# Patient Record
Sex: MALE | Race: WHITE | HISPANIC OR LATINO | Employment: OTHER | URBAN - METROPOLITAN AREA
[De-identification: names, ages, dates, MRNs, and addresses within clinical notes are randomized per-mention and may not be internally consistent; named-entity substitution may affect disease eponyms.]

---

## 2017-01-18 ENCOUNTER — ALLSCRIPTS OFFICE VISIT (OUTPATIENT)
Dept: OTHER | Facility: OTHER | Age: 67
End: 2017-01-18

## 2017-02-27 ENCOUNTER — HOSPITAL ENCOUNTER (OUTPATIENT)
Facility: HOSPITAL | Age: 67
Setting detail: OBSERVATION
Discharge: HOME | End: 2017-02-28
Attending: EMERGENCY MEDICINE | Admitting: INTERNAL MEDICINE
Payer: MEDICARE

## 2017-02-27 ENCOUNTER — APPOINTMENT (EMERGENCY)
Dept: RADIOLOGY | Facility: HOSPITAL | Age: 67
End: 2017-02-27
Payer: MEDICARE

## 2017-02-27 DIAGNOSIS — R07.9 CHEST PAIN: Primary | ICD-10-CM

## 2017-02-27 PROBLEM — R79.89 ABNORMAL TSH: Status: ACTIVE | Noted: 2017-02-27

## 2017-02-27 LAB
ANION GAP SERPL CALCULATED.3IONS-SCNC: 10 MMOL/L (ref 4–13)
APTT PPP: 27 SECONDS (ref 24–36)
BASOPHILS # BLD AUTO: 0.01 THOUSANDS/ΜL (ref 0–0.1)
BASOPHILS NFR BLD AUTO: 0 % (ref 0–1)
BUN SERPL-MCNC: 15 MG/DL (ref 5–25)
CALCIUM SERPL-MCNC: 8.6 MG/DL (ref 8.3–10.1)
CHLORIDE SERPL-SCNC: 108 MMOL/L (ref 100–108)
CO2 SERPL-SCNC: 23 MMOL/L (ref 21–32)
CREAT SERPL-MCNC: 1.15 MG/DL (ref 0.6–1.3)
EOSINOPHIL # BLD AUTO: 0.13 THOUSAND/ΜL (ref 0–0.61)
EOSINOPHIL NFR BLD AUTO: 2 % (ref 0–6)
ERYTHROCYTE [DISTWIDTH] IN BLOOD BY AUTOMATED COUNT: 14.9 % (ref 11.6–15.1)
GFR SERPL CREATININE-BSD FRML MDRD: >60 ML/MIN/1.73SQ M
GLUCOSE SERPL-MCNC: 95 MG/DL (ref 65–140)
HCT VFR BLD AUTO: 37.7 % (ref 36.5–49.3)
HGB BLD-MCNC: 12.9 G/DL (ref 12–17)
INR PPP: 1.03 (ref 0.86–1.16)
LYMPHOCYTES # BLD AUTO: 2.66 THOUSANDS/ΜL (ref 0.6–4.47)
LYMPHOCYTES NFR BLD AUTO: 38 % (ref 14–44)
MCH RBC QN AUTO: 30.5 PG (ref 26.8–34.3)
MCHC RBC AUTO-ENTMCNC: 34.2 G/DL (ref 31.4–37.4)
MCV RBC AUTO: 89 FL (ref 82–98)
MONOCYTES # BLD AUTO: 0.46 THOUSAND/ΜL (ref 0.17–1.22)
MONOCYTES NFR BLD AUTO: 7 % (ref 4–12)
NEUTROPHILS # BLD AUTO: 3.82 THOUSANDS/ΜL (ref 1.85–7.62)
NEUTS SEG NFR BLD AUTO: 53 % (ref 43–75)
NRBC BLD AUTO-RTO: 0 /100 WBCS
PLATELET # BLD AUTO: 282 THOUSANDS/UL (ref 149–390)
PMV BLD AUTO: 9.8 FL (ref 8.9–12.7)
POTASSIUM SERPL-SCNC: 3.6 MMOL/L (ref 3.5–5.3)
PROTHROMBIN TIME: 13.6 SECONDS (ref 12–14.3)
RBC # BLD AUTO: 4.23 MILLION/UL (ref 3.88–5.62)
SODIUM SERPL-SCNC: 141 MMOL/L (ref 136–145)
SPECIMEN SOURCE: NORMAL
TROPONIN I BLD-MCNC: 0.01 NG/ML (ref 0–0.08)
WBC # BLD AUTO: 7.09 THOUSAND/UL (ref 4.31–10.16)

## 2017-02-27 PROCEDURE — 99285 EMERGENCY DEPT VISIT HI MDM: CPT

## 2017-02-27 PROCEDURE — 93005 ELECTROCARDIOGRAM TRACING: CPT | Performed by: EMERGENCY MEDICINE

## 2017-02-27 PROCEDURE — 71020 HB CHEST X-RAY 2VW FRONTAL&LATL: CPT

## 2017-02-27 PROCEDURE — 80048 BASIC METABOLIC PNL TOTAL CA: CPT | Performed by: EMERGENCY MEDICINE

## 2017-02-27 PROCEDURE — 84484 ASSAY OF TROPONIN QUANT: CPT

## 2017-02-27 PROCEDURE — 85610 PROTHROMBIN TIME: CPT | Performed by: EMERGENCY MEDICINE

## 2017-02-27 PROCEDURE — 96361 HYDRATE IV INFUSION ADD-ON: CPT

## 2017-02-27 PROCEDURE — 85730 THROMBOPLASTIN TIME PARTIAL: CPT | Performed by: EMERGENCY MEDICINE

## 2017-02-27 PROCEDURE — 85025 COMPLETE CBC W/AUTO DIFF WBC: CPT | Performed by: EMERGENCY MEDICINE

## 2017-02-27 PROCEDURE — 36415 COLL VENOUS BLD VENIPUNCTURE: CPT | Performed by: EMERGENCY MEDICINE

## 2017-02-27 PROCEDURE — 94760 N-INVAS EAR/PLS OXIMETRY 1: CPT

## 2017-02-27 PROCEDURE — 96374 THER/PROPH/DIAG INJ IV PUSH: CPT

## 2017-02-27 RX ORDER — ASPIRIN 81 MG/1
324 TABLET, CHEWABLE ORAL ONCE
Status: COMPLETED | OUTPATIENT
Start: 2017-02-27 | End: 2017-02-27

## 2017-02-27 RX ORDER — TAMSULOSIN HYDROCHLORIDE 0.4 MG/1
0.4 CAPSULE ORAL
Status: DISCONTINUED | OUTPATIENT
Start: 2017-02-28 | End: 2017-02-28 | Stop reason: HOSPADM

## 2017-02-27 RX ORDER — ACETAMINOPHEN 325 MG/1
650 TABLET ORAL EVERY 4 HOURS PRN
Status: DISCONTINUED | OUTPATIENT
Start: 2017-02-27 | End: 2017-02-28 | Stop reason: HOSPADM

## 2017-02-27 RX ORDER — LISINOPRIL 20 MG/1
20 TABLET ORAL DAILY
Status: DISCONTINUED | OUTPATIENT
Start: 2017-02-28 | End: 2017-02-28 | Stop reason: HOSPADM

## 2017-02-27 RX ORDER — ASPIRIN 81 MG/1
TABLET, CHEWABLE ORAL
Status: COMPLETED
Start: 2017-02-27 | End: 2017-02-27

## 2017-02-27 RX ORDER — LEVALBUTEROL TARTRATE 45 UG/1
1 AEROSOL, METERED ORAL EVERY 6 HOURS PRN
Status: DISCONTINUED | OUTPATIENT
Start: 2017-02-27 | End: 2017-02-27 | Stop reason: SDUPTHER

## 2017-02-27 RX ORDER — ALBUTEROL SULFATE 90 UG/1
2 AEROSOL, METERED RESPIRATORY (INHALATION) EVERY 4 HOURS PRN
Status: DISCONTINUED | OUTPATIENT
Start: 2017-02-27 | End: 2017-02-28 | Stop reason: HOSPADM

## 2017-02-27 RX ORDER — FINASTERIDE 5 MG/1
5 TABLET, FILM COATED ORAL DAILY
COMMUNITY
End: 2018-08-06

## 2017-02-27 RX ORDER — FINASTERIDE 5 MG/1
5 TABLET, FILM COATED ORAL DAILY
Status: DISCONTINUED | OUTPATIENT
Start: 2017-02-28 | End: 2017-02-28 | Stop reason: HOSPADM

## 2017-02-27 RX ORDER — IPRATROPIUM BROMIDE AND ALBUTEROL SULFATE 2.5; .5 MG/3ML; MG/3ML
3 SOLUTION RESPIRATORY (INHALATION) EVERY 6 HOURS PRN
Status: DISCONTINUED | OUTPATIENT
Start: 2017-02-27 | End: 2017-02-27

## 2017-02-27 RX ORDER — ASPIRIN 81 MG/1
81 TABLET, CHEWABLE ORAL DAILY
Status: DISCONTINUED | OUTPATIENT
Start: 2017-02-28 | End: 2017-02-28 | Stop reason: HOSPADM

## 2017-02-27 RX ORDER — ATORVASTATIN CALCIUM 40 MG/1
40 TABLET, FILM COATED ORAL EVERY EVENING
Status: DISCONTINUED | OUTPATIENT
Start: 2017-02-27 | End: 2017-02-28 | Stop reason: HOSPADM

## 2017-02-27 RX ORDER — NITROGLYCERIN 0.4 MG/1
0.4 TABLET SUBLINGUAL
Status: DISCONTINUED | OUTPATIENT
Start: 2017-02-27 | End: 2017-02-28 | Stop reason: HOSPADM

## 2017-02-27 RX ORDER — ONDANSETRON 2 MG/ML
4 INJECTION INTRAMUSCULAR; INTRAVENOUS EVERY 6 HOURS PRN
Status: DISCONTINUED | OUTPATIENT
Start: 2017-02-27 | End: 2017-02-28 | Stop reason: HOSPADM

## 2017-02-27 RX ORDER — NICOTINE 21 MG/24HR
1 PATCH, TRANSDERMAL 24 HOURS TRANSDERMAL DAILY
Status: DISCONTINUED | OUTPATIENT
Start: 2017-02-28 | End: 2017-02-28 | Stop reason: HOSPADM

## 2017-02-27 RX ORDER — CYCLOBENZAPRINE HCL 10 MG
10 TABLET ORAL 3 TIMES DAILY PRN
Status: DISCONTINUED | OUTPATIENT
Start: 2017-02-27 | End: 2017-02-28 | Stop reason: HOSPADM

## 2017-02-27 RX ORDER — NICOTINE 21 MG/24HR
14 PATCH, TRANSDERMAL 24 HOURS TRANSDERMAL ONCE
Status: DISCONTINUED | OUTPATIENT
Start: 2017-02-27 | End: 2017-02-27

## 2017-02-27 RX ORDER — MORPHINE SULFATE 10 MG/ML
6 INJECTION, SOLUTION INTRAMUSCULAR; INTRAVENOUS ONCE
Status: COMPLETED | OUTPATIENT
Start: 2017-02-27 | End: 2017-02-27

## 2017-02-27 RX ORDER — NITROGLYCERIN 0.4 MG/1
0.4 TABLET SUBLINGUAL ONCE
Status: COMPLETED | OUTPATIENT
Start: 2017-02-27 | End: 2017-02-27

## 2017-02-27 RX ADMIN — NITROGLYCERIN 0.4 MG: 0.4 TABLET SUBLINGUAL at 19:54

## 2017-02-27 RX ADMIN — NICOTINE 14 MG: 14 PATCH, EXTENDED RELEASE TRANSDERMAL at 21:23

## 2017-02-27 RX ADMIN — ASPIRIN 324 MG: 81 TABLET, CHEWABLE ORAL at 19:18

## 2017-02-27 RX ADMIN — SODIUM CHLORIDE 1000 ML: 0.9 INJECTION, SOLUTION INTRAVENOUS at 19:20

## 2017-02-27 RX ADMIN — MORPHINE SULFATE 6 MG: 10 INJECTION, SOLUTION INTRAMUSCULAR; INTRAVENOUS at 19:54

## 2017-02-27 RX ADMIN — SODIUM CHLORIDE 1000 ML: 0.9 INJECTION, SOLUTION INTRAVENOUS at 21:05

## 2017-02-28 ENCOUNTER — GENERIC CONVERSION - ENCOUNTER (OUTPATIENT)
Dept: OTHER | Facility: OTHER | Age: 67
End: 2017-02-28

## 2017-02-28 VITALS
RESPIRATION RATE: 18 BRPM | DIASTOLIC BLOOD PRESSURE: 55 MMHG | HEART RATE: 68 BPM | WEIGHT: 166.01 LBS | BODY MASS INDEX: 24.59 KG/M2 | TEMPERATURE: 98.1 F | OXYGEN SATURATION: 97 % | HEIGHT: 69 IN | SYSTOLIC BLOOD PRESSURE: 113 MMHG

## 2017-02-28 PROBLEM — E03.8 SUBCLINICAL HYPOTHYROIDISM: Status: ACTIVE | Noted: 2017-02-27

## 2017-02-28 LAB
ANION GAP SERPL CALCULATED.3IONS-SCNC: 6 MMOL/L (ref 4–13)
ATRIAL RATE: 125 BPM
ATRIAL RATE: 67 BPM
BUN SERPL-MCNC: 12 MG/DL (ref 5–25)
CALCIUM SERPL-MCNC: 8.4 MG/DL (ref 8.3–10.1)
CHLORIDE SERPL-SCNC: 110 MMOL/L (ref 100–108)
CO2 SERPL-SCNC: 26 MMOL/L (ref 21–32)
CREAT SERPL-MCNC: 1.11 MG/DL (ref 0.6–1.3)
ERYTHROCYTE [DISTWIDTH] IN BLOOD BY AUTOMATED COUNT: 15.1 % (ref 11.6–15.1)
GFR SERPL CREATININE-BSD FRML MDRD: >60 ML/MIN/1.73SQ M
GLUCOSE SERPL-MCNC: 97 MG/DL (ref 65–140)
HCT VFR BLD AUTO: 35.7 % (ref 36.5–49.3)
HGB BLD-MCNC: 11.9 G/DL (ref 12–17)
MCH RBC QN AUTO: 30.2 PG (ref 26.8–34.3)
MCHC RBC AUTO-ENTMCNC: 33.3 G/DL (ref 31.4–37.4)
MCV RBC AUTO: 91 FL (ref 82–98)
NT-PROBNP SERPL-MCNC: 68 PG/ML
P AXIS: 29 DEGREES
P AXIS: 65 DEGREES
PLATELET # BLD AUTO: 249 THOUSANDS/UL (ref 149–390)
PMV BLD AUTO: 9.8 FL (ref 8.9–12.7)
POTASSIUM SERPL-SCNC: 4.4 MMOL/L (ref 3.5–5.3)
PR INTERVAL: 116 MS
PR INTERVAL: 132 MS
QRS AXIS: 37 DEGREES
QRS AXIS: 58 DEGREES
QRSD INTERVAL: 84 MS
QRSD INTERVAL: 94 MS
QT INTERVAL: 298 MS
QT INTERVAL: 394 MS
QTC INTERVAL: 416 MS
QTC INTERVAL: 430 MS
RBC # BLD AUTO: 3.94 MILLION/UL (ref 3.88–5.62)
SODIUM SERPL-SCNC: 142 MMOL/L (ref 136–145)
T WAVE AXIS: 141 DEGREES
T WAVE AXIS: 60 DEGREES
T4 FREE SERPL-MCNC: 1.09 NG/DL (ref 0.76–1.46)
TROPONIN I SERPL-MCNC: 0.02 NG/ML
TROPONIN I SERPL-MCNC: 0.03 NG/ML
TROPONIN I SERPL-MCNC: 0.04 NG/ML
TSH SERPL DL<=0.05 MIU/L-ACNC: 4.1 UIU/ML (ref 0.36–3.74)
VENTRICULAR RATE: 125 BPM
VENTRICULAR RATE: 67 BPM
WBC # BLD AUTO: 7.62 THOUSAND/UL (ref 4.31–10.16)

## 2017-02-28 PROCEDURE — 85027 COMPLETE CBC AUTOMATED: CPT | Performed by: INTERNAL MEDICINE

## 2017-02-28 PROCEDURE — G0009 ADMIN PNEUMOCOCCAL VACCINE: HCPCS | Performed by: INTERNAL MEDICINE

## 2017-02-28 PROCEDURE — 83880 ASSAY OF NATRIURETIC PEPTIDE: CPT | Performed by: INTERNAL MEDICINE

## 2017-02-28 PROCEDURE — 84484 ASSAY OF TROPONIN QUANT: CPT | Performed by: INTERNAL MEDICINE

## 2017-02-28 PROCEDURE — 93005 ELECTROCARDIOGRAM TRACING: CPT | Performed by: INTERNAL MEDICINE

## 2017-02-28 PROCEDURE — 80048 BASIC METABOLIC PNL TOTAL CA: CPT | Performed by: INTERNAL MEDICINE

## 2017-02-28 PROCEDURE — 90670 PCV13 VACCINE IM: CPT | Performed by: INTERNAL MEDICINE

## 2017-02-28 PROCEDURE — 84439 ASSAY OF FREE THYROXINE: CPT | Performed by: INTERNAL MEDICINE

## 2017-02-28 PROCEDURE — 84443 ASSAY THYROID STIM HORMONE: CPT | Performed by: INTERNAL MEDICINE

## 2017-02-28 RX ORDER — ATORVASTATIN CALCIUM 40 MG/1
40 TABLET, FILM COATED ORAL DAILY
COMMUNITY

## 2017-02-28 RX ADMIN — ENOXAPARIN SODIUM 40 MG: 40 INJECTION SUBCUTANEOUS at 08:40

## 2017-02-28 RX ADMIN — ATORVASTATIN CALCIUM 40 MG: 40 TABLET, FILM COATED ORAL at 00:28

## 2017-02-28 RX ADMIN — LISINOPRIL 20 MG: 20 TABLET ORAL at 08:39

## 2017-02-28 RX ADMIN — NICOTINE 1 PATCH: 21 PATCH, EXTENDED RELEASE TRANSDERMAL at 08:40

## 2017-02-28 RX ADMIN — METOPROLOL TARTRATE 25 MG: 25 TABLET ORAL at 00:28

## 2017-02-28 RX ADMIN — ASPIRIN 81 MG: 81 TABLET, CHEWABLE ORAL at 08:39

## 2017-02-28 RX ADMIN — PNEUMOCOCCAL 13-VALENT CONJUGATE VACCINE 0.5 ML: 2.2; 2.2; 2.2; 2.2; 2.2; 4.4; 2.2; 2.2; 2.2; 2.2; 2.2; 2.2; 2.2 INJECTION, SUSPENSION INTRAMUSCULAR at 13:06

## 2017-02-28 RX ADMIN — FINASTERIDE 5 MG: 5 TABLET, FILM COATED ORAL at 08:39

## 2017-02-28 RX ADMIN — METOPROLOL TARTRATE 25 MG: 25 TABLET ORAL at 08:39

## 2017-03-17 ENCOUNTER — ALLSCRIPTS OFFICE VISIT (OUTPATIENT)
Dept: OTHER | Facility: OTHER | Age: 67
End: 2017-03-17

## 2017-03-17 DIAGNOSIS — F17.200 NICOTINE DEPENDENCE, UNCOMPLICATED: ICD-10-CM

## 2017-03-17 DIAGNOSIS — R73.03 PREDIABETES: ICD-10-CM

## 2017-03-17 DIAGNOSIS — M54.50 LOW BACK PAIN: ICD-10-CM

## 2017-03-17 DIAGNOSIS — K92.1 MELENA: ICD-10-CM

## 2017-03-20 ENCOUNTER — APPOINTMENT (OUTPATIENT)
Dept: LAB | Facility: HOSPITAL | Age: 67
End: 2017-03-20
Payer: MEDICARE

## 2017-03-20 ENCOUNTER — TRANSCRIBE ORDERS (OUTPATIENT)
Dept: RADIOLOGY | Facility: HOSPITAL | Age: 67
End: 2017-03-20

## 2017-03-20 ENCOUNTER — HOSPITAL ENCOUNTER (OUTPATIENT)
Dept: RADIOLOGY | Facility: HOSPITAL | Age: 67
Discharge: HOME | End: 2017-03-20
Payer: MEDICARE

## 2017-03-20 DIAGNOSIS — M54.50 LOW BACK PAIN: ICD-10-CM

## 2017-03-20 DIAGNOSIS — K92.1 MELENA: ICD-10-CM

## 2017-03-20 LAB
ALBUMIN SERPL BCP-MCNC: 3.3 G/DL (ref 3.5–5)
ALP SERPL-CCNC: 83 U/L (ref 46–116)
ALT SERPL W P-5'-P-CCNC: 21 U/L (ref 12–78)
ANION GAP SERPL CALCULATED.3IONS-SCNC: 8 MMOL/L (ref 4–13)
AST SERPL W P-5'-P-CCNC: 12 U/L (ref 5–45)
BILIRUB SERPL-MCNC: 0.32 MG/DL (ref 0.2–1)
BUN SERPL-MCNC: 13 MG/DL (ref 5–25)
CALCIUM SERPL-MCNC: 8.9 MG/DL (ref 8.3–10.1)
CHLORIDE SERPL-SCNC: 106 MMOL/L (ref 100–108)
CO2 SERPL-SCNC: 28 MMOL/L (ref 21–32)
CREAT SERPL-MCNC: 1.08 MG/DL (ref 0.6–1.3)
GFR SERPL CREATININE-BSD FRML MDRD: >60 ML/MIN/1.73SQ M
GLUCOSE P FAST SERPL-MCNC: 103 MG/DL (ref 65–99)
POTASSIUM SERPL-SCNC: 3.8 MMOL/L (ref 3.5–5.3)
PROT SERPL-MCNC: 6.6 G/DL (ref 6.4–8.2)
SODIUM SERPL-SCNC: 142 MMOL/L (ref 136–145)

## 2017-03-20 PROCEDURE — 36415 COLL VENOUS BLD VENIPUNCTURE: CPT

## 2017-03-20 PROCEDURE — 80053 COMPREHEN METABOLIC PANEL: CPT

## 2017-03-20 PROCEDURE — 72100 X-RAY EXAM L-S SPINE 2/3 VWS: CPT

## 2017-04-06 ENCOUNTER — GENERIC CONVERSION - ENCOUNTER (OUTPATIENT)
Dept: OTHER | Facility: OTHER | Age: 67
End: 2017-04-06

## 2017-04-06 ENCOUNTER — ANESTHESIA (OUTPATIENT)
Dept: GASTROENTEROLOGY | Facility: HOSPITAL | Age: 67
End: 2017-04-06
Payer: MEDICARE

## 2017-04-06 ENCOUNTER — ANESTHESIA EVENT (OUTPATIENT)
Dept: GASTROENTEROLOGY | Facility: HOSPITAL | Age: 67
End: 2017-04-06
Payer: MEDICARE

## 2017-04-06 ENCOUNTER — HOSPITAL ENCOUNTER (OUTPATIENT)
Facility: HOSPITAL | Age: 67
Setting detail: OUTPATIENT SURGERY
Discharge: HOME | End: 2017-04-06
Attending: INTERNAL MEDICINE | Admitting: INTERNAL MEDICINE
Payer: MEDICARE

## 2017-04-06 VITALS
DIASTOLIC BLOOD PRESSURE: 55 MMHG | HEART RATE: 90 BPM | SYSTOLIC BLOOD PRESSURE: 106 MMHG | BODY MASS INDEX: 22.22 KG/M2 | RESPIRATION RATE: 16 BRPM | TEMPERATURE: 98.1 F | HEIGHT: 69 IN | WEIGHT: 150 LBS | OXYGEN SATURATION: 99 %

## 2017-04-06 DIAGNOSIS — K92.1 MELENA: ICD-10-CM

## 2017-04-06 DIAGNOSIS — R63.4 ABNORMAL WEIGHT LOSS: ICD-10-CM

## 2017-04-06 PROCEDURE — 88305 TISSUE EXAM BY PATHOLOGIST: CPT | Performed by: INTERNAL MEDICINE

## 2017-04-06 RX ORDER — SODIUM CHLORIDE 9 MG/ML
125 INJECTION, SOLUTION INTRAVENOUS CONTINUOUS
Status: DISCONTINUED | OUTPATIENT
Start: 2017-04-06 | End: 2017-04-06 | Stop reason: HOSPADM

## 2017-04-06 RX ORDER — PROPOFOL 10 MG/ML
INJECTION, EMULSION INTRAVENOUS AS NEEDED
Status: DISCONTINUED | OUTPATIENT
Start: 2017-04-06 | End: 2017-04-06 | Stop reason: SURG

## 2017-04-06 RX ORDER — PROPOFOL 10 MG/ML
INJECTION, EMULSION INTRAVENOUS CONTINUOUS PRN
Status: DISCONTINUED | OUTPATIENT
Start: 2017-04-06 | End: 2017-04-06 | Stop reason: SURG

## 2017-04-06 RX ORDER — EPHEDRINE SULFATE 50 MG/ML
INJECTION, SOLUTION INTRAVENOUS AS NEEDED
Status: DISCONTINUED | OUTPATIENT
Start: 2017-04-06 | End: 2017-04-06 | Stop reason: SURG

## 2017-04-06 RX ADMIN — EPHEDRINE SULFATE 5 MG: 50 INJECTION, SOLUTION INTRAMUSCULAR; INTRAVENOUS; SUBCUTANEOUS at 15:37

## 2017-04-06 RX ADMIN — PROPOFOL 120 MG: 10 INJECTION, EMULSION INTRAVENOUS at 15:28

## 2017-04-06 RX ADMIN — PROPOFOL 160 MCG/KG/MIN: 10 INJECTION, EMULSION INTRAVENOUS at 15:28

## 2017-04-06 RX ADMIN — SODIUM CHLORIDE: 0.9 INJECTION, SOLUTION INTRAVENOUS at 15:23

## 2017-04-19 ENCOUNTER — GENERIC CONVERSION - ENCOUNTER (OUTPATIENT)
Dept: OTHER | Facility: OTHER | Age: 67
End: 2017-04-19

## 2017-04-25 ENCOUNTER — GENERIC CONVERSION - ENCOUNTER (OUTPATIENT)
Dept: OTHER | Facility: OTHER | Age: 67
End: 2017-04-25

## 2017-12-12 ENCOUNTER — ALLSCRIPTS OFFICE VISIT (OUTPATIENT)
Dept: OTHER | Facility: OTHER | Age: 67
End: 2017-12-12

## 2017-12-12 DIAGNOSIS — I25.5 ISCHEMIC CARDIOMYOPATHY: ICD-10-CM

## 2017-12-13 NOTE — CONSULTS
Assessment    1  Arteriosclerotic coronary artery disease (414 00) (I25 10)   · Got CABG in [facility], follows up closely with Cardiology  Unclear if current symptoms truly represent cardiac etiology (unlikely as they do not occur    with activity, presentation would be atypical given description)  Continue metoprolol 37 5 mg BID, statin and aspirin  Will continue to hold lisinopril given borderline blood pressure, will se Cardiology in July    and they would reevaluate   2  Hyperlipidemia (272 4) (E78 5)   · 10 year ASCVD 19 8%, given clinical ASCVD patient should ideally be on high intensity    lipid lowering therapy  Currenlty on pravastatin 40mg, will change to lipitor 40mg   3  Hypertension (401 9) (I10)   · Currently on metoprolol for CAD, BP borderline and lisinopril remains on hold  REpeated sitting BP was 138/82 HR 72  Standing 122/80 HR: 75     continue current regimen, will see Cardiology in July and would decide if OK to    reintroduce ACEI   4  Cardiomyopathy, ischemic (414 8) (I25 5)   · EF 47% on las echocardiogram  Patient reports ? PND that he describes more as his    mouth being too dry, he is able to do activity (walk many blocks) without getting dyspnea  Continue metoprolol, statin and asa  He does not appear to be volume overloaded    Plan  Arteriosclerotic coronary artery disease    · Metoprolol Tartrate 25 MG Oral Tablet; TAKE 1 TABLET DAILY   Rx By: Russel Yu; Dispense: 30 Days ; #:30 Tablet; Refill: 11; For: Arteriosclerotic coronary artery disease; DANY = N; Sent To: [facility] [place] [address] ; Last Updated By: Yamilka Peters; 12/12/2017 2:27:43 PM   · EKG/ECG- POC; Status:Complete;   Done: 05ETP4080   Perform: In Office; Due:33Tzr4728; Last Updated [place]; 12/12/2017 2:27:33 PM;Ordered; For:Arteriosclerotic coronary artery disease; Ordered By:Jarvis Fournier;  Cardiomyopathy, ischemic    · (1) HEPATIC FUNCTION PANEL; Status:Active;  Requested for:80Yze1678; Perform:[facility]; Due:12Dec2018; Ordered; For:Cardiomyopathy, ischemic; Ordered By:Jarvis Fournier;   · (1) LIPID PANEL, FASTING; Status:Active; Requested for:12Dec2017;    Perform:[facility]; Due:12Dec2018; Ordered;ischemic; Ordered By:Jravis Fournier;   · * NM MYOCARDIAL PERFUSION SPECT (STRESS AND/OR REST); Status:Hold For -Scheduling; Requested for:12Dec2017;    Perform:[facility]; Due:12Dec2018; Ordered;ischemic; Ordered By:Jarvis Fournier;   · ECHO COMPLETE WITH CONTRAST IF INDICATED; Status:Hold For - Scheduling;Requested for:12Dec2017;    Perform:[facility]; Due:12Dec2018; Ordered;ischemic; Ordered By:Jarvis Fournier;   · VAS ABDOMINAL AORTA/ILIACS; COMPLETE STUDY; Status:Hold For - Scheduling;Requested for:12Dec2017;    Perform:[facility]; Due:12Dec2018; Ordered;ischemic; Ordered By:Jarvis Fournier;   · VAS LOWER LIMB ARTERIAL DUPLEX, COMPLETE BILATERAL/GRAFTS; SIDE:Bilateral;Status:Hold For - Scheduling; Requested for:12Dec2017;    Perform:[facility]; Due:12Dec2018; Ordered;ischemic; Ordered By:Jarvis Fournier;   · XR HIP/PELV 1 VW RIGHT W PELVIS IF PERFORMED; Status:Active; Requestedfor:12Dec2017;    Perform:[facility]; Due:12Dec2018; Ordered;ischemic; Ordered By:Jarvis Fournier;   · Follow-up visit in 1 year Evaluation and Treatment  Follow-up  Status: Hold For -Scheduling  Requested for: 12Dec2017   Ordered;Cardiomyopathy, ischemic; Ordered By: Daniel Olmos Performed:  Due: 56FTB4555   · We recommend you quit smoking  Time spent counseling today was greater than 3minutes ; Status:Complete;   Done: 59RFS7927   Ordered;ischemic; Ordered By:Jarvis Fournier; Discussion/Summary    Coronary artery disease, atypical chest discomfort  Favor noninvasive evaluation  Stress test and echocardiogram has been ordered  We will obtain lipid profile as well  Strong recommendation for smoking cessation was given to the patient   The long-term benefits were also explained to the patient  discomfort  Appears to be not claudication, will do noninvasive evaluation given his smoking history and physical evaluation  Chief Complaint                                                here for new patient consult due to coronary disease, pt  had CABG in [place]  Pt  has no complaints  History of Present Illness  Cardiology HPI Free Text Note Form [facility]: Cardiology consultation  55-year-old white male who has extensive cardiac history  Status post bypass x4 in 2008 no details available  He was readmitted about a month later with a sternal infection  No recent cardiac testing, he was admitted to the hospital earlier this year with a chest pain syndrome  He admits to have intermittent chest discomfort which she mostly attributes to emotional distress  He is very active, he states he walks 6-8 miles daily  He is limited by what he describes as significant right hip like discomfort  A suffer some numbness of both feet  He did have a lower extremity duplex couple years ago that suggested no significant obstructive discrete disease  An echocardiogram at that time revealed normal left ventricular systolic function ejection fraction of 50% with mild anterior is septal hypokinesis  His lipids last year revealed total cholesterol 123 with an LDL 70 on high-intensity statin therapy  The patient is a smoker most of his adult life in fact he started at age 6, I did express my displease  Review of Systems     Cardiac: chest pain,-- rhythm problems,-- palpitations present -- and-- AM fatigue, but-- as noted in HPI,-- no fainting/blackouts,-- no heart murmur present,-- no signs of swelling,-- no syncope/fainting-- and-- no witnessed apnea episodes  Skin: No complaints of nonhealing sores or skin rash  ,-- no non healing sores present-- and-- no rashes seen  Genitourinary: prostate problems, but-- no recurrent urinary tract infections,-- no frequent urination at night,-- no difficult urination,-- no blood in urine,-- no kidney stones,-- no loss of bladder control,-- no kidney problems-- and-- no erectile dysfunction  Psychological: No complaints of feeling depressed, anxiety, panic attacks, or difficulty concentrating ,-- no depression,-- no anxiety,-- no panic attacks,-- no difficulty concentrating-- and-- no palpitations present  General: trouble sleeping,-- appetite changes,-- changes in weight lbs-- and-- lack of energy/fatigue, but-- no fever,-- no night sweats-- and-- no frequent infections  Respiratory: cough/sputum, but-- no shortness of breath,-- no wheezing,-- no phlegm-- and-- no hemoptysis  HEENT: No complaints of serious problems, hearing problems, nose problems, throat problems, or snoring ,-- no serious eye problems,-- no hearing problems,-- no nose problems,-- no throat problems-- and-- no snoring  Gastrointestinal: nausea,-- vomiting-- and-- abdonimal pain, but-- no liver problems,-- no heartburn,-- no bloody stools,-- no diarrhea,-- no constipation-- and-- no rectal bleeding  Hematologic: No complaints of bleeding disorders, anemia, blood clots, or excessive brusing ,-- no bleeding disorders,-- no anemia,-- no blood clots-- and-- no excessive bruising  Neurological: numbnes,-- tingling-- and-- headaches, but-- as noted in HPI,-- no weakness,-- no seizures,-- no dizziness,-- no diplopia-- and-- no daytime sleepiness  Musculoskeletal: arthritis-- and-- back pain, but-- no swelling/pain-- No myalgias  Active Problems  1  Arteriosclerotic coronary artery disease (414 00) (I25 10)   2  Benign prostatic hypertrophy (600 00) (N40 0)   3  Biliary colic (401 89) (H58 79)   4  Black tarry stools (578 1) (K92 1)   5  Cardiomyopathy, ischemic (414 8) (I25 5)   6  Chronic low back pain (724 2,338 29) (M54 5,G89 29)   7  Claudication (443 9) (I73 9)   8  Decreased diffusion capacity (794 2) (R94 2)   9  Erectile dysfunction of non-organic origin (302 72) (F52 21)   10   Frequency of urination and polyuria (788 41,788 42) (R35 0,R35 8)   11  GERD without esophagitis (530 81) (K21 9)   12  Hyperlipidemia (272 4) (E78 5)   13  Hypertension (401 9) (I10)   14  Need for prophylactic vaccination and inoculation against influenza (V04 81) (Z23)   15  Nicotine dependence (305 1) (F17 200)   16  Normocytic anemia (285 9) (D64 9)   17  Orthostatic hypotension (458 0) (I95 1)   18  Osteoarthritis (715 90) (M19 90)   19  Other abnormal glucose (790 29) (R73 09)   20  Prediabetes (790 29) (R73 03)   21  Pulmonary embolism (415 19) (I26 99)   22  Right hip pain (719 45) (M25 551)   23  Schizophrenia (295 90) (F20 9)   24  Screening for colorectal cancer (V76 51) (Z12 11,Z12 12)   25  Sessile colonic polyp (211 3) (K63 5)   26  Unintended weight loss (783 21) (R63 4)    Past Medical History   · History of Cardiovascular Stress Test   · History of Echo (2-D)   · Need for prophylactic vaccination and inoculation against influenza (V04 81) (Z23)   · Old myocardial infarction (412) (I25 2)   · History of Pulmonary Function Tests    The active problems and past medical history were reviewed and updated today  Surgical History   · History of CABG    The surgical history was reviewed and updated today  Family History  Mother    · Family history of Type 2 Diabetes Mellitus  Father    · Family history of Heart Disease (V17 49)   · Family history of Type 2 Diabetes Mellitus  Family History Reviewed: The family history was reviewed and updated today  Social History     · Denied: History of Alcohol Use (History)   · Current every day smoker   · Current every day smoker (305 1) (F17 200)   · Denied: History of Drug Use   · Marital History -  (V61 03)   · Nondependent tobacco use disorder (305 1) (F17 200)   · Tobacco use (305 1) (Z72 0)  The social history was reviewed and updated today  Current Meds   1  Aspirin 81 MG Oral Tablet Delayed Release; TAKE 1 TABLET BY MOUTH ONCE DAILY;  Therapy: 34YFP3411 to (Last CE:98AHW2354)  Requested for: 01Jun2017 Ordered   2  Atorvastatin Calcium 40 MG Oral Tablet; TOME 1 TABLET DAILY  1 TABLET DAILY; Therapy: 10EMD3174 to (Evaluate:12Mar2018)  Requested for: 56ZDC2971; Last Rx:17Mar2017 Ordered   3  ClonazePAM 0 5 MG Oral Tablet; Therapy: 97MVS9049 to Recorded   4  Finasteride 5 MG Oral Tablet; NUBIA 1 TABLETA POR VIA ORAL DIARIAMENTE KLAUDIA LAS INSTRUCCIONESTABLET BY MOUTH ONCE A DAY AS DIRECTED; Therapy: 26ANM7190 to [phone])  Requested for: 84DFA7709; Last Rx:17Mar2017 Ordered   5  Lisinopril 5 MG Oral Tablet; TAKE 1 TABLET DAILY; Therapy: 23TEI8125 to (05 12 73 93 30)  Requested for: 96DDO6299; Last Rx:27Oct2016 Ordered   6  Metoprolol Tartrate 25 MG Oral Tablet; TAKE 1 TABLET TWICE DAILY; Therapy: 16Apr2012 to (05 12 73 93 30)  Requested for: 41CXK2657; Last Rx:27Oct2016 Ordered   7  Miniprin Low Dose 81 MG Oral Tablet Delayed Release; NUBIA 1 TABLETA POR VIA ORAL DIARIAMENTE AFTER A MEALONE TABLETBY MOUTH ONCE A DAY AFTER A MEAL; Therapy: 50Ryw6550 to (Evaluate:08Dih9111)  Requested for: 95Luj7396; Last Rx:55Poa4706 Ordered   8  Nitroglycerin 0 4 MG/HR Transdermal Patch 24 Hour; APPLY PATCH FOR 12 TO 14 HOURS DAILY, THEN REMOVE; Therapy: 16NKG8955 to (Evaluate:22Oct2017)  Requested for: 90YPL0405; Last Rx:27Oct2016 Ordered   9  Pantoprazole Sodium 40 MG Oral Tablet Delayed Release; TAKE 1 TABLET DAILY; Therapy: 63COE2787 to (Evaluate:99Hrn7955)  Requested for: 03Rpf4396; Last Rx:33Hlp7745 Ordered   10  PEG-3350/Electrolytes 236 GM Oral Solution Reconstituted; TAKE AS DIRECTED; Therapy: 29LXK4347 to (Last Rx:24Mar2017)  Requested for: 24Mar2017 Ordered   11  Proventil  (90 Base) MCG/ACT Inhalation Aerosol Solution; INHALE 2 PUFFS  EVERY 4-6 HOURS AS NEEDED; Therapy: 19Wyn6517 to (Johnathan Champagne)  Requested for: 38JHR3957 Recorded   12  Sertraline HCl - 100 MG Oral Tablet; Therapy: 99AKR0647 to Recorded   13   Tamsulosin HCl - 0 4 MG Oral Capsule; TOME 1 CAPSULA POR VIA ORAL TODOS LOS  D? AS1 CAPSULE BY MOUTH DAILY; Therapy: 09IVL9256 to (Evaluate:01Ztd6918)  Requested for: 40Iqs4196; Last  Rx:80Lik6579 Ordered   14  TraZODone HCl - 100 MG Oral Tablet; Therapy: 02FMD8652 to Recorded   15  Tylenol 325 MG Oral Capsule; take 1 capsule every 4 hours as needed; Therapy: 57VKO7752 to (Last Rx:18Jan2017) Ordered   16  Ventolin  (90 Base) MCG/ACT Inhalation Aerosol Solution; INHALE 2 PUFFS  EVERY 4 HOURS AS NEEDED; Therapy: 67MLV3465 to (Last Rx:87Oyz7595)  Requested for: 15BOR2516 Ordered    The medication list was reviewed and updated today  Allergies  1  No Known Drug Allergies    Vitals  Signs     Heart Rate: 90, Apical  Pulse Quality: Regular, Apical  Systolic: 971, RUE, Sitting  Diastolic: 70, RUE, Sitting  Height: 5 ft 6 5 in  Weight: 156 lb 5 oz  BMI Calculated: 24 85  BSA Calculated: 1 81    Physical Exam   Constitutional  General appearance: No acute distress, well appearing and well nourished  appears healthy,-- within normal limits of ideal weight,-- well hydrated-- and-- appearance reflects stated age  Eyes  Conjunctiva and Sclera examination: Conjunctiva pink, sclera anicteric  both conjunctiva were norml and pink  Ears, Nose, Mouth, and Throat - Oropharynx: Clear, nares are clear, mucous membranes are moist  Inspection of the oropharynx showed visible hard and soft palate, upper portion of tonsils and uvula (Mallampati class 2)  Oral mucosa was not pale  Neck  Neck and thyroid: Normal, supple, trachea midline, no thyromegaly  The neck was normal in appearance-- and-- was supple  the trachea was midline  Jugular Veins: the JVP was not elevated,-- no sustained hepatojugular reflux-- and-- no prominent V waves  The thyroid was normal-- and-- was not enlarged  There were no thyroid nodules  Pulmonary  Respiratory effort: No increased work of breathing or signs of respiratory distress    Respiratory rate: normal  Assessment of respiratory effort revealed normal rhythm and effort  Auscultation of lungs: Clear to auscultation, no rales, no rhonchi, no wheezing, good air movement  Auscultation of the lungs revealed no expiratory wheezing,-- normal expiratory time-- and-- no inspiratory wheezing  no rales or crackles were heard bilaterally  no rhonchi  no friction rub  no wheezing  no diminished breath sounds  no bronchial breath sounds  Cardiovascular  Palpation of heart: Normal PMI, no thrills  The PMI was palpated at the 5th LICS in the midclavicular line  The apical impulse was normal  no precordial heave was noted  Auscultation of heart: Normal rate and rhythm, normal S1 and S2, no murmurs  The heart rate was normal  The rhythm was regular with premature beats  Heart sounds: normal S1,-- normal S2,-- no gallop heard,-- no S3,-- no S4-- and-- the heart sounds were not distant  No pericardial rub  no murmurs were heard  Carotid pulses: Normal, 2+ bilaterally  right 2+,-- no bruit heard over the right carotid,-- left 2+-- and-- no bruit heard over the left carotid  Peripheral vascular exam: Normal pulses throughout, no tenderness, erythema or swelling  Radial: right 2+,-- left 2+ Femoral: right 2+,-- no bruit heard over the right femoral artery,-- left 2+,-- no bruit heard over the left femoral artery Posterior tibialis: right 1+,-- left 1+ Dorsalis pedis: right 2+,-- left 0 Capillary refill: capillary refill of the fingers was normal  no pitting edema present  no varicosital changes  Abdominal aorta: Normal    Chest - Chest: Normal   Abdomen  Abdomen: Non-tender and no distention  Liver and spleen: No hepatomegaly or splenomegaly  Musculoskeletal Digits and nails: Normal without clubbing or cyanosis  Examination of the extremities revealed no fingernail clubbing-- and-- well perfused fingers  Skin - Skin and subcutaneous tissue: Normal without rashes or lesions  Skin is warm and well perfused, normal turgor  Skin Inspection: normal color and pigmentation,-- no cyanosis-- and-- no diaphoresis  Neurologic - Speech: Normal    Psychiatric - Orientation to person, place, and time: Normal -- Mood and affect: Normal       Results/Data   Rhythm and rate:  normal sinus rhythm  Ectopic Beats: Occasional atrial premature contractions are present  T waves:   there are nonspecific ST-T wave changes  End of Encounter Meds    1  Aspirin 81 MG Oral Tablet Delayed Release; TAKE 1 TABLET BY MOUTH ONCE DAILY; Therapy: 67BCI6827 to (Last Rx:01Jun2017)  Requested for: 01Jun2017 Ordered   2  Metoprolol Tartrate 25 MG Oral Tablet; TAKE 1 TABLET DAILY; Therapy: 29Siq3056 to (Evaluate:79Mln5296)  Requested for: 89Vrm7416; Last Rx:15Elx5047; Status: ACTIVE - Retrospective Authorization, Transmit to Pharmacy - Awaiting Verification Ordered   3  Miniprin Low Dose 81 MG Oral Tablet Delayed Release; NUBIA 1 TABLETA POR VIA ORAL DIARIAMENTE AFTER A MEALONE TABLETBY MOUTH ONCE A DAY AFTER A MEAL; Therapy: 89Edx5813 to (Evaluate:73Cwq9669)  Requested for: 03Kex0752; Last Rx:25Aug2015 Ordered   4  Nitroglycerin 0 4 MG/HR Transdermal Patch 24 Hour; APPLY PATCH FOR 12 TO 14 HOURS DAILY, THEN REMOVE; Therapy: 61CHB2803 to (Evaluate:22Oct2017)  Requested for: 35KJA9261; Last Rx:27Oct2016 Ordered    5  Finasteride 5 MG Oral Tablet (Proscar); NUBIA 1 TABLETA POR VIA ORAL DIARIAMENTE KLAUDIA LAS INSTRUCCIONESTABLET BY MOUTH ONCE A DAY AS DIRECTED; Therapy: 17ARU9126 to [phone])  Requested for: 35SKY7507; Last Rx:17Mar2017 Ordered   6  Tamsulosin HCl - 0 4 MG Oral Capsule; TOME 1 CAPSULA POR VIA ORAL TODOS LOS D? AS1 CAPSULE BY MOUTH DAILY; Therapy: 15JCM8379 to (Evaluate:45Zyp1699)  Requested for: 65Ffr5509; Last Rx:68Jak4900 Ordered    7  Lisinopril 5 MG Oral Tablet; TAKE 1 TABLET DAILY; Therapy: 82KSH1262 to [phone])  Requested for: 39YEY5194; Last Rx:27Oct2016 Ordered    8   Proventil  (90 Base) MCG/ACT Inhalation Aerosol Solution; INHALE 2 PUFFS EVERY 4-6 HOURS AS NEEDED; Therapy: 05Lon2823 to (Maricarmen Horn)  Requested for: 43IRA8399 Recorded    9  Ventolin  (90 Base) MCG/ACT Inhalation Aerosol Solution; INHALE 2 PUFFS EVERY 4 HOURS AS NEEDED; Therapy: 22MAY9328 to (Last Rx:98Pbd8524)  Requested for: 69WJY6213 Ordered    10  Pantoprazole Sodium 40 MG Oral Tablet Delayed Release; TAKE 1 TABLET DAILY; Therapy: 00YFI1030 to (Evaluate:69Dwo8611)  Requested for: 46Wln7155; Last  Rx:92Zvo8613 Ordered    11  Atorvastatin Calcium 40 MG Oral Tablet; TOME 1 TABLET DAILY  1 TABLET DAILY; Therapy: 81PDQ6924 to (Evaluate:12Mar2018)  Requested for: 97MLL7097; Last  Rx:17Mar2017 Ordered    12  Tylenol 325 MG Oral Capsule; take 1 capsule every 4 hours as needed; Therapy: 47RTH9181 to (Last Rx:18Jan2017) Ordered    13  PEG-3350/Electrolytes 236 GM Oral Solution Reconstituted; TAKE AS DIRECTED; Therapy: 70LQE4706 to (Last Rx:24Mar2017)  Requested for: 24Mar2017 Ordered    14  ClonazePAM 0 5 MG Oral Tablet; Therapy: 81GZE9519 to Recorded   15  Sertraline HCl - 100 MG Oral Tablet; Therapy: 72KJM9911 to Recorded   16  TraZODone HCl - 100 MG Oral Tablet;   Therapy: 81FEH2382 to Recorded    Signatures   Electronically signed by : CRYSTAL Garcia ; Dec 12 2017  2:53PM EST                       (Author)

## 2018-01-10 NOTE — MISCELLANEOUS
Dear Daryl Meza,  [address] [facility] office has attempted to contact you regarding results  Please give our office a call back at [phone]    Thank you,  [facility]      Electronically signed by:Julia Thomson  1:58PM EST Author

## 2018-01-12 NOTE — PROGRESS NOTES
Assessment    1  Nicotine dependence (305 1) (F17 200)   · Currently on smoking cessation clinic, he has not decided a kait to completely quit but      has cut down the amount of cigarretes to 10-15 per day   2  Acute lumbar back pain (724 2) (M54 5)   3  Other abnormal glucose (790 29) (R73 09)    Plan  Acute lumbar back pain    · Cyclobenzaprine HCl - 10 MG Oral Tablet; TAKE 1 TABLET AT BEDTIME AS NEEDED   · Naproxen 500 MG Oral Tablet; TAKE 1 TABLET 3 TIMES DAILY AS NEEDED  Acute lumbar back pain, Other abnormal glucose    · (1) HEMOGLOBIN A1C; Status:Active; Requested [facility]:[id number];   Need for prophylactic vaccination and inoculation against influenza    · Flulaval Quadrivalent Intramuscular Suspension    Discussion/Summary  Discussion Summary:   1  Acute lumbar back pain: The patient reports having new onset bilateral lumbar back pain starting one week ago  He first noticed the pain after waking one morning, and denies remembering any specific inciting event  He states the pain has been worsening and he has significant pain when rising from a seated position and when exercising  He states the pain is relieved by sitting or laying  He denies any acute neurological symptoms and has no incontinence of bowel or bladder  He is tender to palpation of bilateral paraspinal lumbar muscles and has exacerbation of his pain with straight leg raise  His presentation is likely secondary to lumbar muscle strain   -Will start Naproxen for 5 days  -Flexeril 5mg QHS PRN  -Moist heat  -Was counseled on lower back exercises    Nicotine dependance: On nicotine patch and currently down from 2ppd to 1ppd  Continue nicotine patch    CAD s/p CABG: Patient denies chest pain or dyspnea and states that he is happy with his current treatment regimen  He follows up with cardiology and has an appointment with them in one month  Pre-diabetes: Has diagnosis of pre-diabetes, and has not had a recent A1C  Will get A1C        Chief Complaint  Chief Complaint Free Text Note Form: Back pain      History of Present Illness  HPI: The patient is a 60year old man presents for evaluation of back pain  He states that the pain began about one week ago  He states that there was no trigger to the pain and it began when he awoke from sleep and that it has progressively worsened to the point where he is currently having pain walking  He has not tried any medication for the pain  The pain is relieved when he is sitting or laying, but standing from a sitting position or exercise exacerbates the pain  He states the pain is solely localized to his lower back area and he denies radiation down the leg, paresthesias, or incontinence  He denies fever, chills, nausea, vomiting, chest pain or dyspnea above his baseline  Also, the patient reports that he is currently using the nicotine patch to help with smoking cessation and that he is currently down from 2ppd to barely 1ppd  [facility] Based Practices Required Assessment:   Pain Assessment   the patient states they have pain  The pain is located in the lower back paijn  (on a scale of 0 to 10, the patient rates the pain at 10 )    Prefered Language is  Zimbabwean  Primary Language is  Zimbabwean  Active Problems    1  Arteriosclerotic coronary artery disease (414 00) (I25 10)   2  Benign prostatic hypertrophy (600 00) (N40 0)   3  Biliary colic (335 77) (L48 53)   4  Claudication (443 9) (I73 9)   5  Decreased diffusion capacity (794 2) (R94 2)   6  Dyspnea on exertion (786 09) (R06 09)   7  Erectile dysfunction of non-organic origin (302 72) (F52 21)   8  Frequency of urination and polyuria (788 41,788 42) (R35 0,R35 8)   9  GERD without esophagitis (530 81) (K21 9)   10  Hyperlipidemia (272 4) (E78 5)   11  Hypertension (401 9) (I10)   12  Ischemic cardiomyopathy (414 8) (I25 5)   13  Need for prophylactic vaccination and inoculation against influenza (V04 81) (Z23)   14   Nicotine dependence (305 1) (F17 200)   15  Orthostatic hypotension (458 0) (I95 1)   16  Osteoarthritis (715 90) (M19 90)   17  Other abnormal glucose (790 29) (R73 09)   18  Pulmonary embolism (415 19) (I26 99)   19  Schizophrenia (295 90) (F20 9)    Past Medical History    1  History of Cardiovascular Stress Test   2  History of Echo (2-D)   3  Need for prophylactic vaccination and inoculation against influenza (V04 81) (Z23)   4  Old myocardial infarction (412) (I25 2)   5  History of Pulmonary Function Tests    Surgical History    1  History of CABG    Family History  Mother    1  Family history of Type 2 Diabetes Mellitus  Father    2  Family history of Heart Disease (V17 49)   3  Family history of Type 2 Diabetes Mellitus    Social History    · Denied: History of Alcohol Use (History)   · Current every day smoker   · Current every day smoker (305 1) (F17 200)   · Denied: History of Drug Use   · Marital History -  (V61 03)   · Nondependent tobacco use disorder (305 1) (F17 200)   · Tobacco use (305 1) (Z72 0)  Social History Reviewed: The social history was reviewed and updated today  The social history was reviewed and is unchanged  Current Meds   1  Aspirin 81 MG TABS; TAKE 1 TABLET DAILY AFTER A MEAL; Therapy: 73TLZ3771 to (Evaluate:51Sdd1030)  Requested for: 07Bmp4374; Last Rx:44Uzz5601   Ordered   2  Atorvastatin Calcium 40 MG Oral Tablet; TOME 1 TABLET DAILY  TAKE 1 TABLET DAILY; Therapy: 03DED9061 to (Evaluate:69Rjs2429)  Requested for: 81Zhc0089; Last Rx:31Tlj3725   Ordered   3  ClonazePAM 0 5 MG Oral Tablet; Therapy: 87JTL9884 to Recorded   4  Finasteride 5 MG Oral Tablet; NUBIA 1 TABLETA POR VIA ORAL DIARIAMENTE KLAUDIA LAS   INSTRUCCIONESTAKEONE TABLET BY MOUTH ONCE A DAY AS DIRECTED; Therapy: 15LYY9472 to (Evaluate:74Lus4373)  Requested for: 16NQO9233; Last UM:42FYK4800   Ordered   5  Lisinopril 5 MG Oral Tablet; TAKE 1 TABLET DAILY;    Therapy: 41YAD2474 to (Evaluate:72Wnr7348)  Requested for: 13XCI2403; Last Rx:04Ywk8237   Ordered   6  Metoprolol Tartrate 25 MG Oral Tablet; TAKE 1 TABLET TWICE DAILY; Therapy: 10Swm1782 to (Evaluate:72Nzj1337)  Requested for: 57Kfb4287; Last Rx:44Yjn6225   Ordered   7  Miniprin Low Dose 81 MG Oral Tablet Delayed Release; NUBIA 1 TABLETA POR VIA ORAL   DIARIAMENTE AFTER A MEALTAKE ONE TABLETBY MOUTH ONCE A DAY AFTER A MEAL; Therapy: 11Uqm9123 to (Evaluate:07Tsn9082)  Requested for: 42Lgn7380; Last Rx:23Hmk9835   Ordered   8  Nitroglycerin 0 4 MG/HR Transdermal Patch 24 Hour; APPLY PATCH FOR 12 TO 14 HOURS DAILY,   THEN REMOVE;   Therapy: 26OPN7125 to (Evaluate:38Fmb0135)  Requested for: 50Cgw8071; Last Rx:05Zkm6691   Ordered   9  Pantoprazole Sodium 40 MG Oral Tablet Delayed Release; TAKE 1 TABLET DAILY; Therapy: 58TIR3093 to (Evaluate:14Lkt7439)  Requested for: 26Vvu1644; Last Rx:76Yih8176   Ordered   10  Proventil  (90 Base) MCG/ACT Inhalation Aerosol Solution; INHALE 2 PUFFS EVERY 4-6    HOURS AS NEEDED; Therapy: 14Ech4483 to (Clara Shown)  Requested for: 79MZZ4257 Recorded   11  Sertraline HCl - 100 MG Oral Tablet; Therapy: 28ZTF6733 to Recorded   12  Tamsulosin HCl - 0 4 MG Oral Capsule; TOME 1 CAPSULA POR VIA ORAL TODOS LOS D? ASTAKE 1    CAPSULE BY MOUTH DAILY; Therapy: 26EZB4643 to (Evaluate:72Sbv7357)  Requested for: 80Zra5161; Last Rx:17Hyd1713    Ordered   13  TraZODone HCl - 100 MG Oral Tablet; Therapy: 53QNU9107 to Recorded   14  Ventolin  (90 Base) MCG/ACT Inhalation Aerosol Solution; INHALE 2 PUFFS EVERY 4 HOURS    AS NEEDED; Therapy: 81VJP5787 to (Last Rx:36Yjk8593)  Requested for: 76DJC3751 Ordered   15  Xarelto 20 MG Oral Tablet; NUBIA 1 TABLETA POR VIA ORAL TODOS LOS D? ASTAKE ONE    TABLET BY MOUTH DAILY; Therapy: 05Brg9565 to (Evaluate:14Jun2017)  Requested for: 31Vst6728; Last Rx:18Mza6799    Ordered    Allergies    1   No Known Drug Allergies    Vitals  Vital Signs    Recorded: 28PZY7283 53:49UB   Systolic 562 Diastolic 60   Heart Rate 98   Temperature 97 7 F   Height 5 ft 8 in   Weight 169 lb 12 05 oz   BMI Calculated 25 81   BSA Calculated 1 91     Physical Exam    Constitutional   General appearance: No acute distress, well appearing and well nourished  Pulmonary   Respiratory effort: No increased work of breathing or signs of respiratory distress  Auscultation of lungs: Clear to auscultation, equal breath sounds bilaterally, no wheezes, no rales, no rhonci  Cardiovascular   Auscultation of heart: Normal rate and rhythm, normal S1 and S2, without murmurs  Abdomen   Abdomen: Non-tender, no masses  Musculoskeletal   Gait and station: Abnormal   Walks gingerly  Inspection/palpation of joints, bones, and muscles: Abnormal   Pain to palpation of paraspinal muscles in the lumbar region  No vertebral tenderness  Positive straight leg raise  Neurologic   Reflexes: 2+ and symmetric  Psychiatric   Orientation to person, place and time: Normal     Mood and affect: Normal          Attending Note  Attending Note: Attending Note: I discussed the case with the Resident and reviewed the Resident's note, I supervised the Resident and I agree with the Resident management plan as it was presented to me  Level of Participation: I was present in clinic and examined the patient  Patient's History: 59 yo M with h/o CAD and nicotine dependence who presents for eval and mgmt of acute LBP  Pain onset asso with lifting  Key Parts of the Exam: PE- STR positive and reproducible paraspinal muscle tenderness  Diagnosis and Plan: Acute LBP- will use muscle relaxant at HS, short course of NSAID (limited d/t concurrent use of A/C) and moist heat 20 minutes 5-6 x/day  Also reviewed exercises for pt to due in ~2 wks when sx has abated  I agree with the Resident's note        Future Appointments    Date/Time Provider Specialty Site   10/27/2016 09:00 AM Adriana Ford MD Internal Medicine ST [address] [place] [address] Signatures   Electronically signed by : CRYSTAL Grey ; Oct 19 2016  9:19AM EST                       (Author)    Electronically signed by : CRYSTAL Stuart ; Oct 19 2016 10:03AM EST                       (Author)

## 2018-01-14 VITALS
BODY MASS INDEX: 24.78 KG/M2 | SYSTOLIC BLOOD PRESSURE: 90 MMHG | TEMPERATURE: 97.4 F | HEART RATE: 80 BPM | HEIGHT: 67 IN | DIASTOLIC BLOOD PRESSURE: 60 MMHG | WEIGHT: 157.85 LBS

## 2018-01-16 NOTE — PROGRESS NOTES
Plan  Arteriosclerotic coronary artery disease    · Metoprolol Tartrate 25 MG Oral Tablet; TAKE 1 TABLET TWICE DAILY   Rx By: Jenny Villar; Dispense: 30 Days ; #:60 Tablet; Refill: 11; For: Arteriosclerotic coronary artery disease; DANY = N; Verified Transmission to [place] [address]; Last Updated By: System, SureScript[employer]; 8/18/2016 10:07:17 AM   · Nitroglycerin 0 4 MG/HR Transdermal Patch 24 Hour; APPLY PATCH FOR 12 TO  14 HOURS DAILY, THEN REMOVE   Rx By: eJnny Villar; Dispense: 30 Days ; #:30 Patch 24 Hour; Refill: 11; For: Arteriosclerotic coronary artery disease; DANY = N; Verified Transmission to [place] [address]; Last Updated By: System, SureScripts; 8/18/2016 10:07:18 AM   · Xarelto 20 MG Oral Tablet; NUBIA 1 TABLETA POR VIA ORAL TODOS LOS  D? ASTAKE ONE TABLET BY MOUTH DAILY   Rx By: Jenny Villar; Dispense: 30 Days ; #:1 X 30 Tablet Bottle; Refill: 9; For: Arteriosclerotic coronary artery disease; DANY = N; Verified Transmission to [place] [address]; Last Updated By: System, SureScripts; 8/18/2016 10:07:24 AM   · (1) LIPID PANEL FASTING W DIRECT LDL REFLEX; Status:Resulted - Requires  Verification;   Done: 03XYK2556 10:17AM   Due:08Fkp4396;Ordered; For:Arteriosclerotic coronary artery disease; Ordered By:Daniel Quinn;  Hyperlipidemia    · Atorvastatin Calcium 40 MG Oral Tablet; TOME 1 TABLET DAILY  TAKE 1 TABLET  DAILY   Rx By: Jenny Villar; Dispense: 30 Days ; #:1 X 90 Tablet Bottle; Refill: 2; For: Hyperlipidemia; DANY = N; Verified Transmission to [place] [address]; Last Updated By: System, SureScripts; 8/18/2016 10:07:25 AM  Ischemic cardiomyopathy    · Lisinopril 5 MG Oral Tablet; TAKE 1 TABLET DAILY   Rx By: Jenny Villar; Dispense: 30 Days ; #:30 Tablet; Refill: 11;  For: Ischemic cardiomyopathy; DANY = N; Verified Transmission to [place] [address]; Last Updated By: System, SureScripts; 8/18/2016 10:07:18 AM   · EKG/ECG- POC; Status:Complete;   Done: 69WHI9656   Perform: In Office; Due:38Xsq7021; Last Updated By:Kendrick Wheat [facility]; 8/18/2016 11:28:34 AM;Ordered; For:Ischemic cardiomyopathy; Ordered By:Daniel Quinn;    Discussion/Summary  Cardiology Discussion Summary Free Text Note Form [facility]:   Assessment;   1  CAD s/p CABG x 4 ([place] 2007)   2  HX of Left subsegmental PE- 9/6/2015  3  Questionable hx of Paroxysmal Atrial fibrillation- 9/6/2015  4  Ischemic cardiomyopathy  5  LE claudication  6  Hypertension  7  Hyperlipidemia  8  Extensive tobacco abuse    Plan: Mr Priscilla Zafar is doing relatively well  He denies any atypical chest pain recently  He is on a nitro patch and states that he has relief from it when he wears it and pain and claudication when doesn't  He was advised to continue this at this time  On the next visit we can attempt to titrate the nitro patch down to assess his symptoms further  Additionally we will check a lipid panel  He was strongly advised to quit smoking  We will see him back in 2 months  He was advised to bring all his medications to the next visit  History of Present Illness  Cardiology HPI Free Text Note Form [facility]: 59 yr old Persian speaking male with pmhx as below presents for a routine follow up  He is doing well with no significant chest pain or shortness of breath  He describes very occasional but stable atypical chest pain with no reported triggers and quick resolution  His exercise tolerance is stable and he denies any recent weight gain, PND, LE edema or any other active concerns  He was placed on the yennifer patch to treat the typical components of his chest pain and states that he feels much better when he is on the patch  He states that he has chest pain and claudication when he doesn't use the patch  He has been compliant with all of his medications however did run out of metoprolol and lisinopril recently  (There has been some reported dizziness with lisinopril)   He is still smoking 1-2ppd- he rolls his own cigarettes  He states he is interested in cutting back and is using the nicotine patch and smoking at the same time  PMHx:   >> CAD s/p CABG x 4 (2007 [facility])- Revision after 1 month for possible sternal infection    >>Spect (2/2015): Small, mild fixed apical defect consistent with apical thinning artifact  No evidence of lexiscan induced ischemia or scar  LVEF 58%  There was image artifact, without diagnostic evidence for perfusion abnormality  >>Arterial duplex with ABIs 2/2015: LL limb: Minimal diffuse disease in the femoral and popliteal arteries  Â Ankle/Brachial index: 1 04, Prior 1 19 ; RL Limb: Minimal diffuse disease in the femoral and popliteal arteries  Â Ankle/Brachial index: 0 94 , Prior 1 12   Hospital Based Practices Required Assessment:   Pain Assessment   the patient states they do not have pain  (on a scale of 0 to 10, the patient rates the pain at 0 )   Abuse And Domestic Violence Screen    Yes, the patient is safe at home  The patient states no one is hurting them  (Patient states is having issues with wife at home that has him feeling very stressed )    Depression And Suicide Screen  No, the patient has not had thoughts of hurting themself  Yes, the patient has felt depressed in the past 7 days  Prefered Language is  [facility] [place]  Primary Language is  Japanese  Review of Systems  Cardiology Male ROS:     Cardiac: No complaints of chest pain, no palpitations, no fainiting  Active Problems  Problems    1  Arteriosclerotic coronary artery disease (414 00) (I25 10)   2  Benign prostatic hypertrophy (600 00) (N40 0)   3  Biliary colic (003 77) (P04 12)   4  Claudication (443 9) (I73 9)   5  Decreased diffusion capacity (794 2) (R94 2)   6  Dyspnea on exertion (786 09) (R06 09)   7  Erectile dysfunction of non-organic origin (302 72) (F52 21)   8  Frequency of urination and polyuria (788 41,788 42) (R35 0,R35 8)   9   GERD without esophagitis (530 81) (K21 9)   10  Hyperlipidemia (272 4) (E78 5)   11  Hypertension (401 9) (I10)   12  Ischemic cardiomyopathy (414 8) (I25 5)   13  Need for prophylactic vaccination and inoculation against influenza (V04 81) (Z23)   14  Nicotine dependence (305 1) (F17 200)   15  Orthostatic hypotension (458 0) (I95 1)   16  Osteoarthritis (715 90) (M19 90)   17  Prediabetes (790 29) (R73 09)   18  Pulmonary embolism (415 19) (I26 99)   19  Schizophrenia (295 90) (F20 9)    Past Medical History  Problems    1  History of Cardiovascular Stress Test   2  History of Echo (2-D)   3  Need for prophylactic vaccination and inoculation against influenza (V04 81) (Z23)   4  Old myocardial infarction (412) (I25 2)   5  History of Pulmonary Function Tests    Surgical History  Problems    1  History of CABG    Family History  Mother    1  Family history of Type 2 Diabetes Mellitus  Father    2  Family history of Heart Disease (V17 49)   3  Family history of Type 2 Diabetes Mellitus    Social History  Problems    · Denied: History of Alcohol Use (History)   · Current every day smoker   · Current every day smoker (305 1) (F17 200)   · Denied: History of Drug Use   · Marital History -  (V61 03)   · Nondependent tobacco use disorder (305 1) (Z72 0)   · Tobacco use (305 1) (Z72 0)    Current Meds   1  Aspirin 81 MG TABS; TAKE 1 TABLET DAILY AFTER A MEAL; Therapy: 14ZUW0438 to (Evaluate:45Lll0112)  Requested for: 48Cej3922; Last   Rx:30Avs7651 Ordered   2  Atorvastatin Calcium 40 MG Oral Tablet; TOME 1 TABLET DAILY  TAKE 1 TABLET DAILY; Therapy: 59DXU3421 to (Evaluate:44Oqm0909)  Requested for: 76Ags1472; Last   Rx:83Xqp2323 Ordered   3  ClonazePAM 0 5 MG Oral Tablet; Therapy: 04JZA6479 to Recorded   4  Finasteride 5 MG Oral Tablet; NUBIA 1 TABLETA POR VIA ORAL DIARIAMENTE KLAUDIA   LAS INSTRUCCIONESTAKEONE TABLET BY MOUTH ONCE A DAY AS DIRECTED;    Therapy: 99UXY9532 to (Evaluate:83Qdu0937)  Requested for: 58DVY3735; Last   ZH:43TIY6261 Ordered   5  Lisinopril 5 MG Oral Tablet; TAKE 1 TABLET DAILY; Therapy: 99OSR0831 to (Evaluate:67Wpu1572)  Requested for: 17YGN1225; Last   Rx:86Bxu5113 Ordered   6  Metoprolol Tartrate 25 MG Oral Tablet; TAKE 1 TABLET TWICE DAILY; Therapy: 09Gpj6948 to (Evaluate:99Ttq4086)  Requested for: 54YCB7996; Last   Rx:57Iit8591 Ordered   7  Miniprin Low Dose 81 MG Oral Tablet Delayed Release; NUBIA 1 TABLETA POR VIA   ORAL DIARIAMENTE AFTER A MEALTAKE ONE TABLETBY MOUTH ONCE A DAY AFTER   A MEAL; Therapy: 41Bmy8237 to (Evaluate:99Ayk9623)  Requested for: 22Mkd1879; Last   Rx:72Arh2226 Ordered   8  Nitroglycerin 0 4 MG/HR Transdermal Patch 24 Hour; APPLY PATCH FOR 12 TO 14   HOURS DAILY, THEN REMOVE;   Therapy: 33OKP2571 to (Mellissa Cortez)  Requested for: 47NRU3460; Last   Rx:05Jun2015 Ordered   9  Pantoprazole Sodium 40 MG Oral Tablet Delayed Release; TAKE 1 TABLET DAILY; Therapy: 17VRG0758 to (Evaluate:10Nov2016)  Requested for: 11LXU2430; Last   Rx:16Nov2015 Ordered   10  Proventil  (90 Base) MCG/ACT Inhalation Aerosol Solution; INHALE 2 PUFFS    EVERY 4-6 HOURS AS NEEDED; Therapy: 79Tcf3979 to (Beckie Mcarthur)  Requested for: 44MYS9443 Recorded   11  Sertraline HCl - 100 MG Oral Tablet; Therapy: 40OGP0903 to Recorded   12  Tamsulosin HCl - 0 4 MG Oral Capsule; TOME 1 CAPSULA POR VIA ORAL TODOS LOS    D? ASTAKE 1 CAPSULE BY MOUTH DAILY; Therapy: 19KFC3274 to (Evaluate:17Qzh5282)  Requested for: 75Uwi7071; Last    Rx:14Flj5614 Ordered   13  TraZODone HCl - 100 MG Oral Tablet; Therapy: 00KMA9411 to Recorded   14  Ventolin  (90 Base) MCG/ACT Inhalation Aerosol Solution; INHALE 2 PUFFS    EVERY 4 HOURS AS NEEDED; Therapy: 67NGS3990 to (Last Rx:15Sei9303)  Requested for: 62JIX5936 Ordered   15  Xarelto 20 MG Oral Tablet; NUBIA 1 TABLETA POR VIA ORAL TODOS LOS D? ASTAKE    ONE TABLET BY MOUTH DAILY;     Therapy: 00Uid3313 to (Oscar Bose)  Requested for: 29TKJ1072; Last Rx:28Bkw4089 Ordered    Allergies  Medication    1  No Known Drug Allergies    Vitals  Vital Signs    Recorded: 21XGD4585 32:10LP   Systolic 556   Diastolic 78   Heart Rate 96   Temperature 98 5 F   Height 5 ft 7 72 in   Weight 174 lb 9 65 oz   BMI Calculated 26 77   BSA Calculated 1 92     Physical Exam    Constitutional   General appearance: No acute distress, well appearing and well nourished  Pulmonary   Respiratory effort: No increased work of breathing or signs of respiratory distress  Auscultation of lungs: Clear to auscultation, no rales, no rhonchi, no wheezing, good air movement  Cardiovascular   Auscultation of heart: Normal rate and rhythm, normal S1 and S2, no murmurs  Examination of extremities for edema and/or varicosities: Normal        Results/Data  (1) LIPID PANEL FASTING W DIRECT LDL REFLEX 77Mio2128 10:17AM Mildred Mcfadden Order Number: [id number]     Test Name Result Flag Reference   CHOLESTEROL 123 mg/dL     LDL CHOLESTEROL CALCULATED 67 mg/dL  0-100   - Patient Instructions: This is a fasting blood test  Water, black tea or black coffee only after 9:00pm the night before test   Drink 2 glasses of water the morning of test     - Patient Instructions:  This is a fasting blood test  Water, black tea or black coffee only after 9:00pm the night before test Drink 2 glasses of water the morning of test   Triglyceride:         Normal              <150 mg/dl       Borderline High    150-199 mg/dl       High               200-499 mg/dl       Very High          >499 mg/dl  Cholesterol:         Desirable        <200 mg/dl      Borderline High  200-239 mg/dl      High             >239 mg/dl  HDL Cholesterol:        High    >59 mg/dL      Low     <41 mg/dL  LDL Cholesterol:        Optimal          <100 mg/dl        Near Optimal     100-129 mg/dl        Above Optimal          Borderline High   130-159 mg/dl          High              160-189 mg/dl          Very High >189 mg/dl  LDL CALCULATED:    This screening LDL is a calculated result  It does not have the accuracy of the Direct Measured LDL in the monitoring of patients with hyperlipidemia and/or statin therapy  Direct Measure LDL (SZA382) must be ordered separately in these patients  TRIGLYCERIDES 109 mg/dL  <=150   Specimen collection should occur prior to N-Acetylcysteine or Metamizole administration due to the potential for falsely depressed results  HDL,DIRECT 34 mg/dL L 40-60   Specimen collection should occur prior to Metamizole administration due to the potential for falsely depressed results  Future Appointments    Date/Time Provider Specialty Site   10/27/2016 09:00 AM Eliseo Kate MD Internal Medicine [facility] [address] [place] [address]   10/19/2016 08:05 AM Bianca Jin Dr PCP     Signatures   Electronically signed by : Letta Moritz, MD; Aug 25 2016  9:22AM EST                       (Author)    Electronically signed by : CRYSTAL Coronel ; Aug 25 2016  9:31AM EST                       (Author)

## 2018-01-16 NOTE — PROGRESS NOTES
Assessment    1  Epistaxis (784 7) (R04 0)   2  Right hip pain (719 45) (M25 551)   3  Foreign body of hand, right (914 6) (S60 551A)   4  Other abnormal glucose (790 29) (R73 09)    Plan  Foreign body of hand, right, Other abnormal glucose    · Tylenol 325 MG Oral Capsule; take 1 capsule every 4 hours as needed   · 1 - Amada BRINK, Harjeet Newsome  (Orthopedic Surgery) Physician Referral  Consult Only: the expectation is  that the referring provider will communicate back to the patient on treatment options  Evaluation and  Treatment: the expectation is that the referred to provider will communicate back to the patient on  treatment options  Status: Active  Requested for: 02JQD2288  Care Summary provided  : Yes    Discussion/Summary  Discussion Summary: 1  Right hip pain: The patient reports experiencing right hip pain since he had a mechanical fall down a flight of steps two weeks ago  He states that the pain has improved over the past two weeks, but is still experiencing pain on palpation of the trochanteric region of the right hip, and has pain while laying down for an extended period of time  He is currently taking no medications for this issue  His presentation is concerning for trochanteric bursitis vs  muscle strain  , as the patient has pain to palpation over the right trochanter and has no radiographic evidence of fracture in the area  -Will give tylenol   -Can apply moist heat to the area    2  Right hand mass and pain: The patient noticed development of a painful mass proximal to his 5th MCP since his fall 2 weeks ago  On Xr of the hand, it was described as a radio-opaque foreign body  There was no skin lesion present which may have introduced this lesion at the time of the fall   -Will refer to orthopedic surgery and use tylenol for pain    3  Lumbar back pain: The patient has been experiencing low back pain since his fall two weeks ago   This pain is located in the lumbar paravertebral musculature with associated muscle tightness  He denies any new neurologic symptoms  His pain is likely due to muscle strain secondary to his fall  He was advised to use tylenol and moist heat for this issue  If the pain does not improve in the next 2 weeks he was instructed to return to the clinic for further evaluation  4  Pre-diabetes: The patient had hemoglobin A1C of 6 4 in October and attests to trying to follow a healthy diet since that time  The patient has been counseled on the importance of following a healthy diet and exercise  Will follow-up A1C in 3 months    5  Subclinical Hypothyroidism: The patient had increased TSH of 6 46 in October  He denies hair loss, weight change, nausea, vomiting, diarrhea or constipation    -Will monitor    6  Epistaxis: The patient reports that he has had several episodes of nosebleed per day for the past year  He has not noticed any bleeding from other sources and denies hematuria, hematochezia or melena  He was recently receiving Xarelto for his history of PE, but that was discontinued several months ago per cardiology  He states that the bleeding stops with pressure and is aggravated by dry air  He had only mild rhinorrhea with no evidence of bleeding on nasal examination   -Will continue to monitor  Counseling Documentation With Imm: The patient was counseled regarding importance of compliance with treatment  Chief Complaint  Chief Complaint Free Text Note Form: Hip and hand pain      History of Present Illness  HPI: The patient is a 73year old male who presents s/p fall fown 9 stairs on 12/31  He ws seen in the emergency department after this incident, and at that time described having a mechanical fall where he hit his hip, hand, and head while falling  At that time, CT of the head, and xray of the hip and right hand showed no acute pathology, although there was a 5mm metallic foreign object present in the hand   He was gien oxycodone while in the ED for his pain, and was discharged home  Over the past two weeks, the patient states that his pain is improving, but is still bothering him  He has not taken anything for the pain currently, and states that the right hip pain worsens upon palpation and when he lays down for too long  Also, The patient reports experiencing daily nosebleeds for the past year  He states that these nosebleeds occur at random times throughout the day and usually stop with the application of pressure to the area  He denies hematuria, hematochezia or melena, but does reports bleeding for 10-15 minutes if he happens to cut himself  He denies chest pain, shortness of breath, fever, chills,nausea, vomiting, diarrhea, or constipation  Hospital Based Practices Required Assessment:   Pain Assessment   the patient states they have pain  The pain is located in the patient states pain in back  (on a scale of 0 to 10, the patient rates the pain at 8 )    Prefered Language is  Anguillan  Primary Language is  Anguillan  Review of Systems  Complete-Male:   Constitutional: No fever or chills, feels well, no tiredness, no recent weight gain or weight loss  Eyes: No complaints of eye pain, no red eyes, no discharge from eyes, no itchy eyes  ENT: nosebleeds and nasal discharge  Cardiovascular: No complaints of slow heart rate, no fast heart rate, no chest pain, no palpitations, no leg claudication, no lower extremity  Respiratory: cough  Gastrointestinal: No complaints of abdominal pain, no constipation, no nausea or vomiting, no diarrhea or bloody stools  Genitourinary: No complaints of dysuria, no incontinence, no hesitancy, no nocturia, no genital lesion, no testicular pain  Neurological: No compliants of headache, no confusion, no convulsions, no numbness or tingling, no dizziness or fainting, no limb weakness, no difficulty walking     Psychiatric: Is not suicidal, no sleep disturbances, no anxiety or depression, no change in personality, no emotional problems  Active Problems    1  Acute lumbar back pain (724 2) (M54 5)   2  Arteriosclerotic coronary artery disease (414 00) (I25 10)   3  Benign prostatic hypertrophy (600 00) (N40 0)   4  Biliary colic (079 36) (K45 41)   5  Claudication (443 9) (I73 9)   6  Decreased diffusion capacity (794 2) (R94 2)   7  Dyspnea on exertion (786 09) (R06 09)   8  Erectile dysfunction of non-organic origin (302 72) (F52 21)   9  Frequency of urination and polyuria (788 41,788 42) (R35 0,R35 8)   10  GERD without esophagitis (530 81) (K21 9)   11  Hyperlipidemia (272 4) (E78 5)   12  Hypertension (401 9) (I10)   13  Ischemic cardiomyopathy (414 8) (I25 5)   14  Need for prophylactic vaccination and inoculation against influenza (V04 81) (Z23)   15  Nicotine dependence (305 1) (F17 200)   16  Orthostatic hypotension (458 0) (I95 1)   17  Osteoarthritis (715 90) (M19 90)   18  Other abnormal glucose (790 29) (R73 09)   19  Pulmonary embolism (415 19) (I26 99)   20  Schizophrenia (295 90) (F20 9)    Past Medical History    1  History of Cardiovascular Stress Test   2  History of Echo (2-D)   3  Need for prophylactic vaccination and inoculation against influenza (V04 81) (Z23)   4  Old myocardial infarction (412) (I25 2)   5  History of Pulmonary Function Tests    Surgical History    1  History of CABG    Family History  Mother    1  Family history of Type 2 Diabetes Mellitus  Father    2  Family history of Heart Disease (V17 49)   3  Family history of Type 2 Diabetes Mellitus    Social History    · Denied: History of Alcohol Use (History)   · Current every day smoker   · Current every day smoker (305 1) (F17 200)   · Denied: History of Drug Use   · Marital History -  (V61 03)   · Nondependent tobacco use disorder (305 1) (F17 200)   · Tobacco use (305 1) (Z72 0)  Social History Reviewed: The social history was reviewed and updated today  The social history was reviewed and is unchanged  Current Meds   1  Aspirin 81 MG Oral Tablet Delayed Release; TAKE 1 TABLET BY MOUTH ONCE DAILY; Therapy: 18XEN9379 to (Last Rx:27Oct2016) Ordered   2  Atorvastatin Calcium 40 MG Oral Tablet; TOME 1 TABLET DAILY  TAKE 1 TABLET DAILY; Therapy: 39YGZ5903 to (Evaluate:25Jan2017)  Requested for: 27Oct2016; Last Rx:27Oct2016   Ordered   3  ClonazePAM 0 5 MG Oral Tablet; Therapy: 24ZTO9716 to Recorded   4  Finasteride 5 MG Oral Tablet; NUBIA 1 TABLETA POR VIA ORAL DIARIAMENTE KLAUDIA LAS   INSTRUCCIONESTAKEONE TABLET BY MOUTH ONCE A DAY AS DIRECTED; Therapy: 19OXD0210 to (Evaluate:81Wdi5182)  Requested for: 22UWU2954; Last MX:43UZX3779   Ordered   5  Lisinopril 5 MG Oral Tablet; TAKE 1 TABLET DAILY; Therapy: 98TWS7935 to (21 273 )  Requested for: 55XMP4006; Last Rx:27Oct2016   Ordered   6  Metoprolol Tartrate 25 MG Oral Tablet; TAKE 1 TABLET TWICE DAILY; Therapy: 69Dkj6898 to (21 273 )  Requested for: 53MDX4404; Last Rx:27Oct2016   Ordered   7  Miniprin Low Dose 81 MG Oral Tablet Delayed Release; NUBIA 1 TABLETA POR VIA ORAL   DIARIAMENTE AFTER A MEALTAKE ONE TABLETBY MOUTH ONCE A DAY AFTER A MEAL; Therapy: 38Kno1107 to (Evaluate:61Cne8031)  Requested for: 95Hzq3191; Last Rx:60Nbk7001   Ordered   8  Nitroglycerin 0 4 MG/HR Transdermal Patch 24 Hour; APPLY PATCH FOR 12 TO 14 HOURS DAILY,   THEN REMOVE;   Therapy: 95NOC5501 to (Evaluate:22Oct2017)  Requested for: 52RQN4233; Last Rx:27Oct2016   Ordered   9  Pantoprazole Sodium 40 MG Oral Tablet Delayed Release; TAKE 1 TABLET DAILY; Therapy: 70PUH8536 to (Evaluate:41Zdl8599)  Requested for: 28Ayd4359; Last Rx:10Bbs0315   Ordered   10  Proventil  (90 Base) MCG/ACT Inhalation Aerosol Solution; INHALE 2 PUFFS EVERY 4-6    HOURS AS NEEDED; Therapy: 90Gzr5154 to (Lara Jacobo)  Requested for: 68OUX8307 Recorded   11  Sertraline HCl - 100 MG Oral Tablet; Therapy: 84QGW6962 to Recorded   12   Tamsulosin HCl - 0 4 MG Oral Capsule; TOME 1 CAPSULA POR VIA ORAL TODOS LOS D? ASTAKE 1    CAPSULE BY MOUTH DAILY; Therapy: 88WFQ2711 to (Evaluate:43Mje1851)  Requested for: 23Fxa5282; Last Rx:30Wbl7614    Ordered   13  TraZODone HCl - 100 MG Oral Tablet; Therapy: 09HWW2627 to Recorded   14  Ventolin  (90 Base) MCG/ACT Inhalation Aerosol Solution; INHALE 2 PUFFS EVERY 4 HOURS    AS NEEDED; Therapy: 72SRC6544 to (Last Rx:30Eqe3426)  Requested for: 27BFZ5863 Ordered    Allergies    1  No Known Drug Allergies    Vitals  Vital Signs    Recorded: 91IDU7188 02:17PM   Temperature 97 6 F   Heart Rate 60   Systolic 582   Diastolic 74   Height 5 ft 7 5 in   Weight 166 lb 3 62 oz   BMI Calculated 25 65   BSA Calculated 1 88     Physical Exam    Constitutional   General appearance: No acute distress, well appearing and well nourished  Ears, Nose, Mouth, and Throat   External inspection of ears and nose: Abnormal   Rhinorrhea present  Pulmonary   Respiratory effort: No increased work of breathing or signs of respiratory distress  Auscultation of lungs: Clear to auscultation, equal breath sounds bilaterally, no wheezes, no rales, no rhonci  Cardiovascular   Palpation of heart: Normal PMI, no thrills  Auscultation of heart: Normal rate and rhythm, normal S1 and S2, without murmurs  Examination of extremities for edema and/or varicosities: Normal     Musculoskeletal   Gait and station: Normal     Inspection/palpation of joints, bones, and muscles: Abnormal   Approximately 1cm mass located on the dorsal aspect of his right hand that is tender to palpation  Skin   Skin and subcutaneous tissue: Normal without rashes or lesions      Psychiatric   Orientation to person, place and time: Normal     Mood and affect: Normal          Attending Note  Attending Note: Attending Note: I interviewed and examined the patient, I discussed the case with the Resident and reviewed the Resident's note, I supervised the Resident and I agree with the Resident management plan as it was presented to me  Level of Participation: I was present in clinic and examined the patient  Patient's History: I confirmed the history  Used translation phone  Patient had mild back pain prior to his fall but it increased in the lumbar paraverterbral area after the fall  Key Parts of the Exam: ENT: rhinorrhea- clear nasal discharge; Right hand: palpable tender area on the lateral aspect; Heart reg, normal S1,S2; Lungs: clear; Back: mild, paravertebral tenderness to palpation in upper lumbar area  Diagnosis and Plan: Agree with plan  Comments/Additional Findings: Encouraged patient to continue to monitor his diet for his pre-diabetes        Future Appointments    Date/Time Provider Specialty Site   04/27/2017 09:00 AM Nora Altamirano MD Internal Medicine [facility]   04/19/2017 08:05 AM CRYSTAL Becker   [address] [facility] PCP     Signatures   Electronically signed by : CRYSTAL Grey ; Jan 18 2017  3:27PM EST                       (Author)    Electronically signed by : CRYSTAL Grey ; Jan 18 2017  3:56PM EST                       (Author)    Electronically signed by : CRYSTAL Jones ; Jan 18 2017  4:10PM EST                       (Co-participant)

## 2018-01-16 NOTE — RESULT NOTES
Verified Results  (1) TISSUE EXAM 06Apr2017 03:42PM Italo Augustin     Test Name Result Flag Reference   LAB AP CASE REPORT (Report)     Surgical Pathology Report             Case: [id number]                   Authorizing Provider: Genia Kay MD      Collected:      04/06/2017 1542        Ordering Location:   [address] [place] [address]   Received:      04/07/2017 [place] [address] End[facility]                               Pathologist:      Deirdre Higuera MD                                 Specimen:  Polyp, Colorectal, Sigmoid colon polyp-cold bx   LAB AP FINAL DIAGNOSIS (Report)     A  Sigmoid colon polyp (cold biopsy):    - Polypoid colonic mucosa with surface hyperplasia  - Few mucosal muciphages noted  - No high-grade dysplasia or malignancy identified  Electronically signed by Deirdre Higuera MD on 4/11/2017 at 2:59 PM  Preliminary result electronically signed by Deirdre Higuera MD on 4/11/2017 at 1:30 PM   LAB AP NOTE      Interpretation performed at [facility], [address] [place] [address]  LAB AP SURGICAL ADDITIONAL INFORMATION (Report)     These tests were developed and their performance characteristics   determined by [facility] or [facility]  They may not be cleared or approved by the [place]  Food and   Drug Administration  The FDA has determined that such clearance or   approval is not necessary  These tests are used for clinical purposes  They should not be regarded as investigational or for research  This   laboratory has been approved by CLIA 88, designated as a high-complexity   laboratory and is qualified to perform these tests  LAB AP GROSS DESCRIPTION (Report)     A  The specimen is received in formalin, labeled with the patient's name   and hospital number, and is designated sigmoid colon polyp biopsy  The   specimen consists of 2 tan-pink soft tissue fragments each measuring 0 2   cm in greatest dimension  Entirely submitted   One cassette  Note: The estimated total formalin fixation time based upon information   provided by the submitting clinician and the standard processing schedule   is 22 25 hours  MAS   LAB AP CLINICAL INFORMATION      Melena  ???  Abnormal weight loss     polyp

## 2018-01-17 NOTE — PROGRESS NOTES
Plan  Arteriosclerotic coronary artery disease    · Aspirin 81 MG TABS   Rx By: Daphnie Mota; Dispense: 30 Days ; #:30 Tablet; Refill: 11; For: Arteriosclerotic coronary artery disease; DANY = N; Sent To: [facility] [id number]; Last Updated By: Hector Monson; 10/27/2016 9:44:24 AM   · Xarelto 20 MG Oral Tablet   Rx By: Hector Monson; Dispense: 30 Days ; #:1 X 30 Tablet Bottle; Refill: 9; For: Arteriosclerotic coronary artery disease; DANY = N; Sent To: [facility]   · Aspirin 81 MG Oral Tablet Delayed Release; TAKE 1 TABLET BY MOUTH ONCE  DAILY   Rx By: Hector Monson; Dispense: 0 Days ; #:1 X 60 Tablet Delayed Release Bottle; Refill: 3; For: Arteriosclerotic coronary artery disease; DANY = N; Faxed To: [facility]   · Metoprolol Tartrate 25 MG Oral Tablet; TAKE 1 TABLET TWICE DAILY   Rx By: Hector Monson; Dispense: 30 Days ; #:60 Tablet; Refill: 11; For: Arteriosclerotic coronary artery disease; DANY = N; Faxed To: [facility]   · Nitroglycerin 0 4 MG/HR Transdermal Patch 24 Hour; APPLY PATCH FOR 12 TO  14 HOURS DAILY, THEN REMOVE   Rx By: Hector Monson; Dispense: 30 Days ; #:30 Patch 24 Hour; Refill: 11; For: Arteriosclerotic coronary artery disease; DANY = N; Faxed To: [facility]  Hyperlipidemia    · Atorvastatin Calcium 40 MG Oral Tablet; TOME 1 TABLET DAILY  TAKE 1 TABLET  DAILY   Rx By: Hector Monson; Dispense: 30 Days ; #:1 X 90 Tablet Bottle; Refill: 2; For: Hyperlipidemia; DANY = N; [facility]  Ischemic cardiomyopathy    · Lisinopril 5 MG Oral Tablet; TAKE 1 TABLET DAILY   Rx By: Hector Monson; Dispense: 30 Days ; #:30 Tablet; Refill: 11; For: Ischemic cardiomyopathy; DANY = N; Faxed To: [facility] [address]    Discussion/Summary  Cardiology Discussion Summary Free Text Note Form [facility]:   Assessment;   1  CAD s/p CABG x 4 (SD 2007)   2   HX of Left subsegmental PE- 9/6/2015  3  Questionable hx of Paroxysmal Atrial fibrillation- 9/6/2015  4  Ischemic cardiomyopathy  5  LE claudication  6  Hypertension  7  Hyperlipidemia  8  Extensive tobacco abuse    Plan: Mr Valdez Ferrer was recently admitted with atypical chest pain in the setting of emotional distress  ACS rule out was negative and he was discharged  CTA was unrevealing for PE  He has completed over 6 months of anticoagulation for the PE  I reviewed his ekg's from 9/2015 and all since and there is no evidence of atrial fibrillation  His ekgs are consistent with nsr with PAC's  We will discontinue xarelto at this time  Would continue aspirin, atorvastatin, lisinopril, and metoprolol  Will have him follow in 3 months  smoking cessation was again emphasized  History of Present Illness  Cardiology HPI Free Text Note Form [facility]: 59 yr old Tamazight speaking male with pmhx CAD s/p CABGx4 and hx of PE on xarelto who presents for a routine follow up  He was recently admitted at [facility] for chest pain  Workup was negative for ACS with troponinx3 negativee  He had reportedly had a dispute with his girlfriend prior to the onset of the pain  CTA in the ed was done and ruled out PE  He states that his pain has resolved at this time  He is currently on Xarelto for a left subsegmental PE diagnosed in 9/2015 as well as a questionable history of atrial fibrillation  He deneis any other concerns at this time  PMHx:   >> CAD s/p CABG x 4 (2007 in [place])- Revision after 1 month for possible sternal infection    >>Spect (2/2015): Small, mild fixed apical defect consistent with apical thinning artifact  No evidence of lexiscan induced ischemia or scar  LVEF 58%  There was image artifact, without diagnostic evidence for perfusion abnormality  >>Arterial duplex with ABIs 2/2015: LL limb: Minimal diffuse disease in the femoral and popliteal arteries   Â Ankle/Brachial index: 1 04, Prior 1 19 ; RL Limb: Minimal diffuse disease in the femoral and popliteal arteries  Â Ankle/Brachial index: 0 94 , Prior 1 12 Â     CTA Chest: 9/2015: tiny subsegmental left upper lobe PE    Echo: EF: 50% with mild hypokinesis of the distal anterosteptum   Hospital Based Practices Required Assessment:   Pain Assessment   the patient states they do not have pain  The pain is located in the pt states low back pain x 3 weeks  (on a scale of 0 to 10, the patient rates the pain at 0 )   Abuse And Domestic Violence Screen    Yes, the patient is safe at home  The patient states no one is hurting them  (Patient states is having issues with wife at home that has him feeling very stressed )    Depression And Suicide Screen  No, the patient has not had thoughts of hurting themself  Yes, the patient has felt depressed in the past 7 days  Prefered Language is  [facility] [place]  Primary Language is  Yakut  Review of Systems  Cardiology Male ROS:     Cardiac: No complaints of chest pain, no palpitations, no fainiting  Active Problems  Problems    1  Acute lumbar back pain (724 2) (M54 5)   2  Arteriosclerotic coronary artery disease (414 00) (I25 10)   3  Benign prostatic hypertrophy (600 00) (N40 0)   4  Biliary colic (741 82) (P93 29)   5  Claudication (443 9) (I73 9)   6  Decreased diffusion capacity (794 2) (R94 2)   7  Dyspnea on exertion (786 09) (R06 09)   8  Erectile dysfunction of non-organic origin (302 72) (F52 21)   9  Frequency of urination and polyuria (788 41,788 42) (R35 0,R35 8)   10  GERD without esophagitis (530 81) (K21 9)   11  Hyperlipidemia (272 4) (E78 5)   12  Hypertension (401 9) (I10)   13  Ischemic cardiomyopathy (414 8) (I25 5)   14  Need for prophylactic vaccination and inoculation against influenza (V04 81) (Z23)   15  Nicotine dependence (305 1) (F17 200)   16  Orthostatic hypotension (458 0) (I95 1)   17  Osteoarthritis (715 90) (M19 90)   18  Other abnormal glucose (790 29) (R73 09)   19  Pulmonary embolism (415 19) (I26 99)   20  Schizophrenia (295 90) (F20 9)    Past Medical History  Problems    1  History of Cardiovascular Stress Test   2  History of Echo (2-D)   3  Need for prophylactic vaccination and inoculation against influenza (V04 81) (Z23)   4  Old myocardial infarction (412) (I25 2)   5  History of Pulmonary Function Tests    Surgical History  Problems    1  History of CABG    Family History  Mother    1  Family history of Type 2 Diabetes Mellitus  Father    2  Family history of Heart Disease (V17 49)   3  Family history of Type 2 Diabetes Mellitus    Social History  Problems    · Denied: History of Alcohol Use (History)   · Current every day smoker   · Current every day smoker (305 1) (F17 200)   · Denied: History of Drug Use   · Marital History -  (V61 03)   · Nondependent tobacco use disorder (305 1) (F17 200)   · Tobacco use (305 1) (Z72 0)  Social History Reviewed: The social history was reviewed and updated today  Current Meds   1  Aspirin 81 MG TABS; TAKE 1 TABLET DAILY AFTER A MEAL; Therapy: 84FAJ7005 to (Evaluate:20Xlz7709)  Requested for: 68Zof5662; Last   Rx:[id number] Ordered   2  Atorvastatin Calcium 40 MG Oral Tablet; TOME 1 TABLET DAILY  TAKE 1 TABLET DAILY; Therapy: 51WJA6731 to (Evaluate:64Mtx0172)  Requested for: 39Wrw4072; Last   Rx:[id number] Ordered   3  ClonazePAM 0 5 MG Oral Tablet; Therapy: 44JMX6738 to Recorded   4  Cyclobenzaprine HCl - 10 MG Oral Tablet; TAKE 1 TABLET AT BEDTIME AS NEEDED; Therapy: 27VZV9353 to (Wai Singh)  Requested for: 83LWR1963; Last   Rx:[id number] Ordered   5  Finasteride 5 MG Oral Tablet; NUBIA 1 TABLETA POR VIA ORAL DIARIAMENTE KLAUDIA   LAS INSTRUCCIONESTAKEONE TABLET BY MOUTH ONCE A DAY AS DIRECTED; Therapy: 38OFQ7837 to (Evaluate:13Caa0646)  Requested for: 36EVW6131; Last   YT:36PPQ1247 Ordered   6  Lisinopril 5 MG Oral Tablet; TAKE 1 TABLET DAILY;    Therapy: 93WXT5609 to (Evaluate:10Lce1970)  Requested for: 41Xhn3263; Last   Rx:[id number] Ordered 7  Metoprolol Tartrate 25 MG Oral Tablet; TAKE 1 TABLET TWICE DAILY; Therapy: 32Tad6123 to (Evaluate:54Ebp3074)  Requested for: 52Jdw7818; Last   Rx:[id number] Ordered   8  Miniprin Low Dose 81 MG Oral Tablet Delayed Release; NUBIA 1 TABLETA POR VIA   ORAL DIARIAMENTE AFTER A MEALTAKE ONE TABLETBY MOUTH ONCE A DAY AFTER   A MEAL; Therapy: 75Yay9478 to (Evaluate:85Qfd9735)  Requested for: 37Xrm0458; Last   Rx:[id number] Ordered   9  Naproxen 500 MG Oral Tablet; TAKE 1 TABLET 3 TIMES DAILY AS NEEDED; Therapy: 21TNI1076 to (03 17 74 30 53)  Requested for: 78Pql8862; Last   Rx:[id number] Ordered   10  Nitroglycerin 0 4 MG/HR Transdermal Patch 24 Hour; APPLY PATCH FOR 12 TO 14    HOURS DAILY, THEN REMOVE;    Therapy: 60LLN2275 to (Evaluate:87Wty2652)  Requested for: 13Cnf5811; Last    Rx:[id number] Ordered   11  Pantoprazole Sodium 40 MG Oral Tablet Delayed Release; TAKE 1 TABLET DAILY; Therapy: 81HTJ7742 to (Evaluate:15Qbu5766)  Requested for: 01Iur2738; Last    Rx:[id number] Ordered   12  Proventil  (90 Base) MCG/ACT Inhalation Aerosol Solution; INHALE 2 PUFFS    EVERY 4-6 HOURS AS NEEDED; Therapy: 12Hny2922 to (Donnamarie Rubinstein)  Requested for: 56NCP4827 Recorded   13  Sertraline HCl - 100 MG Oral Tablet; Therapy: 49CNX1407 to Recorded   14  Tamsulosin HCl - 0 4 MG Oral Capsule; TOME 1 CAPSULA POR VIA ORAL TODOS LOS    D? ASTAKE 1 CAPSULE BY MOUTH DAILY; Therapy: 64RSR0242 to (Evaluate:61Cje4262)  Requested for: 14Obt5849; Last    Rx:[id number] Ordered   15  TraZODone HCl - 100 MG Oral Tablet; Therapy: 47IWQ3828 to Recorded   16  Ventolin  (90 Base) MCG/ACT Inhalation Aerosol Solution; INHALE 2 PUFFS    EVERY 4 HOURS AS NEEDED; Therapy: 26RXZ4180 to (Last Rx:24Wmt6778)  Requested for: 18UZH0598 Ordered   17  Xarelto 20 MG Oral Tablet; NUBIA 1 TABLETA POR VIA ORAL TODOS LOS D? ASTAKE    ONE TABLET BY MOUTH DAILY;     Therapy: 01Zei1621 to (Evaluate:14Jun2017)  Requested for: 25NLW4496; Last    Rx:[id number] Ordered    Allergies  Medication    1  No Known Drug Allergies    Vitals  Vital Signs    Recorded: 82XKX7243 17:46WG   Systolic 025   Diastolic 70   Heart Rate 74   Temperature 97 6 F   Height 5 ft 7 5 in   Weight 166 lb 3 62 oz   BMI Calculated 25 65   BSA Calculated 1 88     Physical Exam    Constitutional   General appearance: No acute distress, well appearing and well nourished  Pulmonary   Respiratory effort: No increased work of breathing or signs of respiratory distress  Auscultation of lungs: Clear to auscultation, no rales, no rhonchi, no wheezing, good air movement  Cardiovascular   Auscultation of heart: Normal rate and rhythm, normal S1 and S2, no murmurs  Examination of extremities for edema and/or varicosities: Normal        Future Appointments    Date/Time Provider Specialty Site   04/27/2017 09:00 AM Birdie Koch MD Internal Medicine [address]     Signatures   Electronically signed by : Charity Epstein MD; Nov 6 2016  9:39PM EST                       (Author)    Electronically signed by : CRYSTAL Lizama ; Nov 9 2016  7:19AM EST

## 2018-01-22 VITALS
SYSTOLIC BLOOD PRESSURE: 110 MMHG | TEMPERATURE: 97.6 F | BODY MASS INDEX: 25.19 KG/M2 | HEIGHT: 68 IN | WEIGHT: 166.23 LBS | DIASTOLIC BLOOD PRESSURE: 74 MMHG | HEART RATE: 60 BPM

## 2018-01-22 VITALS
BODY MASS INDEX: 24.53 KG/M2 | WEIGHT: 156.31 LBS | HEIGHT: 67 IN | DIASTOLIC BLOOD PRESSURE: 70 MMHG | HEART RATE: 90 BPM | SYSTOLIC BLOOD PRESSURE: 120 MMHG

## 2018-02-15 ENCOUNTER — HOSPITAL ENCOUNTER (EMERGENCY)
Facility: HOSPITAL | Age: 68
Discharge: HOME | End: 2018-02-15
Attending: EMERGENCY MEDICINE | Admitting: EMERGENCY MEDICINE
Payer: COMMERCIAL

## 2018-02-15 VITALS
OXYGEN SATURATION: 98 % | DIASTOLIC BLOOD PRESSURE: 67 MMHG | BODY MASS INDEX: 24.48 KG/M2 | WEIGHT: 156 LBS | HEART RATE: 108 BPM | RESPIRATION RATE: 18 BRPM | TEMPERATURE: 98.8 F | SYSTOLIC BLOOD PRESSURE: 113 MMHG | HEIGHT: 67 IN

## 2018-02-15 DIAGNOSIS — H16.009 CORNEAL ULCER: Primary | ICD-10-CM

## 2018-02-15 PROCEDURE — 99283 EMERGENCY DEPT VISIT LOW MDM: CPT

## 2018-02-15 RX ORDER — CIPROFLOXACIN HYDROCHLORIDE 3.5 MG/ML
1 SOLUTION/ DROPS TOPICAL
Status: DISCONTINUED | OUTPATIENT
Start: 2018-02-15 | End: 2018-02-16 | Stop reason: HOSPADM

## 2018-02-15 RX ORDER — PROPARACAINE HYDROCHLORIDE 5 MG/ML
1 SOLUTION/ DROPS OPHTHALMIC ONCE
Status: COMPLETED | OUTPATIENT
Start: 2018-02-15 | End: 2018-02-15

## 2018-02-15 RX ORDER — CIPROFLOXACIN HYDROCHLORIDE 3.5 MG/ML
1 SOLUTION/ DROPS TOPICAL
Qty: 5 ML | Refills: 0 | Status: SHIPPED | OUTPATIENT
Start: 2018-02-15 | End: 2018-08-05

## 2018-02-15 RX ORDER — CYCLOPENTOLATE HYDROCHLORIDE 10 MG/ML
1 SOLUTION/ DROPS OPHTHALMIC ONCE
Status: COMPLETED | OUTPATIENT
Start: 2018-02-15 | End: 2018-02-15

## 2018-02-15 RX ADMIN — PROPARACAINE HYDROCHLORIDE 1 DROP: 5 SOLUTION/ DROPS OPHTHALMIC at 20:00

## 2018-02-15 RX ADMIN — CYCLOPENTOLATE HYDROCHLORIDE 1 DROP: 10 SOLUTION OPHTHALMIC at 22:30

## 2018-02-15 RX ADMIN — FLUORESCEIN SODIUM 1 STRIP: 1 STRIP OPHTHALMIC at 20:00

## 2018-02-15 RX ADMIN — CIPROFLOXACIN HYDROCHLORIDE 1 DROP: 3 SOLUTION/ DROPS OPHTHALMIC at 22:30

## 2018-02-16 NOTE — ED NOTES
Used [employer] phone to speak with patient for visual acuity test    #[id number]     Debbie Boecking  02/15/18 8318

## 2018-02-16 NOTE — DISCHARGE INSTRUCTIONS
You have an ulcer on your cornea  Apply the drops I gave you every 4 hours while awake  Follow up with our eye doctors, as soon as possible, for further management  Return to the ER if the bleeding around your eye gets worse as you are on blood thinner  Return to the ER for new or worrisome symptoms  Ulcera corneal   LO QUE NECESITA SABER:   Bucky Linen corneal es vida lesión abierta en la córnea  La córnea es la capa exterior suave y chad del joss  Bucky Linen corneal se provoca debido a vida bacteria que [employer], aubrie al momento de rascarse  INSTRUCCIONES SOBRE EL [facility]PITALARIA:   Medicamentos:   · AINEs (analgésicos antiinflamatorios no esteroides):  Estos medicamentos disminuyen la inflamación, dolor y [place]  Los AINEs se pueden obtener sin receta médica  Consulte con mcarthur médico cuál medicamento es el adecuado para usted  Pregunte cuánto mayra y cuándo  Tómelos aubrie se le indique  Cuando no se charles de la [employer], los medicamentos antiinflamatorios no esteroides pueden causar sangrado estomacal y problemas renales  · Antibiótico para los ojos:  Estos se administran para tratar vida infección provocada por ivda bacteria  Podrían venir en forma de gotas o en pomada  · Medicamentos cicoplégicos para el joss:  Estos medicamentos dilatarán la pupila y [employer] músculos del joss, lo cuál disminuirá el dolor  · Analgésicos:  Podrían recetarle medicación para quitar o disminuir el dolor  No espere a que el dolor sea muy intenso para mayra el medicamento  · Wenden aminata medicamentos aubrie se le haya indicado  Consulte con mcarthur médico si usted steffen que mcarthur medicamento no le está ayudando o si presenta efectos secundarios  Infórmele si es alérgico a algún medicamento  Mantenga vida lista actualizada de los [place], las vitaminas y los productos herbales que lauren  Incluya los siguientes datos de los medicamentos: cantidad, frecuencia y motivo de administración   Lasha Agarwal con pablo rebolledo [facility] envases de la píldoras a mary citas de seguimiento  Lleve la lista de los medicamentos con usted en andres de vida emergencia  Acuda a vida consulta de control con tariq médico o tariq especialista del joss dentro de 24 horas:  Es posible que necesite sol a tariq oftalmólogo cada 1 a 3 días si la condición es grave  Anote mary preguntas para que se acuerde de hacerlas oseas mary visitas  El [place] de tariq síntomas:   · Aplíquese vida compresa tibia:  Moje un paño con agua tibia y póngalo sobre tariq joss  [place] va a disminuir inflamación y el dolor  Utilice el paño tan a menudo aubrie se le indique  · Limpie alrededor del joss:  Remueva con cuidado cualquier acumulación de costra alrededor del joss  · Use gotas para los ojos:  [place] mantendrá los ojos húmedos y ayudará a disminuir el dolor  · Utilice equipo de seguridad:  Use lentes de sol o gafas de seguridad para evitar otra lesión  · Pregunte acerca de mary contactos:  No use los lentes de contacto hasta que tariq médico lo autorice  Siempre limpie los lentes de contacto con vida solución de [place]  Comuníquese con tariq médico o tariq especialista del joss si:   · Tariq visión empeora  · Mary síntomas no mejoran con el tratamiento  · Usted tiene preguntas o inquietudes acerca de tariq condición o cuidado  Regrese a la thien de emergencias si:   · Tiene dolor intenso en el joss  · Pierde la visión  · Usted piensa que tariq úlcera corneal se está agrandando  · Se lesiona el joss de nuevo  © 2017 [address] Information is for End User's use only and may not be sold, redistributed or otherwise used for commercial purposes  All illustrations and images included in CareNotes® are the copyrighted property of A D A M , Inc  or Matt Mix  Esta información es sólo para uso en educación  Tariq intención no es darle un consejo médico sobre enfermedades o tratamientos   Colsulte con tariq Freeda Arriaga farmacéutico antes de seguir cualquier régimen médico para saber si es seguro y efectivo para usted  Ciprofloxacina (En el joss)   Trata infecciones de el joss  Livier medicamento es un antibiótico de [place]  Sudha(s) : [place]   Existen muchas otras marcas de livier medicamento  Livier medicamento no debe ser usado cuando:   Livier medicamento no es adecuado para todas las personas  No lo use si usted ha tenido vida reacción alérgica al ciprofloxacino o a otros antibióticos quinolónicos  Forma de usar livier medicamento:   Nadia Dumont  · Mcarthur médico le dirá la cantidad de medicamento que usted debe mayra y la frecuencia con que debe hacerlo  No use más cantidad de medicamento ni lo use con más frecuencia de la indicada por el médico   · [place] todo mcarthur medicamento recetado para eliminar mcarthur infección por completo aunque usted se sienta mejor después de las primeras dosis  · Si usted [place] lentes de contacto blandos, quíteselos antes de [employer] en aminata ojos  Es posible que usted necesite esperar por lo menos 15 minutos antes de ponerse los lentes de contacto nuevamente  Las gotas para los ojos  pueden contener cloruro de benzalconio, el cual puede revestir los lentes contacto blandos  Usted no podrá usar los lentes de contacto hasta que la infección haya desaparecido  · [employer] las lilly con agua y jabón antes y después de usar livier medicamento  · Acuéstese o incline mcarthur Susanna Innocent atrás  Tire del párpado inferior con mcarthur dedo índice de modo que se forme vida pequeña bolsa entre el joss y [place]  · Para usar las gotas de los ojos: Sostenga el gotero cerca del joss usando mcarthur [place]  Vierta el número correcto de gotas en la bolsa que se forma entre el párpado inferior y el globo del joss  Cierre suavemente aminata ojos  Usando mcarthur dedo índice, brian presión en el lagrimal (extremo interior del joss) oseas 1 minuto  No toque, limpie o enjuague el gotero ni permita que éste toque cualquier superficie incluyendo el joss   Tape inmediatamente el frasco  Mantenga el frasco en posición vertical cuando no lo esté usando  · Para usar el ungüento: Sostenga la punta del tubo cerca del joss usando mcarthur Traversara  Evite tocar la punta del tubo con mcarthur dedo u joss  Exprima vida jessica de ungüento en la bolsa formada entre mcarthur párpado inferior y el globo del joss  Cierre aminata ojos por 1 a 2 minutos  Limpie la punta el tubo con un pañuelo de papel limpio y tape el tubo  Asegúrese que el tubo esté completamente cerrado mientras no lo está usando  · Si olvida vida dosis: Si olvida vida dosis de mcarthur medicamento, tómelo lo más pronto posible  Si es tucker la hora para mcarthur próxima dosis, espere hasta entonces para mayra mcarthur dosis regular  No use medicamento adicional para reponer la dosis olvidada  · Guarde el medicamento en un recipiente cerrado a temperatura ambiente y alejado del calor, la humedad y la florentin directa  Medicamentos y [employer] que debe evitar:   Consulte con mcarthur médico o farmacéutico antes de usar cualquier medicamento, incluyendo los que compra sin receta médica, las vitaminas y los productos herbales  · Consulte con mcarthur médico antes de usar algún otro medicamento en aminata ojos  Precauciones oseas el uso de óscar medicamento:   · Informe a mcarthur médico si está embarazada o amamantando  · Guarde todos los medicamentos fuera del alcance de los niños  Nunca comparta aminata medicamentos con [employer]    Efectos secundarios que pueden presentarse oseas el uso de óscar medicamento:   Consulte inmediatamente con el médico si nota cualquiera de estos efectos secundarios:  · Reacción alérgica: Comezón o ronchas, hinchazón del doretha o las lilly, hinchazón u hormigueo en la boca o garganta, opresión en el pecho, dificultad para respirar  · Visión borrosa  · Enrojecimiento severo, picazón o hinchazón en los ojos, no presentes antes de que usted comenzara a usar el medicamento  Consulte con el médico si nota los siguientes efectos secundarios menos graves:   · Sabor amargo o desagradable en tariq boca  · Costras o gemma blancos en los rincones de aminata ojos  · Sensación de tener un objeto extraño en el joss  Consulte con el médico si nota otros efectos secundarios que steffen son causados por óscar medicamento  Llame a tariq médico para consultarle [place] Alan puede notificar aminata efectos secundarios al FDA al [phone]  © 2017 [address] [employer] [employer] Information is for End User's use only and may not be sold, redistributed or otherwise used for commercial purposes  Esta información es sólo para uso en educación  Tariq intención no es darle un consejo médico sobre enfermedades o tratamientos  Colsulte con tariq Shanel Antis farmacéutico antes de seguir cualquier régimen médico para saber si es seguro y efectivo para usted

## 2018-02-16 NOTE — ED PROVIDER NOTES
History  Chief Complaint   Patient presents with    Eye Pain     Pt c/o right eye has been bloodshot and painful all day  Pt denies any trauma to the eye       71-year-old man with a history of CAD status post CABG and PE on aspirin and Xarelto presents for evaluation of eye pain  Patient woke up this morning with sudden onset right-sided eye pain and redness  He notes associated blurry vision without discharge or pain with extraocular movement  He denies trauma and foreign body  No previous history of eye disease and no previous surgeries  Patient does not use glasses or contacts  He did not try treating his pain  On arrival, patient is afebrile and tachycardic to 108 with otherwise normal vital signs  Physical exam shows chemosis and erythema involving the inferomedial scleral on the right  He has a normal pupillary exam without pain  Visual acuity is 20/70 OD, 20/40 OS, and 20/30 binocular  Will check intra-ocular pressure to evaluate for possible acute angle closure glaucoma  Will evaluate for corneal ulcer/abrasion /foreign body under fluorescein staining  Further treatment and workup pending above results  Prior to Admission Medications   Prescriptions Last Dose Informant Patient Reported? Taking? Rivaroxaban (XARELTO PO)   Yes Yes   Sig: Take 20 mg by mouth daily     TAMSULOSIN HCL PO   Yes Yes   Sig: Take 0 4 mg by mouth daily     aspirin 81 MG tablet   Yes Yes   Sig: Take 81 mg by mouth daily  atorvastatin (LIPITOR) 40 mg tablet   Yes Yes   Sig: Take 40 mg by mouth daily   cyclobenzaprine (FLEXERIL) 10 mg tablet   No Yes   Sig: Take 1 tablet by mouth 3 (three) times a day as needed for muscle spasms   finasteride (PROSCAR) 5 mg tablet   Yes Yes   Sig: Take 5 mg by mouth daily   lisinopril (ZESTRIL) 20 mg tablet   Yes Yes   Sig: Take 20 mg by mouth daily  metoprolol tartrate (LOPRESSOR) 25 mg tablet   Yes Yes   Sig: Take 25 mg by mouth 2 (two) times a day  Facility-Administered Medications: None       Past Medical History:   Diagnosis Date    BPH (benign prostatic hyperplasia)     Coronary artery disease     HTN (hypertension)     Hx pulmonary embolism     Hyperlipidemia     Hypertension     Prediabetes     Tobacco use        Past Surgical History:   Procedure Laterality Date    CORONARY ARTERY BYPASS GRAFT      NM COLONOSCOPY FLX DX W/COLLJ SPEC WHEN PFRMD N/A 4/6/2017    Procedure: COLONOSCOPY;  Surgeon: Shellie Barjaas MD;  Location: [facility]; Service: Gastroenterology       Family History   Problem Relation Age of Onset    Diabetes Mother     Diabetes Father      I have reviewed and agree with the history as documented  Social History   Substance Use Topics    Smoking status: Current Every Day Smoker     Packs/day: 0 50     Years: 56 00     Types: Cigarettes    Smokeless tobacco: Never Used      Comment: started when Pt was 6years old  Pt smokes 1/2 to 1 pack a day when stressed    Alcohol use No        Review of Systems   Constitutional: Negative for chills and fever  HENT: Negative for rhinorrhea and sore throat  Eyes: Positive for photophobia, pain, redness and visual disturbance  Negative for discharge  Gastrointestinal: Negative for abdominal pain, diarrhea, nausea and vomiting  Musculoskeletal: Negative for back pain and neck pain  Skin: Negative for rash and wound  Neurological: Negative for light-headedness and headaches         Physical Exam  ED Triage Vitals [02/15/18 1816]   Temperature Pulse Respirations Blood Pressure SpO2   98 8 °F (37 1 °C) (!) 108 18 113/67 98 %      Temp Source Heart Rate Source Patient Position - Orthostatic VS BP Location FiO2 (%)   Oral Monitor Lying Left arm --      Pain Score       4           Orthostatic Vital Signs  Vitals:    02/15/18 1816   BP: 113/67   Pulse: (!) 108   Patient Position - Orthostatic VS: Lying       Physical Exam   Constitutional: He is oriented to person, place, and time  He appears well-developed and well-nourished  No distress  HENT:   Head: Normocephalic and atraumatic  Eyes: Conjunctivae and EOM are normal  Pupils are equal, round, and reactive to light  No scleral icterus  No increased pain with pupillary constriction on the left or right, pupils 3 mm and reactive bilaterally, chemosis and erythema involving the inferomedial sclera on the right, no pain with extraocular movements, visual acuity 20/70 OD, 20/40 OS, 20/30 binocular, 0 5 cm corneal ulcer/abrasion on inferomedial aspect of cornea, no Herman sign, no foreign body appreciated including underneath the upper lid, intra-ocular pressure maximum of 13 OD and 12 OS   Neck: Neck supple  No JVD present  Cardiovascular: Normal rate, regular rhythm and normal heart sounds  Exam reveals no gallop and no friction rub  No murmur heard  Pulmonary/Chest: Effort normal and breath sounds normal  No respiratory distress  He has no wheezes  He has no rales  Abdominal: Soft  He exhibits no distension  There is no tenderness  There is no rebound and no guarding  Musculoskeletal: He exhibits no edema or tenderness  Neurological: He is alert and oriented to person, place, and time  Skin: Skin is warm and dry  He is not diaphoretic  Psychiatric: He has a normal mood and affect  His behavior is normal  Thought content normal    Vitals reviewed        ED Medications  Medications   proparacaine (ALCAINE) 0 5 % ophthalmic solution 1 drop (1 drop Right Eye Given by Other 2/15/18 2000)   fluorescein sodium sterile ophthalmic strip 1 strip (1 strip Right Eye Given by Other 2/15/18 2000)   cyclopentolate (CYCLOGYL) 1 % ophthalmic solution 1 drop (1 drop Right Eye Given 2/15/18 2230)       Diagnostic Studies  Results Reviewed     None                 No orders to display         Procedures  Procedures      Phone Consults  ED Phone Contact    ED Course  ED Course            Identification of Seniors at [address] [place] [address] Most Recent Value   (ISAR) Identification of Seniors at Risk   Before the illness or injury that brought you to the Emergency, did you need someone to help you on a regular basis? 0 Filed at: 02/15/2018 1818   In the last 24 hours, have you needed more help than usual?  0 Filed at: 02/15/2018 1818   Have you been hospitalized for one or more nights during the past 6 months? 0 Filed at: 02/15/2018 1818   In general, do you see well?  0 Filed at: 02/15/2018 1818   In general, do you have serious problems with your memory? 0 Filed at: 02/15/2018 1818   Do you take more than three different medications every day? 1 Filed at: 02/15/2018 1818   ISAR Score  1 Filed at: 02/15/2018 1818                          [facility]  Number of Diagnoses or Management Options  Corneal ulcer:   Diagnosis management comments: Intra-ocular pressure normal  Fluorescein exam shows a likely corneal ulcer  Patient was given ciprofloxacin ophthalmic drops  He was given a drop of Cyclogyl for symptomatic relief  Patient was discharged with outpatient ophthalmology follow-up  He was instructed to follow up sooner if the chemosis worsened as he is currently on aspirin and Xarelto  CritCare Time    Disposition  Final diagnoses:   Corneal ulcer     Time reflects when diagnosis was documented in both MDM as applicable and the Disposition within this note     Time User Action Codes Description Comment    2/15/2018 11:04 PM Cecille Morris Just Add [W42 [id number]] Corneal ulcer       ED Disposition     ED Disposition Condition Comment    [place]  [facility] [place] to [place]/self care      Condition at discharge: Good        Follow-up Information     Follow up With Specialties Details Why Contact Bentley Cates MD Ophthalmology Schedule an appointment as soon as possible for a visit For further management Vijay Mcdaniel [address] [place] [address] [phone]  [phone]          Discharge Medication List as of 2/15/2018 11:07 PM      START taking these medications    Details   ciprofloxacin (CILOXAN) 0 3 % ophthalmic solution Administer 1 drop to the right eye every 4 (four) hours while awake, Starting u 2/15/2018, Print         CONTINUE these medications which have NOT CHANGED    Details   aspirin 81 MG tablet Take 81 mg by mouth daily  , Until Discontinued, Historical Med      atorvastatin (LIPITOR) 40 mg tablet Take 40 mg by mouth daily, Until Discontinued, Historical Med      cyclobenzaprine (FLEXERIL) 10 mg tablet Take 1 tablet by mouth 3 (three) times a day as needed for muscle spasms, Starting 10/30/2016, Until Discontinued, Print      finasteride (PROSCAR) 5 mg tablet Take 5 mg by mouth daily, Until Discontinued, Historical Med      lisinopril (ZESTRIL) 20 mg tablet Take 20 mg by mouth daily  , Until Discontinued, Historical Med      metoprolol tartrate (LOPRESSOR) 25 mg tablet Take 25 mg by mouth 2 (two) times a day , Until Discontinued, Historical Med      Rivaroxaban (XARELTO PO) Take 20 mg by mouth daily  , Until Discontinued, Historical Med      TAMSULOSIN HCL PO Take 0 4 mg by mouth daily  , Until Discontinued, Historical Med           No discharge procedures on file  ED Provider  Attending physically available and evaluated [facility] managed the patient along with the ED Attending      Electronically Signed by         Ofe Nunez MD  02/16/18 1156

## 2018-02-16 NOTE — ED NOTES
Pt  Discharged from department by Dr Morris  RN not present during discharge        Merlinda Keys, RN  02/15/18 5679

## 2018-02-26 NOTE — ED ATTENDING ATTESTATION
Cecilia Epsarza MD, saw and evaluated the patient  I have discussed the patient with the resident/non-physician practitioner and agree with the resident's/non-physician practitioner's findings, Plan of Care, and MDM as documented in the resident's/non-physician practitioner's note, except where noted  All available labs and Radiology studies were reviewed  At this point I agree with the current assessment done in the Emergency Department  I have conducted an independent evaluation of this patient a history and physical is as follows:    Patient presents with 1 day of painful blurry vision  Denies trauma  Woke with symptoms  No additional complaints  Exam: AAOx3, mild distress, R eye with conjunctival injection  A/P: R eye pain  Will check pressures in eye to r/o glaucoma, Will stain eye to evaluate for abrasion      Critical Care Time  CritCare Time    Procedures

## 2018-08-05 ENCOUNTER — HOSPITAL ENCOUNTER (OUTPATIENT)
Facility: HOSPITAL | Age: 68
Setting detail: OBSERVATION
Discharge: HOME | End: 2018-08-06
Attending: EMERGENCY MEDICINE | Admitting: INTERNAL MEDICINE
Payer: COMMERCIAL

## 2018-08-05 ENCOUNTER — APPOINTMENT (EMERGENCY)
Dept: RADIOLOGY | Facility: HOSPITAL | Age: 68
End: 2018-08-05
Payer: COMMERCIAL

## 2018-08-05 DIAGNOSIS — Z95.1 S/P CABG X 4: ICD-10-CM

## 2018-08-05 DIAGNOSIS — R07.9 CHEST PAIN: Primary | ICD-10-CM

## 2018-08-05 DIAGNOSIS — I25.10 CAD (CORONARY ARTERY DISEASE): ICD-10-CM

## 2018-08-05 DIAGNOSIS — N17.9 AKI (ACUTE KIDNEY INJURY) (HCC): ICD-10-CM

## 2018-08-05 LAB
ALBUMIN SERPL BCP-MCNC: 3.1 G/DL (ref 3.5–5)
ALP SERPL-CCNC: 102 U/L (ref 46–116)
ALT SERPL W P-5'-P-CCNC: 16 U/L (ref 12–78)
ANION GAP SERPL CALCULATED.3IONS-SCNC: 6 MMOL/L (ref 4–13)
AST SERPL W P-5'-P-CCNC: 16 U/L (ref 5–45)
BASOPHILS # BLD AUTO: 0.02 THOUSANDS/ΜL (ref 0–0.1)
BASOPHILS NFR BLD AUTO: 0 % (ref 0–1)
BILIRUB SERPL-MCNC: 0.18 MG/DL (ref 0.2–1)
BUN SERPL-MCNC: 21 MG/DL (ref 5–25)
CALCIUM SERPL-MCNC: 8.5 MG/DL (ref 8.3–10.1)
CHLORIDE SERPL-SCNC: 109 MMOL/L (ref 100–108)
CO2 SERPL-SCNC: 25 MMOL/L (ref 21–32)
CREAT SERPL-MCNC: 1.49 MG/DL (ref 0.6–1.3)
EOSINOPHIL # BLD AUTO: 0.21 THOUSAND/ΜL (ref 0–0.61)
EOSINOPHIL NFR BLD AUTO: 2 % (ref 0–6)
ERYTHROCYTE [DISTWIDTH] IN BLOOD BY AUTOMATED COUNT: 14.4 % (ref 11.6–15.1)
GFR SERPL CREATININE-BSD FRML MDRD: 48 ML/MIN/1.73SQ M
GLUCOSE SERPL-MCNC: 123 MG/DL (ref 65–140)
HCT VFR BLD AUTO: 40.7 % (ref 36.5–49.3)
HGB BLD-MCNC: 13.7 G/DL (ref 12–17)
IMM GRANULOCYTES # BLD AUTO: 0.02 THOUSAND/UL (ref 0–0.2)
IMM GRANULOCYTES NFR BLD AUTO: 0 % (ref 0–2)
LYMPHOCYTES # BLD AUTO: 3.35 THOUSANDS/ΜL (ref 0.6–4.47)
LYMPHOCYTES NFR BLD AUTO: 38 % (ref 14–44)
MCH RBC QN AUTO: 31.8 PG (ref 26.8–34.3)
MCHC RBC AUTO-ENTMCNC: 33.7 G/DL (ref 31.4–37.4)
MCV RBC AUTO: 94 FL (ref 82–98)
MONOCYTES # BLD AUTO: 0.56 THOUSAND/ΜL (ref 0.17–1.22)
MONOCYTES NFR BLD AUTO: 6 % (ref 4–12)
NEUTROPHILS # BLD AUTO: 4.57 THOUSANDS/ΜL (ref 1.85–7.62)
NEUTS SEG NFR BLD AUTO: 54 % (ref 43–75)
NRBC BLD AUTO-RTO: 0 /100 WBCS
PLATELET # BLD AUTO: 280 THOUSANDS/UL (ref 149–390)
PMV BLD AUTO: 9.8 FL (ref 8.9–12.7)
POTASSIUM SERPL-SCNC: 3.8 MMOL/L (ref 3.5–5.3)
PROT SERPL-MCNC: 6.8 G/DL (ref 6.4–8.2)
RBC # BLD AUTO: 4.31 MILLION/UL (ref 3.88–5.62)
SODIUM SERPL-SCNC: 140 MMOL/L (ref 136–145)
TROPONIN I SERPL-MCNC: <0.02 NG/ML
WBC # BLD AUTO: 8.73 THOUSAND/UL (ref 4.31–10.16)

## 2018-08-05 PROCEDURE — 94640 AIRWAY INHALATION TREATMENT: CPT

## 2018-08-05 PROCEDURE — 80053 COMPREHEN METABOLIC PANEL: CPT | Performed by: EMERGENCY MEDICINE

## 2018-08-05 PROCEDURE — 93005 ELECTROCARDIOGRAM TRACING: CPT

## 2018-08-05 PROCEDURE — 85025 COMPLETE CBC W/AUTO DIFF WBC: CPT | Performed by: EMERGENCY MEDICINE

## 2018-08-05 PROCEDURE — 85379 FIBRIN DEGRADATION QUANT: CPT | Performed by: EMERGENCY MEDICINE

## 2018-08-05 PROCEDURE — 84484 ASSAY OF TROPONIN QUANT: CPT | Performed by: EMERGENCY MEDICINE

## 2018-08-05 PROCEDURE — 96360 HYDRATION IV INFUSION INIT: CPT

## 2018-08-05 PROCEDURE — 71046 X-RAY EXAM CHEST 2 VIEWS: CPT

## 2018-08-05 PROCEDURE — 36415 COLL VENOUS BLD VENIPUNCTURE: CPT

## 2018-08-05 RX ORDER — ALBUTEROL SULFATE 2.5 MG/3ML
5 SOLUTION RESPIRATORY (INHALATION) ONCE
Status: COMPLETED | OUTPATIENT
Start: 2018-08-05 | End: 2018-08-05

## 2018-08-05 RX ORDER — ACETAMINOPHEN 325 MG/1
975 TABLET ORAL ONCE
Status: COMPLETED | OUTPATIENT
Start: 2018-08-05 | End: 2018-08-05

## 2018-08-05 RX ORDER — LIDOCAINE 50 MG/G
1 PATCH TOPICAL ONCE
Status: COMPLETED | OUTPATIENT
Start: 2018-08-05 | End: 2018-08-06

## 2018-08-05 RX ORDER — ALBUTEROL SULFATE 90 UG/1
2 AEROSOL, METERED RESPIRATORY (INHALATION) ONCE
Status: COMPLETED | OUTPATIENT
Start: 2018-08-05 | End: 2018-08-05

## 2018-08-05 RX ADMIN — ACETAMINOPHEN 975 MG: 325 TABLET, FILM COATED ORAL at 22:46

## 2018-08-05 RX ADMIN — ALBUTEROL SULFATE 5 MG: 2.5 SOLUTION RESPIRATORY (INHALATION) at 23:13

## 2018-08-05 RX ADMIN — ALBUTEROL SULFATE 2 PUFF: 90 AEROSOL, METERED RESPIRATORY (INHALATION) at 22:47

## 2018-08-05 RX ADMIN — SODIUM CHLORIDE 1000 ML: 0.9 INJECTION, SOLUTION INTRAVENOUS at 23:02

## 2018-08-05 RX ADMIN — IPRATROPIUM BROMIDE 0.5 MG: 0.5 SOLUTION RESPIRATORY (INHALATION) at 23:13

## 2018-08-05 RX ADMIN — LIDOCAINE 1 PATCH: 50 PATCH TOPICAL at 22:46

## 2018-08-06 VITALS
WEIGHT: 150 LBS | TEMPERATURE: 97.9 F | SYSTOLIC BLOOD PRESSURE: 104 MMHG | HEART RATE: 78 BPM | DIASTOLIC BLOOD PRESSURE: 58 MMHG | HEIGHT: 69 IN | RESPIRATION RATE: 18 BRPM | OXYGEN SATURATION: 96 % | BODY MASS INDEX: 22.22 KG/M2

## 2018-08-06 PROBLEM — K63.5 SESSILE COLONIC POLYP: Status: ACTIVE | Noted: 2017-04-10

## 2018-08-06 PROBLEM — N17.9 AKI (ACUTE KIDNEY INJURY) (HCC): Status: ACTIVE | Noted: 2018-08-06

## 2018-08-06 LAB
ANION GAP SERPL CALCULATED.3IONS-SCNC: 6 MMOL/L (ref 4–13)
ATRIAL RATE: 78 BPM
ATRIAL RATE: 89 BPM
BUN SERPL-MCNC: 19 MG/DL (ref 5–25)
CALCIUM SERPL-MCNC: 8.1 MG/DL (ref 8.3–10.1)
CHLORIDE SERPL-SCNC: 112 MMOL/L (ref 100–108)
CHOLEST SERPL-MCNC: 124 MG/DL (ref 50–200)
CO2 SERPL-SCNC: 23 MMOL/L (ref 21–32)
CREAT SERPL-MCNC: 1.05 MG/DL (ref 0.6–1.3)
DEPRECATED D DIMER PPP: 554 NG/ML (FEU) (ref 0–424)
EST. AVERAGE GLUCOSE BLD GHB EST-MCNC: 120 MG/DL
GFR SERPL CREATININE-BSD FRML MDRD: 74 ML/MIN/1.73SQ M
GLUCOSE SERPL-MCNC: 101 MG/DL (ref 65–140)
HBA1C MFR BLD: 5.8 % (ref 4.2–6.3)
HDLC SERPL-MCNC: 27 MG/DL (ref 40–60)
LDLC SERPL CALC-MCNC: 80 MG/DL (ref 0–100)
NONHDLC SERPL-MCNC: 97 MG/DL
P AXIS: 53 DEGREES
P AXIS: 66 DEGREES
PLATELET # BLD AUTO: 246 THOUSANDS/UL (ref 149–390)
PMV BLD AUTO: 9.7 FL (ref 8.9–12.7)
POTASSIUM SERPL-SCNC: 3.6 MMOL/L (ref 3.5–5.3)
PR INTERVAL: 120 MS
PR INTERVAL: 124 MS
QRS AXIS: 22 DEGREES
QRS AXIS: 43 DEGREES
QRSD INTERVAL: 84 MS
QRSD INTERVAL: 88 MS
QT INTERVAL: 338 MS
QT INTERVAL: 384 MS
QTC INTERVAL: 411 MS
QTC INTERVAL: 437 MS
SODIUM SERPL-SCNC: 141 MMOL/L (ref 136–145)
T WAVE AXIS: 60 DEGREES
T WAVE AXIS: 76 DEGREES
TRIGL SERPL-MCNC: 87 MG/DL
TROPONIN I SERPL-MCNC: <0.02 NG/ML
TROPONIN I SERPL-MCNC: <0.02 NG/ML
VENTRICULAR RATE: 78 BPM
VENTRICULAR RATE: 89 BPM

## 2018-08-06 PROCEDURE — 80048 BASIC METABOLIC PNL TOTAL CA: CPT | Performed by: INTERNAL MEDICINE

## 2018-08-06 PROCEDURE — 93010 ELECTROCARDIOGRAM REPORT: CPT | Performed by: INTERNAL MEDICINE

## 2018-08-06 PROCEDURE — 99285 EMERGENCY DEPT VISIT HI MDM: CPT

## 2018-08-06 PROCEDURE — 93005 ELECTROCARDIOGRAM TRACING: CPT

## 2018-08-06 PROCEDURE — 80061 LIPID PANEL: CPT | Performed by: INTERNAL MEDICINE

## 2018-08-06 PROCEDURE — 84484 ASSAY OF TROPONIN QUANT: CPT | Performed by: INTERNAL MEDICINE

## 2018-08-06 PROCEDURE — 85049 AUTOMATED PLATELET COUNT: CPT | Performed by: INTERNAL MEDICINE

## 2018-08-06 PROCEDURE — 83036 HEMOGLOBIN GLYCOSYLATED A1C: CPT | Performed by: INTERNAL MEDICINE

## 2018-08-06 RX ORDER — LISINOPRIL 5 MG/1
5 TABLET ORAL DAILY
COMMUNITY
End: 2018-08-06 | Stop reason: HOSPADM

## 2018-08-06 RX ORDER — NITROGLYCERIN 0.4 MG/1
0.4 TABLET SUBLINGUAL
Status: DISCONTINUED | OUTPATIENT
Start: 2018-08-06 | End: 2018-08-06 | Stop reason: HOSPADM

## 2018-08-06 RX ORDER — NITROGLYCERIN 80 MG/1
1 PATCH TRANSDERMAL 2 TIMES DAILY PRN
COMMUNITY
Start: 2015-06-05

## 2018-08-06 RX ORDER — ACETAMINOPHEN 325 MG/1
650 TABLET ORAL EVERY 6 HOURS PRN
Status: DISCONTINUED | OUTPATIENT
Start: 2018-08-06 | End: 2018-08-06 | Stop reason: HOSPADM

## 2018-08-06 RX ORDER — NICOTINE 21 MG/24HR
1 PATCH, TRANSDERMAL 24 HOURS TRANSDERMAL DAILY
Status: DISCONTINUED | OUTPATIENT
Start: 2018-08-06 | End: 2018-08-06 | Stop reason: HOSPADM

## 2018-08-06 RX ORDER — ALBUTEROL SULFATE 90 UG/1
2 AEROSOL, METERED RESPIRATORY (INHALATION) EVERY 4 HOURS PRN
Status: DISCONTINUED | OUTPATIENT
Start: 2018-08-06 | End: 2018-08-06 | Stop reason: HOSPADM

## 2018-08-06 RX ORDER — ASPIRIN 81 MG/1
324 TABLET, CHEWABLE ORAL ONCE
Status: COMPLETED | OUTPATIENT
Start: 2018-08-06 | End: 2018-08-06

## 2018-08-06 RX ORDER — ARIPIPRAZOLE 5 MG/1
5 TABLET ORAL DAILY
Status: DISCONTINUED | OUTPATIENT
Start: 2018-08-06 | End: 2018-08-06 | Stop reason: HOSPADM

## 2018-08-06 RX ORDER — ATORVASTATIN CALCIUM 40 MG/1
40 TABLET, FILM COATED ORAL
Status: DISCONTINUED | OUTPATIENT
Start: 2018-08-06 | End: 2018-08-06 | Stop reason: HOSPADM

## 2018-08-06 RX ORDER — LIDOCAINE 50 MG/G
1 PATCH TOPICAL ONCE
Qty: 30 PATCH | Refills: 0 | Status: CANCELLED | OUTPATIENT
Start: 2018-08-06 | End: 2018-08-06

## 2018-08-06 RX ORDER — ARIPIPRAZOLE 5 MG/1
5 TABLET ORAL DAILY
COMMUNITY

## 2018-08-06 RX ORDER — ASPIRIN 81 MG/1
81 TABLET, CHEWABLE ORAL DAILY
Status: DISCONTINUED | OUTPATIENT
Start: 2018-08-06 | End: 2018-08-06 | Stop reason: HOSPADM

## 2018-08-06 RX ORDER — HEPARIN SODIUM 5000 [USP'U]/ML
5000 INJECTION, SOLUTION INTRAVENOUS; SUBCUTANEOUS EVERY 8 HOURS SCHEDULED
Status: DISCONTINUED | OUTPATIENT
Start: 2018-08-06 | End: 2018-08-06 | Stop reason: HOSPADM

## 2018-08-06 RX ORDER — MUSCLE RUB CREAM 100; 150 MG/G; MG/G
CREAM TOPICAL 4 TIMES DAILY PRN
Status: DISCONTINUED | OUTPATIENT
Start: 2018-08-06 | End: 2018-08-06 | Stop reason: HOSPADM

## 2018-08-06 RX ADMIN — HEPARIN SODIUM 5000 UNITS: 5000 INJECTION, SOLUTION INTRAVENOUS; SUBCUTANEOUS at 02:27

## 2018-08-06 RX ADMIN — ARIPIPRAZOLE 5 MG: 5 TABLET ORAL at 09:28

## 2018-08-06 RX ADMIN — ASPIRIN 81 MG 81 MG: 81 TABLET ORAL at 09:28

## 2018-08-06 RX ADMIN — ASPIRIN 81 MG 324 MG: 81 TABLET ORAL at 00:29

## 2018-08-06 NOTE — ED ATTENDING ATTESTATION
Jessica Harmon MD, saw and evaluated the patient  All available labs and X-rays were ordered by me or the resident and have been reviewed by myself  I discussed the patient with the resident / non-physician and agree with the resident's / non-physician practitioner's findings and plan as documented in the resident's / non-physician practicitioner's note, except where noted  At this point, I agree with the current assessment done in the ED  Chief Complaint   Patient presents with    Chest Pain     Patient reports CP when breathing and touching chest  Patient reports two previous chest operations in [place]  Patient reports symptoms started approx 2 days ago, stated they became much more severe during the last hour or so  This is a 71-year-old male presenting for evaluation of chest pain with a pleuritic nature to it  Beginning about 2 days ago he started to get the symptoms  It has been gradually getting worse  He tried no medications for it  No fevers chills nausea or vomiting with it  No dizziness or lightheadedness  No falls injuries or heavy lifting  He is coughing a little bit but the significant other in the room says that it the cough is stable  PMH:  - HTN  - HLD  - BPH  - CAD  - pDM  - Tobacco use  - PE  - HTN  PSH:  - CABG  - colonoscopy  Smokes daily  No alcohol/drugs  PE:  Vitals:    08/06/18 0141 08/06/18 0335 08/06/18 0742 08/06/18 1155   BP: 101/58 94/50 93/51 104/58   BP Location: Left arm Right arm Right arm Right arm   Pulse: 87 85 80 78   Resp: 17 17 18 18   Temp: 97 6 °F (36 4 °C) 98 1 °F (36 7 °C) 97 9 °F (36 6 °C) 97 9 °F (36 6 °C)   TempSrc: Oral Oral Oral Oral   SpO2:  95% 96% 96%   Weight:       Height:       General: VSS, NAD, awake, alert  Well-nourished, well-developed  Appears stated age  Speaking normally in full sentences  Head: Normocephalic, atraumatic, nontender  Eyes: PERRL, EOM-I  No diplopia  No hyphema  No subconjunctival hemorrhages    Symmetrical lids    ENT: Atraumatic external nose and ears  MMM  No malocclusion  No stridor  Normal phonation  No drooling  Normal swallowing  Neck: Symmetric, trachea midline  No JVD  CV: RRR  +S1/S2  No murmurs or gallops  Peripheral pulses +2 throughout  +chest wall tenderness that is reproducible on exam  There is no hyper-esthesia with light touch  No shingles   Lungs:   Unlabored No retractions  CTAB, lungs sounds equal bilateral    No tachypnea  Abd: +BS, soft, NT/ND    MSK:   FROM   Back:   No rashes  Skin: Dry, intact  Neuro: AAOx3, GCS 15, CN II-XII grossly intact  Motor grossly intact  Psychiatric/Behavioral: Appropriate mood and affect   Exam: deferred  A:  - CP  P:  - pleuritic --> d-dimer; ADJUST PE criteria  - will do ACS w/u  - likely DC home if negative labs  - else, admit  - 13 point ROS was performed and all are normal unless stated in the history above  - Nursing note reviewed  Vitals reviewed  - Orders placed by myself and/or advanced practitioner / resident     - Previous chart was reviewed  - No language barrier    - History obtained from patient  - There are no limitations to the history obtained  - Critical care time: Not applicable for this patient  Final Diagnosis:  1  Chest pain    2  NARDA (acute kidney injury) ([facility] [address] )    3  CAD (coronary artery disease)    4   S/P CABG x 4      ED Course as of Aug 10 0119   Citlaly Arauz Aug 05, 2018   2307 NARDA Creatinine: (!) 1 49     Medications   lidocaine (LIDODERM) 5 % patch 1 patch (1 patch Transdermal Medication Applied 8/5/18 2246)   acetaminophen (TYLENOL) tablet 975 mg (975 mg Oral Given 8/5/18 2246)   albuterol (PROVENTIL HFA,VENTOLIN HFA) inhaler 2 puff (2 puffs Inhalation Given 8/5/18 2247)   sodium chloride 0 9 % bolus 1,000 mL (0 mL Intravenous Stopped 8/6/18 0029)   albuterol inhalation solution 5 mg (5 mg Nebulization Given 8/5/18 2313)   ipratropium (ATROVENT) 0 02 % inhalation solution 0 5 mg (0 5 mg Nebulization Given 8/5/18 2313) aspirin chewable tablet 324 mg (324 mg Oral Given 8/6/18 0029)     X-ray chest 2 views   Final Result      No acute cardiopulmonary disease  Workstation performed: [id number]           Orders Placed This Encounter   Procedures    X-ray chest 2 views    Comprehensive metabolic panel    CBC and differential    Troponin I    D-dimer, quantitative    Basic metabolic panel    Troponin I    Platelet count    Hemoglobin A1C    Lipid panel    Discharge Diet    Continuous cardiac monitoring    Continuous pulse oximetry    Nursing communcation Continue IV as ordered   Insert peripheral IV    Call provider for:  persistent dizziness or light-headedness    Call provider for:  persistent nausea or vomiting    NM Myocardial Perfusion Spect (Pharmacological Induced Stress and/or Rest)    EKG RESULTS    ECG 12 lead    ECG 12 lead    ECG 12 lead    ECG 12 lead    Place in Observation (expected length of stay for this patient is less than two midnights)     Labs Reviewed   COMPREHENSIVE METABOLIC PANEL - Abnormal        Result Value Ref Range Status    Sodium 140  136 - 145 mmol/L Final    Potassium 3 8  3 5 - 5 3 mmol/L Final    Comment: Slightly Hemolyzed; Results May be Affected    Chloride 109 (*) 100 - 108 mmol/L Final    CO2 25  21 - 32 mmol/L Final    Anion Gap 6  4 - 13 mmol/L Final    BUN 21  5 - 25 mg/dL Final    Creatinine 1 49 (*) 0 60 - 1 30 mg/dL Final    Comment: Standardized to IDMS reference method    Glucose 123  65 - 140 mg/dL Final    Comment:   If the patient is fasting, the ADA then defines impaired fasting glucose as > 100 mg/dL and diabetes as > or equal to 123 mg/dL  Specimen collection should occur prior to Sulfasalazine administration due to the potential for falsely depressed results  Specimen collection should occur prior to Sulfapyridine administration due to the potential for falsely elevated results      Calcium 8 5  8 3 - 10 1 mg/dL Final    AST 16  5 - 45 U/L Final    Comment: Slightly Hemolyzed; Results May be Affected  Specimen collection should occur prior to Sulfasalazine administration due to the potential for falsely depressed results  ALT 16  12 - 78 U/L Final    Comment:   Specimen collection should occur prior to Sulfasalazine and/or Sulfapyridine administration due to the potential for falsely depressed results  Alkaline Phosphatase 102  46 - 116 U/L Final    Total Protein 6 8  6 4 - 8 2 g/dL Final    Albumin 3 1 (*) 3 5 - 5 0 g/dL Final    Total Bilirubin 0 18 (*) 0 20 - 1 00 mg/dL Final    eGFR 48  ml/min/1 73sq m Final    Narrative:     National Kidney Disease Education Program recommendations are as follows:  GFR calculation is accurate only with a steady state creatinine  Chronic Kidney disease less than 60 ml/min/1 73 sq  meters  Kidney failure less than 15 ml/min/1 73 sq  meters  D-DIMER, QUANTITATIVE - Abnormal     D-Dimer, Quant 554 (*) 0 - 424 ng/ml (FEU) Final    Comment:   Reference and upper limits to exclude DVT and PE are the same  Do not use to exclude if clinical symptoms are present  TROPONIN I - Normal    Troponin I <0 02  <=0 04 ng/mL Final    Comment:   [employer] Chemistry analyzer 99% cutoff is > 0 04 ng/mL in network labs     o cTnI 99% cutoff is useful only when applied to patients in the clinical setting of myocardial ischemia   o cTnI 99% cutoff should be interpreted in the context of clinical history, ECG findings and possibly cardiac imaging to establish correct diagnosis  o cTnI 99% cutoff may be suggestive but clearly not indicative of a coronary event without the clinical setting of myocardial ischemia       CBC AND DIFFERENTIAL    WBC 8 73  4 31 - 10 16 Thousand/uL Final    RBC 4 31  3 88 - 5 62 Million/uL Final    Hemoglobin 13 7  12 0 - 17 0 g/dL Final    Hematocrit 40 7  36 5 - 49 3 % Final    MCV 94  82 - 98 fL Final    MCH 31 8  26 8 - 34 3 pg Final    MCHC 33 7  31 4 - 37 4 g/dL Final    RDW 14 4  11 6 - 15 1 % Final    MPV 9 8  8 9 - 12 7 fL Final    Platelets 986  278 - 390 Thousands/uL Final    nRBC 0  /100 WBCs Final    Neutrophils Relative 54  43 - 75 % Final    Immat GRANS % 0  0 - 2 % Final    Lymphocytes Relative 38  14 - 44 % Final    Monocytes Relative 6  4 - 12 % Final    Eosinophils Relative 2  0 - 6 % Final    Basophils Relative 0  0 - 1 % Final    Neutrophils Absolute 4 57  1 85 - 7 62 Thousands/µL Final    Immature Grans Absolute 0 02  0 00 - 0 20 Thousand/uL Final    Lymphocytes Absolute 3 35  0 60 - 4 47 Thousands/µL Final    Monocytes Absolute 0 56  0 17 - 1 22 Thousand/µL Final    Eosinophils Absolute 0 21  0 00 - 0 61 Thousand/µL Final    Basophils Absolute 0 02  0 00 - 0 10 Thousands/µL Final   LIGHT BLUE TOP   LAVENDER TOP 7 ML ON HOLD     Time reflects when diagnosis was documented in both MDM as applicable and the Disposition within this note     Time User Action Codes Description Comment    8/6/2018 12:17 AM Stanley Saint Add [R07 9] Chest pain     8/6/2018 12:17 AM Stanley Saint Add [N17 9] NARDA (acute kidney injury) ([facility] [address] )     8/6/2018 12:00 PM Nannette Bashir Add [I25 10] CAD (coronary artery disease)     8/6/2018 12:00 PM Nannette Bashir Add [Z95 1] S/P CABG x 4       ED Disposition     ED Disposition Condition Comment    Admit  Case was discussed with [facility] and the patient's admission status was agreed to be Admission Status: observation status to the service of Dr Jese Soto   Follow-up Information    None       Discharge Medication List as of 8/6/2018 12:05 PM      CONTINUE these medications which have NOT CHANGED    Details   ARIPiprazole (ABILIFY) 5 mg tablet Take 5 mg by mouth daily, Historical Med      aspirin 81 MG tablet Take 81 mg by mouth daily  , Until Discontinued, Historical Med      atorvastatin (LIPITOR) 40 mg tablet Take 40 mg by mouth daily, Historical Med      metoprolol tartrate (LOPRESSOR) 25 mg tablet Take 25 mg by mouth daily  , Historical Med      nitroglycerin (NITRODUR) 0 4 mg/hr Place 1 patch on the skin 2 (two) times a day as needed, Starting Fri 6/5/2015, Historical Med         STOP taking these medications       lisinopril (ZESTRIL) 5 mg tablet Comments:   Reason for Stopping:               Outpatient Discharge Orders  Discharge Diet     Call provider for:  persistent dizziness or light-headedness     Call provider for:  persistent nausea or vomiting     NM Myocardial Perfusion Spect (Pharmacological Induced Stress and/or Rest)   Standing Status: Future  Standing Exp  Date: 08/06/22       Prior to Admission Medications   Prescriptions Last Dose Informant Patient Reported? Taking? ARIPiprazole (ABILIFY) 5 mg tablet Past Week at Unknown time  Yes Yes   Sig: Take 5 mg by mouth daily   aspirin 81 MG tablet Past Week at Unknown time  Yes Yes   Sig: Take 81 mg by mouth daily  atorvastatin (LIPITOR) 40 mg tablet Past Week at Unknown time  Yes Yes   Sig: Take 40 mg by mouth daily   lisinopril (ZESTRIL) 5 mg tablet Past Week at Unknown time  Yes Yes   Sig: Take 5 mg by mouth daily   metoprolol tartrate (LOPRESSOR) 25 mg tablet Past Week at Unknown time  Yes Yes   Sig: Take 25 mg by mouth daily     nitroglycerin (NITRODUR) 0 4 mg/hr Past Week at Unknown time  Yes Yes   Sig: Place 1 patch on the skin 2 (two) times a day as needed      Facility-Administered Medications: None       Portions of the record may have been created with voice recognition software  Occasional wrong word or "sound a like" substitutions may have occurred due to the inherent limitations of voice recognition software  Read the chart carefully and recognize, using context, where substitutions have occurred      Electronically signed by:  Eryn Alaniz

## 2018-08-06 NOTE — PROGRESS NOTES
[facility] Senior Admission Note   Unit/Bed [place] [place]@ Encounter: [id number]  SOD Team B           Saul Asif 78 y o  male [id number]       Patient seen and examined  Reviewed H&P per Dr Fadumo Long  Agree with the assessment and plan as outlined in his H&P      Assessment/Plan:   Principal Problem:    Chest pain  Active Problems:    CAD (coronary artery disease)    S/P CABG x 4    Pre-diabetes    HLD (hyperlipidemia)    HTN (hypertension)    Tobacco use    NARDA (acute kidney injury) [facility])     Mr Luis Mota is a primarily Maltese-speaking only male with a past medical history significant for coronary artery disease status post CABG approximately 14 years ago in [place], hypertension, hyperlipidemia, history of PE presents emergency department with complaints of right-sided atypical chest pain for the past few days  Chest pain has a positional component as it is worsened when patient tries to lift heavy objects or movement with this arm  Of note, 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  There is a pleuritic component and patient states the pain is worsened with deep breath  Denies any associated nausea, vomiting, diaphoresis  No association with exertion  Denies shortness of breath  Of note, patient follows with Dr Simi Rojas with Cardiology who recommended noninvasive evaluation patient's chest discomfort back in December of 2017  Patient was ordered stress test and echocardiogram which it appears he never completed and has not followed up with cardiology since  patient was admitted for chest pain in February of 2017 and had ACS workup which was negative at the time  In the ED patient was noted to have troponin x1 which was negative  Chest x-ray was within normal limits  D-dimer was 554  Patient appeared in no acute distress  EKG showed no acute ischemic changes  Given 975 mg of Tylenol as well as  mg    He was also given 1 L normal saline fluid resuscitation  He was given breathing treatment with duo and inhaler  Principal diagnosis necessitating admission:  Atypical chest pain    1  Atypical chest pain  · Low suspicion for ACS  Likely musculoskeletal given history  Heart score = 4 due to risk factors  · Troponin x1 negative  Trend trops x2 more  No ischemic changes noted on EKG  · Will continue home ASA 81 mg, SL nitro, beta-blocker and statin  Recheck lipid panel and A1c  · Patient counseled on of outpatient follow-up with Cardiology as well as going for ordered testing (myocardial perfusion scan and echo) will defer to outpatient setting due to low suspicion for ACS at this time  · Telemetry monitoring  · Cardiac diet  · Tylenol and lidocaine patch p r n  for pain  Darin-Fisher cream p r n  Avoid NSAIDs in setting of NARDA    2  Acute kidney injury  · Current elevated 1 49 on admission  Baseline appears to be closer 1 06-1  15  Likely prerenal in etiology  Patient is status post 1 L normal saline fluid resuscitation  Encourage p o  Intake  · Continue to monitor    3  History of coronary artery disease status post remote history of CABG  · Patient follows with Dr Rick Morillo with Cardiology in the outpatient setting  Patient was last seen in the office in December 2017 was recommended at that time the patient undergo noninvasive testing  Patient was ordered a myocardial perfusion stress test as well as 2D echocardiogram which patient did not get done  · Patient will need follow-up in the outpatient setting to complete testing  These tests may need to be ordered again on discharge so that patient can have them done in the outpatient setting and follow up with Cardiology  · Plan as outlined in #1    4  Hypertension  · Holding home lisinopril in setting of #2  Continue beta-blocker    5  Hyperlipidemia  · Recheck lipid panel  Continue statin    6  Tobacco abuse  · Patient counseled on tobacco cessation    7    History of pre diabetes  · Per records, patient has A1c in 10/2016 of 6 4  Patient is currently not on any medications    Will recheck A1c  · Continue to monitor      Disposition:  OBSERVATION    Expected LOS: <2 Dayana Barrientos MD

## 2018-08-06 NOTE — DISCHARGE INSTRUCTIONS
PLEASE OBTAIN YOUR CARDIAC STRESS TEST AS SOON AS POSSIBLE    Follow up with your primary care doctor

## 2018-08-06 NOTE — CASE MANAGEMENT
Initial Clinical Review    Admission: Date/Time/Statement: Placed In observation status on 8/6 @ 00:19    Orders Placed This Encounter   Procedures    Place in Observation (expected length of stay for this patient is less than two midnights)     Standing Status:   Standing     Number of Occurrences:   1     Order Specific Question:   Admitting Physician     Answer:   Joaquin De Leon     Order Specific Question:   Level of Care     Answer:   Med Surg [16]         ED: Date/Time/Mode of Arrival:   ED Arrival Information     Expected Arrival Acuity Means of Arrival Escorted By Service Admission Type    - 8/5/2018 21:49 Emergent Walk-In Family Member General Medicine Emergency    Arrival Complaint    CHEST PAIN          Chief Complaint:   Chief Complaint   Patient presents with    Chest Pain     Patient reports CP when breathing and touching chest  Patient reports two previous chest operations in [place]  Patient reports symptoms started approx 2 days ago, stated they became much more severe during the last hour or so  History of Illness: Lamar Alfaro is a 78 y o  male with past medical history of hypertension, hyperlipidemia, coronary artery disease, CABG (14 years ago) and history of PE  Patient states that he has been having right-sided chest pain for the past 4 days that has been progressively getting worse  This pain in his chest is reproducible to palpation and he states the pain is worse when moving his right arm  Patient denies any associated symptoms such as shortness of breath, palpitations, nausea, and diaphoresis  Patient states that he is very active and does not like to sit around, and that these symptoms are not made worse with exertion  He also complains of being dizzy and lightheaded especially when getting up from a lying or sitting position  He states he must sit down for a couple minutes until the symptoms resolve  Patient denies drug and alcohol use    Patient has been smoking 1-2 packs of cigarettes since age of 6        ED Vital Signs:   ED Triage Vitals   Temperature Pulse Respirations Blood Pressure SpO2   08/05/18 2154 08/05/18 2154 08/05/18 2154 08/05/18 2154 08/05/18 2154   97 5 °F (36 4 °C) 98 20 122/69 98 %      Temp Source Heart Rate Source Patient Position - Orthostatic VS BP Location FiO2 (%)   08/06/18 0141 08/05/18 2154 08/05/18 2154 08/05/18 2300 --   Oral Monitor Lying Right arm       Pain Score       08/05/18 2157       7           Abnormal Labs/Diagnostic Test Results:  Trop 0 02-0 02-0 02    Bun/cr 21/1 49 - 19/1 05  - baseline Cr 1 0  Albumin 3 1   D dimer 554    CXR - no acute     ED Treatment:   Medication Administration from 08/05/2018 2149 to 08/06/2018 0109       Date/Time Order Dose Route Action     08/05/2018 2246 lidocaine (LIDODERM) 5 % patch 1 patch 1 patch Transdermal Medication Applied     08/05/2018 2246 acetaminophen (TYLENOL) tablet 975 mg 975 mg Oral Given     08/05/2018 2247 albuterol (PROVENTIL HFA,VENTOLIN HFA) inhaler 2 puff 2 puff Inhalation Given     08/05/2018 2302 sodium chloride 0 9 % bolus 1,000 mL 1,000 mL Intravenous New Bag     08/05/2018 2313 albuterol inhalation solution 5 mg 5 mg Nebulization Given     08/05/2018 2313 ipratropium (ATROVENT) 0 02 % inhalation solution 0 5 mg 0 5 mg Nebulization Given     08/06/2018 0029 aspirin chewable tablet 324 mg 324 mg Oral Given       Past Medical/Surgical History:   Diagnosis    BPH (benign prostatic hyperplasia)    Coronary artery disease    HTN (hypertension)    Hx pulmonary embolism    Hyperlipidemia    Hypertension    Prediabetes    Tobacco use       Admitting Diagnosis: Chest pain [R07 9]  NARDA (acute kidney injury) ([facility], [place] [address] ) [N17 9]    Age/Sex: 78 y o  male    Assessment/Plan:   Chest pain  · Patient states that this pain is reproducible and is on his right side; this pain is likely secondary to musculoskeletal  · Troponin negative x1  · Chest x-ray unremarkable  · EKG pending in a m   · Telemetry  · Lipid panel pending     Acute kidney injury  · Patient received 1 L fluid bolus an ED  · Increased p o  fluid intake  · Creatinine 1 49 (baseline~1)  · Continue to monitor     Hypertension  · Holding lisinopril due to acute kidney injury  · Metoprolol tartrate 25 mg p o  daily     Pre diabetes  · Hemoglobin A1c pending; previous hemoglobin A1c 6 4% in 2016     Hyperlipidemia  · Atorvastatin 40 mg     Status post CABG  · Patient states he had CABG 14 years ago and is uncertain in regards to how many vessels     Tobacco abuse  · Nicotine patch  · Will discuss tobacco cessation    Admission Orders:  Scheduled Meds:   Current Facility-Administered Medications:  acetaminophen 650 mg Oral Q6H PRN   albuterol 2 puff Inhalation Q4H PRN   ARIPiprazole 5 mg Oral Daily   aspirin 81 mg Oral Daily   atorvastatin 40 mg Oral Daily With Dinner   heparin (porcine) 5,000 Units Subcutaneous Q8H [facility]   lidocaine 1 patch Transdermal Once   menthol-methyl salicylate  Apply externally 4x Daily PRN   metoprolol tartrate 25 mg Oral Daily   nicotine 1 patch Transdermal Daily   nitroglycerin 0 4 mg Sublingual Q5 Min PRN   pneumococcal 23-valent polysaccharide vaccine 0 5 mL Subcutaneous Prior to discharge     Nursing orders - VS q 4 - venodynes to le's - Up & OOB as tolerated - up with assistance - Diet cardiac step 1

## 2018-08-06 NOTE — ED NOTES
Dr Hua Valenzuela ED resident at bedside for patient evaluation        Debra Ramesh RN  08/05/18 7481

## 2018-08-06 NOTE — H&P
INTERNAL MEDICINE HISTORY AND PHYSICAL  JOE SOD Team B     NAME: Samm Armijo  AGE: 78 y o  SEX: male  : 1950   MRN: [id number]  ENCOUNTER: [id number]    DATE: 2018  TIME: 12:58 AM    Primary Care Physician: Amy Larsen DO  Admitting Provider: Surya Louis MD    Chief complaint:  Chest pain    History of Present Illness     Dasia Nicolas is a 78 y o  male with past medical history of hypertension, hyperlipidemia, coronary artery disease, CABG (14 years ago) and history of PE  Patient states that he has been having right-sided chest pain for the past 4 days that has been progressively getting worse  This pain in his chest is reproducible to palpation and he states the pain is worse when moving his right arm  Patient denies any associated symptoms such as shortness of breath, palpitations, nausea, and diaphoresis  Patient states that he is very active and does not like to sit around, and that these symptoms are not made worse with exertion  He also complains of being dizzy and lightheaded especially when getting up from a lying or sitting position  He states he must sit down for a couple minutes until the symptoms resolve  Patient denies drug and alcohol use  Patient has been smoking 1-2 packs of cigarettes since age of 6  While in the ED the patient had a workup significant for creatinine of 1 49 (baseline~1) and troponin negative x1  The patient received 1 L fluid bolus, and 324 mg aspirin  Review of Systems   Review of Systems   Constitutional: Negative for chills, diaphoresis, fatigue and fever  HENT: Negative for sore throat and tinnitus  Eyes: Negative for visual disturbance  Respiratory: Negative for chest tightness, shortness of breath and wheezing  Cardiovascular: Positive for chest pain  Negative for palpitations and leg swelling  Gastrointestinal: Negative for abdominal pain, diarrhea, nausea and vomiting     Genitourinary: Negative for dysuria and frequency  Neurological: Positive for dizziness and light-headedness  Negative for syncope, weakness and numbness  Past Medical History     Past Medical History:   Diagnosis Date    BPH (benign prostatic hyperplasia)     Coronary artery disease     HTN (hypertension)     Hx pulmonary embolism     Hyperlipidemia     Hypertension     Prediabetes     Tobacco use        Past Surgical History     Past Surgical History:   Procedure Laterality Date    CORONARY ARTERY BYPASS GRAFT      WA COLONOSCOPY FLX DX W/COLLJ SPEC WHEN PFRMD N/A 4/6/2017    Procedure: COLONOSCOPY;  Surgeon: Genia Kay MD;  Location: [facility]; Service: Gastroenterology       Social History     History   Alcohol Use No     History   Drug Use No     History   Smoking Status    Current Every Day Smoker    Packs/day: 0 50    Years: 56 00    Types: Cigarettes   Smokeless Tobacco    Never Used     Comment: started when Pt was 6years old  Pt smokes 1/2 to 1 pack a day when stressed       Family History     Family History   Problem Relation Age of Onset    Diabetes Mother     Diabetes Father        Medications Prior to Admission     Prior to Admission medications    Medication Sig Start Date End Date Taking? Authorizing Provider   ARIPiprazole (ABILIFY) 5 mg tablet Take 5 mg by mouth daily   Yes Historical Provider, MD   aspirin 81 MG tablet Take 81 mg by mouth daily     Yes Historical Provider, MD   atorvastatin (LIPITOR) 40 mg tablet Take 40 mg by mouth daily   Yes Historical Provider, MD   lisinopril (ZESTRIL) 5 mg tablet Take 5 mg by mouth daily   Yes Historical Provider, MD   metoprolol tartrate (LOPRESSOR) 25 mg tablet Take 25 mg by mouth daily     Yes Historical Provider, MD   nitroglycerin (NITRODUR) 0 4 mg/hr Place 1 patch on the skin 2 (two) times a day as needed 6/5/15  Yes Historical Provider, MD   lisinopril (ZESTRIL) 20 mg tablet Take 5 mg by mouth daily    8/6/18 Yes Historical Provider, MD   cyclobenzaprine (FLEXERIL) 10 mg tablet Take 1 tablet by mouth 3 (three) times a day as needed for muscle spasms 10/30/16 8/6/18  Lolita James DO   finasteride (PROSCAR) 5 mg tablet Take 5 mg by mouth daily  8/6/18  Historical Provider, MD   TAMSULOSIN HCL PO Take 0 4 mg by mouth daily    8/6/18  Historical Provider, MD       Allergies   No Known Allergies    Objective     Vitals:    08/05/18 2154 08/05/18 2157 08/05/18 2300 08/06/18 0008   BP: 122/69  120/57 98/55   BP Location:   Right arm Right arm   Pulse: 98  96 98   Resp: 20  22 22   Temp: 97 5 °F (36 4 °C)      SpO2: 98%  96% 93%   Weight:  68 kg (150 lb)     Height:  5' 9" (1 753 m)       Body mass index is 22 15 kg/m²  Intake/Output Summary (Last 24 hours) at 08/06/18 0058  Last data filed at 08/06/18 0029   Gross per 24 hour   Intake             1000 ml   Output                0 ml   Net             1000 ml     Invasive Devices     Peripheral Intravenous Line            Peripheral IV 08/05/18 Left Antecubital less than 1 day                Physical Exam  GENERAL: Appears well-developed and well-nourished  Appears in no acute distress   HEENT: Normocephalic and atraumatic  No scleral icterus  PERRLA  EOMI B/L  No oropharyngeal edema  MM moist    NECK: Neck supple with no lymphadenopathy  Trachea midline  No JVD  CARDIOVASCULAR: S1 and S2 are present  Regular rate and rhythm  No murmurs, rubs, or gallops  RESPIRATORY: CTA B/L, no rales, rhonci or wheezes  Normal respiratory expansion  ABDOMINAL: Bowel sounds present in all 4 quadrants, non-tender, soft, non-distended  No organomegaly, rebound, or guarding  EXTREMITIES: 2+ DP and PT pulses bilaterally; no cyanosis, clubbing, edema  ROM intact  FRANK x4   MUSCULOSKELETAL: Tenderness to palpation over R chest wall  No joint tenderness, deformity or swelling, full range of motion without pain  NEUROLOGIC: Patient is alert and oriented to person, place, and time  No sensory or motor deficits  CN 2-12 intact  Plantars downgoing bilaterally  Speech fluent  SKIN: Skin is warm and dry  No skin lesions are present  No rashes  PSYCHIATRIC: Normal mood and affect     Lab Results: I have personally reviewed pertinent reports  CBC:   Results from last 7 days  Lab Units 08/05/18  2213   WBC Thousand/uL 8 73   RBC Million/uL 4 31   HEMOGLOBIN g/dL 13 7   HEMATOCRIT % 40 7   MCV fL 94   MCH pg 31 8   MCHC g/dL 33 7   RDW % 14 4   MPV fL 9 8   PLATELETS Thousands/uL 280   NRBC AUTO /100 WBCs 0   NEUTROS PCT % 54   LYMPHS PCT % 38   MONOS PCT % 6   EOS PCT % 2   BASOS PCT % 0   NEUTROS ABS Thousands/µL 4 57   LYMPHS ABS Thousands/µL 3 35   MONOS ABS Thousand/µL 0 56   EOS ABS Thousand/µL 0 21   , Chemistry Profile:   Results from last 7 days  Lab Units 08/05/18  2213   SODIUM mmol/L 140   POTASSIUM mmol/L 3 8   CHLORIDE mmol/L 109*   CO2 mmol/L 25   ANION GAP mmol/L 6   BUN mg/dL 21   CREATININE mg/dL 1 49*   GLUCOSE RANDOM mg/dL 123   CALCIUM mg/dL 8 5   AST U/L 16   ALT U/L 16   ALK PHOS U/L 102   TOTAL PROTEIN g/dL 6 8   BILIRUBIN TOTAL mg/dL 0 18*   EGFR ml/min/1 73sq m 48       Imaging: I have personally reviewed pertinent films in PACS  No results found  Microbiology: cultures obtained in emergency department include none    Urinalysis:       Invalid input(s): URIBILINOGEN     Urine Micro:        EKG, Pathology, and Other Studies: I have personally reviewed pertinent reports        Medications given in Emergency Department     Medication Administration - last 24 hours from 08/05/2018 0058 to 08/06/2018 0058       Date/Time Order Dose Route Action Action by     08/05/2018 2246 lidocaine (LIDODERM) 5 % patch 1 patch 1 patch Transdermal Medication Applied Елена Edwards, RN     08/05/2018 2246 acetaminophen (TYLENOL) tablet 975 mg 975 mg Oral Given Елена Edwards RN     08/05/2018 2247 albuterol (PROVENTIL HFA,VENTOLIN HFA) inhaler 2 puff 2 puff Inhalation Given Елена Edwards RN     08/06/2018 0029 sodium chloride 0 9 % bolus 1,000 mL 0 mL Intravenous Stopped Ilene Espitia RN     08/05/2018 2302 sodium chloride 0 9 % bolus 1,000 mL 1,000 mL Intravenous New Bag Ilene Espitia RN     08/05/2018 2313 albuterol inhalation solution 5 mg 5 mg Nebulization Given Ilene Espitia RN     08/05/2018 2313 ipratropium (ATROVENT) 0 02 % inhalation solution 0 5 mg 0 5 mg Nebulization Given Ilene Espitia RN     08/06/2018 7134 aspirin chewable tablet 324 mg 324 mg Oral Given Ilene Espitia RN          Assessment and Plan     Chest pain  · Patient states that this pain is reproducible and is on his right side; this pain is likely secondary to musculoskeletal  · Troponin negative x1  · Chest x-ray unremarkable  · EKG pending in a m  · Telemetry  · Lipid panel pending    Acute kidney injury  · Patient received 1 L fluid bolus an ED  · Increased p o  fluid intake  · Creatinine 1 49 (baseline~1)  · Continue to monitor    Hypertension  · Holding lisinopril due to acute kidney injury  · Metoprolol tartrate 25 mg p o  daily    Pre diabetes  · Hemoglobin A1c pending; previous hemoglobin A1c 6 4% in 2016    Hyperlipidemia  · Atorvastatin 40 mg    Status post CABG  · Patient states he had CABG 14 years ago and is uncertain in regards to how many vessels    Tobacco abuse  · Nicotine patch  · Will discuss tobacco cessation    Code Status: Level 1 - Full Code  VTE Pharmacologic Prophylaxis: Heparin   VTE Mechanical Prophylaxis: sequential compression device  Admission Status: OBSERVATION    Admission Time  I spent 30 minutes admitting the patient  This involved direct patient contact where I performed a full history and physical, reviewing previous records, and reviewing laboratory and other diagnostic studies      Richard Mccollum DO  Internal Medicine  PGY-1

## 2018-08-06 NOTE — DISCHARGE SUMMARY
IMR Discharge Summary - Medical Andrea Amador 78 y o  male MRN: [id number]    [address] [facility]  Room / Bed: [facility] 218/[facility] 218-02 Encounter: [id number]    BRIEF OVERVIEW    Admitting Provider: Darshan Guillory MD  Discharge Provider: Darshan Guillory MD  Primary Care Physician at Discharge: Constanza Yeh DO    Discharge To: Home/self care  Phone Number: [phone]     Admission Date: 8/5/2018     Discharge Date: 8/6/2018 12:20 PM    Primary Discharge Diagnosis  Principal Problem:    Chest pain  Active Problems:    CAD (coronary artery disease)    S/P CABG x 4    Pre-diabetes    HLD (hyperlipidemia)    HTN (hypertension)    Tobacco use    NARDA (acute kidney injury) ([facility] [address] )  Resolved Problems:    * No resolved hospital problems  *      Diagnostic Procedures Performed  Troponin I [[id number]] (Normal) Collected: 08/06/18 0532   Lab Status: Final result Specimen: Blood from Arm, Right Updated: 08/06/18 0626    Troponin I <0 02 ng/mL    Basic metabolic panel [[id number]] (Abnormal) Collected: 08/06/18 0532   Lab Status: Final result Specimen: Blood from Arm, Right Updated: 08/06/18 4742    Sodium 141 mmol/L     Potassium 3 6 mmol/L     Chloride 112 (H) mmol/L     CO2 23 mmol/L     Anion Gap 6 mmol/L     BUN 19 mg/dL     Creatinine 1 05 mg/dL     Glucose 101 mg/dL     Calcium 8 1 (L) mg/dL     eGFR 74 ml/min/1 73sq m    Narrative:       National Kidney Disease Education Program recommendations are as follows:  GFR calculation is accurate only with a steady state creatinine  Chronic Kidney disease less than 60 ml/min/1 73 sq  meters  Kidney failure less than 15 ml/min/1 73 sq  meters     Hemoglobin A1C [[id number]] Collected: 08/06/18 0532   Lab Status: Final result Specimen: Blood from Arm, Right Updated: 08/06/18 0623    Hemoglobin A1C 5 8 %      mg/dl    Lipid panel [[id number]] (Abnormal) Collected: 08/06/18 0532   Lab Status: Final result Specimen: Blood from Arm, Right Updated: 08/06/18 6056    Cholesterol 124 mg/dL     Triglycerides 87 mg/dL     HDL, Direct 27 (L) mg/dL     LDL Calculated 80 mg/dL     Non-HDL-Chol (CHOL-HDL) 97 mg/dl    Troponin I [[id number]] (Normal) Collected: 08/06/18 0222   Lab Status: Final result Specimen: Blood from Arm, Right Updated: 08/06/18 0304    Troponin I <0 02 ng/mL        Discharge Disposition: Home/Self Care  Discharged With Lines: no    Test Results Pending at Discharge: none    Outpatient Follow-Up  yes      Follow up: Darleen Fraire MD  Follow up within next: 2 weeks  Follow up with consulting providers  none required   Active Issues Requiring Follow-up   none    Code Status: Level 1 - Full Code  Advance Directive and Living Will: <no information>  Power of :    POLST:      Medications   Discharge Medication List as of 8/6/2018 12:05 PM      STOP taking these medications       lisinopril (ZESTRIL) 5 mg tablet Comments:   Reason for Stopping:             Discharge Medication List as of 8/6/2018 12:05 PM      CONTINUE these medications which have NOT CHANGED    Details   aspirin 81 MG tablet Take 81 mg by mouth daily  , Until Discontinued, Historical Med      atorvastatin (LIPITOR) 40 mg tablet Take 40 mg by mouth daily, Historical Med      metoprolol tartrate (LOPRESSOR) 25 mg tablet Take 25 mg by mouth daily  , Historical Med      ARIPiprazole (ABILIFY) 5 mg tablet Take 5 mg by mouth daily, Historical Med      nitroglycerin (NITRODUR) 0 4 mg/hr Place 1 patch on the skin 2 (two) times a day as needed, Starting Fri 6/5/2015, Historical Med               Allergies  No Known Allergies  Discharge Diet: cardiac diet  Activity restrictions: none    [facility] Course  This is a gentleman with a past medical history of hyperlipidemia, hypertension, and coronary artery disease status post CABG 14 years ago who presented with right-sided chest pain that he described as progressive worsening for the past 4 days    He said that pain was worse when moving his right arm and reproducible on palpation  It was not associated with exertion  However, he denied associated symptoms including shortness of breath, palpitations, nausea, and diaphoresis  He was also noted to have a creatinine elevation of 1 49, up from a baseline around 1  Chest pain was suspected to be musculoskeletal, but due to his multiple risk factors, and due to his NARDA, he was admitted to observation  Overnight, his troponins were negative x3  He had no events on telemetry monitoring and his EKG did not show ischemic changes  The next morning, he reported great improvement in his chest pain  His creatinine went back down to baseline next morning  It was decided to discharge him back home that day with instructions to follow up with his primary care physician and with Cardiology and to obtain a nuclear medicine stress study that was ordered as an outpatient  In addition, due to his systolic blood pressure running in the low 100-90's, he was advised to stop taking his lisinopril  The physical exam findings on the day of his discharge are:  General:  Awake, non diaphoretic, no acute distress  Cardio:  Regular rate and rhythm, no heart murmurs  No peripheral edema  Chest:  Right-sided tenderness to palpation  Lungs:  Clear to auscultation bilaterally    Presenting Problem/History of Present Illness  Principal Problem:    Chest pain  Active Problems:    CAD (coronary artery disease)    S/P CABG x 4    Pre-diabetes    HLD (hyperlipidemia)    HTN (hypertension)    Tobacco use    NARDA (acute kidney injury) ([facility] [address] )  Resolved Problems:    * No resolved hospital problems   *        Other Pertinent Test Results  Chest xray: no acute pathology     Discharge Condition: good    Everette Chambers DO  PGY-3

## 2018-08-06 NOTE — ED NOTES
Pt transported to [address] [place] [address], [address] [place] [address]  08/05/18 2383

## 2018-08-07 ENCOUNTER — TRANSITIONAL CARE MANAGEMENT (OUTPATIENT)
Dept: INTERNAL MEDICINE CLINIC | Facility: CLINIC | Age: 68
End: 2018-08-07

## 2019-01-10 ENCOUNTER — TRANSCRIBE ORDERS (OUTPATIENT)
Dept: ADMINISTRATIVE | Facility: HOSPITAL | Age: 69
End: 2019-01-10

## 2019-01-10 DIAGNOSIS — I73.9 PERIPHERAL VASCULAR DISEASE, UNSPECIFIED (HCC): Primary | ICD-10-CM

## 2019-01-14 ENCOUNTER — HOSPITAL ENCOUNTER (OUTPATIENT)
Dept: NON INVASIVE DIAGNOSTICS | Facility: CLINIC | Age: 69
Discharge: HOME | End: 2019-01-14
Payer: COMMERCIAL

## 2019-01-14 DIAGNOSIS — I73.9 PERIPHERAL VASCULAR DISEASE, UNSPECIFIED (HCC): ICD-10-CM

## 2019-01-14 PROCEDURE — 93925 LOWER EXTREMITY STUDY: CPT

## 2019-01-14 PROCEDURE — 93922 UPR/L XTREMITY ART 2 LEVELS: CPT | Performed by: SURGERY

## 2019-01-14 PROCEDURE — 93925 LOWER EXTREMITY STUDY: CPT | Performed by: SURGERY

## 2019-01-14 PROCEDURE — 93923 UPR/LXTR ART STDY 3+ LVLS: CPT

## 2020-01-23 ENCOUNTER — TRANSCRIBE ORDERS (OUTPATIENT)
Dept: ADMINISTRATIVE | Facility: HOSPITAL | Age: 70
End: 2020-01-23

## 2020-01-23 DIAGNOSIS — I73.9 PERIPHERAL VASCULAR DISEASE, UNSPECIFIED (HCC): ICD-10-CM

## 2020-01-23 DIAGNOSIS — I25.10 ATHEROSCLEROSIS OF NATIVE CORONARY ARTERY WITHOUT ANGINA PECTORIS, UNSPECIFIED WHETHER NATIVE OR TRANSPLANTED HEART: ICD-10-CM

## 2020-01-23 DIAGNOSIS — I25.10 CORONARY ARTERY DISEASE INVOLVING NATIVE HEART WITHOUT ANGINA PECTORIS, UNSPECIFIED VESSEL OR LESION TYPE: Primary | ICD-10-CM

## 2020-02-03 ENCOUNTER — HOSPITAL ENCOUNTER (OUTPATIENT)
Dept: NON INVASIVE DIAGNOSTICS | Facility: HOSPITAL | Age: 70
Discharge: HOME | End: 2020-02-03
Payer: COMMERCIAL

## 2020-02-03 DIAGNOSIS — I25.10 ATHEROSCLEROSIS OF NATIVE CORONARY ARTERY WITHOUT ANGINA PECTORIS, UNSPECIFIED WHETHER NATIVE OR TRANSPLANTED HEART: ICD-10-CM

## 2020-02-03 DIAGNOSIS — I73.9 PERIPHERAL VASCULAR DISEASE, UNSPECIFIED (HCC): ICD-10-CM

## 2020-02-03 PROCEDURE — 93922 UPR/L XTREMITY ART 2 LEVELS: CPT

## 2020-02-03 PROCEDURE — 93970 EXTREMITY STUDY: CPT | Performed by: SURGERY

## 2020-02-03 PROCEDURE — 93922 UPR/L XTREMITY ART 2 LEVELS: CPT | Performed by: SURGERY

## 2020-02-03 PROCEDURE — 93970 EXTREMITY STUDY: CPT

## 2020-02-10 ENCOUNTER — HOSPITAL ENCOUNTER (OUTPATIENT)
Dept: RADIOLOGY | Facility: HOSPITAL | Age: 70
Discharge: HOME | End: 2020-02-10
Payer: COMMERCIAL

## 2020-02-10 ENCOUNTER — APPOINTMENT (OUTPATIENT)
Dept: LAB | Facility: HOSPITAL | Age: 70
End: 2020-02-10
Payer: COMMERCIAL

## 2020-02-10 ENCOUNTER — TRANSCRIBE ORDERS (OUTPATIENT)
Dept: LAB | Facility: HOSPITAL | Age: 70
End: 2020-02-10

## 2020-02-10 DIAGNOSIS — I25.10 ATHEROSCLEROSIS OF NATIVE CORONARY ARTERY OF NATIVE HEART WITHOUT ANGINA PECTORIS: ICD-10-CM

## 2020-02-10 DIAGNOSIS — E78.00 PURE HYPERCHOLESTEROLEMIA: Primary | ICD-10-CM

## 2020-02-10 DIAGNOSIS — E78.00 PURE HYPERCHOLESTEROLEMIA: ICD-10-CM

## 2020-02-10 DIAGNOSIS — I25.10 CORONARY ARTERY DISEASE INVOLVING NATIVE HEART WITHOUT ANGINA PECTORIS, UNSPECIFIED VESSEL OR LESION TYPE: ICD-10-CM

## 2020-02-10 LAB
ALBUMIN SERPL BCP-MCNC: 3.3 G/DL (ref 3.5–5)
ALP SERPL-CCNC: 87 U/L (ref 46–116)
ALT SERPL W P-5'-P-CCNC: 22 U/L (ref 12–78)
ANION GAP SERPL CALCULATED.3IONS-SCNC: 4 MMOL/L (ref 4–13)
AST SERPL W P-5'-P-CCNC: 14 U/L (ref 5–45)
BASOPHILS # BLD AUTO: 0.02 THOUSANDS/ΜL (ref 0–0.1)
BASOPHILS NFR BLD AUTO: 0 % (ref 0–1)
BILIRUB SERPL-MCNC: 0.27 MG/DL (ref 0.2–1)
BUN SERPL-MCNC: 14 MG/DL (ref 5–25)
CALCIUM SERPL-MCNC: 8.9 MG/DL (ref 8.3–10.1)
CHLORIDE SERPL-SCNC: 107 MMOL/L (ref 100–108)
CHOLEST SERPL-MCNC: 140 MG/DL (ref 50–200)
CO2 SERPL-SCNC: 27 MMOL/L (ref 21–32)
CREAT SERPL-MCNC: 1.06 MG/DL (ref 0.6–1.3)
EOSINOPHIL # BLD AUTO: 0.13 THOUSAND/ΜL (ref 0–0.61)
EOSINOPHIL NFR BLD AUTO: 2 % (ref 0–6)
ERYTHROCYTE [DISTWIDTH] IN BLOOD BY AUTOMATED COUNT: 13.7 % (ref 11.6–15.1)
GFR SERPL CREATININE-BSD FRML MDRD: 72 ML/MIN/1.73SQ M
GLUCOSE P FAST SERPL-MCNC: 89 MG/DL (ref 65–99)
HCT VFR BLD AUTO: 42.7 % (ref 36.5–49.3)
HDLC SERPL-MCNC: 40 MG/DL
HGB BLD-MCNC: 14.3 G/DL (ref 12–17)
IMM GRANULOCYTES # BLD AUTO: 0.02 THOUSAND/UL (ref 0–0.2)
IMM GRANULOCYTES NFR BLD AUTO: 0 % (ref 0–2)
LDLC SERPL CALC-MCNC: 79 MG/DL (ref 0–100)
LYMPHOCYTES # BLD AUTO: 2.44 THOUSANDS/ΜL (ref 0.6–4.47)
LYMPHOCYTES NFR BLD AUTO: 30 % (ref 14–44)
MCH RBC QN AUTO: 31.9 PG (ref 26.8–34.3)
MCHC RBC AUTO-ENTMCNC: 33.5 G/DL (ref 31.4–37.4)
MCV RBC AUTO: 95 FL (ref 82–98)
MONOCYTES # BLD AUTO: 0.67 THOUSAND/ΜL (ref 0.17–1.22)
MONOCYTES NFR BLD AUTO: 8 % (ref 4–12)
NEUTROPHILS # BLD AUTO: 4.93 THOUSANDS/ΜL (ref 1.85–7.62)
NEUTS SEG NFR BLD AUTO: 60 % (ref 43–75)
NONHDLC SERPL-MCNC: 100 MG/DL
NRBC BLD AUTO-RTO: 0 /100 WBCS
PLATELET # BLD AUTO: 265 THOUSANDS/UL (ref 149–390)
PMV BLD AUTO: 10.2 FL (ref 8.9–12.7)
POTASSIUM SERPL-SCNC: 3.8 MMOL/L (ref 3.5–5.3)
PROT SERPL-MCNC: 6.8 G/DL (ref 6.4–8.2)
RBC # BLD AUTO: 4.48 MILLION/UL (ref 3.88–5.62)
SODIUM SERPL-SCNC: 138 MMOL/L (ref 136–145)
TRIGL SERPL-MCNC: 106 MG/DL
TSH SERPL DL<=0.05 MIU/L-ACNC: 5.66 UIU/ML (ref 0.36–3.74)
WBC # BLD AUTO: 8.21 THOUSAND/UL (ref 4.31–10.16)

## 2020-02-10 PROCEDURE — 85025 COMPLETE CBC W/AUTO DIFF WBC: CPT

## 2020-02-10 PROCEDURE — 80053 COMPREHEN METABOLIC PANEL: CPT

## 2020-02-10 PROCEDURE — 80061 LIPID PANEL: CPT

## 2020-02-10 PROCEDURE — 84443 ASSAY THYROID STIM HORMONE: CPT

## 2020-02-10 PROCEDURE — 93005 ELECTROCARDIOGRAM TRACING: CPT

## 2020-02-10 PROCEDURE — 36415 COLL VENOUS BLD VENIPUNCTURE: CPT

## 2020-02-10 PROCEDURE — 71046 X-RAY EXAM CHEST 2 VIEWS: CPT

## 2020-02-11 LAB
ATRIAL RATE: 97 BPM
P AXIS: 72 DEGREES
PR INTERVAL: 122 MS
QRS AXIS: 61 DEGREES
QRSD INTERVAL: 84 MS
QT INTERVAL: 350 MS
QTC INTERVAL: 444 MS
T WAVE AXIS: 71 DEGREES
VENTRICULAR RATE: 97 BPM

## 2020-02-11 PROCEDURE — 93010 ELECTROCARDIOGRAM REPORT: CPT | Performed by: INTERNAL MEDICINE

## 2020-02-14 ENCOUNTER — APPOINTMENT (OUTPATIENT)
Dept: LAB | Facility: HOSPITAL | Age: 70
End: 2020-02-14
Payer: COMMERCIAL

## 2020-02-14 ENCOUNTER — TRANSCRIBE ORDERS (OUTPATIENT)
Dept: LAB | Facility: HOSPITAL | Age: 70
End: 2020-02-14

## 2020-02-14 DIAGNOSIS — Z12.11 SPECIAL SCREENING FOR MALIGNANT NEOPLASMS, COLON: Primary | ICD-10-CM

## 2020-02-14 DIAGNOSIS — Z12.11 SPECIAL SCREENING FOR MALIGNANT NEOPLASMS, COLON: ICD-10-CM

## 2020-02-14 LAB
HEMOCCULT SP1 STL QL: NEGATIVE

## 2020-02-14 PROCEDURE — 82270 OCCULT BLOOD FECES: CPT

## 2020-02-18 ENCOUNTER — TRANSCRIBE ORDERS (OUTPATIENT)
Dept: ADMINISTRATIVE | Facility: HOSPITAL | Age: 70
End: 2020-02-18

## 2020-02-18 DIAGNOSIS — F17.200 TOBACCO USE DISORDER: Primary | ICD-10-CM

## 2020-02-21 ENCOUNTER — HOSPITAL ENCOUNTER (OUTPATIENT)
Dept: RADIOLOGY | Facility: HOSPITAL | Age: 70
Discharge: HOME | End: 2020-02-21
Payer: COMMERCIAL

## 2020-02-21 DIAGNOSIS — F17.200 TOBACCO USE DISORDER: ICD-10-CM

## 2020-02-21 PROCEDURE — 76706 US ABDL AORTA SCREEN AAA: CPT

## 2020-08-27 ENCOUNTER — TRANSCRIBE ORDERS (OUTPATIENT)
Dept: RADIOLOGY | Facility: HOSPITAL | Age: 70
End: 2020-08-27

## 2020-08-27 ENCOUNTER — APPOINTMENT (OUTPATIENT)
Dept: LAB | Facility: HOSPITAL | Age: 70
End: 2020-08-27
Payer: COMMERCIAL

## 2020-08-27 ENCOUNTER — TRANSCRIBE ORDERS (OUTPATIENT)
Dept: LAB | Facility: HOSPITAL | Age: 70
End: 2020-08-27

## 2020-08-27 ENCOUNTER — HOSPITAL ENCOUNTER (OUTPATIENT)
Dept: RADIOLOGY | Facility: HOSPITAL | Age: 70
Discharge: HOME | End: 2020-08-27
Payer: COMMERCIAL

## 2020-08-27 DIAGNOSIS — F03.90 PRESENILE DEMENTIA WITH DEPRESSION WITHOUT BEHAVIORAL DISTURBANCE (HCC): Primary | ICD-10-CM

## 2020-08-27 DIAGNOSIS — F03.90 PRESENILE DEMENTIA WITH DEPRESSION WITHOUT BEHAVIORAL DISTURBANCE (HCC): ICD-10-CM

## 2020-08-27 DIAGNOSIS — M54.50 LOW BACK PAIN, UNSPECIFIED BACK PAIN LATERALITY, UNSPECIFIED CHRONICITY, UNSPECIFIED WHETHER SCIATICA PRESENT: ICD-10-CM

## 2020-08-27 DIAGNOSIS — F32.A PRESENILE DEMENTIA WITH DEPRESSION WITHOUT BEHAVIORAL DISTURBANCE (HCC): Primary | ICD-10-CM

## 2020-08-27 DIAGNOSIS — M54.50 LOW BACK PAIN, UNSPECIFIED BACK PAIN LATERALITY, UNSPECIFIED CHRONICITY, UNSPECIFIED WHETHER SCIATICA PRESENT: Primary | ICD-10-CM

## 2020-08-27 DIAGNOSIS — F32.A PRESENILE DEMENTIA WITH DEPRESSION WITHOUT BEHAVIORAL DISTURBANCE (HCC): ICD-10-CM

## 2020-08-27 LAB
ALBUMIN SERPL BCP-MCNC: 3.4 G/DL (ref 3.5–5)
ALP SERPL-CCNC: 87 U/L (ref 46–116)
ALT SERPL W P-5'-P-CCNC: 27 U/L (ref 12–78)
ANION GAP SERPL CALCULATED.3IONS-SCNC: 3 MMOL/L (ref 4–13)
AST SERPL W P-5'-P-CCNC: 25 U/L (ref 5–45)
BILIRUB SERPL-MCNC: 0.38 MG/DL (ref 0.2–1)
BUN SERPL-MCNC: 17 MG/DL (ref 5–25)
CALCIUM SERPL-MCNC: 8.6 MG/DL (ref 8.3–10.1)
CHLORIDE SERPL-SCNC: 112 MMOL/L (ref 100–108)
CO2 SERPL-SCNC: 26 MMOL/L (ref 21–32)
CREAT SERPL-MCNC: 1.04 MG/DL (ref 0.6–1.3)
GFR SERPL CREATININE-BSD FRML MDRD: 73 ML/MIN/1.73SQ M
GLUCOSE P FAST SERPL-MCNC: 101 MG/DL (ref 65–99)
POTASSIUM SERPL-SCNC: 4.2 MMOL/L (ref 3.5–5.3)
PROT SERPL-MCNC: 7.1 G/DL (ref 6.4–8.2)
SODIUM SERPL-SCNC: 141 MMOL/L (ref 136–145)
T4 FREE SERPL-MCNC: 1.08 NG/DL (ref 0.76–1.46)
TSH SERPL DL<=0.05 MIU/L-ACNC: 2.62 UIU/ML (ref 0.36–3.74)

## 2020-08-27 PROCEDURE — 80053 COMPREHEN METABOLIC PANEL: CPT

## 2020-08-27 PROCEDURE — 36415 COLL VENOUS BLD VENIPUNCTURE: CPT

## 2020-08-27 PROCEDURE — 84439 ASSAY OF FREE THYROXINE: CPT

## 2020-08-27 PROCEDURE — 72100 X-RAY EXAM L-S SPINE 2/3 VWS: CPT

## 2020-08-27 PROCEDURE — 84443 ASSAY THYROID STIM HORMONE: CPT

## 2020-12-04 ENCOUNTER — HOSPITAL ENCOUNTER (EMERGENCY)
Facility: HOSPITAL | Age: 70
Discharge: HOME | End: 2020-12-04
Attending: EMERGENCY MEDICINE
Payer: COMMERCIAL

## 2020-12-04 VITALS
DIASTOLIC BLOOD PRESSURE: 111 MMHG | TEMPERATURE: 97.8 F | SYSTOLIC BLOOD PRESSURE: 143 MMHG | RESPIRATION RATE: 22 BRPM | HEART RATE: 105 BPM | OXYGEN SATURATION: 98 %

## 2020-12-04 DIAGNOSIS — H10.9 CONJUNCTIVITIS: Primary | ICD-10-CM

## 2020-12-04 PROCEDURE — 99282 EMERGENCY DEPT VISIT SF MDM: CPT

## 2020-12-04 PROCEDURE — 99284 EMERGENCY DEPT VISIT MOD MDM: CPT | Performed by: EMERGENCY MEDICINE

## 2020-12-04 RX ORDER — ERYTHROMYCIN 5 MG/G
OINTMENT OPHTHALMIC
Qty: 3.5 G | Refills: 0 | Status: SHIPPED | OUTPATIENT
Start: 2020-12-04

## 2020-12-04 RX ORDER — TETRACAINE HYDROCHLORIDE 5 MG/ML
2 SOLUTION OPHTHALMIC ONCE
Status: COMPLETED | OUTPATIENT
Start: 2020-12-04 | End: 2020-12-04

## 2020-12-04 RX ADMIN — FLUORESCEIN SODIUM 1 STRIP: 1 STRIP OPHTHALMIC at 22:04

## 2020-12-04 RX ADMIN — TETRACAINE HYDROCHLORIDE 2 DROP: 5 SOLUTION OPHTHALMIC at 22:04

## 2021-04-01 ENCOUNTER — TRANSCRIBE ORDERS (OUTPATIENT)
Dept: LAB | Facility: HOSPITAL | Age: 71
End: 2021-04-01

## 2021-04-01 ENCOUNTER — APPOINTMENT (OUTPATIENT)
Dept: LAB | Facility: HOSPITAL | Age: 71
End: 2021-04-01
Payer: COMMERCIAL

## 2021-04-01 DIAGNOSIS — I25.119 ATHEROSCLEROSIS OF NATIVE CORONARY ARTERY WITH ANGINA PECTORIS, UNSPECIFIED WHETHER NATIVE OR TRANSPLANTED HEART (HCC): ICD-10-CM

## 2021-04-01 DIAGNOSIS — R73.09 IMPAIRED GLUCOSE TOLERANCE TEST: ICD-10-CM

## 2021-04-01 DIAGNOSIS — G25.81 RESTLESS LEGS: ICD-10-CM

## 2021-04-01 DIAGNOSIS — N52.9 ERECTILE DYSFUNCTION, UNSPECIFIED ERECTILE DYSFUNCTION TYPE: ICD-10-CM

## 2021-04-01 DIAGNOSIS — G25.81 RESTLESS LEGS: Primary | ICD-10-CM

## 2021-04-01 LAB
ALBUMIN SERPL BCP-MCNC: 3.4 G/DL (ref 3.5–5)
ALP SERPL-CCNC: 101 U/L (ref 46–116)
ALT SERPL W P-5'-P-CCNC: 18 U/L (ref 12–78)
ANION GAP SERPL CALCULATED.3IONS-SCNC: 3 MMOL/L (ref 4–13)
AST SERPL W P-5'-P-CCNC: 13 U/L (ref 5–45)
BACTERIA UR QL AUTO: ABNORMAL /HPF
BILIRUB SERPL-MCNC: 0.38 MG/DL (ref 0.2–1)
BILIRUB UR QL STRIP: NEGATIVE
BUN SERPL-MCNC: 13 MG/DL (ref 5–25)
CALCIUM ALBUM COR SERPL-MCNC: 9.5 MG/DL (ref 8.3–10.1)
CALCIUM SERPL-MCNC: 9 MG/DL (ref 8.3–10.1)
CHLORIDE SERPL-SCNC: 111 MMOL/L (ref 100–108)
CHOLEST SERPL-MCNC: 159 MG/DL (ref 50–200)
CLARITY UR: CLEAR
CO2 SERPL-SCNC: 25 MMOL/L (ref 21–32)
COLOR UR: YELLOW
CREAT SERPL-MCNC: 1.25 MG/DL (ref 0.6–1.3)
ERYTHROCYTE [DISTWIDTH] IN BLOOD BY AUTOMATED COUNT: 13.7 % (ref 11.6–15.1)
EST. AVERAGE GLUCOSE BLD GHB EST-MCNC: 126 MG/DL
FERRITIN SERPL-MCNC: 37 NG/ML (ref 8–388)
GFR SERPL CREATININE-BSD FRML MDRD: 58 ML/MIN/1.73SQ M
GLUCOSE P FAST SERPL-MCNC: 99 MG/DL (ref 65–99)
GLUCOSE UR STRIP-MCNC: NEGATIVE MG/DL
HBA1C MFR BLD: 6 %
HCT VFR BLD AUTO: 43.4 % (ref 36.5–49.3)
HDLC SERPL-MCNC: 34 MG/DL
HGB BLD-MCNC: 14.4 G/DL (ref 12–17)
HGB UR QL STRIP.AUTO: ABNORMAL
HYALINE CASTS #/AREA URNS LPF: ABNORMAL /LPF
KETONES UR STRIP-MCNC: NEGATIVE MG/DL
LDLC SERPL CALC-MCNC: 95 MG/DL (ref 0–100)
LEUKOCYTE ESTERASE UR QL STRIP: NEGATIVE
MAGNESIUM SERPL-MCNC: 2 MG/DL (ref 1.6–2.6)
MCH RBC QN AUTO: 31.5 PG (ref 26.8–34.3)
MCHC RBC AUTO-ENTMCNC: 33.2 G/DL (ref 31.4–37.4)
MCV RBC AUTO: 95 FL (ref 82–98)
NITRITE UR QL STRIP: NEGATIVE
NON-SQ EPI CELLS URNS QL MICRO: ABNORMAL /HPF
NONHDLC SERPL-MCNC: 125 MG/DL
PH UR STRIP.AUTO: 6.5 [PH]
PLATELET # BLD AUTO: 275 THOUSANDS/UL (ref 149–390)
PMV BLD AUTO: 9.6 FL (ref 8.9–12.7)
POTASSIUM SERPL-SCNC: 4 MMOL/L (ref 3.5–5.3)
PROT SERPL-MCNC: 7.4 G/DL (ref 6.4–8.2)
PROT UR STRIP-MCNC: NEGATIVE MG/DL
RBC # BLD AUTO: 4.57 MILLION/UL (ref 3.88–5.62)
RBC #/AREA URNS AUTO: ABNORMAL /HPF
SODIUM SERPL-SCNC: 139 MMOL/L (ref 136–145)
SP GR UR STRIP.AUTO: 1.02 (ref 1–1.03)
TIBC SERPL-MCNC: 330 UG/DL (ref 250–450)
TRIGL SERPL-MCNC: 150 MG/DL
UROBILINOGEN UR QL STRIP.AUTO: 1 E.U./DL
VIT B12 SERPL-MCNC: 296 PG/ML (ref 100–900)
WBC # BLD AUTO: 5.92 THOUSAND/UL (ref 4.31–10.16)
WBC #/AREA URNS AUTO: ABNORMAL /HPF

## 2021-04-01 PROCEDURE — 87086 URINE CULTURE/COLONY COUNT: CPT

## 2021-04-01 PROCEDURE — 83036 HEMOGLOBIN GLYCOSYLATED A1C: CPT

## 2021-04-01 PROCEDURE — 82728 ASSAY OF FERRITIN: CPT

## 2021-04-01 PROCEDURE — 84154 ASSAY OF PSA FREE: CPT

## 2021-04-01 PROCEDURE — 84153 ASSAY OF PSA TOTAL: CPT

## 2021-04-01 PROCEDURE — 83550 IRON BINDING TEST: CPT

## 2021-04-01 PROCEDURE — 80061 LIPID PANEL: CPT

## 2021-04-01 PROCEDURE — 82607 VITAMIN B-12: CPT

## 2021-04-01 PROCEDURE — 36415 COLL VENOUS BLD VENIPUNCTURE: CPT

## 2021-04-01 PROCEDURE — 81001 URINALYSIS AUTO W/SCOPE: CPT

## 2021-04-01 PROCEDURE — 85027 COMPLETE CBC AUTOMATED: CPT

## 2021-04-01 PROCEDURE — 80053 COMPREHEN METABOLIC PANEL: CPT

## 2021-04-01 PROCEDURE — 83735 ASSAY OF MAGNESIUM: CPT

## 2021-04-02 LAB
BACTERIA UR CULT: NORMAL
PSA FREE MFR SERPL: 19 %
PSA FREE SERPL-MCNC: 0.19 NG/ML
PSA SERPL-MCNC: 1 NG/ML (ref 0–4)

## 2021-04-20 ENCOUNTER — TELEPHONE (OUTPATIENT)
Dept: NEUROLOGY | Facility: CLINIC | Age: 71
End: 2021-04-20

## 2021-04-20 NOTE — TELEPHONE ENCOUNTER
Lm advising patient we received order needs to be sent to sleep center not a dx we treat for      Mefan Galanek/Restless leg syn/Medicare

## 2021-05-17 ENCOUNTER — TRANSCRIBE ORDERS (OUTPATIENT)
Dept: ADMINISTRATIVE | Facility: HOSPITAL | Age: 71
End: 2021-05-17

## 2021-05-17 DIAGNOSIS — Z12.2 SCREENING FOR MALIGNANT NEOPLASM OF RESPIRATORY ORGAN: Primary | ICD-10-CM

## 2021-05-17 DIAGNOSIS — F17.200 NICOTINE DEPENDENCE, UNCOMPLICATED, UNSPECIFIED NICOTINE PRODUCT TYPE: ICD-10-CM

## 2021-05-27 ENCOUNTER — HOSPITAL ENCOUNTER (OUTPATIENT)
Dept: RADIOLOGY | Facility: HOSPITAL | Age: 71
Discharge: HOME | End: 2021-05-27
Payer: COMMERCIAL

## 2021-05-27 DIAGNOSIS — Z12.2 SCREENING FOR MALIGNANT NEOPLASM OF RESPIRATORY ORGAN: ICD-10-CM

## 2021-05-27 DIAGNOSIS — F17.200 NICOTINE DEPENDENCE, UNCOMPLICATED, UNSPECIFIED NICOTINE PRODUCT TYPE: ICD-10-CM

## 2021-05-27 PROCEDURE — 71271 CT THORAX LUNG CANCER SCR C-: CPT

## 2021-06-10 ENCOUNTER — TRANSCRIBE ORDERS (OUTPATIENT)
Dept: ADMINISTRATIVE | Facility: HOSPITAL | Age: 71
End: 2021-06-10

## 2021-06-10 DIAGNOSIS — G25.81 RESTLESS LEG SYNDROME: Primary | ICD-10-CM

## 2021-06-10 DIAGNOSIS — F17.200 NICOTINE DEPENDENCE, UNSPECIFIED, UNCOMPLICATED: ICD-10-CM

## 2021-06-10 DIAGNOSIS — Z12.2 ENCOUNTER FOR SCREENING FOR MALIGNANT NEOPLASM OF RESPIRATORY ORGANS: ICD-10-CM

## 2021-06-21 ENCOUNTER — TELEPHONE (OUTPATIENT)
Dept: UROLOGY | Facility: MEDICAL CENTER | Age: 71
End: 2021-06-21

## 2021-06-21 NOTE — TELEPHONE ENCOUNTER
Please Triage [facility]   Patient speaks Indonesian     What is the reason for the patients appointment? Patient family doctor called stating patient needs appointment for micro hematuria  Patient can be contacted directly        Imaging/Lab Results:      Do we accept the patient's insurance or is the patient Self-Pay? Provider & Plan: [facility]  Member ID#: Has the patient had any previous urologist(s)? Yes more than 5 years ago       Have patient records been requested? Records will be faxed to [phone]       Has the patient had any outside testing done?       Does the patient have a personal history of cancer?no       Patient can be reached at :[phone] () Detail Level: Detailed Quality 130: Documentation Of Current Medications In The Medical Record: Current Medications Documented

## 2021-06-22 NOTE — TELEPHONE ENCOUNTER
Called patient and only speaks Hungarian   He is scheduled with Karen Monday on 8/3 @ 9:45 for microhematuria

## 2021-06-23 NOTE — TELEPHONE ENCOUNTER
Called patient and left a message in [place] on machine with appointment date and time  Left message to return call to confirm appointment

## 2021-06-30 ENCOUNTER — APPOINTMENT (EMERGENCY)
Dept: RADIOLOGY | Facility: HOSPITAL | Age: 71
End: 2021-06-30
Payer: COMMERCIAL

## 2021-06-30 ENCOUNTER — HOSPITAL ENCOUNTER (EMERGENCY)
Facility: HOSPITAL | Age: 71
Discharge: HOME | End: 2021-06-30
Attending: EMERGENCY MEDICINE | Admitting: EMERGENCY MEDICINE
Payer: COMMERCIAL

## 2021-06-30 VITALS
SYSTOLIC BLOOD PRESSURE: 145 MMHG | HEART RATE: 116 BPM | RESPIRATION RATE: 20 BRPM | OXYGEN SATURATION: 95 % | DIASTOLIC BLOOD PRESSURE: 83 MMHG | TEMPERATURE: 97.9 F

## 2021-06-30 DIAGNOSIS — T14.8XXA ABRASION: ICD-10-CM
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
## 2021-06-30 DIAGNOSIS — R42 LIGHTHEADEDNESS: ICD-10-CM

## 2021-06-30 DIAGNOSIS — S00.83XA CONTUSION OF FACE, INITIAL ENCOUNTER: ICD-10-CM

## 2021-06-30 DIAGNOSIS — S09.90XA INJURY OF HEAD, INITIAL ENCOUNTER: Primary | ICD-10-CM

## 2021-06-30 DIAGNOSIS — R07.9 CHEST PAIN: ICD-10-CM

## 2021-06-30 LAB
ANION GAP SERPL CALCULATED.3IONS-SCNC: 6 MMOL/L (ref 4–13)
BASOPHILS # BLD AUTO: 0.02 THOUSANDS/ΜL (ref 0–0.1)
BASOPHILS NFR BLD AUTO: 0 % (ref 0–1)
BUN SERPL-MCNC: 22 MG/DL (ref 5–25)
CALCIUM SERPL-MCNC: 9.6 MG/DL (ref 8.3–10.1)
CHLORIDE SERPL-SCNC: 108 MMOL/L (ref 100–108)
CO2 SERPL-SCNC: 23 MMOL/L (ref 21–32)
CREAT SERPL-MCNC: 1.28 MG/DL (ref 0.6–1.3)
EOSINOPHIL # BLD AUTO: 0.09 THOUSAND/ΜL (ref 0–0.61)
EOSINOPHIL NFR BLD AUTO: 1 % (ref 0–6)
ERYTHROCYTE [DISTWIDTH] IN BLOOD BY AUTOMATED COUNT: 13.7 % (ref 11.6–15.1)
GFR SERPL CREATININE-BSD FRML MDRD: 57 ML/MIN/1.73SQ M
GLUCOSE SERPL-MCNC: 109 MG/DL (ref 65–140)
HCT VFR BLD AUTO: 43.8 % (ref 36.5–49.3)
HGB BLD-MCNC: 15 G/DL (ref 12–17)
IMM GRANULOCYTES # BLD AUTO: 0.04 THOUSAND/UL (ref 0–0.2)
IMM GRANULOCYTES NFR BLD AUTO: 0 % (ref 0–2)
LYMPHOCYTES # BLD AUTO: 1.78 THOUSANDS/ΜL (ref 0.6–4.47)
LYMPHOCYTES NFR BLD AUTO: 17 % (ref 14–44)
MCH RBC QN AUTO: 32 PG (ref 26.8–34.3)
MCHC RBC AUTO-ENTMCNC: 34.2 G/DL (ref 31.4–37.4)
MCV RBC AUTO: 93 FL (ref 82–98)
MONOCYTES # BLD AUTO: 0.77 THOUSAND/ΜL (ref 0.17–1.22)
MONOCYTES NFR BLD AUTO: 7 % (ref 4–12)
NEUTROPHILS # BLD AUTO: 8.09 THOUSANDS/ΜL (ref 1.85–7.62)
NEUTS SEG NFR BLD AUTO: 75 % (ref 43–75)
NRBC BLD AUTO-RTO: 0 /100 WBCS
PLATELET # BLD AUTO: 313 THOUSANDS/UL (ref 149–390)
PMV BLD AUTO: 9.7 FL (ref 8.9–12.7)
POTASSIUM SERPL-SCNC: 4.1 MMOL/L (ref 3.5–5.3)
RBC # BLD AUTO: 4.69 MILLION/UL (ref 3.88–5.62)
SODIUM SERPL-SCNC: 137 MMOL/L (ref 136–145)
TROPONIN I SERPL-MCNC: <0.02 NG/ML
WBC # BLD AUTO: 10.79 THOUSAND/UL (ref 4.31–10.16)

## 2021-06-30 PROCEDURE — 71045 X-RAY EXAM CHEST 1 VIEW: CPT

## 2021-06-30 PROCEDURE — 72125 CT NECK SPINE W/O DYE: CPT

## 2021-06-30 PROCEDURE — 80048 BASIC METABOLIC PNL TOTAL CA: CPT | Performed by: EMERGENCY MEDICINE

## 2021-06-30 PROCEDURE — 36415 COLL VENOUS BLD VENIPUNCTURE: CPT | Performed by: EMERGENCY MEDICINE

## 2021-06-30 PROCEDURE — 99284 EMERGENCY DEPT VISIT MOD MDM: CPT

## 2021-06-30 PROCEDURE — 99285 EMERGENCY DEPT VISIT HI MDM: CPT | Performed by: EMERGENCY MEDICINE

## 2021-06-30 PROCEDURE — 70450 CT HEAD/BRAIN W/O DYE: CPT

## 2021-06-30 PROCEDURE — 85025 COMPLETE CBC W/AUTO DIFF WBC: CPT | Performed by: EMERGENCY MEDICINE

## 2021-06-30 PROCEDURE — 84484 ASSAY OF TROPONIN QUANT: CPT | Performed by: EMERGENCY MEDICINE

## 2021-06-30 PROCEDURE — 93005 ELECTROCARDIOGRAM TRACING: CPT

## 2021-06-30 PROCEDURE — 70486 CT MAXILLOFACIAL W/O DYE: CPT

## 2021-06-30 RX ORDER — ACETAMINOPHEN 325 MG/1
975 TABLET ORAL ONCE
Status: COMPLETED | OUTPATIENT
Start: 2021-06-30 | End: 2021-06-30

## 2021-06-30 RX ADMIN — ACETAMINOPHEN 975 MG: 325 TABLET, FILM COATED ORAL at 20:45

## 2021-07-01 LAB
ATRIAL RATE: 118 BPM
P AXIS: 77 DEGREES
PR INTERVAL: 152 MS
QRS AXIS: 35 DEGREES
QRSD INTERVAL: 80 MS
QT INTERVAL: 294 MS
QTC INTERVAL: 412 MS
T WAVE AXIS: 100 DEGREES
VENTRICULAR RATE: 118 BPM

## 2021-07-01 PROCEDURE — 93010 ELECTROCARDIOGRAM REPORT: CPT | Performed by: INTERNAL MEDICINE
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
## 2021-07-01 NOTE — DISCHARGE INSTRUCTIONS
Please follow-up primary care provider  Recommend Tylenol 650 mg every 6 hours as needed for pain as well as ice  Please return for severely worsening pain, fainting, significant chest pain or shortness of breath, or any other concerning signs or symptoms  Please refer to the following documents for additional instructions return precautions

## 2021-10-03 ENCOUNTER — APPOINTMENT (EMERGENCY)
Dept: RADIOLOGY | Facility: HOSPITAL | Age: 71
End: 2021-10-03
Payer: COMMERCIAL

## 2021-10-03 ENCOUNTER — HOSPITAL ENCOUNTER (EMERGENCY)
Facility: HOSPITAL | Age: 71
Discharge: HOME | End: 2021-10-03
Attending: EMERGENCY MEDICINE
Payer: COMMERCIAL

## 2021-10-03 VITALS
TEMPERATURE: 98.1 F | SYSTOLIC BLOOD PRESSURE: 123 MMHG | HEART RATE: 79 BPM | OXYGEN SATURATION: 97 % | RESPIRATION RATE: 16 BRPM | DIASTOLIC BLOOD PRESSURE: 84 MMHG
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
## 2021-10-03 DIAGNOSIS — M54.2 NECK PAIN: ICD-10-CM

## 2021-10-03 DIAGNOSIS — R51.9 LEFT FACIAL PAIN: Primary | ICD-10-CM

## 2021-10-03 DIAGNOSIS — M79.602 LEFT ARM PAIN: ICD-10-CM

## 2021-10-03 PROCEDURE — 73110 X-RAY EXAM OF WRIST: CPT

## 2021-10-03 PROCEDURE — 70450 CT HEAD/BRAIN W/O DYE: CPT

## 2021-10-03 PROCEDURE — 99284 EMERGENCY DEPT VISIT MOD MDM: CPT | Performed by: EMERGENCY MEDICINE

## 2021-10-03 PROCEDURE — 70486 CT MAXILLOFACIAL W/O DYE: CPT

## 2021-10-03 PROCEDURE — 72125 CT NECK SPINE W/O DYE: CPT

## 2021-10-03 PROCEDURE — 73030 X-RAY EXAM OF SHOULDER: CPT

## 2021-10-03 PROCEDURE — 73090 X-RAY EXAM OF FOREARM: CPT

## 2021-10-03 PROCEDURE — 72128 CT CHEST SPINE W/O DYE: CPT

## 2021-10-03 PROCEDURE — 73060 X-RAY EXAM OF HUMERUS: CPT

## 2021-10-03 PROCEDURE — 99284 EMERGENCY DEPT VISIT MOD MDM: CPT

## 2022-01-11 ENCOUNTER — HOSPITAL ENCOUNTER (EMERGENCY)
Facility: HOSPITAL | Age: 72
Discharge: HOME | End: 2022-01-11
Attending: EMERGENCY MEDICINE
Payer: COMMERCIAL

## 2022-01-11 ENCOUNTER — APPOINTMENT (EMERGENCY)
Dept: RADIOLOGY | Facility: HOSPITAL | Age: 72
End: 2022-01-11
Payer: COMMERCIAL

## 2022-01-11 VITALS
TEMPERATURE: 97.5 F | SYSTOLIC BLOOD PRESSURE: 143 MMHG | HEART RATE: 90 BPM | DIASTOLIC BLOOD PRESSURE: 96 MMHG | RESPIRATION RATE: 18 BRPM | OXYGEN SATURATION: 98 % | WEIGHT: 150 LBS | BODY MASS INDEX: 22.15 KG/M2

## 2022-01-11 DIAGNOSIS — M70.20 OLECRANON BURSITIS: Primary | ICD-10-CM

## 2022-01-11 DIAGNOSIS — M25.521 ELBOW PAIN, RIGHT: ICD-10-CM

## 2022-01-11 PROCEDURE — 73080 X-RAY EXAM OF ELBOW: CPT

## 2022-01-11 PROCEDURE — 99284 EMERGENCY DEPT VISIT MOD MDM: CPT | Performed by: EMERGENCY MEDICINE

## 2022-01-11 PROCEDURE — 99283 EMERGENCY DEPT VISIT LOW MDM: CPT

## 2022-01-11 RX ORDER — ACETAMINOPHEN 325 MG/1
650 TABLET ORAL ONCE
Status: COMPLETED | OUTPATIENT
Start: 2022-01-11 | End: 2022-01-11

## 2022-01-11 RX ADMIN — ACETAMINOPHEN 650 MG: 325 TABLET, FILM COATED ORAL at 03:21

## 2022-01-11 NOTE — DISCHARGE INSTRUCTIONS
You have been evaluated in the Emergency Department today for right elbow  pain  Your evaluation did not find evidence of medical conditions requiring emergent intervention at this time  The most likely cause of your swelling is "olecranon bursitis"    You may take ibuprofen every 6 hours or tylenol every 6 hours as needed for pain  Please schedule an appointment for follow up with your primary care physician this week  Return to the Emergency Department if you experience worsening pain, numbness, tingling, change of color in your fingers, or any other concerning symptoms  Ha sido evaluado en el Departamento de Emergencias hoy por dolor en el codo derecho  [place] evaluación no encontró evidencia de condiciones médicas que requieran vida intervención de emergencia en óscar momento  La causa más probable de mcarthur inflamación es la "bursitis del olécranon"  Puede mayra ibuprofeno cada 6 horas o tylenol cada 6 horas según sea necesario para el dolor  Programe vida ondina de seguimiento con mcarthur médico de atención primaria esta semana  Regrese al [employer] de Emergencias si experimenta un empeoramiento del dolor, entumecimiento, hormigueo, cambio de color en los dedos o cualquier otro síntoma preocupante

## 2022-01-11 NOTE — ED ATTENDING ATTESTATION
1/11/2022  IJaylan MD, saw and evaluated the patient  I have discussed the patient with the resident/non-physician practitioner and agree with the resident's/non-physician practitioner's findings, Plan of Care, and MDM as documented in the resident's/non-physician practitioner's note, except where noted  All available labs and Radiology studies were reviewed  I was present for key portions of any procedure(s) performed by the resident/non-physician practitioner and I was immediately available to provide assistance  At this point I agree with the current assessment done in the Emergency Department  I have conducted an independent evaluation of this patient a history and physical is as follows:    ED Course     Emergency Department Note- Linda Tellez 80 y o  male MRN: [id number]    Unit/Bed#: [facility] Encounter: [id number]    Linda Tellez is a 80 y o  male who presents with   Chief Complaint   Patient presents with    Elbow Swelling     Pt reports hitting his rightelbow today and woke up to left elbow pain and swelling  History of Present Illness   HPI:  Linda Tellez is a 80 y o  male who presents for evaluation of:  Right elbow pain and swelling after feeling a crack in his right elbow  The patient denies any direct trauma to the right elbow  The patient notes that he was leaning on his elbows earlier today when feeling a crack in his right elbow  He has noted persistently more pain throughout the day and swelling  Flexing and extending the right upper extremity exacerbates discomfort  Review of Systems   Constitutional: Negative for chills and fever  HENT: Negative for congestion and rhinorrhea  Respiratory: Negative for cough and shortness of breath  Cardiovascular: Negative for chest pain and palpitations  Gastrointestinal: Negative for nausea and vomiting  All other systems reviewed and are negative        Historical Information   Past Medical History: Diagnosis Date    BPH (benign prostatic hyperplasia)     Coronary artery disease     HTN (hypertension)     Hx pulmonary embolism     Hyperlipidemia     Hypertension     Prediabetes     Tobacco use      Past Surgical History:   Procedure Laterality Date    CORONARY ARTERY BYPASS GRAFT      GA COLONOSCOPY FLX DX W/COLLJ SPEC WHEN PFRMD N/A 4/6/2017    Procedure: COLONOSCOPY;  Surgeon: Lucy Bhardwaj MD;  Location: [facility]; Service: Gastroenterology     Social History   Social History     Substance and Sexual Activity   Alcohol Use No     Social History     Substance and Sexual Activity   Drug Use No     Social History     Tobacco Use   Smoking Status Current Every Day Smoker    Packs/day: 0 50    Years: 56 00    Pack years: 28 00    Types: Cigarettes   Smokeless Tobacco Never Used   Tobacco Comment    started when Pt was 6years old  Pt smokes 1/2 to 1 pack a day when stressed     Family History:   Family History   Problem Relation Age of Onset    Diabetes Mother     Diabetes Father        Meds/Allergies   PTA meds:   Prior to Admission Medications   Prescriptions Last Dose Informant Patient Reported? Taking? ARIPiprazole (ABILIFY) 5 mg tablet   Yes No   Sig: Take 5 mg by mouth daily   aspirin 81 MG tablet   Yes No   Sig: Take 81 mg by mouth daily  atorvastatin (LIPITOR) 40 mg tablet   Yes No   Sig: Take 40 mg by mouth daily   erythromycin (ILOTYCIN) ophthalmic ointment   No No   Sig: Place a 1/2 inch ribbon of ointment into the lower eyelid     metoprolol tartrate (LOPRESSOR) 25 mg tablet   Yes No   Sig: Take 25 mg by mouth daily     nitroglycerin (NITRODUR) 0 4 mg/hr   Yes No   Sig: Place 1 patch on the skin 2 (two) times a day as needed      Facility-Administered Medications: None     No Known Allergies    Objective   First Vitals:   Blood Pressure: 143/96 (01/11/22 0043)  Pulse: 90 (01/11/22 0043)  Temperature: 97 5 °F (36 4 °C) (01/11/22 0050)  Temp Source: Oral (01/11/22 8526)  Respirations: 18 (22)  Weight - Scale: 68 kg (150 lb) (22)  SpO2: 98 % (22)    Current Vitals:   Blood Pressure: 143/96 (22)  Pulse: 90 (22)  Temperature: 97 5 °F (36 4 °C) (22)  Temp Source: Oral (22)  Respirations: 18 (22)  Weight - Scale: 68 kg (150 lb) (22)  SpO2: 98 % (22)    No intake or output data in the 24 hours ending 22    Invasive Devices  Report    None                 Physical Exam  Vitals and nursing note reviewed  Constitutional:       General: He is not in acute distress  Appearance: Normal appearance  He is well-developed  HENT:      Head: Normocephalic and atraumatic  Right Ear: External ear normal       Left Ear: External ear normal       Nose: Nose normal       Mouth/Throat:      Pharynx: No oropharyngeal exudate  Eyes:      Conjunctiva/sclera: Conjunctivae normal       Pupils: Pupils are equal, round, and reactive to light  Cardiovascular:      Rate and Rhythm: Normal rate and regular rhythm  Pulmonary:      Effort: Pulmonary effort is normal  No respiratory distress  Abdominal:      General: Abdomen is flat  There is no distension  Musculoskeletal:         General: No deformity  Normal range of motion  Cervical back: Normal range of motion and neck supple  Skin:     General: Skin is warm and dry  Capillary Refill: Capillary refill takes less than 2 seconds  Neurological:      General: No focal deficit present  Mental Status: He is alert and oriented to person, place, and time  Mental status is at baseline  Coordination: Coordination normal    Psychiatric:         Mood and Affect: Mood normal          Behavior: Behavior normal          Thought Content: Thought content normal          Judgment: Judgment normal            Medical Decision Makin   Acute right elbow pain secondary to olecranon bursitis:  Ice, NSAIDs    No results found for this or any previous visit (from the past 39 hour(s))  XR elbow 3+ views RIGHT    (Results Pending)         Portions of the record may have been created with voice recognition software  Occasional wrong word or "sound a like" substitutions may have occurred due to the inherent limitations of voice recognition software  Read the chart carefully and recognize, using context, where substitutions have occurred          Critical Care Time  Procedures

## 2022-01-11 NOTE — ED PROVIDER NOTES
History  Chief Complaint   Patient presents with    Elbow Swelling     Pt reports hitting his rightelbow today and woke up to left elbow pain and swelling  Patient is a 79 YO M, PMHx of HTN, who presents to the ED for pain and swelling to his R elbow  Patient states around 10 PM last night he was laying down in his bed on his right forearm when he heard a pop  He subsequently noticed swelling to the R elbow  Since then, the swelling has increased in size and he is now having pain to his RUE with certain movements  There are no other modifying factors  No associated symptoms  Denies any prior hx of symptoms like this in the past  Denies any direct trauma to the elbow  Denies any fevers, chills, rashes, or any other new or concerning symptoms  History provided by:  Patient   used: Yes        Prior to Admission Medications   Prescriptions Last Dose Informant Patient Reported? Taking? ARIPiprazole (ABILIFY) 5 mg tablet   Yes No   Sig: Take 5 mg by mouth daily   aspirin 81 MG tablet   Yes No   Sig: Take 81 mg by mouth daily  atorvastatin (LIPITOR) 40 mg tablet   Yes No   Sig: Take 40 mg by mouth daily   erythromycin (ILOTYCIN) ophthalmic ointment   No No   Sig: Place a 1/2 inch ribbon of ointment into the lower eyelid     metoprolol tartrate (LOPRESSOR) 25 mg tablet   Yes No   Sig: Take 25 mg by mouth daily     nitroglycerin (NITRODUR) 0 4 mg/hr   Yes No   Sig: Place 1 patch on the skin 2 (two) times a day as needed      Facility-Administered Medications: None       Past Medical History:   Diagnosis Date    BPH (benign prostatic hyperplasia)     Coronary artery disease     HTN (hypertension)     Hx pulmonary embolism     Hyperlipidemia     Hypertension     Prediabetes     Tobacco use        Past Surgical History:   Procedure Laterality Date    CORONARY ARTERY BYPASS GRAFT      NV COLONOSCOPY FLX DX W/COLLJ SPEC WHEN PFRMD N/A 4/6/2017    Procedure: COLONOSCOPY;  Surgeon: Tyrese Araujo Marge Mcguire MD;  Location: [facility]; Service: Gastroenterology       Family History   Problem Relation Age of Onset    Diabetes Mother     Diabetes Father      I have reviewed and agree with the history as documented  E-Cigarette/Vaping    E-Cigarette Use Never User      E-Cigarette/Vaping Substances    Nicotine No     THC No     CBD No     Flavoring No     Other No     Unknown No      Social History     Tobacco Use    Smoking status: Current Every Day Smoker     Packs/day: 0 50     Years: 56 00     Pack years: 28 00     Types: Cigarettes    Smokeless tobacco: Never Used    Tobacco comment: started when Pt was 6years old  Pt smokes 1/2 to 1 pack a day when stressed   Vaping Use    Vaping Use: Never used   Substance Use Topics    Alcohol use: No    Drug use: No        Review of Systems   Constitutional: Negative for chills and fever  Respiratory: Negative for shortness of breath  Cardiovascular: Negative for chest pain  Musculoskeletal:        Right elbow pain and swelling   Skin: Negative for rash  All other systems reviewed and are negative  Physical Exam  ED Triage Vitals   Temperature Pulse Respirations Blood Pressure SpO2   01/11/22 0050 01/11/22 0043 01/11/22 0043 01/11/22 0043 01/11/22 0043   97 5 °F (36 4 °C) 90 18 143/96 98 %      Temp Source Heart Rate Source Patient Position - Orthostatic VS BP Location FiO2 (%)   01/11/22 0043 01/11/22 0043 01/11/22 0043 01/11/22 0043 --   Oral Monitor Sitting Right arm       Pain Score       01/11/22 0043       6             Orthostatic Vital Signs  Vitals:    01/11/22 0043   BP: 143/96   Pulse: 90   Patient Position - Orthostatic VS: Sitting       Physical Exam  Vitals and nursing note reviewed  Constitutional:       General: He is not in acute distress  Appearance: He is well-developed  He is not diaphoretic  HENT:      Head: Normocephalic and atraumatic        Right Ear: External ear normal       Left Ear: External ear normal  Nose: Nose normal    Eyes:      General: Lids are normal  No scleral icterus  Cardiovascular:      Rate and Rhythm: Normal rate and regular rhythm  Heart sounds: Normal heart sounds  No murmur heard  No friction rub  No gallop  Pulmonary:      Effort: Pulmonary effort is normal  No respiratory distress  Breath sounds: Normal breath sounds  No wheezing or rales  Musculoskeletal:         General: Tenderness present  Normal range of motion  Cervical back: Normal range of motion and neck supple  Comments: There is a moderate amount of swelling to the R olecranon, consistent with olecranon bursitis  No overlying skin changes  FROM of the R shoulder and elbow  RUE compartments are soft  No signs of RUE infection  Skin:     General: Skin is warm and dry  Neurological:      General: No focal deficit present  Mental Status: He is alert  Psychiatric:         Mood and Affect: Mood normal          Behavior: Behavior normal          ED Medications  Medications   acetaminophen (TYLENOL) tablet 650 mg (650 mg Oral Given 1/11/22 0321)       Diagnostic Studies  Results Reviewed     None                 XR elbow 3+ views RIGHT    (Results Pending)         Procedures  Procedures      ED Course                                       MDM  Number of Diagnoses or Management Options  Elbow pain, right  Olecranon bursitis  Diagnosis management comments: Patient is a 80 y o  male who presents to the ED for right elbow pain  Presentation consistent with olecranon bursitis  Low suspicion for fx or dislocation, but due to "pop" heard by patient, will obtain plain films of the elbow  Plan: Plain films, NSAIDs, d/c                     Portions of the record may have been created with voice recognition software  Occasional wrong word or "sound a like" substitutions may have occurred due to the inherent limitations of voice recognition software   Read the chart carefully and recognize, using context, where substitutions have occurred  Amount and/or Complexity of Data Reviewed  Tests in the radiology section of CPT®: ordered and reviewed  Independent visualization of images, tracings, or specimens: yes    Risk of Complications, Morbidity, and/or Mortality  Presenting problems: low  Diagnostic procedures: moderate  Management options: low    Patient Progress  Patient progress: stable      Disposition  Final diagnoses:   Olecranon bursitis   Elbow pain, right     Time reflects when diagnosis was documented in both MDM as applicable and the Disposition within this note     Time User Action Codes Description Comment    1/11/2022  3:08 AM Fred Randal Add [M70 20] Olecranon bursitis     1/11/2022  6:15 AM Florzelalem Randal Add [M25 521] Elbow pain, right       ED Disposition     ED Disposition Condition Date/Time Comment    Discharge Stable Tue Jan 11, 2022  3:08 AM Samm Armijo discharge to home/self care  Follow-up Information     Follow up With Specialties Details Why Contact Info Additional Viki 74, SINGH Nurse Practitioner   Maddie [address] Emergency Department Emergency Medicine  As needed [address]  [address] [place] [address] 64 [facility] Emergency Department, [address]          Discharge Medication List as of 1/11/2022  4:03 AM      CONTINUE these medications which have NOT CHANGED    Details   ARIPiprazole (ABILIFY) 5 mg tablet Take 5 mg by mouth daily, Historical Med      aspirin 81 MG tablet Take 81 mg by mouth daily  , Until Discontinued, Historical Med      atorvastatin (LIPITOR) 40 mg tablet Take 40 mg by mouth daily, Historical Med      erythromycin (ILOTYCIN) ophthalmic ointment Place a 1/2 inch ribbon of ointment into the lower eyelid  , Print      metoprolol tartrate (LOPRESSOR) 25 mg tablet Take 25 mg by mouth daily  , Historical Med      nitroglycerin (NITRODUR) 0 4 mg/hr Place 1 patch on the skin 2 (two) times a day as needed, Starting Fri 6/5/2015, Historical Med           No discharge procedures on file  PDMP Review     None           ED Provider  Attending physically available and evaluated [facility] managed the patient along with the ED Attending      Electronically Signed by         Ginger Gama DO  01/11/22 5370

## 2022-02-23 ENCOUNTER — CONSULT (OUTPATIENT)
Dept: PAIN MEDICINE | Facility: CLINIC | Age: 72
End: 2022-02-23
Payer: COMMERCIAL

## 2022-02-23 VITALS
WEIGHT: 150 LBS | BODY MASS INDEX: 22.22 KG/M2 | HEIGHT: 69 IN | DIASTOLIC BLOOD PRESSURE: 72 MMHG | SYSTOLIC BLOOD PRESSURE: 110 MMHG | HEART RATE: 84 BPM

## 2022-02-23 DIAGNOSIS — M70.21 OLECRANON BURSITIS OF RIGHT ELBOW: ICD-10-CM

## 2022-02-23 DIAGNOSIS — M51.36 DDD (DEGENERATIVE DISC DISEASE), LUMBAR: ICD-10-CM

## 2022-02-23 DIAGNOSIS — M47.816 LUMBAR SPONDYLOSIS: ICD-10-CM

## 2022-02-23 DIAGNOSIS — M54.16 LUMBAR RADICULOPATHY: Primary | ICD-10-CM

## 2022-02-23 DIAGNOSIS — S32.010S CLOSED COMPRESSION FRACTURE OF L1 VERTEBRA, SEQUELA: ICD-10-CM

## 2022-02-23 DIAGNOSIS — M54.40 LOW BACK PAIN WITH SCIATICA, SCIATICA LATERALITY UNSPECIFIED, UNSPECIFIED BACK PAIN LATERALITY, UNSPECIFIED CHRONICITY: ICD-10-CM

## 2022-02-23 PROBLEM — M51.369 DDD (DEGENERATIVE DISC DISEASE), LUMBAR: Status: ACTIVE | Noted: 2022-02-23

## 2022-02-23 PROBLEM — S32.010A CLOSED COMPRESSION FRACTURE OF FIRST LUMBAR VERTEBRA (HCC): Status: ACTIVE | Noted: 2022-02-23

## 2022-02-23 PROCEDURE — 99204 OFFICE O/P NEW MOD 45 MIN: CPT | Performed by: ANESTHESIOLOGY

## 2022-02-23 RX ORDER — GABAPENTIN 100 MG/1
300 CAPSULE ORAL
Qty: 90 CAPSULE | Refills: 1 | Status: SHIPPED | OUTPATIENT
Start: 2022-02-23 | End: 2022-05-24 | Stop reason: SDUPTHER

## 2022-02-23 NOTE — PATIENT INSTRUCTIONS
Gabapentin Enacarbil (Por la boca)   Gabapentin Enacarbil (damian-a-PEN-tin [id number]-CSI-yuri)  Se [place] para tratar el síndrome de las piernas inquietas (SPI) y el dolor que causa la Obi Grijalva  Sudha(s) : Horizant   Existen muchas otras marcas de [place] 26  Livier medicamento no debe ser usado cuando:   Livier medicamento no es adecuado para todas las personas  No lo use si usted ha tenido vida reacción alérgica a la gabapentina enacarbil  Forma de usar livier medicamento:   Tableta de liberación prolongada  · [place] aminata medicamentos aubrie se le haya indicado  Es probable que sea necesario cambiar mcarthur dosis varias veces hasta encontrar la que funciona mejor para usted  · Lo más conveniente es mayra livier medicamento con comida o con [place]  · [place] la tableta de liberación prolongada entera  No triture, rompa o mastique  · [place] [address] debe venir con [place] Guía del medicamento  Solicite vida copia con mcarthur farmacéutico en andres de no tener la guía  · Si olvida vida dosis: Omita ivonne dosis y regrese mcarthur horario de dosificación regular  · Guarde el medicamento en un recipiente cerrado a temperatura ambiente y alejado del calor, la humedad y la florentin directa  Medicamentos y [employer] que debe evitar:   Consulte con mcarthur médico o farmacéutico antes de usar cualquier medicamento, incluyendo los que compra sin receta médica, las vitaminas y los productos herbales  · Algunos medicamentos pueden afectar la eficacia de la gabapentina enacarbil  Informe a mcarthur médico si también está usando otros medicamentos que contienen gabapentina  · No consuma alcohol mientras esté   · Informe a mcarthur médico si usted [place] cualquier cosa que le provoca sueño  Maurita Tej son medicamentos para alergia o medicamentos narcóticos para el dolor y el alcohol  Informe a mcarthur médico si usted [place] usando lorazepam, morfina, oxicodona o zolpidem    Precauciones oseas el uso de livier medicamento:   · Informe a mcarthur médico si está embarazada o amamantando, o si tiene problemas renales (incluyendo los pacientes que reciben diálisis) o antecedentes de depresión o problemas de susie mental  Dígale a mcarthur médico si necesita dormir oseas el día y permanecer despierto por la noche  · Livier medicamento puede causar los siguientes problemas:   ? Cambios en el estado de ánimo o el comportamiento, incluyendo pensamientos o comportamiento suicidas  ? Depresión respiratoria (problema respiratorio grave que puede ser peligroso para la josefina), cuando se [place] con medicamentos narcóticos para el dolor  ? Reacción cutánea con eosinofilia y síntomas sistémicos (DRESS) o hipersensibilidad multiorgánica, que puede dañar el hígado, los riñones, la [place], el corazón o los músculos  · No suspenda el uso de livier medicamento súbitamente  Mcarthur médico necesitará disminuir mcarthur dosis poco a poco antes de suspender el medicamento por completo  · Livier medicamento podría causarle a usted mareos o somnolencia  No maneje un vehículo ni brian ninguna tarea que pueda ser peligrosa hasta que usted sepa cómo lo afecta livier medicamento  · Mcarthur médico tendrá que revisar mcarthur progreso y los efectos de livier medicamento oseas aminata citas regulares  [place] sin falta con todas aminata citas médicas  Asista a todas aminata citas  · Guarde todos los medicamentos fuera del alcance de los niños  Nunca comparta aminata medicamentos con [employer]    Efectos secundarios que pueden presentarse oseas el uso de livier medicamento:   Consulte inmediatamente con el médico si nota cualquiera de estos efectos secundarios:  · Reacción alérgica: Comezón o ronchas, hinchazón del doretha o las lilly, hinchazón u hormigueo en la boca o garganta, opresión en el pecho, dificultad para respirar  · Labios, uñas de las lilly o piel [place], dificultad para respirar, dolor de [place]  · Carlos A, salpullido, inflamación o [employer] ganglios linfáticos del [place], las axilas o las ingles  · Aumento rápido de [place], inflamación en aminata lilly, tobillos, o pies  · Cambios repentinos de humor, estados de ánimo o comportamiento inusuales, incluyendo cordell o depresión extrema, pensamientos o intentos de suicidarse  · Sangrado, moretones o debilidad inusuales  · Piel u ojos amarillos  Consulte con el médico si nota los siguientes efectos secundarios menos graves:   · Mareos, somnolencia, cansancio  · Dolor de prateek  Consulte con el médico si nota otros efectos secundarios que steffen son causados por óscar medicamento  Llame a mcarthur médico para consultarle Dixon Alan puede notificar aminata efectos secundarios al FDA al [phone]-WAR-5735  © Copyright [employer] 2021 Information is for End User's use only and may not be sold, redistributed or otherwise used for commercial purposes  Esta información es sólo para uso en educación  Mcarthur intención no es darle un consejo médico sobre enfermedades o tratamientos  Colsulte con mcarthur Timo Varma farmacéutico antes de seguir cualquier régimen médico para saber si es seguro y efectivo para usted

## 2022-02-23 NOTE — PROGRESS NOTES
Assessment  1  Lumbar radiculopathy    2  Lumbar spondylosis    3  DDD (degenerative disc disease), lumbar    4  Low back pain with sciatica, sciatica laterality unspecified, unspecified back pain laterality, unspecified chronicity    5  Closed compression fracture of L1 vertebra, sequela    6  Olecranon bursitis of right elbow        Plan  67-year-old male with a history of CAD status post CABG, hypertension, history of PE, and schizophrenia, referred by ARIELLA Noriega PA-C, presenting for initial consultation regarding a long-standing history of lumbosacral back pain that radiates into bilateral lower extremities with associated numbness and paresthesias on the bottom of the feet as well as difficulty with balance and gait  Symptoms have worsened over the past 4 months without any specific trauma or inciting event  The patient does have a history of frequent falls  He also has a secondary complaint of right elbow pain with swelling noted for the past month and a half  The patient was leaning on his bed to get up and felt a crack  X-ray lumbar spine from August 27, 2020 shows multilevel degenerative disc disease and facet arthrosis with chronic superior endplate compression fracture at L1  This was unchanged from prior imaging  An MRI of the lumbar spine was ordered by referring NP, however this was never scheduled  EMG was also ordered which was not scheduled  X-ray of the right elbow was negative for fracture or dislocation  There was soft tissue swelling about the olecranon  The patient has not done any formal physical therapy or chiropractic treatment  Tylenol and NSAIDs provide minimal relief  The patient was prescribed Lyrica in the past, but did not find this effective  Unsure what dose, however upon review of PDMP he was prescribed 150 mg b i d   This was discontinued for unknown reasons  The patient did not note any side effects      Patient's low back pain seems to be multifactorial including myofascial and facet mediated components  There also seems to be a component of SI mediated pain  Lower extremity symptoms may be radiculopathy verses peripheral neuropathy  Right elbow pain secondary to olecranon bursitis    1  I will order an MRI of the lumbar spine without contrast  2  I will order physical therapy as I feel the patient may benefit from [facility] based exercises   3  I will prescribe low-dose gabapentin 100 mg q h s  and he will titrate up to 300 mg q h s  for his neuropathic complaints  The patient was apprised of the most common side effects of gabapentin and he was given titration schedule at today's office visit  4  Advised patient to contact ordering physician of EMG to either reprint order or help facilitate appointment  5  I will follow up the patient in 6 weeks and we will call with results of MRI once received and our recommendations based upon those results  6  Finally, patient was given a referral to Orthopedics regarding olecranon bursitis            My impressions and treatment recommendations were discussed in detail with the patient who verbalized understanding and had no further questions  Discharge instructions were provided  I personally saw and examined the patient and I agree with the above discussed plan of care  No orders of the defined types were placed in this encounter  No orders of the defined types were placed in this encounter  History of Present Illness    Linda Tellez is a 80 y o  male with a history of CAD status post CABG, hypertension, history of PE, and schizophrenia, referred by ARIELLA Alvarado PA-C, presenting for initial consultation regarding a long-standing history of lumbosacral back pain that radiates into bilateral lower extremities with associated numbness and paresthesias on the bottom of the feet as well as difficulty with balance and gait  He does feel some weakness in the legs    He denies any bladder or bowel incontinence or saddle anesthesia  Symptoms have worsened over the past 4 months without any specific trauma or inciting event  The patient does have a history of frequent falls  He also has a secondary complaint of right elbow pain with swelling noted for the past month and a half  The patient was leaning on his bed to get up and felt a crack  He denies any numbness or tingling in the right arm  X-ray lumbar spine from August 27, 2020 shows multilevel degenerative disc disease and facet arthrosis with chronic superior endplate compression fracture at L1  This was unchanged from prior imaging  An MRI of the lumbar spine was ordered by referring NP, however this was never scheduled  EMG was also ordered which was not scheduled  X-ray of the right elbow was negative for fracture or dislocation  There was soft tissue swelling about the olecranon  The patient has not done any formal physical therapy or chiropractic treatment  Tylenol and NSAIDs provide minimal relief  The patient was prescribed Lyrica in the past, but did not find this effective  Unsure what dose, however upon review of PDMP he was prescribed 150 mg b i d   This was discontinued for unknown reasons  The patient did not note any side effects  The pain is rated moderate to severe and constant  The pain is not follow any particular pattern throughout the day  The pain is described as sharp and numbness  The pain is worse with standing and walking and exercise  The pain is alleviated somewhat with sitting and lying down  Other than as stated above, the patient denies any interval changes in medications, medical condition, mental condition, symptoms, or allergies since the last office visit  I have personally reviewed and/or updated the patient's past medical history, past surgical history, family history, social history, current medications, allergies, and vital signs today       Review of Systems    Patient Active Problem List   Diagnosis    Chest pain    CAD (coronary artery disease)    S/P CABG x 4    Pre-diabetes    HLD (hyperlipidemia)    HTN (hypertension)    Tobacco use    Hx pulmonary embolism    BPH (benign prostatic hyperplasia)    Subclinical hypothyroidism    [facility]    Schizophrenia ([facility] )    Sessile colonic polyp    Osteoarthritis       Past Medical History:   Diagnosis Date    BPH (benign prostatic hyperplasia)     Coronary artery disease     HTN (hypertension)     Hx pulmonary embolism     Hyperlipidemia     Hypertension     Prediabetes     Tobacco use        Past Surgical History:   Procedure Laterality Date    CORONARY ARTERY BYPASS GRAFT      ID COLONOSCOPY FLX DX W/COLLJ SPEC WHEN PFRMD N/A 4/6/2017    Procedure: COLONOSCOPY;  Surgeon: Nils Moritz, MD;  Location: [facility]; Service: Gastroenterology       Family History   Problem Relation Age of Onset    Diabetes Mother     Diabetes Father        Social History     Occupational History    Not on file   Tobacco Use    Smoking status: Current Every Day Smoker     Packs/day: 0 50     Years: 56 00     Pack years: 28 00     Types: Cigarettes    Smokeless tobacco: Never Used    Tobacco comment: started when Pt was 6years old  Pt smokes 1/2 to 1 pack a day when stressed   Vaping Use    Vaping Use: Never used   Substance and Sexual Activity    Alcohol use: No    Drug use: No    Sexual activity: Not Currently       Current Outpatient Medications on File Prior to Visit   Medication Sig    ARIPiprazole (ABILIFY) 5 mg tablet Take 5 mg by mouth daily    aspirin 81 MG tablet Take 81 mg by mouth daily   atorvastatin (LIPITOR) 40 mg tablet Take 40 mg by mouth daily    erythromycin (ILOTYCIN) ophthalmic ointment Place a 1/2 inch ribbon of ointment into the lower eyelid      metoprolol tartrate (LOPRESSOR) 25 mg tablet Take 25 mg by mouth daily      nitroglycerin (NITRODUR) 0 4 mg/hr Place 1 patch on the skin 2 (two) times a day as needed     No current facility-administered medications on file prior to visit  No Known Allergies    Physical Exam    /72   Pulse 84   Ht 5' 9" (1 753 m)   Wt 68 kg (150 lb)   BMI 22 15 kg/m²     Constitutional: normal, well developed, well nourished, alert, in no distress and non-toxic and no overt pain behavior  Eyes: anicteric  HEENT: grossly intact  Neck: supple, symmetric, trachea midline and no masses   Pulmonary:even and unlabored  Cardiovascular:No edema or pitting edema present  Skin:Normal without rashes or lesions and well hydrated  Psychiatric:Mood and affect appropriate  Neurologic:Cranial Nerves II-XII grossly intact  Musculoskeletal:antalgic gait  Bilateral lumbar paraspinals tender to palpation from L3-L5  Bilateral SI joints tender to palpation  Bilateral patellar and Achilles reflexes were 2/4 and symmetrical   No clonus was noted bilaterally  Left lower extremity strength 5/5 in all muscle groups  Right plantar flexion 4-5 strength  Right dorsiflexion and EHL 5/5 strength  Right quadriceps and hamstrings 5/5 strength  Right iliopsoas 4-5 strength  Sensation intact to light touch in L3 through S1 dermatomes bilaterally  Negative straight leg raise bilaterally  Positive Sandro's and Gaenslen's test bilaterally  Imaging  LUMBAR SPINE     INDICATION:   M54 5: Low back pain      COMPARISON:  3/20/2017     VIEWS:  XR SPINE LUMBAR 2 OR 3 VIEWS INJURY        FINDINGS:     There are 5 non rib bearing lumbar vertebral bodies       There is no evidence of acute fracture or destructive osseous lesion  Unchanged mild superior endplate compression fracture at L1      Alignment is unremarkable       Mild multilevel degenerative disc disease and osteoarthritis of the lower lumbar facet joints, not significantly changed      The pedicles appear intact      There are atherosclerotic calcifications   Soft tissues are otherwise unremarkable      IMPRESSION:     No acute osseous abnormality        Degenerative changes as described

## 2022-02-23 NOTE — PROGRESS NOTES
Assessment  No diagnosis found  Plan  ***    {Oral Swab Statement:11828}    {Opioid Statement:50937}    {UDS Statement:58910}    {PDMP Statement:35963}    {Pain Management Procedure Statement:73998}    My impressions and treatment recommendations were discussed in detail with the patient who verbalized understanding and had no further questions  Discharge instructions were provided  I personally saw and examined the patient and I agree with the above discussed plan of care  No orders of the defined types were placed in this encounter  No orders of the defined types were placed in this encounter  History of Present Illness    Sia Donis is a 80 y o  male ***    I have personally reviewed and/or updated the patient's past medical history, past surgical history, family history, social history, current medications, allergies, and vital signs today  Review of Systems    Patient Active Problem List   Diagnosis    Chest pain    CAD (coronary artery disease)    S/P CABG x 4    Pre-diabetes    HLD (hyperlipidemia)    HTN (hypertension)    Tobacco use    Hx pulmonary embolism    BPH (benign prostatic hyperplasia)    Subclinical hypothyroidism    [facility] )    Schizophrenia ([facility] )    Sessile colonic polyp    Osteoarthritis       Past Medical History:   Diagnosis Date    BPH (benign prostatic hyperplasia)     Coronary artery disease     HTN (hypertension)     Hx pulmonary embolism     Hyperlipidemia     Hypertension     Prediabetes     Tobacco use        Past Surgical History:   Procedure Laterality Date    CORONARY ARTERY BYPASS GRAFT      [place] COLONOSCOPY FLX DX W/COLLJ SPEC WHEN PFRMD N/A 4/6/2017    Procedure: COLONOSCOPY;  Surgeon: Karla Negron MD;  Location: [facility];   Service: Gastroenterology       Family History   Problem Relation Age of Onset    Diabetes Mother     Diabetes Father        Social History     Occupational History    Not on file Tobacco Use    Smoking status: Current Every Day Smoker     Packs/day: 0 50     Years: 56 00     Pack years: 28 00     Types: Cigarettes    Smokeless tobacco: Never Used    Tobacco comment: started when Pt was 6years old  Pt smokes 1/2 to 1 pack a day when stressed   Vaping Use    Vaping Use: Never used   Substance and Sexual Activity    Alcohol use: No    Drug use: No    Sexual activity: Not Currently       Current Outpatient Medications on File Prior to Visit   Medication Sig    ARIPiprazole (ABILIFY) 5 mg tablet Take 5 mg by mouth daily    aspirin 81 MG tablet Take 81 mg by mouth daily   atorvastatin (LIPITOR) 40 mg tablet Take 40 mg by mouth daily    erythromycin (ILOTYCIN) ophthalmic ointment Place a 1/2 inch ribbon of ointment into the lower eyelid   metoprolol tartrate (LOPRESSOR) 25 mg tablet Take 25 mg by mouth daily      nitroglycerin (NITRODUR) 0 4 mg/hr Place 1 patch on the skin 2 (two) times a day as needed     No current facility-administered medications on file prior to visit  No Known Allergies    Physical Exam    /72   Pulse 84   Ht 5' 9" (1 753 m)   Wt 68 kg (150 lb)   BMI 22 15 kg/m²     Constitutional: {General Appearance:84121::"normal, well developed, well nourished, alert, in no distress and non-toxic and no overt pain behavior  "}  Eyes: {Sclera:80177::"anicteric"}  HEENT: {Hearin::"grossly intact"}  Neck: {Neck:52088::"supple, symmetric, trachea midline and no masses "}  Pulmonary:{Respiratory effort:23961::"even and unlabored"}  Cardiovascular:{Examination of Extremities:87960::"No edema or pitting edema present"}  Skin:{Skin and Subcutaneous tissues:93978::"Normal without rashes or lesions and well hydrated"}  Psychiatric:{Mood and Affect:95034::"Mood and affect appropriate"}  Neurologic:{Cranial Nerves:16932::"Cranial Nerves II-XII grossly intact"}  Musculoskeletal:{Gait and Station:47857::"normal"}    Imaging    PACS Images     Show images for TRAUMA - CT spine thoracic wo contrast    Study Result    Narrative & Impression   CT THORACIC SPINE     INDICATION:   TRAUMA      COMPARISON:  CT chest 5/27/2021     TECHNIQUE:  Contiguous axial images were obtained  Sagittal and coronal reconstructions were performed        Radiation dose length product (DLP) for this visit:  390 35 mGy-cm   This examination, like all CT scans performed in the [facility], was performed utilizing techniques to minimize radiation dose exposure, including the use of iterative   reconstruction and automated exposure control         IMAGE QUALITY:  Diagnostic      FINDINGS:     ALIGNMENT:  Normal alignment of the thoracic spine  No subluxation      VERTEBRAL BODIES: No fracture      DEGENERATIVE CHANGES:  Mild multilevel degenerative disc disease      PARASPINAL SOFT TISSUES: Normal   There are emphysematous changes present in the lungs      IMPRESSION:     No acute traumatic injury  Mild degenerative changes      The study was marked in EPIC for immediate notification given trauma status      Workstation performed: [id number]     PACS Images     Show images for XR spine lumbar 2 or 3 views injury    Study Result    Narrative & Impression   LUMBAR SPINE     INDICATION:   M54 5: Low back pain      COMPARISON:  3/20/2017     VIEWS:  XR SPINE LUMBAR 2 OR 3 VIEWS INJURY        FINDINGS:     There are 5 non rib bearing lumbar vertebral bodies       There is no evidence of acute fracture or destructive osseous lesion  Unchanged mild superior endplate compression fracture at L1      Alignment is unremarkable       Mild multilevel degenerative disc disease and osteoarthritis of the lower lumbar facet joints, not significantly changed      The pedicles appear intact      There are atherosclerotic calcifications   Soft tissues are otherwise unremarkable      IMPRESSION:     No acute osseous abnormality        Degenerative changes as described      Workstation performed: [id number]

## 2022-03-23 ENCOUNTER — HOSPITAL ENCOUNTER (OUTPATIENT)
Dept: RADIOLOGY | Facility: HOSPITAL | Age: 72
Discharge: HOME | End: 2022-03-23
Attending: ANESTHESIOLOGY
Payer: COMMERCIAL

## 2022-03-23 DIAGNOSIS — M54.16 LUMBAR RADICULOPATHY: ICD-10-CM

## 2022-03-23 PROCEDURE — 72148 MRI LUMBAR SPINE W/O DYE: CPT

## 2022-03-25 ENCOUNTER — TELEPHONE (OUTPATIENT)
Dept: PAIN MEDICINE | Facility: CLINIC | Age: 72
End: 2022-03-25

## 2022-03-25 NOTE — TELEPHONE ENCOUNTER
Please notify the patient the MRI of his lumbar spine shows a chronic old compression fracture of the superior endplate of the L1 vertebral body  He has degenerative disc disease and arthritis of the lower lumbar spine with varying degrees of central and foraminal stenosis from L1-2 to L5-S1, most severe at L3-4 and L4-5    I can offer the patient bilateral L4 TFESI

## 2022-03-28 NOTE — TELEPHONE ENCOUNTER
S/w the patient via [place] interp Myla Priest #[id number] and reviewed the MRI results with him  Inquired about anticoagulants and he stated he is just on ASA on  He is agreeable in having the procedure done  Please call and schedule  Please make sure he brings a  with him   Thanks

## 2022-03-29 ENCOUNTER — OFFICE VISIT (OUTPATIENT)
Dept: PAIN MEDICINE | Facility: CLINIC | Age: 72
End: 2022-03-29
Payer: COMMERCIAL

## 2022-03-29 VITALS
SYSTOLIC BLOOD PRESSURE: 106 MMHG | DIASTOLIC BLOOD PRESSURE: 62 MMHG | WEIGHT: 150 LBS | HEIGHT: 69 IN | BODY MASS INDEX: 22.22 KG/M2 | HEART RATE: 93 BPM

## 2022-03-29 DIAGNOSIS — M51.36 DDD (DEGENERATIVE DISC DISEASE), LUMBAR: ICD-10-CM

## 2022-03-29 DIAGNOSIS — M54.16 LUMBAR RADICULOPATHY: Primary | ICD-10-CM

## 2022-03-29 DIAGNOSIS — M47.816 LUMBAR SPONDYLOSIS: ICD-10-CM

## 2022-03-29 DIAGNOSIS — S32.010S CLOSED COMPRESSION FRACTURE OF L1 VERTEBRA, SEQUELA: ICD-10-CM

## 2022-03-29 PROCEDURE — 99214 OFFICE O/P EST MOD 30 MIN: CPT | Performed by: NURSE PRACTITIONER

## 2022-03-29 RX ORDER — NAPROXEN 250 MG/1
TABLET ORAL
COMMUNITY
Start: 2022-02-03

## 2022-03-29 RX ORDER — RISPERIDONE 0.5 MG/1
TABLET, FILM COATED ORAL
COMMUNITY
Start: 2022-03-23

## 2022-03-29 NOTE — PROGRESS NOTES
Assessment:  1  Lumbar radiculopathy    2  Lumbar spondylosis    3  DDD (degenerative disc disease), lumbar    4  Closed compression fracture of L1 vertebra, sequela        Plan:  1  I will schedule the patient for a bilateral L4 TFESI to address the inflammatory component the patient's pain  Complete risks and benefits including bleeding, infection, tissue reaction, nerve injury and allergic reaction were discussed  The patient was agreeable and verbalized an understanding  2  Patient may continue gabapentin 300 mg q h s  for neuropathic symptoms  He does not wish to increase dosing at this time and does not require refills   3  Patient was encouraged to move forward with EMG scheduled for May 2022   4  Patient was encouraged to call orthopedics to schedule an appointment regarding olecranon bursitis  5  Patient will follow-up after his procedure sooner if needed      M*Modal software was used to dictate this note  It may contain errors with dictating incorrect words or incorrect spelling  Please contact the provider directly with any questions  Please note that the patient is primarily Setswana speaking and required interpretive services for this office visit  Brandon Styles, leander MA, who speaks fluent Cameroonian was available throughout the entire office visit and interpreted with the patient in agreement and verbalizing understanding  History of Present Illness: The patient is a 80 y o  male with a history of CAD status post CABG, hypertension, history of PE and schizophrenia last seen on 2/23/22 who presents for a follow up office visit in regards to chronic low back pain that radiates into the bilateral lower extremities with associated numbness and paresthesias in the lateral ankles  Patient denies bowel or bladder incontinence or saddle anesthesia  He also has a secondary complaint of right elbow pain with swelling noted for the past 3 and half months    He was given a referral to Orthopedics last office visit, however states that when he called to schedule, there is no  available and the office never called him back to schedule  Patient did have MRI lumbar spine which revealed a chronic old compression fracture of L1, degenerative disc disease and arthritis of the lower spine with various degrees of central and foraminal stenosis from L1-2 to L5-S1, most severe L3-4 L4-5  Patient was offered an epidural steroid injection, however never returned our phone call to schedule  This is something he would be interested in doing so today  He is taking gabapentin 300 mg q h s  without relief of his pain without side effects  He currently rates his pain a 10/10 on the numeric pain rating scale  He states his pain is constant nature bothersome all day  Characterizes the pain as throbbing, shooting, numbness and pins and needles      I have personally reviewed and/or updated the patient's past medical history, past surgical history, family history, social history, current medications, allergies, and vital signs today  Review of Systems:    Review of Systems   Respiratory: Negative for shortness of breath  Cardiovascular: Negative for chest pain  Gastrointestinal: Negative for constipation, diarrhea, nausea and vomiting  Musculoskeletal: Positive for gait problem  Negative for arthralgias, joint swelling and myalgias  Skin: Negative for rash  Neurological: Negative for dizziness, seizures and weakness  All other systems reviewed and are negative          Past Medical History:   Diagnosis Date    BPH (benign prostatic hyperplasia)     Coronary artery disease     HTN (hypertension)     Hx pulmonary embolism     Hyperlipidemia     Hypertension     Prediabetes     Tobacco use        Past Surgical History:   Procedure Laterality Date    CORONARY ARTERY BYPASS GRAFT      [place] COLONOSCOPY FLX DX W/COLLJ SPEC WHEN PFRMD N/A 4/6/2017    Procedure: COLONOSCOPY;  Surgeon: Anders Ghosh MD; Location: [facility]; Service: Gastroenterology       Family History   Problem Relation Age of Onset    Diabetes Mother     Diabetes Father        Social History     Occupational History    Not on file   Tobacco Use    Smoking status: Current Every Day Smoker     Packs/day: 0 50     Years: 56 00     Pack years: 28 00     Types: Cigarettes    Smokeless tobacco: Never Used    Tobacco comment: started when Pt was 6years old  Pt smokes 1/2 to 1 pack a day when stressed   Vaping Use    Vaping Use: Never used   Substance and Sexual Activity    Alcohol use: No    Drug use: No    Sexual activity: Not Currently         Current Outpatient Medications:     ARIPiprazole (ABILIFY) 5 mg tablet, Take 5 mg by mouth daily, Disp: , Rfl:     aspirin 81 MG tablet, Take 81 mg by mouth daily  , Disp: , Rfl:     atorvastatin (LIPITOR) 40 mg tablet, Take 40 mg by mouth daily, Disp: , Rfl:     erythromycin (ILOTYCIN) ophthalmic ointment, Place a 1/2 inch ribbon of ointment into the lower eyelid  , Disp: 3 5 g, Rfl: 0    gabapentin (NEURONTIN) 100 mg capsule, Take 3 capsules (300 mg total) by mouth daily at bedtime, Disp: 90 capsule, Rfl: 1    metoprolol tartrate (LOPRESSOR) 25 mg tablet, Take 25 mg by mouth daily  , Disp: , Rfl:     naproxen (NAPROSYN) 250 mg tablet, take 1 tablet by mouth twice a day if needed for 10 days, Disp: , Rfl:     nitroglycerin (NITRODUR) 0 4 mg/hr, Place 1 patch on the skin 2 (two) times a day as needed, Disp: , Rfl:     risperiDONE (RisperDAL) 0 5 mg tablet, , Disp: , Rfl:     No Known Allergies    Physical Exam:    /62   Pulse 93   Ht 5' 9" (1 753 m)   Wt 68 kg (150 lb)   BMI 22 15 kg/m²     Constitutional:normal, well developed, well nourished, alert, in no distress and non-toxic and no overt pain behavior    Eyes:anicteric  HEENT:grossly intact  Neck:supple, symmetric, trachea midline and no masses   Pulmonary:even and unlabored  Cardiovascular:No edema or pitting edema present  Skin:Normal without rashes or lesions and well hydrated  Psychiatric:Mood and affect appropriate  Neurologic:Cranial Nerves II-XII grossly intact  Musculoskeletal:antalgic gait  Imaging  FL spine and pain procedure    (Results Pending)   MRI LUMBAR SPINE WITHOUT CONTRAST   INDICATION: M54 16: Radiculopathy, lumbar region  COMPARISON: None  TECHNIQUE: Sagittal T1, sagittal T2, sagittal inversion recovery, axial T1 and axial T2, coronal T2    IMAGE QUALITY: Diagnostic   FINDINGS:   VERTEBRAL BODIES: There are 5 lumbar type vertebral bodies  Mild levoscoliosis at the lumbosacral junction  No spondylolisthesis or spondylolysis  There is a mild compression fracture of the L1 vertebral body involving the superior endplate  This is chronic with no associated marrow edema  Normal marrow signal within the lumbar spine  There is developmental spinal canal stenosis within the lumbar canal    SACRUM: Normal signal within the sacrum  No evidence of insufficiency or stress fracture  DISTAL CORD AND CONUS: Normal size and signal within the distal cord and conus  PARASPINAL SOFT TISSUES: Paraspinal soft tissues are unremarkable  LOWER THORACIC DISC SPACES: Normal disc height and signal  No disc herniation, canal stenosis or foraminal narrowing  LUMBAR DISC SPACES:   L1-L2: Mild developmental canal stenosis  No disc herniation  No foraminal narrowing  L2-L3: Mild annular bulging  Mild to moderate canal stenosis  Mild bilateral foraminal narrowing without clear nerve compression  L3-L4: Moderate annular bulging  Mild facet degenerative change and ligamentum flavum thickening  Moderately severe canal stenosis, see series 7 image 18 where the AP diameter of the canal measures approximately 6 mm  Mild to moderate bilateral   foraminal narrowing  L4-L5: Diffuse annular bulging  Facet degenerative change and ligamentum flavum thickening with severe central canal stenosis   AP diameter of the canal measures approximately 5 mm, see series 7 image 23  Mild foraminal narrowing  L5-S1: Mild diffuse annular bulging and facet degenerative change  Far lateral endplate osteophyte formation noted on the right  Mild canal stenosis  Mild distal right foraminal narrowing  IMPRESSION:   There is mild diffuse developmental canal stenosis of the lumbar canal exacerbated by acquired degenerative disc disease and posterior element degenerative change, most prominent at the L3-4 and L4-5 levels see series 7 images 18 at L3-4 and 23 at L4-5          Orders Placed This Encounter   Procedures    FL spine and pain procedure

## 2022-03-29 NOTE — PATIENT INSTRUCTIONS
Inyección Epidural de esteroides, cuidados ambulatorios   INFORMACIÓN GENERAL:   ¿Qué necesito saber acerca de vida inyección epidural de esteroides? Vida inyección epidural de esteroides (IEE) es un procedimiento para inyectar medicamentos esteroide en el espacio epidural  El espacio epidural se encuentra entre tariq Marvina Guiles y las [place]  Las esteroides reducen la inflamación y la acumulación de líquido en tariq columna que podrían estar provocando dolor  Es posible que a usted le den medicamento para el dolor junto con esteroides  ¿Cómo me preparo para vida IEE? Tariq proveedor de susie hablará con usted acerca de cómo prepararse para tariq procedimiento  Él le indicará cuáles medicamentos mayra y cuáles no el día de tariq procedimiento  Es posible que usted necesite dejar de mayra anticoagulantes u otros medicamentos varios días antes de tariq procedimiento  Podría ser necesario que modifique cualquier medicamento para la diabetes que esté tomando el día de tariq procedimiento  Los medicamentos de esteroides pueden aumentar los niveles de azúcar en tariq melodie  ¿Qué sucederá [place] IEE? · A usted le administrarán medicamento para adormecer el área del procedimiento  Usted estará despierto oseas el procedimiento, maxine no sentirá dolor  Es posible que [employer] den medicamento para ayudarlo a relajarse oseas el procedimiento  Se usará líquido de contraste para ayudar a que tariq proveedor de susie reyna el área mejor  Informe a tariq proveedor de susie si usted alguna vez tuvo vida reacción alérgica al líquido de [place]  · Tariq proveedor de susie podría colocar la aguja en el área de tariq [place], en la parte media de tariq espalda, o en el área del coxis  Él podría inyectarle el medicamento junto a los nervios que están provocándole dolor  O en vez de esto, él podría inyectarle el medicamento en vida área más ramon del espacio epidural  [place] ayuda a que el medicamento se extienda a más nervios   Tariq proveedor de susie Pamela Wade un fluoroscopio para ayudarse a guiar la aguja hacia el lugar correcto  Un fluoroscopio es un tipo de radiografía  Después del procedimiento, se colocará un vendaje sobre el sitio de la inyección para evitar vida infección  ¿Cuáles son los riesgos de Matias Stain IEE? Es posible que usted sufra daño a los nervios o parálisis temporal o Mitch  Usted podría presentar vida hemorragia o desarrollar vida infección seria, aubrie meningitis (inflamación del revestimiento del cerebro)  También podría desarrollar un absceso  Usted podría necesitar vida cirugía para corregir el absceso  Es posible que usted tenga vida convulsión, ansiedad o dificultad para dormir  Si usted es un hombre podría tener disfunción eréctil temporal (incapaz de Tura Junaid erección)  ACUERDOS SOBRE CASTELAN CUIDADO:   Usted tiene el derecho de participar en la planificación de castelan cuidado  Aprenda todo lo que pueda sobre castelan condición y aubrie darle tratamiento  Discuta con aminata médicos aminata opciones de tratamiento para juntos decidir el cuidado que usted quiere recibir  Usted siempre tiene el derecho a rechazar castelan tratamiento  Esta información es sólo para uso en educación  Castelan intención no es darle un consejo médico sobre enfermedades o tratamientos  Colsulte con castelan Surehs Melo farmacéutico antes de seguir cualquier régimen médico para saber si es seguro y efectivo para usted  © 2014 [address] [employer] is for End User's use only and may not be sold, redistributed or otherwise used for commercial purposes  All illustrations and images included in CareNotes® are the copyrighted property of A D A M [employer]  or Matt Mix

## 2022-04-14 ENCOUNTER — OFFICE VISIT (OUTPATIENT)
Dept: OBGYN CLINIC | Facility: OTHER | Age: 72
End: 2022-04-14
Payer: COMMERCIAL

## 2022-04-14 ENCOUNTER — APPOINTMENT (OUTPATIENT)
Dept: LAB | Facility: HOSPITAL | Age: 72
End: 2022-04-14
Payer: COMMERCIAL

## 2022-04-14 VITALS
HEIGHT: 69 IN | SYSTOLIC BLOOD PRESSURE: 108 MMHG | BODY MASS INDEX: 22.07 KG/M2 | HEART RATE: 85 BPM | DIASTOLIC BLOOD PRESSURE: 65 MMHG | WEIGHT: 149 LBS

## 2022-04-14 DIAGNOSIS — M70.21 OLECRANON BURSITIS OF RIGHT ELBOW: Primary | ICD-10-CM

## 2022-04-14 LAB — CRYSTALS SNV QL MICRO: NORMAL

## 2022-04-14 PROCEDURE — 89051 BODY FLUID CELL COUNT: CPT | Performed by: FAMILY MEDICINE

## 2022-04-14 PROCEDURE — 89050 BODY FLUID CELL COUNT: CPT | Performed by: FAMILY MEDICINE

## 2022-04-14 PROCEDURE — 99204 OFFICE O/P NEW MOD 45 MIN: CPT | Performed by: FAMILY MEDICINE

## 2022-04-14 PROCEDURE — 87476 LYME DIS DNA AMP PROBE: CPT | Performed by: FAMILY MEDICINE

## 2022-04-14 PROCEDURE — 89060 EXAM SYNOVIAL FLUID CRYSTALS: CPT | Performed by: FAMILY MEDICINE

## 2022-04-14 PROCEDURE — 20605 DRAIN/INJ JOINT/BURSA W/O US: CPT | Performed by: FAMILY MEDICINE

## 2022-04-14 PROCEDURE — 87070 CULTURE OTHR SPECIMN AEROBIC: CPT | Performed by: FAMILY MEDICINE

## 2022-04-14 PROCEDURE — 36415 COLL VENOUS BLD VENIPUNCTURE: CPT | Performed by: FAMILY MEDICINE

## 2022-04-14 PROCEDURE — 87205 SMEAR GRAM STAIN: CPT | Performed by: FAMILY MEDICINE

## 2022-04-14 NOTE — PROGRESS NOTES
1  Olecranon bursitis of right elbow  Body fluid culture and Gram stain    Synovial fluid, crystal    RBC count,Synovial Fluid    Synovial fluid white cell count w/ diff    Lyme disease, PCR    Body fluid culture and Gram stain    Synovial fluid, crystal    RBC count,Synovial Fluid    Synovial fluid white cell count w/ diff    Lyme disease, PCR    Synovial Fluid Diff    Medium joint arthrocentesis: R olecranon bursa     Orders Placed This Encounter   Procedures    Medium joint arthrocentesis: R olecranon bursa    Body fluid culture and Gram stain    Synovial fluid, crystal    RBC count,Synovial Fluid    Synovial fluid white cell count w/ diff    Lyme disease, PCR    Synovial Fluid Diff        IMAGING STUDIES: (I personally reviewed images in PACS and report):    x-ray right elbow 01/11/2022: Olecranon spur  No acute abnormality      PAST REPORTS:        ASSESSMENT/PLAN:   right hemorrhagic olecranon bursitis    Repeat X-ray next visit: None    Return in about 4 weeks (around 5/12/2022)  Patient Instructions   Olecranon bursitis is inflammation of a cushion sac at the elbow point usually related to chronic repetitive trauma or can sometimes be acute occurring after recent injury  Usually this inflammation is non-infectious; however, sometimes the bursal sac can become infected  Signs of infection are redness, increased warmth at the site, fever, difficulty moving the elbow, and if any of these occur, then patient should notify physician immediately and be evaluated same day or go to the emergency room to prevent severe medical complications from infection  Treatment for aseptic (non-infected) bursitis includes cushioning and protection with brace or elbow sleeve, avoidance of repetitive leaning the elbow on hard surfaces, and aspiration to reduce pressure and to confirm diagnosis if unclear  Bracing is paramount after aspiration to provide pressure and prevent fluid from reaccumulating   Injection with steroids is controversial for bursitis as newer evidence does not appear to show any routine benefit and also increases the risk for developing an infection (ANJU Roper 2018); however, other studies have shown benefit even in hemorrhagic bursitis (Eneida & Yogi 2014 ACR/ARHP meeting)   In episodes of recurrence, steroid injection "should not be routinely used" and patients should consider surgical referral (AFP 2/15/17 Keshia Gonzales)  __________________________________________________________________________    CHIEF COMPLAINT:   right elbow swelling    HPI:  Maria G Hinson is a 80 y o  male  who presents for       Visit   Right elbow swelling x4 weeks  Denies any specific trauma or does rub his elbows on hard surface  Denies any fevers redness or drainage  Review of Systems      Following history reviewed and update:    Past Medical History:   Diagnosis Date    BPH (benign prostatic hyperplasia)     Coronary artery disease     HTN (hypertension)     Hx pulmonary embolism     Hyperlipidemia     Hypertension     Prediabetes     Tobacco use      Past Surgical History:   Procedure Laterality Date    CORONARY ARTERY BYPASS GRAFT      ME COLONOSCOPY FLX DX W/COLLJ SPEC WHEN PFRMD N/A 4/6/2017    Procedure: COLONOSCOPY;  Surgeon: Kemal Dee MD;  Location: [facility]; Service: Gastroenterology     Social History   Social History     Substance and Sexual Activity   Alcohol Use No     Social History     Substance and Sexual Activity   Drug Use No     Social History     Tobacco Use   Smoking Status Current Every Day Smoker    Packs/day: 0 50    Years: 56 00    Pack years: 28 00    Types: Cigarettes   Smokeless Tobacco Never Used   Tobacco Comment    started when Pt was 6years old    Pt smokes 1/2 to 1 pack a day when stressed     Family History   Problem Relation Age of Onset    Diabetes Mother     Diabetes Father      No Known Allergies       Physical Exam  /65 (BP Location: Left arm, Patient Position: Sitting, Cuff Size: Adult)   Pulse 85   Ht 5' 9" (1 753 m)   Wt 67 6 kg (149 lb)   BMI 22 00 kg/m²     Constitutional:  see vital signs  Gen: well-developed, normocephalic/atraumatic, well-groomed  Eyes: No inflammation or discharge of conjunctiva or lids; sclera clear   Pharynx: no inflammation, lesion, or mass of lips  Neck: supple, no masses, non-distended  MSK: no inflammation, lesion, mass, or clubbing of nails and digits except for other than mentioned below  SKIN: no visible rashes or skin lesions  Pulmonary/Chest: Effort normal  No respiratory distress  NEURO: cranial nerves grossly intact  PSYCH:  Alert and oriented to person, place, and time; recent and remote memory intact; mood normal, no depression, anxiety, or agitation, judgment and insight good and intact     Ortho Exam  Right Elbow:  no erythema, or increased warmth   localized swelling over the olecranon without erythema or increased warmth  No drainage  No punctate  No purulence  rom full  + mild tenderness olecranon  no laxity of joint  Cubital tunnel Tinel's test:  Distal Biceps Hook test:    Right Wrist  no swelling, erythema, or increased warmth  rom full  nontender  no laxity of joint; druj stable  Carpal tunnel compression test:  Phalen's test:  Tinel's carpal tunnel test:    Right Hand  no erythema  Swelling:  None  Tenderness: none  rom fingers mcp, pip, dip intact without pain  No digital scissoring or deviation of fingers  no extensor lag  no rotation of fingers  no joint laxity  strenght flexion and extension mcp, pip, dip 5/5  sensation intact  capillary refill intact   Froment sign:  normal  OK sign:  Normal  Thumb extension:  5/5   __________________________________________________________________________  Medium joint arthrocentesis: R olecranon bursa  Universal Protocol:  Procedure performed by: Marla Leiva)  Consent: Verbal consent obtained    Risks and benefits: risks, benefits and alternatives were discussed  Consent given by: patient  Patient understanding: patient states understanding of the procedure being performed  Site marked: the operative site was marked  Radiology Images displayed and confirmed   If images not available, report reviewed: imaging studies available  Patient identity confirmed: verbally with patient    Supporting Documentation  Indications: pain and diagnostic evaluation   Procedure Details  Location: elbow - R olecranon bursa  Preparation: Patient was prepped and draped in the usual sterile fashion  Needle size: 18 G  Ultrasound guidance: no  Approach: posterior  Medications administered: 2 mL lidocaine 1 %    Aspirate amount: 5 mL  Aspirate: bloody  Analysis: fluid sample sent for laboratory analysis    Patient tolerance: patient tolerated the procedure well with no immediate complications  Dressing:  Sterile dressing applied    Ace bandage pressure dressing applied

## 2022-04-14 NOTE — PATIENT INSTRUCTIONS
Olecranon bursitis is inflammation of a cushion sac at the elbow point usually related to chronic repetitive trauma or can sometimes be acute occurring after recent injury  Usually this inflammation is non-infectious; however, sometimes the bursal sac can become infected  Signs of infection are redness, increased warmth at the site, fever, difficulty moving the elbow, and if any of these occur, then patient should notify physician immediately and be evaluated same day or go to the emergency room to prevent severe medical complications from infection  Treatment for aseptic (non-infected) bursitis includes cushioning and protection with brace or elbow sleeve, avoidance of repetitive leaning the elbow on hard surfaces, and aspiration to reduce pressure and to confirm diagnosis if unclear  Bracing is paramount after aspiration to provide pressure and prevent fluid from reaccumulating  Injection with steroids is controversial for bursitis as newer evidence does not appear to show any routine benefit and also increases the risk for developing an infection (UTANTON Carvajal [employer] 2018); however, other studies have shown benefit even in hemorrhagic bursitis (Eneida & Yogi 2014 ACR/ARHP meeting)   In episodes of recurrence, steroid injection "should not be routinely used" and patients should consider surgical referral (AFP 2/15/17 Ofelia Gramajo)

## 2022-04-15 LAB
LYMPHOCYTES # SNV MANUAL: 9 %
MONOCYTES NFR SNV MANUAL: 90 %
NEUTROPHILS NFR SNV MANUAL: 1 %
RBC # SNV MANUAL: ABNORMAL /UL (ref 0–10)
TOTAL CELLS COUNTED SPEC: 100
WBC # FLD MANUAL: 434 /UL

## 2022-04-15 RX ORDER — LIDOCAINE HYDROCHLORIDE 10 MG/ML
2 INJECTION, SOLUTION INFILTRATION; PERINEURAL
Status: COMPLETED | OUTPATIENT
Start: 2022-04-15 | End: 2022-04-15

## 2022-04-15 RX ADMIN — LIDOCAINE HYDROCHLORIDE 2 ML: 10 INJECTION, SOLUTION INFILTRATION; PERINEURAL at 16:17

## 2022-04-17 LAB
BACTERIA SPEC BFLD CULT: NO GROWTH
GRAM STN SPEC: NORMAL

## 2022-04-19 ENCOUNTER — HOSPITAL ENCOUNTER (OUTPATIENT)
Dept: RADIOLOGY | Facility: CLINIC | Age: 72
Discharge: HOME | End: 2022-04-19
Attending: ANESTHESIOLOGY | Admitting: ANESTHESIOLOGY
Payer: COMMERCIAL

## 2022-04-19 VITALS
RESPIRATION RATE: 20 BRPM | OXYGEN SATURATION: 98 % | TEMPERATURE: 97.5 F | DIASTOLIC BLOOD PRESSURE: 71 MMHG | SYSTOLIC BLOOD PRESSURE: 119 MMHG | HEART RATE: 100 BPM

## 2022-04-19 DIAGNOSIS — M54.16 LUMBAR RADICULOPATHY: ICD-10-CM

## 2022-04-19 PROCEDURE — 64483 NJX AA&/STRD TFRM EPI L/S 1: CPT | Performed by: ANESTHESIOLOGY

## 2022-04-19 RX ORDER — PAPAVERINE HCL 150 MG
20 CAPSULE, EXTENDED RELEASE ORAL ONCE
Status: COMPLETED | OUTPATIENT
Start: 2022-04-19 | End: 2022-04-19

## 2022-04-19 RX ADMIN — LIDOCAINE HYDROCHLORIDE 2 ML: 20 INJECTION, SOLUTION EPIDURAL; INFILTRATION; INTRACAUDAL; PERINEURAL at 13:49

## 2022-04-19 RX ADMIN — IOHEXOL 3 ML: 300 INJECTION, SOLUTION INTRAVENOUS at 13:47

## 2022-04-19 RX ADMIN — DEXAMETHASONE SODIUM PHOSPHATE 15 MG: 10 INJECTION, SOLUTION INTRAMUSCULAR; INTRAVENOUS at 13:49

## 2022-04-19 NOTE — H&P
History of Present Illness: The patient is a 80 y o  male who presents with complaints of low back and leg pain  Patient Active Problem List   Diagnosis    Chest pain    CAD (coronary artery disease)    S/P CABG x 4    Pre-diabetes    HLD (hyperlipidemia)    HTN (hypertension)    Tobacco use    Hx pulmonary embolism    BPH (benign prostatic hyperplasia)    Subclinical hypothyroidism    [facility]) [facility] [address] [facility]    Schizophrenia ([facility] [address] )    Sessile colonic polyp    Osteoarthritis    Lumbar radiculopathy    Lumbar spondylosis    DDD (degenerative disc disease), lumbar    Low back pain with sciatica    Closed compression fracture of first lumbar vertebra (HCC)    Olecranon bursitis of right elbow       Past Medical History:   Diagnosis Date    BPH (benign prostatic hyperplasia)     Coronary artery disease     HTN (hypertension)     Hx pulmonary embolism     Hyperlipidemia     Hypertension     Prediabetes     Tobacco use        Past Surgical History:   Procedure Laterality Date    CORONARY ARTERY BYPASS GRAFT      NE COLONOSCOPY FLX DX W/COLLJ SPEC WHEN PFRMD N/A 4/6/2017    Procedure: COLONOSCOPY;  Surgeon: Michael Bunn MD;  Location: [facility]; Service: Gastroenterology         Current Outpatient Medications:     ARIPiprazole (ABILIFY) 5 mg tablet, Take 5 mg by mouth daily, Disp: , Rfl:     aspirin 81 MG tablet, Take 81 mg by mouth daily  , Disp: , Rfl:     atorvastatin (LIPITOR) 40 mg tablet, Take 40 mg by mouth daily, Disp: , Rfl:     erythromycin (ILOTYCIN) ophthalmic ointment, Place a 1/2 inch ribbon of ointment into the lower eyelid  , Disp: 3 5 g, Rfl: 0    gabapentin (NEURONTIN) 100 mg capsule, Take 3 capsules (300 mg total) by mouth daily at bedtime, Disp: 90 capsule, Rfl: 1    metoprolol tartrate (LOPRESSOR) 25 mg tablet, Take 25 mg by mouth daily  , Disp: , Rfl:     naproxen (NAPROSYN) 250 mg tablet, take 1 tablet by mouth twice a day if needed for 10 days, Disp: , Rfl:     nitroglycerin (NITRODUR) 0 4 mg/hr, Place 1 patch on the skin 2 (two) times a day as needed, Disp: , Rfl:     risperiDONE (RisperDAL) 0 5 mg tablet, , Disp: , Rfl:     No Known Allergies    Physical Exam:   Vitals:    04/19/22 1332   BP: 111/72   Pulse: 75   Resp: 20   Temp: 97 5 °F (36 4 °C)   SpO2: 100%     General: Awake, Alert, Oriented x 3, Mood and affect appropriate  Respiratory: Respirations even and unlabored  Cardiovascular: Peripheral pulses intact; no edema  Musculoskeletal Exam:  Bilateral lumbar paraspinals    ASA Score: 3         Assessment:   1   Lumbar radiculopathy        Plan: B/L L4 TFESI

## 2022-04-19 NOTE — DISCHARGE INSTRUCTIONS
Epidural Steroid Injection   WHAT YOU NEED TO KNOW:   An epidural steroid injection (ERIC) is a procedure to inject steroid medicine into the epidural space  The epidural space is between your spinal cord and vertebrae  Steroids reduce inflammation and fluid buildup in your spine that may be causing pain  You may be given pain medicine along with the steroids  ACTIVITY  · Do not drive or operate machinery today  · No strenuous activity today - bending, lifting, etc   · You may resume normal activites starting tomorrow - start slowly and as tolerated  · You may shower today, but no tub baths or hot tubs  · You may have numbness for several hours from the local anesthetic  Please use caution and common sense, especially with weight-bearing activities  CARE OF THE INJECTION SITE  · If you have soreness or pain, apply ice to the area today (20 minutes on/20 minutes off)  · Starting tomorrow, you may use warm, moist heat or ice if needed  · You may have an increase or change in your discomfort for 36-48 hours after your treatment  · Apply ice and continue with any pain medication you have been prescribed  · [facility] if you have any of the following: redness, drainage, swelling, headache, stiff neck or fever above 100°F     SPECIAL INSTRUCTIONS  · Our office will contact you in approximately 7 days for a progress report  MEDICATIONS  · Continue to take all routine medications  · Our office may have instructed you to hold some medications  As no general anesthesia was used in today's procedure, you should not experience any side effects related to anesthesia  If you have a problem specifically related to your procedure, please call our office at [phone]  Problems not related to your procedure should be directed to your primary care physician

## 2022-04-21 LAB — B BURGDOR DNA SPEC QL NAA+PROBE: NEGATIVE

## 2022-04-26 ENCOUNTER — TELEPHONE (OUTPATIENT)
Dept: PAIN MEDICINE | Facility: CLINIC | Age: 72
End: 2022-04-26

## 2022-05-19 ENCOUNTER — HOSPITAL ENCOUNTER (OUTPATIENT)
Dept: NEUROLOGY | Facility: CLINIC | Age: 72
Discharge: HOME | End: 2022-05-19
Payer: COMMERCIAL

## 2022-05-19 DIAGNOSIS — M62.81 MUSCLE WEAKNESS (GENERALIZED): ICD-10-CM

## 2022-05-19 PROCEDURE — 95886 MUSC TEST DONE W/N TEST COMP: CPT | Performed by: PSYCHIATRY & NEUROLOGY

## 2022-05-19 PROCEDURE — 95911 NRV CNDJ TEST 9-10 STUDIES: CPT | Performed by: PSYCHIATRY & NEUROLOGY

## 2022-05-24 ENCOUNTER — OFFICE VISIT (OUTPATIENT)
Dept: PAIN MEDICINE | Facility: CLINIC | Age: 72
End: 2022-05-24
Payer: COMMERCIAL

## 2022-05-24 VITALS
SYSTOLIC BLOOD PRESSURE: 113 MMHG | BODY MASS INDEX: 22.22 KG/M2 | DIASTOLIC BLOOD PRESSURE: 73 MMHG | WEIGHT: 150 LBS | HEIGHT: 69 IN | HEART RATE: 92 BPM

## 2022-05-24 DIAGNOSIS — M54.16 RADICULOPATHY, LUMBAR REGION: ICD-10-CM

## 2022-05-24 DIAGNOSIS — M47.816 LUMBAR SPONDYLOSIS: ICD-10-CM

## 2022-05-24 DIAGNOSIS — M51.36 DDD (DEGENERATIVE DISC DISEASE), LUMBAR: ICD-10-CM

## 2022-05-24 DIAGNOSIS — M54.40 LOW BACK PAIN WITH SCIATICA, SCIATICA LATERALITY UNSPECIFIED, UNSPECIFIED BACK PAIN LATERALITY, UNSPECIFIED CHRONICITY: Primary | ICD-10-CM

## 2022-05-24 DIAGNOSIS — M54.16 LUMBAR RADICULOPATHY: ICD-10-CM

## 2022-05-24 PROCEDURE — 99214 OFFICE O/P EST MOD 30 MIN: CPT | Performed by: NURSE PRACTITIONER

## 2022-05-24 RX ORDER — GABAPENTIN 100 MG/1
300 CAPSULE ORAL
Qty: 90 CAPSULE | Refills: 1 | Status: SHIPPED | OUTPATIENT
Start: 2022-05-24

## 2022-05-24 NOTE — PROGRESS NOTES
Assessment:  1  Low back pain with sciatica, sciatica laterality unspecified, unspecified back pain laterality, unspecified chronicity    2  Radiculopathy, lumbar region    3  Lumbar spondylosis    4  DDD (degenerative disc disease), lumbar    5  Lumbar radiculopathy        Plan:  1  I will schedule the patient for a bilateral L5 TFESI to address the inflammatory component the patient's pain  Complete risks and benefits including bleeding, infection, tissue reaction, nerve injury and allergic reaction were discussed  The patient was agreeable and verbalized an understanding  2  Patient may continue gabapentin 300 mg q h s  as prescribed  This medication was refilled today  May consider increasing dosing in the future, however patient would like to keep dosing the same at this time   3  Patient will follow-up with orthopedics regarding olecranon bursitis  4  The patient will follow-up after his procedure or sooner if needed    M*Modal software was used to dictate this note  It may contain errors with dictating incorrect words or incorrect spelling  Please contact the provider directly with any questions  Please note that the patient is primarily Romanian speaking and required interpretive services for this office visit  leander Cabrera MA, who speaks fluent [place] was available throughout the entire office visit and interpreted with the patient in agreement and verbalizing understanding  History of Present Illness: The patient is a 80 y o  male with a history of CAD status post CABG, hypertension, history of PE and schizophrenia last seen on 3/29/22 who presents for a follow up office visit in regards to chronic low back pain that radiates into the bilateral lower extremities with associated numbness and paresthesias into the lateral ankles secondary to lumbar degenerative disc disease, lumbar spondylosis, lumbar stenosis, lumbar radiculopathy and chronic pain syndrome    The patient denies bowel or bladder incontinence or saddle anesthesia  Since last office visit, patient did have EMG completed which revealed a bilateral L5-S1 radiculopathy  MRI lumbar spine which revealed a chronic old compression fracture of L1, degenerative disc disease and arthritis of the lower spine with various degrees of central and foraminal stenosis from L1-2 to L5-S1, most severe L3-4 L4-5  Patient is status post bilateral L4 TFESI on April 19, 2022 which provided 90% improvement for about a month  The pain has since returned to baseline  Patient rates his pain a 9/10 on the numeric pain rating scale  He characterizes his pain as shooting and pins and needles    I have personally reviewed and/or updated the patient's past medical history, past surgical history, family history, social history, current medications, allergies, and vital signs today  Review of Systems:    Review of Systems   Constitutional: Negative for fever and unexpected weight change  HENT: Negative for trouble swallowing  Eyes: Negative for visual disturbance  Respiratory: Negative for shortness of breath and wheezing  Cardiovascular: Negative for chest pain and palpitations  Gastrointestinal: Negative for constipation, diarrhea, nausea and vomiting  Endocrine: Negative for cold intolerance, heat intolerance and polydipsia  Genitourinary: Negative for difficulty urinating and frequency  Musculoskeletal: Negative for arthralgias, gait problem, joint swelling and myalgias  Skin: Negative for rash  Neurological: Negative for dizziness, seizures, syncope, weakness and headaches  Hematological: Does not bruise/bleed easily  Psychiatric/Behavioral: Negative for dysphoric mood  All other systems reviewed and are negative          Past Medical History:   Diagnosis Date    BPH (benign prostatic hyperplasia)     Coronary artery disease     HTN (hypertension)     Hx pulmonary embolism     Hyperlipidemia     Hypertension     Prediabetes     Tobacco use        Past Surgical History:   Procedure Laterality Date    CORONARY ARTERY BYPASS GRAFT      ND COLONOSCOPY FLX DX W/COLLJ SPEC WHEN PFRMD N/A 4/6/2017    Procedure: COLONOSCOPY;  Surgeon: Melissa Moctezuma MD;  Location: [facility]; Service: Gastroenterology       Family History   Problem Relation Age of Onset    Diabetes Mother     Diabetes Father        Social History     Occupational History    Not on file   Tobacco Use    Smoking status: Current Every Day Smoker     Packs/day: 0 50     Years: 56 00     Pack years: 28 00     Types: Cigarettes    Smokeless tobacco: Never Used    Tobacco comment: started when Pt was 6years old  Pt smokes 1/2 to 1 pack a day when stressed   Vaping Use    Vaping Use: Never used   Substance and Sexual Activity    Alcohol use: No    Drug use: No    Sexual activity: Not Currently         Current Outpatient Medications:     gabapentin (NEURONTIN) 100 mg capsule, Take 3 capsules (300 mg total) by mouth daily at bedtime, Disp: 90 capsule, Rfl: 1    Acetaminophen Extra Strength 500 MG tablet, , Disp: , Rfl:     ARIPiprazole (ABILIFY) 5 mg tablet, Take 5 mg by mouth daily, Disp: , Rfl:     aspirin 81 MG tablet, Take 81 mg by mouth daily  , Disp: , Rfl:     atorvastatin (LIPITOR) 40 mg tablet, Take 40 mg by mouth daily, Disp: , Rfl:     erythromycin (ILOTYCIN) ophthalmic ointment, Place a 1/2 inch ribbon of ointment into the lower eyelid  , Disp: 3 5 g, Rfl: 0    lisinopril (ZESTRIL) 5 mg tablet, , Disp: , Rfl:     metoprolol tartrate (LOPRESSOR) 25 mg tablet, Take 25 mg by mouth daily  , Disp: , Rfl:     naproxen (NAPROSYN) 250 mg tablet, take 1 tablet by mouth twice a day if needed for 10 days, Disp: , Rfl:     nitroglycerin (NITRODUR) 0 4 mg/hr, Place 1 patch on the skin 2 (two) times a day as needed, Disp: , Rfl:     risperiDONE (RisperDAL) 0 5 mg tablet, , Disp: , Rfl:     No Known Allergies    Physical Exam:    /73   Pulse 92  5' 9" (1 753 m)   Wt 68 kg (150 lb)   BMI 22 15 kg/m²     Constitutional:normal, well developed, well nourished, alert, in no distress and non-toxic and no overt pain behavior  Eyes:anicteric  HEENT:grossly intact  Neck:supple, symmetric, trachea midline and no masses   Pulmonary:even and unlabored  Cardiovascular:No edema or pitting edema present  Skin:Normal without rashes or lesions and well hydrated  Psychiatric:Mood and affect appropriate  Neurologic:Cranial Nerves II-XII grossly intact  Musculoskeletal:antalgic gait but steady without assistive devices      Imaging  FL spine and pain procedure    (Results Pending)     Narrative & Impression   MRI LUMBAR SPINE WITHOUT CONTRAST   INDICATION: [id number]: Radiculopathy, lumbar region  COMPARISON: None  TECHNIQUE: Sagittal T1, sagittal T2, sagittal inversion recovery, axial T1 and axial T2, coronal T2    IMAGE QUALITY: Diagnostic   FINDINGS:   VERTEBRAL BODIES: There are 5 lumbar type vertebral bodies  Mild levoscoliosis at the lumbosacral junction  No spondylolisthesis or spondylolysis  There is a mild compression fracture of the L1 vertebral body involving the superior endplate  This is chronic with no associated marrow edema  Normal marrow signal within the lumbar spine  There is developmental spinal canal stenosis within the lumbar canal    SACRUM: Normal signal within the sacrum  No evidence of insufficiency or stress fracture  DISTAL CORD AND CONUS: Normal size and signal within the distal cord and conus  PARASPINAL SOFT TISSUES: Paraspinal soft tissues are unremarkable  LOWER THORACIC DISC SPACES: Normal disc height and signal  No disc herniation, canal stenosis or foraminal narrowing  LUMBAR DISC SPACES:   L1-L2: Mild developmental canal stenosis  No disc herniation  No foraminal narrowing  L2-L3: Mild annular bulging  Mild to moderate canal stenosis  Mild bilateral foraminal narrowing without clear nerve compression     L3-L4: Moderate annular bulging  Mild facet degenerative change and ligamentum flavum thickening  Moderately severe canal stenosis, see series 7 image 18 where the AP diameter of the canal measures approximately 6 mm  Mild to moderate bilateral   foraminal narrowing  L4-L5: Diffuse annular bulging  Facet degenerative change and ligamentum flavum thickening with severe central canal stenosis  AP diameter of the canal measures approximately 5 mm, see series 7 image 23  Mild foraminal narrowing  L5-S1: Mild diffuse annular bulging and facet degenerative change  Far lateral endplate osteophyte formation noted on the right  Mild canal stenosis  Mild distal right foraminal narrowing  IMPRESSION:   There is mild diffuse developmental canal stenosis of the lumbar canal exacerbated by acquired degenerative disc disease and posterior element degenerative change, most prominent at the L3-4 and L4-5 levels see series 7 images 18 at L3-4 and 23 at L4-5     Workstation performed: [id number]      Orders Placed This Encounter   Procedures    [place] spine and pain procedure

## 2022-06-20 ENCOUNTER — HOSPITAL ENCOUNTER (OUTPATIENT)
Dept: RADIOLOGY | Facility: CLINIC | Age: 72
Discharge: HOME | End: 2022-06-20
Attending: ANESTHESIOLOGY | Admitting: ANESTHESIOLOGY
Payer: COMMERCIAL

## 2022-06-20 VITALS
HEART RATE: 97 BPM | SYSTOLIC BLOOD PRESSURE: 125 MMHG | RESPIRATION RATE: 20 BRPM | DIASTOLIC BLOOD PRESSURE: 75 MMHG | TEMPERATURE: 97.7 F | OXYGEN SATURATION: 98 %

## 2022-06-20 DIAGNOSIS — M54.16 LUMBAR RADICULOPATHY: ICD-10-CM

## 2022-06-20 PROCEDURE — A9585 GADOBUTROL INJECTION: HCPCS | Performed by: ANESTHESIOLOGY

## 2022-06-20 PROCEDURE — 64483 NJX AA&/STRD TFRM EPI L/S 1: CPT | Performed by: ANESTHESIOLOGY

## 2022-06-20 RX ORDER — PAPAVERINE HCL 150 MG
20 CAPSULE, EXTENDED RELEASE ORAL ONCE
Status: COMPLETED | OUTPATIENT
Start: 2022-06-20 | End: 2022-06-20

## 2022-06-20 RX ADMIN — LIDOCAINE HYDROCHLORIDE 2 ML: 20 INJECTION, SOLUTION EPIDURAL; INFILTRATION; INTRACAUDAL; PERINEURAL at 11:08

## 2022-06-20 RX ADMIN — GADOBUTROL 2 ML: 604.72 INJECTION INTRAVENOUS at 11:07

## 2022-06-20 RX ADMIN — DEXAMETHASONE SODIUM PHOSPHATE 15 MG: 10 INJECTION, SOLUTION INTRAMUSCULAR; INTRAVENOUS at 11:07

## 2022-06-20 NOTE — DISCHARGE INSTRUCTIONS
Epidural Steroid Injection   WHAT YOU NEED TO KNOW:   An epidural steroid injection (ERIC) is a procedure to inject steroid medicine into the epidural space  The epidural space is between your spinal cord and vertebrae  Steroids reduce inflammation and fluid buildup in your spine that may be causing pain  You may be given pain medicine along with the steroids  ACTIVITY  Do not drive or operate machinery today  No strenuous activity today - bending, lifting, etc   You may resume normal activites starting tomorrow - start slowly and as tolerated  You may shower today, but no tub baths or hot tubs  You may have numbness for several hours from the local anesthetic  Please use caution and common sense, especially with weight-bearing activities  CARE OF THE INJECTION SITE  If you have soreness or pain, apply ice to the area today (20 minutes on/20 minutes off)  Starting tomorrow, you may use warm, moist heat or ice if needed  You may have an increase or change in your discomfort for 36-48 hours after your treatment  Apply ice and continue with any pain medication you have been prescribed  Notify the [facility] if you have any of the following: redness, drainage, swelling, headache, stiff neck or fever above 100°F     SPECIAL INSTRUCTIONS  Our office will contact you in approximately 7 days for a progress report  MEDICATIONS  Continue to take all routine medications  Our office may have instructed you to hold some medications  As no general anesthesia was used in today's procedure, you should not experience any side effects related to anesthesia  If you have a problem specifically related to your procedure, please call our office at [phone]  Problems not related to your procedure should be directed to your primary care physician

## 2022-06-20 NOTE — H&P
History of Present Illness: The patient is a 80 y o  male who presents with complaints of low back and leg pain  Patient Active Problem List   Diagnosis    Chest pain    CAD (coronary artery disease)    S/P CABG x 4    Pre-diabetes    HLD (hyperlipidemia)    HTN (hypertension)    Tobacco use    Hx pulmonary embolism    BPH (benign prostatic hyperplasia)    Subclinical hypothyroidism    [facility] injury) [facility]    Schizophrenia ([facility] )    Sessile colonic polyp    Osteoarthritis    Radiculopathy, lumbar region    Lumbar spondylosis    DDD (degenerative disc disease), lumbar    Low back pain with sciatica    Closed compression fracture of first lumbar vertebra (HCC)    Olecranon bursitis of right elbow       Past Medical History:   Diagnosis Date    BPH (benign prostatic hyperplasia)     Coronary artery disease     HTN (hypertension)     Hx pulmonary embolism     Hyperlipidemia     Hypertension     Prediabetes     Tobacco use        Past Surgical History:   Procedure Laterality Date    CORONARY ARTERY BYPASS GRAFT      OK COLONOSCOPY FLX DX W/COLLJ SPEC WHEN PFRMD N/A 4/6/2017    Procedure: COLONOSCOPY;  Surgeon: Delon Boateng MD;  Location: [facility]; Service: Gastroenterology         Current Outpatient Medications:     Acetaminophen Extra Strength 500 MG tablet, , Disp: , Rfl:     ARIPiprazole (ABILIFY) 5 mg tablet, Take 5 mg by mouth daily, Disp: , Rfl:     aspirin 81 MG tablet, Take 81 mg by mouth daily  , Disp: , Rfl:     atorvastatin (LIPITOR) 40 mg tablet, Take 40 mg by mouth daily, Disp: , Rfl:     erythromycin (ILOTYCIN) ophthalmic ointment, Place a 1/2 inch ribbon of ointment into the lower eyelid  , Disp: 3 5 g, Rfl: 0    gabapentin (NEURONTIN) 100 mg capsule, Take 3 capsules (300 mg total) by mouth daily at bedtime, Disp: 90 capsule, Rfl: 1    lisinopril (ZESTRIL) 5 mg tablet, , Disp: , Rfl:     metoprolol tartrate (LOPRESSOR) 25 mg tablet, Take 25 mg by mouth daily  , Disp: , Rfl:     naproxen (NAPROSYN) 250 mg tablet, take 1 tablet by mouth twice a day if needed for 10 days, Disp: , Rfl:     nitroglycerin (NITRODUR) 0 4 mg/hr, Place 1 patch on the skin 2 (two) times a day as needed, Disp: , Rfl:     risperiDONE (RisperDAL) 0 5 mg tablet, , Disp: , Rfl:   No current facility-administered medications for this encounter  No Known Allergies    Physical Exam:   Vitals:    06/20/22 1048   BP: 122/75   Pulse: 96   Resp: 20   Temp: 97 7 °F (36 5 °C)   SpO2: 98%     General: Awake, Alert, Oriented x 3, Mood and affect appropriate  Respiratory: Respirations even and unlabored  Cardiovascular: Peripheral pulses intact; no edema  Musculoskeletal Exam:  Bilateral lumbar paraspinal centered palpation    ASA Score: 3    Patient/Chart Verification  Patient ID Verified: Verbal  ID Band Applied: No  Consents Confirmed: Procedural, To be obtained in the Pre-Procedure area  Interval H&P(within 24 hr) Complete (required for Outpatients and Surgery Admit only): To be obtained in the Pre-Procedure area  Allergies Reviewed: Yes  Anticoag/NSAID held?: NA  Currently on antibiotics?: No    Assessment:   1   Lumbar radiculopathy        Plan: B/L L5 TFESI

## 2022-06-27 ENCOUNTER — TELEPHONE (OUTPATIENT)
Dept: PAIN MEDICINE | Facility: CLINIC | Age: 72
End: 2022-06-27

## 2022-08-11 ENCOUNTER — OFFICE VISIT (OUTPATIENT)
Dept: OBGYN CLINIC | Facility: OTHER | Age: 72
End: 2022-08-11
Payer: COMMERCIAL

## 2022-08-11 VITALS
BODY MASS INDEX: 22.85 KG/M2 | SYSTOLIC BLOOD PRESSURE: 112 MMHG | DIASTOLIC BLOOD PRESSURE: 66 MMHG | HEIGHT: 69 IN | WEIGHT: 154.3 LBS | HEART RATE: 71 BPM

## 2022-08-11 DIAGNOSIS — M54.16 LUMBAR RADICULOPATHY: ICD-10-CM

## 2022-08-11 DIAGNOSIS — M25.729 OLECRANON BONE SPUR: Primary | ICD-10-CM
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
## 2022-08-11 DIAGNOSIS — M48.061 SPINAL STENOSIS OF LUMBAR REGION, UNSPECIFIED WHETHER NEUROGENIC CLAUDICATION PRESENT: ICD-10-CM

## 2022-08-11 DIAGNOSIS — M70.21 OLECRANON BURSITIS OF RIGHT ELBOW: ICD-10-CM

## 2022-08-11 PROCEDURE — 99214 OFFICE O/P EST MOD 30 MIN: CPT | Performed by: FAMILY MEDICINE

## 2022-08-11 RX ORDER — NICOTINE 21 MG/24HR
PATCH, TRANSDERMAL 24 HOURS TRANSDERMAL
COMMUNITY

## 2022-08-11 RX ORDER — HYDROXYZINE HYDROCHLORIDE 25 MG/1
TABLET, FILM COATED ORAL
COMMUNITY

## 2022-08-11 NOTE — PATIENT INSTRUCTIONS
Olecranon bursitis resolved  There is a painful olecranon bone spur that will be treated with padding and compression  Patient provided with elbow sleeve brace and told to apply ice as needed  Patient unstable with ambulation on exam today with known history of disc herniation and nerve impingement  No red flags reported on history or noted during exam, but patient was strongly encouraged to follow-up with his primary care doctor (states he has an appointment this afternoon) and with pain management given his history  Patient missed a scheduled appointment for repeat lumbar spine MRI last month and was strongly advised to reschedule this exam   Will also refer patient to Neurosurgery at this time as based on his exam he may require operative intervention for his lumbar spine  Follow-up with primary care sports med as needed  Olecranon bursitis is inflammation of a cushion sac at the elbow point usually related to chronic repetitive trauma or can sometimes be acute occurring after recent injury  Usually this inflammation is non-infectious; however, sometimes the bursal sac can become infected  Signs of infection are redness, increased warmth at the site, fever, difficulty moving the elbow, and if any of these occur, then patient should notify physician immediately and be evaluated same day or go to the emergency room to prevent severe medical complications from infection  Treatment for aseptic (non-infected) bursitis includes cushioning and protection with brace or elbow sleeve, avoidance of repetitive leaning the elbow on hard surfaces, and aspiration to reduce pressure and to confirm diagnosis if unclear  Bracing is paramount after aspiration to provide pressure and prevent fluid from reaccumulating   Injection with steroids is controversial for bursitis as newer evidence does not appear to show any routine benefit and also increases the risk for developing an infection (ANJU Stahl 2018); however, other studies have shown benefit even in hemorrhagic bursitis (Eneida & Yogi 2014 ACR/ARHP meeting)   In episodes of recurrence, steroid injection "should not be routinely used" and patients should consider surgical referral (AFP 2/15/17 Vijay Felix)

## 2022-08-11 NOTE — PROGRESS NOTES
1  Olecranon bone spur  Brace   2  Olecranon bursitis of right elbow     3  Lumbar radiculopathy  Ambulatory Referral to Neurosurgery   4  Spinal stenosis of lumbar region, unspecified whether neurogenic claudication present  Ambulatory Referral to Neurosurgery   5  Fall, initial encounter  Ambulatory Referral to Neurosurgery     Orders Placed This Encounter   Procedures    Brace    Ambulatory Referral to Neurosurgery        Imaging Studies (I personally reviewed images in PACS and report):  X-ray right elbow 01/11/2022 impression:  No acute osseous abnormality  Suspected olecranon bursitis  (report details presence of olecranon spur)    IMPRESSION:  Right hemorrhagic olecranon bursitis status post aspiration 04/14/2022 without recurrence - current history and exam not consistent with active bursitis  Right olecranon bone spur  Spinal stenosis with lumbar radiculopathy  Falls      Repeat X-ray next visit: None    Return for Follow up urgently with pain management and PCP  Establish with Neurosurgery  Patient Instructions   Olecranon bursitis resolved  There is a painful olecranon bone spur that will be treated with padding and compression  Patient provided with elbow sleeve brace and told to apply ice as needed  Patient unstable with ambulation on exam today with known history of disc herniation and nerve impingement  No red flags reported on history or noted during exam, but patient was strongly encouraged to follow-up with his primary care doctor (states he has an appointment this afternoon) and with pain management given his history  Patient missed a scheduled appointment for repeat lumbar spine MRI last month and was strongly advised to reschedule this exam   Will also refer patient to Neurosurgery at this time as based on his exam he may require operative intervention for his lumbar spine  Follow-up with primary care sports med as needed      Olecranon bursitis is inflammation of a cushion sac at the elbow point usually related to chronic repetitive trauma or can sometimes be acute occurring after recent injury  Usually this inflammation is non-infectious; however, sometimes the bursal sac can become infected  Signs of infection are redness, increased warmth at the site, fever, difficulty moving the elbow, and if any of these occur, then patient should notify physician immediately and be evaluated same day or go to the emergency room to prevent severe medical complications from infection  Treatment for aseptic (non-infected) bursitis includes cushioning and protection with brace or elbow sleeve, avoidance of repetitive leaning the elbow on hard surfaces, and aspiration to reduce pressure and to confirm diagnosis if unclear  Bracing is paramount after aspiration to provide pressure and prevent fluid from reaccumulating  Injection with steroids is controversial for bursitis as newer evidence does not appear to show any routine benefit and also increases the risk for developing an infection (ANJU Win 2018); however, other studies have shown benefit even in hemorrhagic bursitis (Eneida & Yogi 2014 ACR/AR[facility] meeting)   In episodes of recurrence, steroid injection "should not be routinely used" and patients should consider surgical referral (AFP 2/15/17 Warren Yanez)  CHIEF COMPLAINT:  Right olecranon bursitis follow-up    HPI:  Woodrow Darling is a 80 y o  male  who presents for       Visit 8/11/2022 :  Patient is here for follow-up of atraumatic hemorrhagic right olecranon bursitis patient was last seen in this regard on 04/14/2022 where he received aspiration of the bursa, which yielded 5 cc of hemorrhagic fluid  Testing of aspirate yielded RBCs 33,000,  (neutrophils 1%, lymphocytes 9%, monocytes 90%), negative crystal analysis, negative bacterial Gram stain and culture, and negative Lyme PCR    Patient is right hand dominant    Today, patient reports some residual, but improved, swelling of the posterior right elbow  He states he has pain with motion, particularly pain with extension and limited range of motion with flexion  He states he continues with pain following his aspiration at last visit and notes no improvement since procedure  He states his elbow centered to the touch  He denies any history of erythema or drainage  Also noted today was that the patient endorsed chronic bilateral leg weakness and multiple falls in recent months  He states he has a longstanding history of back pain for which he has seen Pain Management in the past   He had a TFESI relatively recently that he states that not improve his pain  He was due for repeat MRI of his lumbar spine last month and states he forgot about this appointment and did not report for this scan  He states in recent months he has been very unsteady on his feet  He denies any bowel or bladder incontinence  Denies any saddle anesthesia  Denies any fevers, chills, or night sweats  Denies any unintentional weight loss  Review of Systems      Following history reviewed and update:    Past Medical History:   Diagnosis Date    BPH (benign prostatic hyperplasia)     Coronary artery disease     HTN (hypertension)     Hx pulmonary embolism     Hyperlipidemia     Hypertension     Prediabetes     Tobacco use      Past Surgical History:   Procedure Laterality Date    CORONARY ARTERY BYPASS GRAFT      NM COLONOSCOPY FLX DX W/COLLJ SPEC WHEN PFRMD N/A 4/6/2017    Procedure: COLONOSCOPY;  Surgeon: Shira Ellis MD;  Location: [facility];   Service: Gastroenterology     Social History   Social History     Substance and Sexual Activity   Alcohol Use No     Social History     Substance and Sexual Activity   Drug Use No     Social History     Tobacco Use   Smoking Status Current Every Day Smoker    Packs/day: 0 50    Years: 56 00    Pack years: 28 00    Types: Cigarettes   Smokeless Tobacco Never Used   Tobacco Comment    started when Pt was 10 years old  Pt smokes 1/2 to 1 pack a day when stressed     Family History   Problem Relation Age of Onset    Diabetes Mother     Diabetes Father      No Known Allergies       Physical Exam  /66 (BP Location: Left arm, Patient Position: Sitting, Cuff Size: Adult)   Pulse 71   Ht 5' 9" (1 753 m)   Wt 70 kg (154 lb 4 8 oz)   BMI 22 79 kg/m²     Constitutional:  see vital signs  Gen: well-developed, normocephalic/atraumatic, well-groomed  Eyes: No inflammation or discharge of conjunctiva or lids; sclera clear   Pharynx: no inflammation, lesion, or mass of lips  Neck: supple, no masses, non-distended  MSK: no inflammation, lesion, mass, or clubbing of nails and digits except for other than mentioned below  SKIN: no visible rashes or skin lesions  Pulmonary/Chest: Effort normal  No respiratory distress  NEURO: cranial nerves grossly intact  PSYCH:  Alert and oriented to person, place, and time; recent and remote memory intact; mood normal, no depression, anxiety, or agitation, judgment and insight good and intact     Ortho Exam  Right Elbow:  Trace swelling  Palpable olecranon bone spur that is tender to touch  No erythema or increased warmth  rom:  Reduced in flexion to 100° and extension to 10°  no laxity of joint  Cubital tunnel Tinel's test:  Distal Biceps Hook test:  Flexion strength:  5/5  Extension strength:  5/5      BACK EXAM:  Gait: Abnormal gait  Upon standing, patient falls backwards  Patient unable to ambulate on his heels or on his toes without assistance      BACK TENDERNESS:  Spinous Processes: no  Paraspinal Muscles: no  SI Joint:   Sacrum:     ROM:  Flexion:  Unable to assess  Extension:  Unable to assess  Sidebending:  Unable to assess    DERMATOMAL SENSATION:  L1: normal   L2: normal   L3: normal   L4: normal   L5: normal   S1: normal    STRENGTH (bilateral):  Knee Extension: 5/5  Knee Flexion: 5/5  Foot Dorsiflexion: 5/5  Great Toe Extension: 5/5  Foot Plantarflexion: 5/5  Hip Flexion: 5/5  Hip Abduction: 5/5              Procedures

## 2022-09-11 PROBLEM — G89.4 CHRONIC PAIN SYNDROME: Status: ACTIVE | Noted: 2022-09-11

## 2022-09-11 NOTE — ASSESSMENT & PLAN NOTE
· As addressed in HPI  · He denies bowel or bladder incontinence or saddle anesthesia  · Demonstrated no gait instability , has an antalgic gait   · He does not want to attend physical tharapy has a fear of falling  · Reports repeated falls , 5 in the past week       Imagining-  3/23/2022 MRI lumbar spine which revealed a chronic old compression fracture of L1, degenerative disc disease and arthritis of the lower spine with various degrees of central and foraminal stenosis from L1-2 to L5-S1, most severe L3-4Moderately severe canal stenosis, see series 7 image 18 where the AP diameter of the canal measures approximately 6 mm  L4-5 AP diameter of the canal measures approximately 5 mm      EMG BLE-  Lumbar paraspinal testing showed abnormalities with denervation in the L5 and S1 distributions      Plan  · MRI Lumbar spine reviewed with patient   · Dr Lashon Gomez met with patient  after assessment, imagining review, and collaborative discussion of symptoms decision made to proceed with surgery   · MRI lumbar spine

## 2022-09-11 NOTE — ASSESSMENT & PLAN NOTE
Secondary to ---chronic old compression fracture of L1, degenerative disc disease and arthritis of the lower spine with various degrees of central and foraminal stenosis from L1-2 to L5-S1, most severe L3-4 L4-5

## 2022-09-12 ENCOUNTER — OFFICE VISIT (OUTPATIENT)
Dept: NEUROSURGERY | Facility: CLINIC | Age: 72
End: 2022-09-12
Payer: COMMERCIAL

## 2022-09-12 VITALS
TEMPERATURE: 98.5 F | HEART RATE: 60 BPM | DIASTOLIC BLOOD PRESSURE: 72 MMHG | SYSTOLIC BLOOD PRESSURE: 112 MMHG | WEIGHT: 150 LBS | BODY MASS INDEX: 22.22 KG/M2 | RESPIRATION RATE: 16 BRPM | HEIGHT: 69 IN
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
## 2022-09-12 DIAGNOSIS — M48.062 LUMBAR STENOSIS WITH NEUROGENIC CLAUDICATION: ICD-10-CM

## 2022-09-12 DIAGNOSIS — M48.062 SPINAL STENOSIS OF LUMBAR REGION WITH NEUROGENIC CLAUDICATION: ICD-10-CM

## 2022-09-12 DIAGNOSIS — M54.16 LUMBAR RADICULOPATHY: Primary | ICD-10-CM

## 2022-09-12 PROCEDURE — 99204 OFFICE O/P NEW MOD 45 MIN: CPT | Performed by: STUDENT IN AN ORGANIZED HEALTH CARE EDUCATION/TRAINING PROGRAM

## 2022-09-12 RX ORDER — CEFAZOLIN SODIUM 1 G/50ML
1000 SOLUTION INTRAVENOUS ONCE
OUTPATIENT
Start: 2022-09-12 | End: 2022-09-12

## 2022-09-12 RX ORDER — CHLORHEXIDINE GLUCONATE 0.12 MG/ML
15 RINSE ORAL ONCE
OUTPATIENT
Start: 2022-09-12 | End: 2022-09-12

## 2022-09-12 RX ORDER — SODIUM CHLORIDE 9 MG/ML
125 INJECTION, SOLUTION INTRAVENOUS CONTINUOUS
OUTPATIENT
Start: 2022-09-12

## 2022-09-12 NOTE — PROGRESS NOTES
Assessment/Plan:    Lumbar radiculopathy  · As addressed in HPI  · He denies bowel or bladder incontinence or saddle anesthesia  · Demonstrated no gait instability , has an antalgic gait   · He does not want to attend physical tharapy has a fear of falling  · Reports repeated falls , 5 in the past week       Imagining-  3/23/2022 MRI lumbar spine which revealed a chronic old compression fracture of L1, degenerative disc disease and arthritis of the lower spine with various degrees of central and foraminal stenosis from L1-2 to L5-S1, most severe L3-4Moderately severe canal stenosis, see series 7 image 18 where the AP diameter of the canal measures approximately 6 mm  L4-5 AP diameter of the canal measures approximately 5 mm  EMG BLE-  Lumbar paraspinal testing showed abnormalities with denervation in the L5 and S1 distributions      Plan  · MRI Lumbar spine reviewed with patient   · Dr Crispin Thomson met with patient  after assessment, imagining review, and collaborative discussion of symptoms decision made to proceed with surgery   · MRI lumbar spine         Chronic pain syndrome  Secondary to ---chronic old compression fracture of L1, degenerative disc disease and arthritis of the lower spine with various degrees of central and foraminal stenosis from L1-2 to L5-S1, most severe L3-4 L4-5  Spinal stenosis of lumbar region with neurogenic claudication  · As addressed in HPI  · As identified in MRI lumbar spine 3/23/22 refer to imagining section for report      Lumbar radiculopathy  -     Ambulatory Referral to Neurosurgery      Subjective: Referral form Pain management Dr Stevie Osullivan    Patient ID: Loi Persaud is a 80 y o  male PM/SH  CAD status post CABG, hypertension, history of PE and schizophrenia     required  # [phone]    HPI   He has a longstanding history of chronic pain affecting his  bilateral low back  Into bilateral hips  Into lower legs associated with numbness and paresthesias into lateral ankles  Reports both legs fell weak left greater than right  Pain is worse in legs when standing and walking  Onset 2 years ago , inciting event denies  Symptom severity -8-9/10    Characterization of symptoms-Numbness in legs and feet , cramping in bilateral legs , shooting, pins and needle sensation    Aggravating -sitting, standing and walking long periods     Relieving- laying down     Multimodal RX   · PT ---none is afraid of falling down secondary to leg weakness  · PM - DR Abigail Gallegos --bilateral L4 TFESI on April 19, 2022 , reports 90 % efficacy  · Medicinal Gabapentin -reports about 65 % efficacy         I have spent 60 minutes with the patient today in which greater than 50% of this time was spent in assessment, examination, impressions, reviewing imagining and recommendations for care  All questions were answered to his/her satisfaction, and contact information provided in the event additional questions arise  Patient acknowledged an understanding and agreement with plan              Review of Systems   Constitutional: Positive for activity change  HENT: Negative  Eyes: Negative  Respiratory: Negative  Cardiovascular: Negative  Gastrointestinal: Negative  Endocrine: Negative  Genitourinary: Negative  Negative for frequency and urgency  Musculoskeletal: Positive for back pain (Constant b/l lower back pain radiates into b/l hips and BLE), gait problem (off balance) and myalgias (b/l legs cramping)  Pain started 2 years ago suddenly  No previous back Sx  PT - no  PM/INJ - 6 injections in his back, last was 2 months ago -- slightly helpful    Falls frequently, last fall was a week ago     Skin: Negative  Allergic/Immunologic: Negative  Neurological: Positive for weakness (BLE, L>R) and numbness (and tingling in b/l feet)  Hematological: Negative  Psychiatric/Behavioral: Positive for sleep disturbance       Meds/Allergies     Current Outpatient Medications Medication Sig Dispense Refill    Acetaminophen Extra Strength 500 MG tablet       ARIPiprazole (ABILIFY) 5 mg tablet Take 5 mg by mouth daily      aspirin 81 MG tablet Take 81 mg by mouth daily   atorvastatin (LIPITOR) 40 mg tablet Take 40 mg by mouth daily      erythromycin (ILOTYCIN) ophthalmic ointment Place a 1/2 inch ribbon of ointment into the lower eyelid  3 5 g 0    gabapentin (NEURONTIN) 100 mg capsule Take 3 capsules (300 mg total) by mouth daily at bedtime 90 capsule 1    hydrOXYzine HCL (ATARAX) 25 mg tablet Take 1 tablet twice a day by oral route as needed for 30 days   lisinopril (ZESTRIL) 5 mg tablet       metoprolol tartrate (LOPRESSOR) 25 mg tablet Take 25 mg by mouth daily        naproxen (NAPROSYN) 250 mg tablet take 1 tablet by mouth twice a day if needed for 10 days      nicotine (NICODERM CQ) 21 mg/24 hr TD 24 hr patch apply 1 patch to CLEAN, DRY, AND INTACT SKIN once daily      nitroglycerin (NITRODUR) 0 4 mg/hr Place 1 patch on the skin 2 (two) times a day as needed      risperiDONE (RisperDAL) 0 5 mg tablet       triamcinolone (KENALOG) 0 1 % ointment APPLY A THIN LAYER TO THE AFFECTED AREA(S) BY TOPICAL ROUTE TWO TIMES PER DAY AS NEEDED       No current facility-administered medications for this visit  No Known Allergies    PAST HISTORY    Past Medical History:   Diagnosis Date    BPH (benign prostatic hyperplasia)     Coronary artery disease     HTN (hypertension)     Hx pulmonary embolism     Hyperlipidemia     Hypertension     Prediabetes     Tobacco use        Past Surgical History:   Procedure Laterality Date    CORONARY ARTERY BYPASS GRAFT      [place] COLONOSCOPY FLX DX W/COLLJ SPEC WHEN PFRMD N/A 4/6/2017    Procedure: COLONOSCOPY;  Surgeon: Homer Barahona MD;  Location: [facility];   Service: Gastroenterology       Social History     Tobacco Use    Smoking status: Current Every Day Smoker     Packs/day: 0 50     Years: 56 00     Pack years: 28 00 Types: Cigarettes    Smokeless tobacco: Never Used    Tobacco comment: started when Pt was 6years old  Pt smokes 1/2 to 1 pack a day when stressed   Vaping Use    Vaping Use: Never used   Substance Use Topics    Alcohol use: No    Drug use: No       Family History   Problem Relation Age of Onset    Diabetes Mother     Diabetes Father        The following portions of the patient's history were reviewed and updated as appropriate: allergies, current medications, past family history, past medical history, past social history, past surgical history and problem list       EXAM    Vitals:Blood pressure 112/72, pulse 60, temperature 98 5 °F (36 9 °C), temperature source Probe, resp  rate 16, height 5' 9" (1 753 m), weight 68 kg (150 lb)  ,Body mass index is 22 15 kg/m²  Physical Exam  Vitals and nursing note reviewed  Constitutional:       General: He is not in acute distress  Appearance: Normal appearance  He is normal weight  He is not ill-appearing, toxic-appearing or diaphoretic  Eyes:      General: No scleral icterus  Right eye: No discharge  Left eye: No discharge  Pulmonary:      Effort: Pulmonary effort is normal  No respiratory distress  Musculoskeletal:      Right lower leg: No edema  Left lower leg: No edema  Comments: Limitation in lumbar extension and flexion   Skin:     General: Skin is warm and dry  Neurological:      Mental Status: He is alert and oriented to person, place, and time  Motor: No weakness  Gait: Gait abnormal       Deep Tendon Reflexes: Reflexes abnormal       Reflex Scores:       Patellar reflexes are 1+ on the right side and 1+ on the left side  Achilles reflexes are 0 on the right side and 0 on the left side  Psychiatric:         Mood and Affect: Mood normal          Behavior: Behavior normal          Neurologic Exam     Mental Status   Oriented to person, place, and time     Level of consciousness: alert    Motor Exam Muscle bulk: normal    Strength   Right interossei: 5/5  Left interossei: 5/5  Right abdominals: 5/5  Left abdominals: 5/5  Right iliopsoas: 5/5  Left iliopsoas: 5/5  Right hamstrin/5  Left hamstrin/5  Right glutei: 5/5  Left glutei: 5/5  Right anterior tibial: 5/5  Left anterior tibial: 5/5  Right gastroc: 5/5  Left gastroc: 5/5    Sensory Exam   Right leg light touch: normal  Left leg light touch: normal    Gait, Coordination, and Reflexes     Gait  Gait: (antalgic)    Reflexes   Right patellar: 1+  Left patellar: 1+  Right achilles: 0  Left achilles: 0  Right ankle clonus: absent  Left pendular knee jerk: absent      Imaging Studies  10/14/2021 EMG Nerve conduction studies were performed in the bilateral lower extremities  The bilateral tibial and peroneal compound motor action potentials were abnormal  The bilateral peroneal compound motor action potential when examined at the ED B was absent   when examined at the tibialis anterior the responses were normal  The bilateral tibial compound motor action potentials were abnormal    The bilateral tibial H reflexes were prolonged   The tibial F reflex was normal    The bilateral sural and superficial peroneal sensory nerve action potentials were normal    Needle EMG study was performed on various muscle groups in the bilateral lower extremities utilizing a disposable monopolar needle  All of the muscles tested except the iliopsoas and the quadriceps muscles were abnormal  There is denervation changes in the form of fibrillation potentials positive sharp waves with polyphasics potentials  Lumbar paraspinal testing showed abnormalities with denervation in the L5 and S1 distributions         3/23/22 MRI LUMBAR SPINE WITHOUT CONTRAST     INDICATION: M54 [id number]: Radiculopathy, lumbar region      COMPARISON:  None      TECHNIQUE:  Sagittal T1, sagittal T2, sagittal inversion recovery, axial T1 and axial T2, coronal T2     IMAGE QUALITY: Diagnostic     FINDINGS:     VERTEBRAL BODIES:  There are 5 lumbar type vertebral bodies  Mild levoscoliosis at the lumbosacral junction  No spondylolisthesis or spondylolysis      There is a mild compression fracture of the L1 vertebral body involving the superior endplate  This is chronic with no associated marrow edema        Normal marrow signal within the lumbar spine      There is developmental spinal canal stenosis within the lumbar canal      SACRUM:  Normal signal within the sacrum  No evidence of insufficiency or stress fracture      DISTAL CORD AND CONUS:  Normal size and signal within the distal cord and conus      PARASPINAL SOFT TISSUES:  Paraspinal soft tissues are unremarkable      LOWER THORACIC DISC SPACES:  Normal disc height and signal   No disc herniation, canal stenosis or foraminal narrowing      LUMBAR DISC SPACES:     L1-L2:  Mild developmental canal stenosis  No disc herniation  No foraminal narrowing      L2-L3:  Mild annular bulging  Mild to moderate canal stenosis  Mild bilateral foraminal narrowing without clear nerve compression      L3-L4:  Moderate annular bulging  Mild facet degenerative change and ligamentum flavum thickening  Moderately severe canal stenosis, see series 7 image 18 where the AP diameter of the canal measures approximately 6 mm  Mild to moderate bilateral   foraminal narrowing      L4-L5:  Diffuse annular bulging  Facet degenerative change and ligamentum flavum thickening with severe central canal stenosis  AP diameter of the canal measures approximately 5 mm, see series 7 image 23  Mild foraminal narrowing      L5-S1:  Mild diffuse annular bulging and facet degenerative change  Far lateral endplate osteophyte formation noted on the right  Mild canal stenosis    Mild distal right foraminal narrowing      IMPRESSION:   There is mild diffuse developmental canal stenosis of the lumbar canal exacerbated by acquired degenerative disc disease and posterior element degenerative change, most prominent at the L3-4 and L4-5 levels see series 7 images 18 at L3-4 and 23 at L4-5  I have personally reviewed pertinent reports     and I have personally reviewed pertinent films in PACS

## 2022-09-12 NOTE — PROGRESS NOTES
Neurosurgery Office Note  Odilia Mccormick 80 y o  male MRN: [id number]      Assessment/Plan     Lumbar radiculopathy  The patient is a 80 y o  male with a history of CAD status post CABG, hypertension, history of PE and schizophrenia last seen on 3/29/22 who presents for a follow up office visit in regards to chronic low back pain that radiates into the bilateral lower extremities with associated numbness and paresthesias into the lateral ankles secondary to lumbar degenerative disc disease, lumbar spondylosis, lumbar stenosis, lumbar radiculopathy and chronic pain syndrome  The patient denies bowel or bladder incontinence or saddle anesthesia      Since last office visit, patient did have EMG completed which revealed a bilateral L5-S1 radiculopathy  MRI lumbar spine which revealed a chronic old compression fracture of L1, degenerative disc disease and arthritis of the lower spine with various degrees of central and foraminal stenosis from L1-2 to L5-S1, most severe L3-4 L4-5      Patient is status post bilateral L4 TFESI on April 19, 2022 which provided 90% improvement for about a month  The pain has since returned to baseline  Patient rates his pain a 9/10 on the numeric pain rating scale  He characterizes his pain as shooting and pins and needles    Chronic pain syndrome  Secondary to ---chronic old compression fracture of L1, degenerative disc disease and arthritis of the lower spine with various degrees of central and foraminal stenosis from L1-2 to L5-S1, most severe L3-4 L4-5          {Assess/PlanSmartLinks:96868}      I spent *** minutes with the patient today in which >50% of the time was spent counseling/coordination of care regarding diagnosis, imaging review, symptoms and treatment plan       CHIEF COMPLAINT    Chief Complaint   Patient presents with    Consult      CONSULT LBP MRI LSP 3/23/22 SL       HISTORY    History of Present Illness     80y o  year old male     HPI    See Discussion    REVIEW OF SYSTEMS    Review of Systems   Constitutional: Positive for activity change  HENT: Negative  Eyes: Negative  Respiratory: Negative  Cardiovascular: Negative  Gastrointestinal: Negative  Endocrine: Negative  Genitourinary: Negative  Negative for frequency and urgency  Musculoskeletal: Positive for back pain (Constant b/l lower back pain radiates into b/l hips and BLE), gait problem (off balance) and myalgias (b/l legs cramping)  Pain started 2 years ago suddenly  No previous back Sx  PT - no  PM/INJ - 6 injections in his back, last was 2 months ago -- slightly helpful    Falls frequently, last fall was a week ago     Skin: Negative  Allergic/Immunologic: Negative  Neurological: Positive for weakness (BLE, L>R) and numbness (and tingling in b/l feet)  Hematological: Negative  Psychiatric/Behavioral: Positive for sleep disturbance  Meds/Allergies     Current Outpatient Medications   Medication Sig Dispense Refill    Acetaminophen Extra Strength 500 MG tablet       ARIPiprazole (ABILIFY) 5 mg tablet Take 5 mg by mouth daily      aspirin 81 MG tablet Take 81 mg by mouth daily   atorvastatin (LIPITOR) 40 mg tablet Take 40 mg by mouth daily      erythromycin (ILOTYCIN) ophthalmic ointment Place a 1/2 inch ribbon of ointment into the lower eyelid  3 5 g 0    gabapentin (NEURONTIN) 100 mg capsule Take 3 capsules (300 mg total) by mouth daily at bedtime 90 capsule 1    hydrOXYzine HCL (ATARAX) 25 mg tablet Take 1 tablet twice a day by oral route as needed for 30 days        lisinopril (ZESTRIL) 5 mg tablet       metoprolol tartrate (LOPRESSOR) 25 mg tablet Take 25 mg by mouth daily        naproxen (NAPROSYN) 250 mg tablet take 1 tablet by mouth twice a day if needed for 10 days      nicotine (NICODERM CQ) 21 mg/24 hr TD 24 hr patch apply 1 patch to CLEAN, DRY, AND INTACT SKIN once daily      nitroglycerin (NITRODUR) 0 4 mg/hr Place 1 patch on the skin 2 (two) times a day as needed      risperiDONE (RisperDAL) 0 5 mg tablet       triamcinolone (KENALOG) 0 1 % ointment APPLY A THIN LAYER TO THE AFFECTED AREA(S) BY TOPICAL ROUTE TWO TIMES PER DAY AS NEEDED       No current facility-administered medications for this visit  No Known Allergies    PAST HISTORY    Past Medical History:   Diagnosis Date    BPH (benign prostatic hyperplasia)     Coronary artery disease     HTN (hypertension)     Hx pulmonary embolism     Hyperlipidemia     Hypertension     Prediabetes     Tobacco use        Past Surgical History:   Procedure Laterality Date    CORONARY ARTERY BYPASS GRAFT      [place] COLONOSCOPY FLX DX W/COLLJ SPEC WHEN PFRMD N/A 4/6/2017    Procedure: COLONOSCOPY;  Surgeon: Lo Diaz MD;  Location: [facility]; Service: Gastroenterology       Social History     Tobacco Use    Smoking status: Current Every Day Smoker     Packs/day: 0 50     Years: 56 00     Pack years: 28 00     Types: Cigarettes    Smokeless tobacco: Never Used    Tobacco comment: started when Pt was 6years old  Pt smokes 1/2 to 1 pack a day when stressed   Vaping Use    Vaping Use: Never used   Substance Use Topics    Alcohol use: No    Drug use: No       Family History   Problem Relation Age of Onset    Diabetes Mother     Diabetes Father          Above history personally reviewed  EXAM    Vitals:Blood pressure 112/72, pulse 60, temperature 98 5 °F (36 9 °C), temperature source Probe, resp  rate 16, height 5' 9" (1 753 m), weight 68 kg (150 lb)  ,Body mass index is 22 15 kg/m²  Physical Exam    Neurologic Exam      MEDICAL DECISION MAKING    Imaging Studies:     No results found      {Results Review Statement:75205}

## 2022-09-12 NOTE — PROGRESS NOTES
Neurosurgery Office Note  Tivis Class 80 y o  male MRN: [id number]      Assessment/Plan     Lumbar radiculopathy  The patient is a 80 y o  male with a history of CAD status post CABG, hypertension, history of PE and schizophrenia last seen on 3/29/22 who presents for a follow up office visit in regards to chronic low back pain that radiates into the bilateral lower extremities with associated numbness and paresthesias into the lateral ankles secondary to lumbar degenerative disc disease, lumbar spondylosis, lumbar stenosis, lumbar radiculopathy and chronic pain syndrome  The patient denies bowel or bladder incontinence or saddle anesthesia      Since last office visit, patient did have EMG completed which revealed a bilateral L5-S1 radiculopathy  MRI lumbar spine which revealed a chronic old compression fracture of L1, degenerative disc disease and arthritis of the lower spine with various degrees of central and foraminal stenosis from L1-2 to L5-S1, most severe L3-4 L4-5      Patient is status post bilateral L4 TFESI on April 19, 2022 which provided 90% improvement for about a month  The pain has since returned to baseline  Patient rates his pain a 9/10 on the numeric pain rating scale  He characterizes his pain as shooting and pins and needles    Chronic pain syndrome  Secondary to ---chronic old compression fracture of L1, degenerative disc disease and arthritis of the lower spine with various degrees of central and foraminal stenosis from L1-2 to L5-S1, most severe L3-4 L4-5          {Assess/PlanSmartLinks:76671}      I spent *** minutes with the patient today in which >50% of the time was spent counseling/coordination of care regarding diagnosis, imaging review, symptoms and treatment plan       CHIEF COMPLAINT    Chief Complaint   Patient presents with    Consult      CONSULT LBP MRI LSP 3/23/22        HISTORY    History of Present Illness     80y o  year old male     HPI                  REVIEW OF SYSTEMS    Review of Systems   Constitutional: Positive for activity change  HENT: Negative  Eyes: Negative  Respiratory: Negative  Cardiovascular: Negative  Gastrointestinal: Negative  Endocrine: Negative  Genitourinary: Negative  Negative for frequency and urgency  Musculoskeletal: Positive for back pain (Constant b/l lower back pain radiates into b/l hips and BLE), gait problem (off balance) and myalgias (b/l legs cramping)  Pain started 2 years ago suddenly  No previous back Sx  PT - no  PM/INJ - 6 injections in his back, last was 2 months ago -- slightly helpful    Falls frequently, last fall was a week ago     Skin: Negative  Allergic/Immunologic: Negative  Neurological: Positive for weakness (BLE, L>R) and numbness (and tingling in b/l feet)  Hematological: Negative  Psychiatric/Behavioral: Positive for sleep disturbance  Meds/Allergies     Current Outpatient Medications   Medication Sig Dispense Refill    Acetaminophen Extra Strength 500 MG tablet       ARIPiprazole (ABILIFY) 5 mg tablet Take 5 mg by mouth daily      aspirin 81 MG tablet Take 81 mg by mouth daily   atorvastatin (LIPITOR) 40 mg tablet Take 40 mg by mouth daily      erythromycin (ILOTYCIN) ophthalmic ointment Place a 1/2 inch ribbon of ointment into the lower eyelid  3 5 g 0    gabapentin (NEURONTIN) 100 mg capsule Take 3 capsules (300 mg total) by mouth daily at bedtime 90 capsule 1    hydrOXYzine HCL (ATARAX) 25 mg tablet Take 1 tablet twice a day by oral route as needed for 30 days        lisinopril (ZESTRIL) 5 mg tablet       metoprolol tartrate (LOPRESSOR) 25 mg tablet Take 25 mg by mouth daily        naproxen (NAPROSYN) 250 mg tablet take 1 tablet by mouth twice a day if needed for 10 days      nicotine (NICODERM CQ) 21 mg/24 hr TD 24 hr patch apply 1 patch to CLEAN, DRY, AND INTACT SKIN once daily      nitroglycerin (NITRODUR) 0 4 mg/hr Place 1 patch on the skin 2 (two) times a day as needed      risperiDONE (RisperDAL) 0 5 mg tablet       triamcinolone (KENALOG) 0 1 % ointment APPLY A THIN LAYER TO THE AFFECTED AREA(S) BY TOPICAL ROUTE TWO TIMES PER DAY AS NEEDED       No current facility-administered medications for this visit  No Known Allergies    PAST HISTORY    Past Medical History:   Diagnosis Date    BPH (benign prostatic hyperplasia)     Coronary artery disease     HTN (hypertension)     Hx pulmonary embolism     Hyperlipidemia     Hypertension     Prediabetes     Tobacco use        Past Surgical History:   Procedure Laterality Date    CORONARY ARTERY BYPASS GRAFT      [place] COLONOSCOPY FLX DX W/COLLJ SPEC WHEN PFRMD N/A 4/6/2017    Procedure: COLONOSCOPY;  Surgeon: Elham Dennis MD;  Location: [facility]; Service: Gastroenterology       Social History     Tobacco Use    Smoking status: Current Every Day Smoker     Packs/day: 0 50     Years: 56 00     Pack years: 28 00     Types: Cigarettes    Smokeless tobacco: Never Used    Tobacco comment: started when Pt was 6years old  Pt smokes 1/2 to 1 pack a day when stressed   Vaping Use    Vaping Use: Never used   Substance Use Topics    Alcohol use: No    Drug use: No       Family History   Problem Relation Age of Onset    Diabetes Mother     Diabetes Father          Above history personally reviewed  EXAM    Vitals: There were no vitals taken for this visit  ,There is no height or weight on file to calculate BMI  Physical Exam    Neurologic Exam      MEDICAL DECISION MAKING    Imaging Studies:     No results found      {Results Review Statement:11323}

## 2022-09-12 NOTE — PROGRESS NOTES
I saw the patient along with Caroline Frias  This is a 28-year-old Slovak-speaking male presenting with chronic bilateral lower extremity claudicating symptoms  The patient states his symptoms have been ongoing for over 2 years  He states he gets diffuse claudicating pain in his legs, numbness and tingling in his feet and weakness when he tries to walk  He has been getting worse over time  He denies any low back pain  He has had extensive conservative management without any benefit  He is here to discuss surgical intervention  He is neurologically intact on exam     MRI of his lumbar spine demonstrates congenitally shallow canal with diffuse degeneration from L2 to L5  At L2-3, the patient has small disc bulge with ligamentous and facet arthropathy causing mild central canal stenosis  At L3-4 and L4-5, the patient has combination of disc herniation with ligamentous hypertrophy and facet arthropathy causing severe central canal stenosis  I had a long discussion with the patient about his symptoms as well as imaging findings  The patient has severe neurogenic claudication and I believe it is stemming from the severe stenosis he has at L3-4 and L4-5  We discussed treatment options and since he has failed conservative management, surgical intervention in the form of L3-5 bilateral laminectomies and foraminotomies were discussed with the patient  He is on board with surgery  I sat down with the patient and had an extensive discussion about the procedure including the details of the procedure as well as it's risks and benefits  Risks of the procedure include but are not limited to bleeding, infection, nerve injury which can lead to extremity weakness, numbness, tingling, paralysis, and/or bowel/bladder dysfunction  There is also risk of CSF leak which may require additional procedures to repair   There is risk of injury to the great retroperitoneal vessels which may require vascular surgery intervention  Major procedure related risks include adjacent segment disease, hardware failure, and the need for further future surgeries  There is also anesthesia related risks which include blood clots in the legs/lungs, heart attack, stroke, coma and death  The patient provided verbal consent to the surgical procedure and signed the consent form  Expected postoperative course, including activity restrictions, expected pain and postoperative medication were reviewed  In the meantime, the patient will obtain medical clearance from their primary care physician  The patient is to call the office with any questions

## 2022-09-13 NOTE — ASSESSMENT & PLAN NOTE
· As addressed in HPI  · As identified in MRI lumbar spine 3/23/22 refer to imagining section for report

## 2023-02-10 ENCOUNTER — HOSPITAL ENCOUNTER (OUTPATIENT)
Dept: RADIOLOGY | Facility: HOSPITAL | Age: 73
Discharge: HOME | End: 2023-02-10

## 2023-02-10 DIAGNOSIS — F17.290 NICOTINE DEPENDENCE, OTHER TOBACCO PRODUCT, UNCOMPLICATED: ICD-10-CM

## 2023-03-14 ENCOUNTER — TELEPHONE (OUTPATIENT)
Dept: NEUROSURGERY | Facility: CLINIC | Age: 73
End: 2023-03-14

## 2023-03-14 NOTE — TELEPHONE ENCOUNTER
Spoke with patient in regards to sheduling an appointment with Dr Minh Kelly  Patient was scheduled for surgery and canceled due to needing to find a place to live  While talking to patient he stated he would like surgery but is now living in a second floor apartment [place] and has no one to help with post-operative care  With this said he agreed to be booked with a AP to order further testing

## 2023-05-11 ENCOUNTER — LAB (OUTPATIENT)
Dept: LAB | Facility: CLINIC | Age: 73
End: 2023-05-11

## 2023-05-11 DIAGNOSIS — M54.16 LUMBAR RADICULOPATHY: ICD-10-CM
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
## 2023-05-11 DIAGNOSIS — M48.062 LUMBAR STENOSIS WITH NEUROGENIC CLAUDICATION: ICD-10-CM

## 2023-05-11 DIAGNOSIS — M48.062 SPINAL STENOSIS OF LUMBAR REGION WITH NEUROGENIC CLAUDICATION: ICD-10-CM

## 2023-05-11 LAB
ALBUMIN SERPL BCP-MCNC: 3.7 G/DL (ref 3.5–5)
ALP SERPL-CCNC: 90 U/L (ref 46–116)
ALT SERPL W P-5'-P-CCNC: 15 U/L (ref 12–78)
ANION GAP SERPL CALCULATED.3IONS-SCNC: 2 MMOL/L (ref 4–13)
APTT PPP: 28 SECONDS (ref 23–37)
AST SERPL W P-5'-P-CCNC: 22 U/L (ref 5–45)
BACTERIA UR QL AUTO: ABNORMAL /HPF
BASOPHILS # BLD AUTO: 0.02 THOUSANDS/ÂΜL (ref 0–0.1)
BASOPHILS NFR BLD AUTO: 0 % (ref 0–1)
BILIRUB SERPL-MCNC: 0.29 MG/DL (ref 0.2–1)
BILIRUB UR QL STRIP: NEGATIVE
BUN SERPL-MCNC: 14 MG/DL (ref 5–25)
CALCIUM SERPL-MCNC: 9.2 MG/DL (ref 8.3–10.1)
CHLORIDE SERPL-SCNC: 110 MMOL/L (ref 96–108)
CLARITY UR: CLEAR
CO2 SERPL-SCNC: 25 MMOL/L (ref 21–32)
COLOR UR: YELLOW
CREAT SERPL-MCNC: 1.07 MG/DL (ref 0.6–1.3)
EOSINOPHIL # BLD AUTO: 0.09 THOUSAND/ÂΜL (ref 0–0.61)
EOSINOPHIL NFR BLD AUTO: 1 % (ref 0–6)
ERYTHROCYTE [DISTWIDTH] IN BLOOD BY AUTOMATED COUNT: 13.7 % (ref 11.6–15.1)
EST. AVERAGE GLUCOSE BLD GHB EST-MCNC: 120 MG/DL
GFR SERPL CREATININE-BSD FRML MDRD: 69 ML/MIN/1.73SQ M
GLUCOSE P FAST SERPL-MCNC: 82 MG/DL (ref 65–99)
GLUCOSE UR STRIP-MCNC: NEGATIVE MG/DL
HBA1C MFR BLD: 5.8 %
HCT VFR BLD AUTO: 43.1 % (ref 36.5–49.3)
HGB BLD-MCNC: 14.3 G/DL (ref 12–17)
HGB UR QL STRIP.AUTO: ABNORMAL
HYALINE CASTS #/AREA URNS LPF: ABNORMAL /LPF
IMM GRANULOCYTES # BLD AUTO: 0.01 THOUSAND/UL (ref 0–0.2)
IMM GRANULOCYTES NFR BLD AUTO: 0 % (ref 0–2)
INR PPP: 0.93 (ref 0.84–1.19)
KETONES UR STRIP-MCNC: NEGATIVE MG/DL
LEUKOCYTE ESTERASE UR QL STRIP: NEGATIVE
LYMPHOCYTES # BLD AUTO: 2.21 THOUSANDS/ÂΜL (ref 0.6–4.47)
LYMPHOCYTES NFR BLD AUTO: 31 % (ref 14–44)
MCH RBC QN AUTO: 31.3 PG (ref 26.8–34.3)
MCHC RBC AUTO-ENTMCNC: 33.2 G/DL (ref 31.4–37.4)
MCV RBC AUTO: 94 FL (ref 82–98)
MONOCYTES # BLD AUTO: 0.45 THOUSAND/ÂΜL (ref 0.17–1.22)
MONOCYTES NFR BLD AUTO: 6 % (ref 4–12)
NEUTROPHILS # BLD AUTO: 4.39 THOUSANDS/ÂΜL (ref 1.85–7.62)
NEUTS SEG NFR BLD AUTO: 62 % (ref 43–75)
NITRITE UR QL STRIP: NEGATIVE
NON-SQ EPI CELLS URNS QL MICRO: ABNORMAL /HPF
NRBC BLD AUTO-RTO: 0 /100 WBCS
PH UR STRIP.AUTO: 6 [PH]
PLATELET # BLD AUTO: 323 THOUSANDS/UL (ref 149–390)
PMV BLD AUTO: 10.1 FL (ref 8.9–12.7)
POTASSIUM SERPL-SCNC: 4.3 MMOL/L (ref 3.5–5.3)
PROT SERPL-MCNC: 7.4 G/DL (ref 6.4–8.4)
PROT UR STRIP-MCNC: ABNORMAL MG/DL
PROTHROMBIN TIME: 12.6 SECONDS (ref 11.6–14.5)
RBC # BLD AUTO: 4.57 MILLION/UL (ref 3.88–5.62)
RBC #/AREA URNS AUTO: ABNORMAL /HPF
SODIUM SERPL-SCNC: 137 MMOL/L (ref 135–147)
SP GR UR STRIP.AUTO: 1.02 (ref 1–1.03)
UROBILINOGEN UR STRIP-ACNC: <2 MG/DL
WBC # BLD AUTO: 7.17 THOUSAND/UL (ref 4.31–10.16)
WBC #/AREA URNS AUTO: ABNORMAL /HPF

## 2023-05-15 ENCOUNTER — APPOINTMENT (EMERGENCY)
Dept: RADIOLOGY | Facility: HOSPITAL | Age: 73
End: 2023-05-15

## 2023-05-15 ENCOUNTER — HOSPITAL ENCOUNTER (EMERGENCY)
Facility: HOSPITAL | Age: 73
Discharge: HOME | End: 2023-05-15
Attending: EMERGENCY MEDICINE

## 2023-05-15 VITALS
TEMPERATURE: 97.6 F | RESPIRATION RATE: 17 BRPM | OXYGEN SATURATION: 99 % | DIASTOLIC BLOOD PRESSURE: 68 MMHG | WEIGHT: 154 LBS | BODY MASS INDEX: 22.74 KG/M2 | SYSTOLIC BLOOD PRESSURE: 116 MMHG | HEART RATE: 68 BPM

## 2023-05-15 DIAGNOSIS — R07.9 CHEST PAIN: Primary | ICD-10-CM

## 2023-05-15 DIAGNOSIS — I25.10 CAD (CORONARY ARTERY DISEASE): ICD-10-CM

## 2023-05-15 DIAGNOSIS — E78.5 HLD (HYPERLIPIDEMIA): ICD-10-CM

## 2023-05-15 DIAGNOSIS — I10 HTN (HYPERTENSION): ICD-10-CM

## 2023-05-15 LAB
2HR DELTA HS TROPONIN: 1 NG/L
ALBUMIN SERPL BCP-MCNC: 3.6 G/DL (ref 3.5–5)
ALP SERPL-CCNC: 97 U/L (ref 46–116)
ALT SERPL W P-5'-P-CCNC: 14 U/L (ref 12–78)
ANION GAP SERPL CALCULATED.3IONS-SCNC: -1 MMOL/L (ref 4–13)
AST SERPL W P-5'-P-CCNC: 23 U/L (ref 5–45)
ATRIAL RATE: 87 BPM
ATRIAL RATE: 89 BPM
BASOPHILS # BLD AUTO: 0.03 THOUSANDS/ÂΜL (ref 0–0.1)
BASOPHILS NFR BLD AUTO: 0 % (ref 0–1)
BILIRUB SERPL-MCNC: 0.31 MG/DL (ref 0.2–1)
BUN SERPL-MCNC: 15 MG/DL (ref 5–25)
CALCIUM SERPL-MCNC: 9.3 MG/DL (ref 8.3–10.1)
CARDIAC TROPONIN I PNL SERPL HS: 4 NG/L
CARDIAC TROPONIN I PNL SERPL HS: 5 NG/L
CHLORIDE SERPL-SCNC: 113 MMOL/L (ref 96–108)
CO2 SERPL-SCNC: 25 MMOL/L (ref 21–32)
CREAT SERPL-MCNC: 1.12 MG/DL (ref 0.6–1.3)
EOSINOPHIL # BLD AUTO: 0.15 THOUSAND/ÂΜL (ref 0–0.61)
EOSINOPHIL NFR BLD AUTO: 2 % (ref 0–6)
ERYTHROCYTE [DISTWIDTH] IN BLOOD BY AUTOMATED COUNT: 13.9 % (ref 11.6–15.1)
GFR SERPL CREATININE-BSD FRML MDRD: 65 ML/MIN/1.73SQ M
GLUCOSE SERPL-MCNC: 103 MG/DL (ref 65–140)
HCT VFR BLD AUTO: 42.2 % (ref 36.5–49.3)
HGB BLD-MCNC: 14.5 G/DL (ref 12–17)
IMM GRANULOCYTES # BLD AUTO: 0.02 THOUSAND/UL (ref 0–0.2)
IMM GRANULOCYTES NFR BLD AUTO: 0 % (ref 0–2)
LYMPHOCYTES # BLD AUTO: 2.72 THOUSANDS/ÂΜL (ref 0.6–4.47)
LYMPHOCYTES NFR BLD AUTO: 37 % (ref 14–44)
MCH RBC QN AUTO: 31.9 PG (ref 26.8–34.3)
MCHC RBC AUTO-ENTMCNC: 34.4 G/DL (ref 31.4–37.4)
MCV RBC AUTO: 93 FL (ref 82–98)
MONOCYTES # BLD AUTO: 0.56 THOUSAND/ÂΜL (ref 0.17–1.22)
MONOCYTES NFR BLD AUTO: 8 % (ref 4–12)
NEUTROPHILS # BLD AUTO: 3.93 THOUSANDS/ÂΜL (ref 1.85–7.62)
NEUTS SEG NFR BLD AUTO: 53 % (ref 43–75)
NRBC BLD AUTO-RTO: 0 /100 WBCS
P AXIS: 55 DEGREES
P AXIS: 75 DEGREES
PLATELET # BLD AUTO: 304 THOUSANDS/UL (ref 149–390)
PMV BLD AUTO: 9.6 FL (ref 8.9–12.7)
POTASSIUM SERPL-SCNC: 4.3 MMOL/L (ref 3.5–5.3)
PR INTERVAL: 116 MS
PR INTERVAL: 118 MS
PROT SERPL-MCNC: 7.3 G/DL (ref 6.4–8.4)
QRS AXIS: 40 DEGREES
QRS AXIS: 42 DEGREES
QRSD INTERVAL: 80 MS
QRSD INTERVAL: 86 MS
QT INTERVAL: 334 MS
QT INTERVAL: 336 MS
QTC INTERVAL: 401 MS
QTC INTERVAL: 408 MS
RBC # BLD AUTO: 4.55 MILLION/UL (ref 3.88–5.62)
SODIUM SERPL-SCNC: 137 MMOL/L (ref 135–147)
T WAVE AXIS: 78 DEGREES
T WAVE AXIS: 87 DEGREES
VENTRICULAR RATE: 87 BPM
VENTRICULAR RATE: 89 BPM
WBC # BLD AUTO: 7.41 THOUSAND/UL (ref 4.31–10.16)

## 2023-05-15 RX ORDER — ASPIRIN 81 MG/1
81 TABLET, CHEWABLE ORAL ONCE
Status: COMPLETED | OUTPATIENT
Start: 2023-05-15 | End: 2023-05-15

## 2023-05-15 RX ADMIN — ASPIRIN 81 MG 81 MG: 81 TABLET ORAL at 12:39

## 2023-05-15 NOTE — ED ATTENDING ATTESTATION
5/15/2023  IRanjeet MD, saw and evaluated the patient  I have discussed the patient with the resident/non-physician practitioner and agree with the resident's/non-physician practitioner's findings, Plan of Care, and MDM as documented in the resident's/non-physician practitioner's note, except where noted  All available labs and Radiology studies were reviewed  I was present for key portions of any procedure(s) performed by the resident/non-physician practitioner and I was immediately available to provide assistance  At this point I agree with the current assessment done in the Emergency Department  I have conducted an independent evaluation of this patient a history and physical is as follows:    ED Course         Critical Care Time  Procedures    70 yo male with hx of cad, cabg x2, got in argument with family member here in ed and then developed chest pain on left side with numbness in arm and palpitaitons  Pt with similar symptoms with mi previously  Vss, afebrile, lungs cta, rrr, abdomen soft nontender, no pedal edema  No reproducible chest tenderness  Cardiac workup, delta troponin, asa

## 2023-05-15 NOTE — DISCHARGE INSTRUCTIONS
Please follow-up with your primary care doctor as soon as possible  Please return the emergency department for any new or concerning symptoms including worsening chest pain, heart palpitations, or shortness of breath

## 2023-05-19 ENCOUNTER — OFFICE VISIT (OUTPATIENT)
Dept: NEUROSURGERY | Facility: CLINIC | Age: 73
End: 2023-05-19

## 2023-05-19 VITALS
WEIGHT: 153 LBS | HEIGHT: 69 IN | HEART RATE: 85 BPM | SYSTOLIC BLOOD PRESSURE: 130 MMHG | OXYGEN SATURATION: 98 % | TEMPERATURE: 98.6 F | DIASTOLIC BLOOD PRESSURE: 86 MMHG | BODY MASS INDEX: 22.66 KG/M2

## 2023-05-19 DIAGNOSIS — M48.062 SPINAL STENOSIS, LUMBAR REGION WITH NEUROGENIC CLAUDICATION: Primary | ICD-10-CM

## 2023-05-19 RX ORDER — MULTIVIT WITH MINERALS/LUTEIN
TABLET ORAL
COMMUNITY
Start: 2023-02-15

## 2023-05-19 RX ORDER — SERTRALINE HYDROCHLORIDE 100 MG/1
TABLET, FILM COATED ORAL
COMMUNITY
Start: 2023-03-08

## 2023-05-19 RX ORDER — ACETAMINOPHEN 650 MG/1
TABLET, FILM COATED, EXTENDED RELEASE ORAL
COMMUNITY
Start: 2023-03-15

## 2023-05-19 RX ORDER — FINASTERIDE 5 MG/1
5 TABLET, FILM COATED ORAL DAILY
COMMUNITY
Start: 2023-05-07

## 2023-05-19 RX ORDER — ASPIRIN 81 MG/1
TABLET, COATED ORAL
COMMUNITY
Start: 2023-03-07

## 2023-05-19 NOTE — ASSESSMENT & PLAN NOTE
Returns for follow up of neurogenic claudication  · Last seen 9/2022 by Dr Stewart Garcia and offered L3-5 bilateral laminectomies and foraminotomies  Surgery cancelled d/t living situation  Now ready to proceed  · Ongoing bilateral lower extremity heaviness and numbness, worse with inclines  · Exam: No midline spinal TTP, BLE 5/5 throughout, LT intact, 1+ DTRs, no clonus    Plan:  · Reviewed current symptoms with patient via [employer]   He continues to describe symptoms of neurogenic claudication, and is now ready to rediscuss surgical options  · Continue home PT exercises  · Since he did have some relief with injections, would also follow-up with pain management for possible injections  · Recommend updated MRI lumbar spine  · Reviewed red flag s/s  · Follow up once MRI completed to see Dr Stewart Garcia  Call sooner with any questions or concerns

## 2023-05-19 NOTE — PROGRESS NOTES
Neurosurgery Office Note  Benita Valdez 71 y o  male MRN: [id number]      Assessment/Plan     Lumbar spondylosis  Returns for follow up of neurogenic claudication  · Last seen 9/2022 by Dr Radha Gorman and offered L3-5 bilateral laminectomies and foraminotomies  Surgery cancelled d/t living situation  Now ready to proceed  · Ongoing bilateral lower extremity heaviness and numbness, worse with inclines  · Exam: No midline spinal TTP, BLE 5/5 throughout, LT intact, 1+ DTRs, no clonus    Plan:  · Reviewed current symptoms with patient via [employer] [place]   He continues to describe symptoms of neurogenic claudication, and is now ready to rediscuss surgical options  · Continue home PT exercises  · Since he did have some relief with injections, would also follow-up with pain management for possible injections  · Recommend updated MRI lumbar spine  · Reviewed red flag s/s  · Follow up once MRI completed to see Dr Radha Gorman  Call sooner with any questions or concerns  Diagnoses and all orders for this visit:    Spinal stenosis, lumbar region with neurogenic claudication  -     Ambulatory Referral to Neurosurgery  -     MRI lumbar spine wo contrast; Future    Other orders  -     Arthritis Pain Relief 650 MG CR tablet; take 2 tablets by mouth twice a day if needed  -     Aspirin Low Dose 81 MG EC tablet  -     finasteride (PROSCAR) 5 mg tablet; Take 5 mg by mouth daily  -     RA Melatonin 3 MG; take 1 tablet by mouth EVERY DAY AT BEDTIME FOR 90 DAYS  -     sertraline (ZOLOFT) 100 mg tablet; take 1 tablet by mouth EVERY DAY IN THE EVENING FOR 90 DAYS          I have spent a total time of 40 minutes on 05/19/23 in caring for this patient including Instructions for management, Patient and family education, Impressions, Counseling / Coordination of care, Documenting in the medical record, Reviewing / ordering tests, medicine, procedures   and Obtaining or reviewing history          [address] COMPLAINT    Chief Complaint Patient presents with   • Follow-up       HISTORY    History of Present Illness     71y o  year old male     67-year-old gentleman seen for evaluation of neurogenic claudication  Last seen in September 2022 by Dr Juvenal Leiva for the same and offered L3-5 bilateral laminectomies and foraminotomies  Surgery was canceled due to patient's living situation  He now returns ready to discuss surgical intervention  He continues to describe bilateral lower extremity numbness and heaviness, worse going up hills  He is able to walk farther on level ground  Both legs get numb and heavy  Worse going up hills, can walk farther on level ground  Has trouble getting up the steps to a second floor apartment  Denies radiating pain to his legs  Feels both legs are weak  Continues to have falls  He admits to some low back pain, 4/10  Denies bowel or bladder incontinence  He has not done formal PT recently, but has been doing home exercises  Since last seen he has not followed up with pain management or any additional injections  Past medical history significant for CAD s/p CABG, DM2 last A1c 5 8, tobacco use, HTN      See Discussion    REVIEW OF SYSTEMS    Review of Systems   Constitutional: Negative  HENT: Negative  Eyes: Negative  Respiratory: Negative  Cardiovascular: Negative  Gastrointestinal: Negative  Endocrine: Negative  Genitourinary: Negative  Musculoskeletal: Positive for back pain (low back pain- 4 out of 10 pain scale) and gait problem (losing balance- fell 13 times recently)  Allergic/Immunologic: Negative  Neurological: Positive for weakness (b/l legs ) and numbness (and heaviness b/l legs)  Hematological: Bruises/bleeds easily (medication)  Psychiatric/Behavioral: Negative for sleep disturbance  ROS obtained by MA  Reviewed  See HPI       Meds/Allergies     Current Outpatient Medications   Medication Sig Dispense Refill   • Acetaminophen Extra Strength 500 MG tablet      • ARIPiprazole (ABILIFY) 5 mg tablet Take 5 mg by mouth daily     • Arthritis Pain Relief 650 MG CR tablet take 2 tablets by mouth twice a day if needed     • aspirin 81 MG tablet Take 81 mg by mouth daily  • Aspirin Low Dose 81 MG EC tablet      • atorvastatin (LIPITOR) 40 mg tablet Take 40 mg by mouth daily     • erythromycin (ILOTYCIN) ophthalmic ointment Place a 1/2 inch ribbon of ointment into the lower eyelid  3 5 g 0   • finasteride (PROSCAR) 5 mg tablet Take 5 mg by mouth daily     • gabapentin (NEURONTIN) 100 mg capsule Take 3 capsules (300 mg total) by mouth daily at bedtime 90 capsule 1   • hydrOXYzine HCL (ATARAX) 25 mg tablet Take 1 tablet twice a day by oral route as needed for 30 days  • lisinopril (ZESTRIL) 5 mg tablet      • metoprolol tartrate (LOPRESSOR) 25 mg tablet Take 25 mg by mouth daily       • naproxen (NAPROSYN) 250 mg tablet take 1 tablet by mouth twice a day if needed for 10 days     • nicotine (NICODERM CQ) 21 mg/24 hr TD 24 hr patch apply 1 patch to CLEAN, DRY, AND INTACT SKIN once daily     • nitroglycerin (NITRODUR) 0 4 mg/hr Place 1 patch on the skin 2 (two) times a day as needed     • RA Melatonin 3 MG take 1 tablet by mouth EVERY DAY AT BEDTIME FOR 90 DAYS     • risperiDONE (RisperDAL) 0 5 mg tablet      • sertraline (ZOLOFT) 100 mg tablet take 1 tablet by mouth EVERY DAY IN THE EVENING FOR 90 DAYS     • triamcinolone (KENALOG) 0 1 % ointment APPLY A THIN LAYER TO THE AFFECTED AREA(S) BY TOPICAL ROUTE TWO TIMES PER DAY AS NEEDED       No current facility-administered medications for this visit         No Known Allergies    PAST HISTORY    Past Medical History:   Diagnosis Date   • BPH (benign prostatic hyperplasia)    • Coronary artery disease    • HTN (hypertension)    • Hx pulmonary embolism    • Hyperlipidemia    • Hypertension    • Prediabetes    • Tobacco use        Past Surgical History:   Procedure Laterality Date   • CORONARY ARTERY BYPASS GRAFT     • NC COLONOSCOPY "FLX DX W/COLLJ SPEC WHEN PFRMD N/A 4/6/2017    Procedure: COLONOSCOPY;  Surgeon: Luz Marina Robin MD;  Location: [facility]; Service: Gastroenterology       Social History     Tobacco Use   • Smoking status: Every Day     Packs/day: 0 50     Years: 56 00     Pack years: 28 00     Types: Cigarettes   • Smokeless tobacco: Never   • Tobacco comments:     started when Pt was 6years old  Pt smokes 1/2 to 1 pack a day when stressed   Vaping Use   • Vaping Use: Never used   Substance Use Topics   • Alcohol use: No   • Drug use: No       Family History   Problem Relation Age of Onset   • Diabetes Mother    • Diabetes Father          Above history personally reviewed  EXAM    Vitals:Blood pressure 130/86, pulse 85, temperature 98 6 °F (37 °C), temperature source Temporal, height 5' 9\" (1 753 m), weight 69 4 kg (153 lb), SpO2 98 %  ,Body mass index is 22 59 kg/m²  Physical Exam  Vitals reviewed  Constitutional:       General: He is awake  Appearance: Normal appearance  HENT:      Head: Normocephalic and atraumatic  Eyes:      Conjunctiva/sclera: Conjunctivae normal    Cardiovascular:      Rate and Rhythm: Normal rate  Pulmonary:      Effort: Pulmonary effort is normal    Musculoskeletal:      Comments: No midline spinal TTP   Skin:     General: Skin is warm and dry  Neurological:      Mental Status: He is alert and oriented to person, place, and time  Coordination: Heel to Murrel Shook Test normal       Deep Tendon Reflexes:      Reflex Scores:       Tricep reflexes are 1+ on the right side and 1+ on the left side  Bicep reflexes are 1+ on the right side and 1+ on the left side  Patellar reflexes are 1+ on the right side and 1+ on the left side  Achilles reflexes are 1+ on the right side and 1+ on the left side    Psychiatric:         Attention and Perception: Attention and perception normal          Mood and Affect: Mood and affect normal          Speech: Speech normal          Behavior: " Behavior normal  Behavior is cooperative  Thought Content: Thought content normal          Cognition and Memory: Cognition and memory normal          Judgment: Judgment normal          Neurologic Exam     Mental Status   Oriented to person, place, and time  Follows 2 step commands  Attention: normal  Concentration: normal    Speech: speech is normal   Level of consciousness: alert  Knowledge: good  Normal comprehension  Motor Exam   Muscle bulk: normal  Overall muscle tone: normal  BUE - 5/5  BLE - 5/5     Sensory Exam   Right arm light touch: normal  Left arm light touch: normal  Right leg light touch: normal  Left leg light touch: normal    Gait, Coordination, and Reflexes     Coordination   Heel to shin coordination: normal    Tremor   Resting tremor: absent  Intention tremor: absent  Action tremor: absent    Reflexes   Right biceps: 1+  Left biceps: 1+  Right triceps: 1+  Left triceps: 1+  Right patellar: 1+  Left patellar: 1+  Right achilles: 1+  Left achilles: 1+  Right Madison: absent  Left Madison: absent  Right ankle clonus: absent  Left ankle clonus: absent  Antalgic gait         MEDICAL DECISION MAKING    Imaging Studies:     XR chest 1 view portable    Result Date: 5/15/2023  Narrative: CHEST INDICATION:   CP  COMPARISON: CXR 6/30/2021 and chest CT 2/10/2023  EXAM PERFORMED/VIEWS:  XR CHEST PORTABLE  FINDINGS: Cardiomediastinal silhouette normal  CABG  coronary stents  Lungs clear  No effusion or pneumothorax  Upper abdomen normal  Bones normal for age  Impression: No acute cardiopulmonary disease  Workstation performed: [id number]       I have personally reviewed pertinent reports     and I have personally reviewed pertinent films in PACS

## 2023-06-02 ENCOUNTER — HOSPITAL ENCOUNTER (OUTPATIENT)
Dept: RADIOLOGY | Facility: HOSPITAL | Age: 73
Discharge: HOME | End: 2023-06-02

## 2023-06-02 DIAGNOSIS — M48.062 SPINAL STENOSIS, LUMBAR REGION WITH NEUROGENIC CLAUDICATION: ICD-10-CM

## 2023-06-19 ENCOUNTER — APPOINTMENT (EMERGENCY)
Dept: RADIOLOGY | Facility: HOSPITAL | Age: 73
DRG: 519 | End: 2023-06-19
Payer: COMMERCIAL

## 2023-06-19 ENCOUNTER — APPOINTMENT (OUTPATIENT)
Dept: RADIOLOGY | Facility: HOSPITAL | Age: 73
DRG: 519 | End: 2023-06-19
Payer: COMMERCIAL

## 2023-06-19 ENCOUNTER — HOSPITAL ENCOUNTER (INPATIENT)
Facility: HOSPITAL | Age: 73
LOS: 11 days | Discharge: NURSING, REHAB OR LONG-TERM CARE | DRG: 519 | End: 2023-06-30
Attending: EMERGENCY MEDICINE | Admitting: PSYCHIATRY & NEUROLOGY
Payer: COMMERCIAL

## 2023-06-19 DIAGNOSIS — E78.5 HLD (HYPERLIPIDEMIA): ICD-10-CM

## 2023-06-19 DIAGNOSIS — R73.03 PRE-DIABETES: ICD-10-CM

## 2023-06-19 DIAGNOSIS — I10 HTN (HYPERTENSION): ICD-10-CM

## 2023-06-19 DIAGNOSIS — N40.0 BPH (BENIGN PROSTATIC HYPERPLASIA): ICD-10-CM

## 2023-06-19 DIAGNOSIS — I25.10 CAD (CORONARY ARTERY DISEASE): ICD-10-CM

## 2023-06-19 DIAGNOSIS — Z95.1 S/P CABG X 4: ICD-10-CM

## 2023-06-19 DIAGNOSIS — R29.90 STROKE-LIKE SYMPTOM: Primary | ICD-10-CM

## 2023-06-19 DIAGNOSIS — R29.898 WEAKNESS OF BOTH LOWER EXTREMITIES: ICD-10-CM

## 2023-06-19 DIAGNOSIS — M48.062 SPINAL STENOSIS OF LUMBAR REGION WITH NEUROGENIC CLAUDICATION: ICD-10-CM

## 2023-06-19 DIAGNOSIS — F20.9 SCHIZOPHRENIA (HCC): ICD-10-CM

## 2023-06-19 DIAGNOSIS — R42 DIZZINESS: ICD-10-CM

## 2023-06-19 DIAGNOSIS — Z72.0 TOBACCO USE: ICD-10-CM

## 2023-06-19 DIAGNOSIS — M19.90 OSTEOARTHRITIS: ICD-10-CM

## 2023-06-19 DIAGNOSIS — G89.4 CHRONIC PAIN SYNDROME: ICD-10-CM

## 2023-06-19 LAB
2HR DELTA HS TROPONIN: 0 NG/L
ANION GAP SERPL CALCULATED.3IONS-SCNC: 1 MMOL/L (ref 4–13)
APTT PPP: 24 SECONDS (ref 23–37)
ATRIAL RATE: 96 BPM
BASOPHILS # BLD AUTO: 0.02 THOUSANDS/ÂΜL (ref 0–0.1)
BASOPHILS NFR BLD AUTO: 0 % (ref 0–1)
BUN SERPL-MCNC: 16 MG/DL (ref 5–25)
CALCIUM SERPL-MCNC: 8.7 MG/DL (ref 8.3–10.1)
CARDIAC TROPONIN I PNL SERPL HS: 6 NG/L
CARDIAC TROPONIN I PNL SERPL HS: 6 NG/L
CHLORIDE SERPL-SCNC: 116 MMOL/L (ref 96–108)
CO2 SERPL-SCNC: 23 MMOL/L (ref 21–32)
CREAT SERPL-MCNC: 1.2 MG/DL (ref 0.6–1.3)
EOSINOPHIL # BLD AUTO: 0.06 THOUSAND/ÂΜL (ref 0–0.61)
EOSINOPHIL NFR BLD AUTO: 1 % (ref 0–6)
ERYTHROCYTE [DISTWIDTH] IN BLOOD BY AUTOMATED COUNT: 13.9 % (ref 11.6–15.1)
GFR SERPL CREATININE-BSD FRML MDRD: 60 ML/MIN/1.73SQ M
GLUCOSE SERPL-MCNC: 153 MG/DL (ref 65–140)
GLUCOSE SERPL-MCNC: 158 MG/DL (ref 65–140)
HCT VFR BLD AUTO: 36.7 % (ref 36.5–49.3)
HGB BLD-MCNC: 12.9 G/DL (ref 12–17)
IMM GRANULOCYTES # BLD AUTO: 0.03 THOUSAND/UL (ref 0–0.2)
IMM GRANULOCYTES NFR BLD AUTO: 0 % (ref 0–2)
INR PPP: 1.04 (ref 0.84–1.19)
LYMPHOCYTES # BLD AUTO: 1.54 THOUSANDS/ÂΜL (ref 0.6–4.47)
LYMPHOCYTES NFR BLD AUTO: 20 % (ref 14–44)
MCH RBC QN AUTO: 32.5 PG (ref 26.8–34.3)
MCHC RBC AUTO-ENTMCNC: 35.1 G/DL (ref 31.4–37.4)
MCV RBC AUTO: 92 FL (ref 82–98)
MONOCYTES # BLD AUTO: 0.44 THOUSAND/ÂΜL (ref 0.17–1.22)
MONOCYTES NFR BLD AUTO: 6 % (ref 4–12)
NEUTROPHILS # BLD AUTO: 5.57 THOUSANDS/ÂΜL (ref 1.85–7.62)
NEUTS SEG NFR BLD AUTO: 73 % (ref 43–75)
NRBC BLD AUTO-RTO: 0 /100 WBCS
P AXIS: 154 DEGREES
PLATELET # BLD AUTO: 259 THOUSANDS/UL (ref 149–390)
PLATELET # BLD AUTO: 264 THOUSANDS/UL (ref 149–390)
PMV BLD AUTO: 9.4 FL (ref 8.9–12.7)
PMV BLD AUTO: 9.5 FL (ref 8.9–12.7)
POTASSIUM SERPL-SCNC: 3.7 MMOL/L (ref 3.5–5.3)
PR INTERVAL: 130 MS
PROTHROMBIN TIME: 13.8 SECONDS (ref 11.6–14.5)
QRS AXIS: 39 DEGREES
QRSD INTERVAL: 66 MS
QT INTERVAL: 318 MS
QTC INTERVAL: 401 MS
RBC # BLD AUTO: 3.97 MILLION/UL (ref 3.88–5.62)
SODIUM SERPL-SCNC: 140 MMOL/L (ref 135–147)
T WAVE AXIS: -13 DEGREES
VENTRICULAR RATE: 96 BPM
WBC # BLD AUTO: 7.66 THOUSAND/UL (ref 4.31–10.16)

## 2023-06-19 PROCEDURE — 85610 PROTHROMBIN TIME: CPT | Performed by: EMERGENCY MEDICINE

## 2023-06-19 PROCEDURE — 99285 EMERGENCY DEPT VISIT HI MDM: CPT

## 2023-06-19 PROCEDURE — NC001 PR NO CHARGE: Performed by: PSYCHIATRY & NEUROLOGY

## 2023-06-19 PROCEDURE — 99223 1ST HOSP IP/OBS HIGH 75: CPT | Performed by: PSYCHIATRY & NEUROLOGY

## 2023-06-19 PROCEDURE — 93005 ELECTROCARDIOGRAM TRACING: CPT

## 2023-06-19 PROCEDURE — 85025 COMPLETE CBC W/AUTO DIFF WBC: CPT | Performed by: EMERGENCY MEDICINE

## 2023-06-19 PROCEDURE — 93010 ELECTROCARDIOGRAM REPORT: CPT | Performed by: INTERNAL MEDICINE

## 2023-06-19 PROCEDURE — 96360 HYDRATION IV INFUSION INIT: CPT

## 2023-06-19 PROCEDURE — 80048 BASIC METABOLIC PNL TOTAL CA: CPT | Performed by: EMERGENCY MEDICINE

## 2023-06-19 PROCEDURE — 82948 REAGENT STRIP/BLOOD GLUCOSE: CPT

## 2023-06-19 PROCEDURE — 70551 MRI BRAIN STEM W/O DYE: CPT

## 2023-06-19 PROCEDURE — 85730 THROMBOPLASTIN TIME PARTIAL: CPT | Performed by: EMERGENCY MEDICINE

## 2023-06-19 PROCEDURE — 70496 CT ANGIOGRAPHY HEAD: CPT

## 2023-06-19 PROCEDURE — 70498 CT ANGIOGRAPHY NECK: CPT

## 2023-06-19 PROCEDURE — 84484 ASSAY OF TROPONIN QUANT: CPT | Performed by: EMERGENCY MEDICINE

## 2023-06-19 PROCEDURE — 85049 AUTOMATED PLATELET COUNT: CPT | Performed by: PSYCHIATRY & NEUROLOGY

## 2023-06-19 PROCEDURE — 36415 COLL VENOUS BLD VENIPUNCTURE: CPT | Performed by: EMERGENCY MEDICINE

## 2023-06-19 PROCEDURE — 72148 MRI LUMBAR SPINE W/O DYE: CPT

## 2023-06-19 RX ORDER — NICOTINE 21 MG/24HR
1 PATCH, TRANSDERMAL 24 HOURS TRANSDERMAL DAILY
Status: DISCONTINUED | OUTPATIENT
Start: 2023-06-19 | End: 2023-07-01 | Stop reason: HOSPADM

## 2023-06-19 RX ORDER — LISINOPRIL 5 MG/1
5 TABLET ORAL DAILY
Status: CANCELLED | OUTPATIENT
Start: 2023-06-19

## 2023-06-19 RX ORDER — ATORVASTATIN CALCIUM 40 MG/1
40 TABLET, FILM COATED ORAL EVERY EVENING
Status: DISCONTINUED | OUTPATIENT
Start: 2023-06-19 | End: 2023-06-24

## 2023-06-19 RX ORDER — SERTRALINE HYDROCHLORIDE 100 MG/1
100 TABLET, FILM COATED ORAL
Status: DISCONTINUED | OUTPATIENT
Start: 2023-06-19 | End: 2023-07-01 | Stop reason: HOSPADM

## 2023-06-19 RX ORDER — ACETAMINOPHEN 325 MG/1
975 TABLET ORAL EVERY 6 HOURS PRN
Status: DISCONTINUED | OUTPATIENT
Start: 2023-06-19 | End: 2023-06-21

## 2023-06-19 RX ORDER — ASPIRIN 325 MG
325 TABLET ORAL ONCE
Status: COMPLETED | OUTPATIENT
Start: 2023-06-19 | End: 2023-06-19

## 2023-06-19 RX ORDER — NITROGLYCERIN 80 MG/1
1 PATCH TRANSDERMAL DAILY
Status: DISCONTINUED | OUTPATIENT
Start: 2023-06-20 | End: 2023-06-21

## 2023-06-19 RX ORDER — FINASTERIDE 5 MG/1
5 TABLET, FILM COATED ORAL DAILY
Status: DISCONTINUED | OUTPATIENT
Start: 2023-06-20 | End: 2023-07-01 | Stop reason: HOSPADM

## 2023-06-19 RX ORDER — HEPARIN SODIUM 5000 [USP'U]/ML
5000 INJECTION, SOLUTION INTRAVENOUS; SUBCUTANEOUS EVERY 8 HOURS SCHEDULED
Status: DISCONTINUED | OUTPATIENT
Start: 2023-06-19 | End: 2023-06-22

## 2023-06-19 RX ADMIN — SODIUM CHLORIDE 1000 ML: 0.9 INJECTION, SOLUTION INTRAVENOUS at 14:20

## 2023-06-19 RX ADMIN — HEPARIN SODIUM 5000 UNITS: 5000 INJECTION INTRAVENOUS; SUBCUTANEOUS at 17:12

## 2023-06-19 RX ADMIN — ASPIRIN 325 MG ORAL TABLET 325 MG: 325 PILL ORAL at 17:12

## 2023-06-19 RX ADMIN — IOHEXOL 85 ML: 350 INJECTION, SOLUTION INTRAVENOUS at 14:28

## 2023-06-19 RX ADMIN — NICOTINE 1 PATCH: 21 PATCH, EXTENDED RELEASE TRANSDERMAL at 17:12

## 2023-06-19 RX ADMIN — ATORVASTATIN CALCIUM 40 MG: 40 TABLET, FILM COATED ORAL at 17:12

## 2023-06-19 NOTE — ASSESSMENT & PLAN NOTE
On home lisinopril 5 mg and Lopressor 25 mg     Plan:  · Normotension  · Hold lisinopril as BP stable

## 2023-06-19 NOTE — H&P
NEUROLOGY RESIDENCY - ADMISSION H&P NOTE     Name: Ricky Rowland   Age & Sex: 71 y o  male   MRN: [id number]  Unit/Bed#: [facility]   Encounter: [id number]    ASSESSMENT & PLAN     Spinal stenosis of lumbar region with neurogenic claudication  Assessment & Plan  Ricky Rowland is a 71 y o  male  with pertinent history lumbar spondylosis, neurogenic claudication, CAD s/p CABG x 4, HTN, HLD, hx of PE (Not on thinners) who presented as stroke alert on 6/19/23 given acute onset worsening bilateral lower extremity weakness  Denies any urinary symptoms or saddle anesthesia  Initial NIHSS of 5 and LKW 1200  Initial BP on arrival was Blood Pressure: 110/53  As a result of lower suspicion for stroke and potential need for urgent surgery patient was determined to not be a candidate for tPA  F/u exam:  HF strength 4-/5 R, 4/5 L  No ataxia or dysmetria noted on exam  Proprioception normal  Difficulty standing from a seated position without assistance  Romberg positive  BP over last 24 hours: Blood Pressure: 109/59  current BP: Blood Pressure: 109/59    Lab Results   Component Value Date    HGBA1C 5 8 (H) 05/11/2023    CHOLESTEROL 159 04/01/2021    [address] 95 04/01/2021    TRIG 150 04/01/2021    INR 1 04 06/19/2023        Imaging:  CTH: No acute intracranial abnormality  CTA: No significant stenosis of the cervical carotid or vertebral arteries  No high-grade intracranial stenosis, large vessel occlusion or aneurysm  MRI: pending  Echocardiogram: pending  Telemetry: monitor    Impression: At this time history and physical along with available imaging more suspicious for worsening of spinal stenosis  One month ago patient strength 5/5 throughout vs today significantly week in bilateral HF  Will place on stroke pathway out of abundance of caution given acute dizziness however could might as well be poor PO intake this morning       Plan:  Admit under neuro primary service   Will repeat MRI L spine  Appreciate neurosurgery eval and recommendations   Stroke pathway due to vascular risk factors and dizziness  Goal BP: Permissive for 24 H    X1 then resume 81 mg daily,hold plavix  in case need for urgent surgery   Statin  A1c/lipid panel pending  Maintain glucose below 200  Echo pending  Neuro checks  Monitor on telemetry   PT/OT//PMR consults appreciated when able      Schizophrenia [facility])  Assessment & Plan  As per chart patient with history of schizophrenia  I reviewed listed home meds at bedside  Patient denies taking aripiprazole or risperidone  BPH (benign prostatic hyperplasia)  Assessment & Plan  On home finasteride 5 mg     Tobacco use  Assessment & Plan  Current smoker 1 PPD     Plan:  Nicotine patch     HTN (hypertension)  Assessment & Plan  On home lisinopril 5 mg and Lopressor 25 mg     Plan:  Permissive HTN for at least 24 H   Hold lisinopril   Appreciate SLIM for medical management     CAD (coronary artery disease)  Assessment & Plan  Patient is a 1 PPD smoker with hx of CAD s/p CABG x 4      Plan:   X 1 then continue 81 mg   Cont atorvastatin 40 mg   Echo   Prn nitroglycerin   Appreciate SLIM for medical management       Gail Vega will need follow up in in 4 weeks with general attending (Cab follow with me in clinic)   He will not require outpatient neurological testing  MRI Brain and L spine, rehabd recs: This should be completed prior to removing patient from list or discharge     Pending for discharge: MRI brain and L spine, rehab recs    VTE Prophylaxis: Heparin  / sequential compression device   Code Status: Full   POLST: POLST form is not discussed and not completed at this time  Anticipated Length of Stay:  Patient will be admitted on an Inpatient basis with an anticipated length of stay of  > 2 midnights       Justification for Hospital Stay: Acute focal neurological deficit     CHIEF COMPLAINT     Chief Complaint   Patient presents with   • Medical Problem     Per EMS pt was fine all morning around 12ish pt started getting super shakey having generalized weakness  HISTORY OF PRESENT ILLNESS     Yareli Cunha is a 71 y o  male  with pertinent history lumbar spondylosis, neurogenic claudication, CAD s/p CABG x 4, HTN, HLD, hx of PE (Not on thinners) who presents as a stroke alert for acute onset bilateral lower extremity weakness  Patient states that symptom onset was around noon  Patient was changing his car tire when he stood up he experienced a strange smell  He had a cigarette  When he entered his house he experienced an acute sensation of the room spinning and shakiness  Of note, he had not eaten anything to eat  Throughout the day had only had coffee and cigarettes  Around that time he experienced worsening of bilateral lower extremity weakness  He denies and urinary symptoms or saddle anesthesia  On arrival to the emergency department, /53, HR 95, RR 20 and SPO2 98% on room air  Stroke alert was called at 1418  NIHSS of 5 given for some effort against gravity in BLE and visual field deficits  HF strength 4-/5 R, 4/5 L  No ataxia or dysmetria noted on exam  Proprioception normal  Difficulty standing from a seated position without assistance  Romberg positive  CTH negative for acute intracranial abnormalities  CTA without significant vessel disease  Patient was not deemed a candidate for thrombolytics given clinical picture  In terms of his PMH, patient with a history of lumbar spondylosis with neurogenic claudication  He reports more recent increase in frequency of falls  He is followed by neurosurgery as outpatient    Patient was determined to be a candidate for L3-L5 bilateral laminectomies/foraminotomies  Surgery was cancelled giving his living situation  Patient reported he lives on a second floor and it would be hard for him to ambulate up and down stairs  Patient was last seen by neurosx on 5/19/2023  As per note, at that time patient exam 5/5 strength  Other than this, patient is a 1 PPD smoker with multiple vascular risk factors  Prior imaging:  MRI L spine  2023:  Degenerative change as detailed worse at levels L3-4 and L4-5 with severe canal stenosis  Multilevel foraminal narrowing as described; moderate to severe stenosis at right L3-4 and L4-5  MRI L spine 3/23/2022: There is mild diffuse developmental canal stenosis of the lumbar canal exacerbated by acquired degenerative disc disease and posterior element degenerative change, most prominent at the L3-4 and L4-5 levels see series 7 images 18 at L3-4 and 23 at L4-5  NIHSS:  1a Level of Consciousness: 0 = Alert   1b  LOC Questions: 0 = Answers both correctly   1c  LOC Commands: 0 = Obeys both correctly   2  Best Gaze: 0 = Normal   3  Visual: 1 = Partial hemianopia   4  Facial Palsy: 0=Normal symmetric movement   5a  Motor Right Arm: 0=No drift, limb holds 90 (or 45) degrees for full 10 seconds   5b  Motor Left Arm: 0=No drift, limb holds 90 (or 45) degrees for full 10 seconds   6a  Motor Right Le=Some effort against gravity, limb cannot get to or maintain (if cured) 90 (or 45) degrees, drifts down to bed, but has some effort against gravity   6b  Motor Left Le=Some effort against gravity, limb cannot get to or maintain (if cured) 90 (or 45) degrees, drifts down to bed, but has some effort against gravity   7  Limb Ataxia:  0=Absent   8  Sensory: 0=Normal; no sensory loss   9  Best Language:  0=No aphasia, normal   10  Dysarthria: 0=Normal articulation   11  Extinction and Inattention (formerly Neglect): 0=No abnormality   Total Score: 4     Time NIHSS was completed: 1440      REVIEW OF SYSTEMS     Review of Systems   Constitutional: Negative for fever  Eyes: Negative for visual disturbance  Respiratory: Negative for shortness of breath  Cardiovascular: Negative for chest pain  Gastrointestinal: Negative for constipation and diarrhea     Genitourinary: Negative for difficulty urinating  Musculoskeletal: Positive for back pain and gait problem  Neurological: Positive for dizziness, weakness and light-headedness  Negative for syncope, facial asymmetry, speech difficulty and numbness  Psychiatric/Behavioral: Negative for confusion  The patient is not nervous/anxious  PAST MEDICAL HISTORY     Past Medical History:   Diagnosis Date   • BPH (benign prostatic hyperplasia)    • Coronary artery disease    • HTN (hypertension)    • Hx pulmonary embolism    • Hyperlipidemia    • Hypertension    • Prediabetes    • Tobacco use        Allergies:   No Known Allergies    PAST SURGICAL HISTORY     Past Surgical History:   Procedure Laterality Date   • CORONARY ARTERY BYPASS GRAFT     • LA COLONOSCOPY FLX DX W/COLLJ SPEC WHEN PFRMD N/A 2017    Procedure: COLONOSCOPY;  Surgeon: Roberta Ledezma MD;  Location: [facility]; Service: Gastroenterology       SOCIAL & FAMILY HISTORY     Social History     Substance and Sexual Activity   Alcohol Use No     Substance and Sexual Activity   Alcohol Use No        Substance and Sexual Activity   Drug Use No     Social History     Tobacco Use   Smoking Status Every Day   • Packs/day: 0 50   • Years: 56 00   • Total pack years: 28 00   • Types: Cigarettes   Smokeless Tobacco Never   Tobacco Comments    started when Pt was 6years old  Pt smokes 1/2 to 1 pack a day when stressed       Marital Status: NA  Occupation: ?   Patient Pre-hospital Living Situation: Independent  Patient Pre-hospital Level of Mobility: independent   Patient Pre-hospital Diet Restrictions: N/A     Family History:  non-contributory        OBJECTIVE     Vitals:    23 1435 23 1450 23 1505 23 1609   BP: 129/70 136/73 109/59 104/58   BP Location:    Right arm   Pulse: 102 87 84 76   Resp: 20 20 20 20   Temp:       TempSrc:       SpO2: 98% 98% 98% 98%   Weight:            Temperature:   Temp (24hrs), Av 1 °F (36 7 °C), Min:98 1 °F (36 7 °C), Max:98 1 °F (36 7 °C)    Temperature: 98 1 °F (36 7 °C)    Intake & Output:  I/O     None          Weights:        Body mass index is 23 54 kg/m²  Weight (last 2 days)     Date/Time Weight    06/19/23 1418 72 3 (159 39)          Physical Exam  Vitals and nursing note reviewed  Constitutional:       General: He is not in acute distress  Appearance: He is well-developed  HENT:      Head: Normocephalic and atraumatic  Eyes:      Extraocular Movements: EOM normal       Conjunctiva/sclera: Conjunctivae normal    Cardiovascular:      Rate and Rhythm: Normal rate and regular rhythm  Heart sounds: No murmur heard  Pulmonary:      Effort: Pulmonary effort is normal  No respiratory distress  Abdominal:      Palpations: Abdomen is soft  Tenderness: There is no abdominal tenderness  Musculoskeletal:         General: No swelling  Cervical back: Neck supple  Skin:     General: Skin is warm and dry  Capillary Refill: Capillary refill takes less than 2 seconds  Neurological:      Mental Status: He is alert and oriented to person, place, and time  Coordination: Romberg Test abnormal  Finger-Nose-Finger Test normal       Deep Tendon Reflexes:      Reflex Scores:       Bicep reflexes are 1+ on the right side and 1+ on the left side  Brachioradialis reflexes are 1+ on the right side and 1+ on the left side  Patellar reflexes are 1+ on the right side and 1+ on the left side  Psychiatric:         Mood and Affect: Mood normal           Neurologic Exam     Mental Status   Oriented to person, place, and time  Able to name object  Able to repeat  Normal comprehension  Cranial Nerves     CN III, IV, VI   Extraocular motions are normal      CN V   Facial sensation intact  CN VII   Facial expression full, symmetric     R eye- deficits in lower nasal quadrant   L eye- deficits in upper temporal quadrant   Initially some decrease EOM to the L but able to overcome later      Motor Exam Muscle bulk: decreased  Overall muscle tone: normal  Right arm pronator drift: absent  Left arm pronator drift: absent    Strength   Right deltoid: 5/5  Left deltoid: 5/5  Right biceps: 5/5  Left biceps: 5/5  Right triceps: 5/5  Left triceps: 5/5  Right quadriceps: 4/5  Left quadriceps: 4/5  Right hamstrin/5  Left hamstrin/5  Right anterior tibial: 5/5  Left anterior tibial: 5/5  Right gastroc: 5/5  Left gastroc: 5/5    Sensory Exam   Right arm proprioception: normal  Left arm proprioception: normal  Pinprick normal      Gait, Coordination, and Reflexes     Coordination   Romberg: positive  Finger to nose coordination: normal    Tremor   Resting tremor: absent    Reflexes   Right brachioradialis: 1+  Left brachioradialis: 1+  Right biceps: 1+  Left biceps: 1+  Right patellar: 1+  Left patellar: 1+       LABORATORY DATA     Labs: I have personally reviewed pertinent reports  Results from last 7 days   Lab Units 23  1420   WBC Thousand/uL 7 66   HEMOGLOBIN g/dL 12 9   HEMATOCRIT % 36 7   PLATELETS Thousands/uL 264   NEUTROS PCT % 73   MONOS PCT % 6   EOS PCT % 1      Results from last 7 days   Lab Units 23  1420   SODIUM mmol/L 140   POTASSIUM mmol/L 3 7   CHLORIDE mmol/L 116*   CO2 mmol/L 23   BUN mg/dL 16   CREATININE mg/dL 1 20   CALCIUM mg/dL 8 7              Results from last 7 days   Lab Units 23  1420   INR  1 04   PTT seconds 24               Micro:  Lab Results   Component Value Date    URINECX No Growth <1000 cfu/mL 2021       IMAGING & DIAGNOSTIC TESTING     Radiology Results: I have personally reviewed pertinent reports  CTA stroke alert (head/neck)   Final Result by MANJULA Bosch MD ( 1277)      No significant stenosis of the cervical carotid or vertebral arteries   No high-grade intracranial stenosis, large vessel occlusion or aneurysm  Findings were directly discussed with Gifty Jones at 2:41 p m                             Workstation performed: [id number]         CT stroke alert brain   Final Result by E Cozetta Ahumada, MD (06/19 1446)      No acute intracranial abnormality  Findings were directly discussed with Chirag Cordero at 2:41 p m  Workstation performed: [id number]         MRI Inpatient Order    (Results Pending)       Other Diagnostic Testing: I have personally reviewed pertinent reports  ACTIVE MEDICATIONS     Current Facility-Administered Medications   Medication Dose Route Frequency   • acetaminophen (TYLENOL) tablet 975 mg  975 mg Oral Q6H PRN   • aspirin tablet 325 mg  325 mg Oral Once   • atorvastatin (LIPITOR) tablet 40 mg  40 mg Oral QPM   • heparin (porcine) subcutaneous injection 5,000 Units  5,000 Units Subcutaneous Q8H [facility]   • nicotine (NICODERM CQ) 21 mg/24 hr TD 24 hr patch 1 patch  1 patch Transdermal Daily         HOME MEDICATIONS     Prior to Admission medications    Medication Sig Start Date End Date Taking? Authorizing Provider   Acetaminophen Extra Strength 500 MG tablet  5/12/22   Historical Provider, MD   ARIPiprazole (ABILIFY) 5 mg tablet Take 5 mg by mouth daily    Historical Provider, MD   Arthritis Pain Relief 650 MG CR tablet take 2 tablets by mouth twice a day if needed 3/15/23   Historical Provider, MD   aspirin 81 MG tablet Take 81 mg by mouth daily  Historical Provider, MD   Aspirin Low Dose 81 MG EC tablet  3/7/23   Historical Provider, MD   atorvastatin (LIPITOR) 40 mg tablet Take 40 mg by mouth daily    Historical Provider, MD   erythromycin (ILOTYCIN) ophthalmic ointment Place a 1/2 inch ribbon of ointment into the lower eyelid  12/4/20   Christian Melissa MD   finasteride (PROSCAR) 5 mg tablet Take 5 mg by mouth daily 5/7/23   Historical Provider, MD   gabapentin (NEURONTIN) 100 mg capsule Take 3 capsules (300 mg total) by mouth daily at bedtime 5/24/22   SINGH Chang   hydrOXYzine HCL (ATARAX) 25 mg tablet Take 1 tablet twice a day by oral route as needed for 30 days  Historical Provider, MD   lisinopril (ZESTRIL) 5 mg tablet  5/12/22   Historical Provider, MD   metoprolol tartrate (LOPRESSOR) 25 mg tablet Take 25 mg by mouth daily      Historical Provider, MD   naproxen (NAPROSYN) 250 mg tablet take 1 tablet by mouth twice a day if needed for 10 days 2/3/22   Historical Provider, MD   nicotine (NICODERM CQ) 21 mg/24 hr TD 24 hr patch apply 1 patch to CLEAN, DRY, AND INTACT SKIN once daily    Historical Provider, MD   nitroglycerin (NITRODUR) 0 4 mg/hr Place 1 patch on the skin 2 (two) times a day as needed 6/5/15   Historical Provider, MD   RA Melatonin 3 MG take 1 tablet by mouth EVERY DAY AT BEDTIME FOR 90 DAYS 2/15/23   Historical Provider, MD   risperiDONE (RisperDAL) 0 5 mg tablet  3/23/22   Historical Provider, MD   sertraline (ZOLOFT) 100 mg tablet take 1 tablet by mouth EVERY DAY IN THE EVENING FOR 90 DAYS 3/8/23   Historical Provider, MD   triamcinolone (KENALOG) 0 1 % ointment APPLY A THIN LAYER TO THE AFFECTED AREA(S) BY TOPICAL ROUTE TWO TIMES PER DAY AS NEEDED    Historical Provider, MD     ACTIVE MEDICATIONS    ==  Woody Land MD  [facility], PGY-4

## 2023-06-19 NOTE — ASSESSMENT & PLAN NOTE
Patient is a 1 PPD smoker with hx of CAD s/p CABG x 4      Plan:  · Nuclear stress test done  Cleared for surgery  Will need cardiology follow up     · ASA on hold with anticipation to surgery    · Cont atorvastatin 40 mg   · Prn nitroglycerin   · Appreciate SOD for medical management

## 2023-06-19 NOTE — ASSESSMENT & PLAN NOTE
Maty Zaragoza is a 71 y o  male  with pertinent history lumbar spondylosis, neurogenic claudication, CAD s/p CABG x 4, HTN, HLD, hx of PE (Not on thinners) who presented as stroke alert on 6/19/23 given acute onset worsening bilateral lower extremity weakness  Denies any urinary symptoms or saddle anesthesia  Initial NIHSS of 5 and LKW 1200  Initial BP on arrival was Blood Pressure: 110/53  As a result of lower suspicion for stroke and potential need for urgent surgery patient was determined to not be a candidate for tPA  • F/u exam:  HF strength 4-/5 R, 4/5 L  No ataxia or dysmetria noted on exam  Proprioception normal  Difficulty standing from a seated position without assistance  Romberg positive  • BP over last 24 hours: Blood Pressure: 136/79  current BP: Blood Pressure: 136/79    Imaging:  • CTH: No acute intracranial abnormality  • CTA: No significant stenosis of the cervical carotid or vertebral arteries  No high-grade intracranial stenosis, large vessel occlusion or aneurysm  • MRI brain: No MR evidence of acute ischemia  Small right mastoid effusion  • MRI L spine: Spondylotic degenerative changes again noted similar to the prior study most significantly at L3-4 and L4-5 where severe central stenosis is identified  Moderate to severe lateral recess stenosis also identified as described  Moderate central stenosis and mild bilateral foraminal narrowing noted at L2-3 with mild foraminal narrowing at L5-S1 bilaterally  • Echocardiogram: EF 50% with mild LA dilation   • Telemetry: monitor  • LDL 71   • HBA1C 5 8    Impression: At this time history and physical along with available imaging concerning for worsening weakness in the setting of known spinal stenosis  One month ago patient strength 5/5 throughout vs now significantly week in bilateral HF        Plan:  • Plan for L3-4 and L4-5 decompression today with Dr Jean Claude Engel   • Family able to help with recovery  • PMR on board, plan for acute post surgical rehab   • Appreciate neurosurgery eval and recommendations   • ASA on hold with anticipation to surgery    • BP goal: normotension  • Statin  • Maintain glucose below 200  • Neuro checks  • PT/OT//PMR consults appreciated   • Follow up with me as outpatient in 3 months   • Will transfer to [place] for further medical management  • No further inpatient neuro recs, TT if any questions

## 2023-06-19 NOTE — ED PROVIDER NOTES
History  Chief Complaint   Patient presents with   • Medical Problem     Per EMS pt was fine all morning around 12ish pt started getting super shakey having generalized weakness  73-year-old male past medical story significant for coronary artery disease, hyperlipidemia, hypertension, history of PE no longer on thinners presenting to ED today for bilateral lower extremity weakness as well as dizziness  Patient describes the dizziness as near syncopal   He also states that has been having heaviness in the legs  Last known normal was at 12 noon  He is currently denying any chest pain or shortness of breath  Denies any fevers or chills  He is a  and works outside all day  Per family patient also has not eaten anything all day today  Prior to Admission Medications   Prescriptions Last Dose Informant Patient Reported? Taking? ARIPiprazole (ABILIFY) 5 mg tablet  Self Yes No   Sig: Take 5 mg by mouth daily   Acetaminophen Extra Strength 500 MG tablet  Self Yes No   Arthritis Pain Relief 650 MG CR tablet   Yes No   Sig: take 2 tablets by mouth twice a day if needed   Aspirin Low Dose 81 MG EC tablet   Yes No   RA Melatonin 3 MG   Yes No   Sig: take 1 tablet by mouth EVERY DAY AT BEDTIME FOR 90 DAYS   aspirin 81 MG tablet  Self Yes No   Sig: Take 81 mg by mouth daily  atorvastatin (LIPITOR) 40 mg tablet  Self Yes No   Sig: Take 40 mg by mouth daily   erythromycin (ILOTYCIN) ophthalmic ointment  Self No No   Sig: Place a 1/2 inch ribbon of ointment into the lower eyelid  finasteride (PROSCAR) 5 mg tablet   Yes No   Sig: Take 5 mg by mouth daily   gabapentin (NEURONTIN) 100 mg capsule  Self No No   Sig: Take 3 capsules (300 mg total) by mouth daily at bedtime   hydrOXYzine HCL (ATARAX) 25 mg tablet  Self Yes No   Sig: Take 1 tablet twice a day by oral route as needed for 30 days     lisinopril (ZESTRIL) 5 mg tablet  Self Yes No   metoprolol tartrate (LOPRESSOR) 25 mg tablet  Self Yes No   Sig: Take 25 mg by mouth daily     naproxen (NAPROSYN) 250 mg tablet  Self Yes No   Sig: take 1 tablet by mouth twice a day if needed for 10 days   nicotine (NICODERM CQ) 21 mg/24 hr TD 24 hr patch  Self Yes No   Sig: apply 1 patch to CLEAN, DRY, AND INTACT SKIN once daily   nitroglycerin (NITRODUR) 0 4 mg/hr  Self Yes No   Sig: Place 1 patch on the skin 2 (two) times a day as needed   risperiDONE (RisperDAL) 0 5 mg tablet  Self Yes No   sertraline (ZOLOFT) 100 mg tablet   Yes No   Sig: take 1 tablet by mouth EVERY DAY IN THE EVENING FOR 90 DAYS   triamcinolone (KENALOG) 0 1 % ointment  Self Yes No   Sig: APPLY A THIN LAYER TO THE AFFECTED AREA(S) BY TOPICAL ROUTE TWO TIMES PER DAY AS NEEDED      Facility-Administered Medications: None       Past Medical History:   Diagnosis Date   • BPH (benign prostatic hyperplasia)    • Coronary artery disease    • HTN (hypertension)    • Hx pulmonary embolism    • Hyperlipidemia    • Hypertension    • Prediabetes    • Tobacco use        Past Surgical History:   Procedure Laterality Date   • CORONARY ARTERY BYPASS GRAFT     • AK COLONOSCOPY FLX DX W/COLLJ SPEC WHEN PFRMD N/A 4/6/2017    Procedure: COLONOSCOPY;  Surgeon: Beth Laguna MD;  Location: [facility]; Service: Gastroenterology       Family History   Problem Relation Age of Onset   • Diabetes Mother    • Diabetes Father      I have reviewed and agree with the history as documented  E-Cigarette/Vaping   • E-Cigarette Use Never User      E-Cigarette/Vaping Substances   • Nicotine No    • THC No    • CBD No    • Flavoring No    • Other No    • Unknown No      Social History     Tobacco Use   • Smoking status: Every Day     Packs/day: 0 50     Years: 56 00     Total pack years: 28 00     Types: Cigarettes   • Smokeless tobacco: Never   • Tobacco comments:     started when Pt was 6years old    Pt smokes 1/2 to 1 pack a day when stressed   Vaping Use   • Vaping Use: Never used   Substance Use Topics   • Alcohol use: No   • Drug use: No        Review of Systems   Constitutional: Negative for chills and fever  HENT: Negative for hearing loss  Eyes: Negative for visual disturbance  Respiratory: Negative for shortness of breath  Cardiovascular: Negative for chest pain  Gastrointestinal: Negative for abdominal pain, constipation, diarrhea, nausea and vomiting  Genitourinary: Negative for difficulty urinating  Musculoskeletal: Negative for myalgias  Skin: Negative for rash  Neurological: Positive for weakness  Negative for dizziness  Psychiatric/Behavioral: Negative for agitation  All other systems reviewed and are negative  Physical Exam  ED Triage Vitals [06/19/23 1320]   Temperature Pulse Respirations Blood Pressure SpO2   98 1 °F (36 7 °C) 95 20 110/53 98 %      Temp Source Heart Rate Source Patient Position - Orthostatic VS BP Location FiO2 (%)   Oral Monitor Lying Right arm --      Pain Score       --             Orthostatic Vital Signs  Vitals:    06/19/23 1320 06/19/23 1435 06/19/23 1450   BP: 110/53 129/70 136/73   Pulse: 95 102 87   Patient Position - Orthostatic VS: Lying         Physical Exam  Vitals and nursing note reviewed  Constitutional:       General: He is not in acute distress  Appearance: Normal appearance  He is not ill-appearing  HENT:      Head: Normocephalic and atraumatic  Right Ear: External ear normal       Left Ear: External ear normal       Nose: Nose normal  No congestion  Mouth/Throat:      Mouth: Mucous membranes are moist       Pharynx: Oropharynx is clear  No oropharyngeal exudate  Eyes:      General:         Right eye: No discharge  Left eye: No discharge  Extraocular Movements: Extraocular movements intact  Conjunctiva/sclera: Conjunctivae normal       Pupils: Pupils are equal, round, and reactive to light  Cardiovascular:      Rate and Rhythm: Normal rate and regular rhythm  Pulses: Normal pulses        Heart sounds: Normal heart sounds  No murmur heard  Pulmonary:      Effort: Pulmonary effort is normal  No respiratory distress  Breath sounds: Normal breath sounds  No wheezing  Abdominal:      General: Abdomen is flat  Bowel sounds are normal  There is no distension  Palpations: Abdomen is soft  Tenderness: There is no abdominal tenderness  Musculoskeletal:         General: No swelling  Normal range of motion  Cervical back: Normal range of motion  No rigidity  Skin:     General: Skin is warm and dry  Capillary Refill: Capillary refill takes less than 2 seconds  Neurological:      General: No focal deficit present  Mental Status: He is alert and oriented to person, place, and time  Mental status is at baseline  Comments: Cranial nerves II through XII are intact  5 out of 5 strength in the upper extremities  His lower extremities are exhibiting generalized bilateral weakness  He does have some effort against gravity however he cannot get him up     Psychiatric:         Mood and Affect: Mood normal          Behavior: Behavior normal          ED Medications  Medications   sodium chloride 0 9 % bolus 1,000 mL (1,000 mL Intravenous New Bag 6/19/23 1420)   iohexol (OMNIPAQUE) 350 MG/ML injection (SINGLE-DOSE) 85 mL (85 mL Intravenous Given 6/19/23 1428)       Diagnostic Studies  Results Reviewed     Procedure Component Value Units Date/Time    Basic metabolic panel [[id number]]  (Abnormal) Collected: 06/19/23 1420    Lab Status: Final result Specimen: Blood from Arm, Right Updated: 06/19/23 1457     Sodium 140 mmol/L      Potassium 3 7 mmol/L      Chloride 116 mmol/L      CO2 23 mmol/L      ANION GAP 1 mmol/L      BUN 16 mg/dL      Creatinine 1 20 mg/dL      Glucose 158 mg/dL      Calcium 8 7 mg/dL      eGFR 60 ml/min/1 73sq m     Narrative:      [place] guidelines for Chronic Kidney Disease (CKD):   •  Stage 1 with normal or high GFR (GFR > 90 mL/min/1 73 square meters)  • Stage 2 Mild CKD (GFR = 60-89 mL/min/1 73 square meters)  •  Stage 3A Moderate CKD (GFR = 45-59 mL/min/1 73 square meters)  •  Stage 3B Moderate CKD (GFR = 30-44 mL/min/1 73 square meters)  •  Stage 4 Severe CKD (GFR = 15-29 mL/min/1 73 square meters)  •  Stage 5 End Stage CKD (GFR <15 mL/min/1 73 square meters)  Note: GFR calculation is accurate only with a steady state creatinine    Protime-INR [[id number]]  (Normal) Collected: 06/19/23 1420    Lab Status: Final result Specimen: Blood from Arm, Right Updated: 06/19/23 1450     Protime 13 8 seconds      INR 1 04    APTT [[id number]]  (Normal) Collected: 06/19/23 1420    Lab Status: Final result Specimen: Blood from Arm, Right Updated: 06/19/23 1450     PTT 24 seconds     CBC and differential [[id number]] Collected: 06/19/23 1420    Lab Status: Final result Specimen: Blood from Arm, Right Updated: 06/19/23 1437     WBC 7 66 Thousand/uL      RBC 3 97 Million/uL      Hemoglobin 12 9 g/dL      Hematocrit 36 7 %      MCV 92 fL      MCH 32 5 pg      MCHC 35 1 g/dL      RDW 13 9 %      MPV 9 5 fL      Platelets 883 Thousands/uL      nRBC 0 /100 WBCs      Neutrophils Relative 73 %      Immat GRANS % 0 %      Lymphocytes Relative 20 %      Monocytes Relative 6 %      Eosinophils Relative 1 %      Basophils Relative 0 %      Neutrophils Absolute 5 57 Thousands/µL      Immature Grans Absolute 0 03 Thousand/uL      Lymphocytes Absolute 1 54 Thousands/µL      Monocytes Absolute 0 44 Thousand/µL      Eosinophils Absolute 0 06 Thousand/µL      Basophils Absolute 0 02 Thousands/µL     HS Troponin 0hr (reflex protocol) [[id number]] Collected: 06/19/23 1420    Lab Status: In process Specimen: Blood from Arm, Right Updated: 06/19/23 1428    Fingerstick Glucose (POCT) [[id number]]  (Abnormal) Collected: 06/19/23 1418    Lab Status: Final result Updated: 06/19/23 1419     POC Glucose 153 mg/dl                  CTA stroke alert (head/neck)   Final Result by MANJULA Hernandez MD (06/19 1445)      No significant stenosis of the cervical carotid or vertebral arteries   No high-grade intracranial stenosis, large vessel occlusion or aneurysm  Findings were directly discussed with Haris Duque at 2:41 p m  Workstation performed: [id number]         CT stroke alert brain   Final Result by MANJULA Miller MD (06/19 1446)      No acute intracranial abnormality  Findings were directly discussed with Haris Duque at 2:41 p m  Workstation performed: [id number]               Procedures  Procedures      ED Course  ED Course as of 06/19/23 1502   Mon Jun 19, 2023   1347 Procedure Note: EKG  Date/Time: 06/19/23 1:47 PM   Interpreted by: Ean Harrell   Indications / Diagnosis: Weakness  ECG reviewed by me, the ED Provider: yes   The EKG demonstrates:  Rhythm: normal sinus with occasional PACs  Intervals: normal intervals  Axis: normal axis  QRS/Blocks: normal QRS  ST Changes: No acute ST Changes, no STD/MARIE  Medical Decision Making  80-year-old male presenting to ED today for lightheadedness and bilateral lower extremity weakness  Differential diagnosis includes heat exhaustion versus [place] abnormality versus anemia versus ACS  I also think that may be potentially the patient is having weakness secondary to decreased p o  intake as well as being outside in the hot day while working outside  I did give him a box with sandwiches and snacks and also given normal saline bolus  However after he did not improve the decision was made to make the patient a stroke alert given the fact that he was still within the window for possible tPA should he have any acute blockages  I think less likely given the bilateral nature of the weakness  CTA head and neck was ordered  I discussed case with Neuro who evaluated patient at bedside  He had no large vessel occlusion    He was admitted to their service for further imaging along the stroke pathway  Patient in good condition  Amount and/or Complexity of Data Reviewed  Labs: ordered  Radiology: ordered  Risk  Prescription drug management  Decision regarding hospitalization  Disposition  Final diagnoses:   Stroke-like symptom   Dizziness   Weakness of both lower extremities     Time reflects when diagnosis was documented in both MDM as applicable and the Disposition within this note     Time User Action Codes Description Comment    6/19/2023  2:17 PM Theodore Ballard Add [R29 90] Stroke-like symptom     6/19/2023  3:01 PM Theodore Ballard Add [R42] Dizziness     6/19/2023  3:01 PM Theodore Hensen Add [R29 898] Weakness of both lower extremities       ED Disposition     ED Disposition   Admit    Condition   Stable    Date/Time   Mon Jun 19, 2023  3:01 PM    Comment   Case was discussed with Neuro resident Dr Vicenta Gonzalez and the patient's admission status was agreed to be Admission Status: inpatient status to the service of Dr Selena Fine   Follow-up Information    None         Patient's Medications   Discharge Prescriptions    No medications on file     No discharge procedures on file  PDMP Review       Value Time User    PDMP Reviewed  Yes 2/23/2022 10:59 AM Drake Diaz DO           ED Provider  Attending physically available and evaluated [facility] managed the patient along with the ED Attending      Electronically Signed by         Marylee Pennant, MD  06/19/23 0916

## 2023-06-19 NOTE — ASSESSMENT & PLAN NOTE
As per chart patient with history of schizophrenia  I reviewed listed home meds at bedside  Patient denies taking aripiprazole or risperidone

## 2023-06-20 LAB
ALBUMIN SERPL BCP-MCNC: 3 G/DL (ref 3.5–5)
ALP SERPL-CCNC: 82 U/L (ref 46–116)
ALT SERPL W P-5'-P-CCNC: 16 U/L (ref 12–78)
ANION GAP SERPL CALCULATED.3IONS-SCNC: 3 MMOL/L (ref 4–13)
AST SERPL W P-5'-P-CCNC: 20 U/L (ref 5–45)
BILIRUB SERPL-MCNC: 0.18 MG/DL (ref 0.2–1)
BUN SERPL-MCNC: 17 MG/DL (ref 5–25)
CALCIUM ALBUM COR SERPL-MCNC: 9.8 MG/DL (ref 8.3–10.1)
CALCIUM SERPL-MCNC: 9 MG/DL (ref 8.3–10.1)
CHLORIDE SERPL-SCNC: 117 MMOL/L (ref 96–108)
CHOLEST SERPL-MCNC: 124 MG/DL
CO2 SERPL-SCNC: 24 MMOL/L (ref 21–32)
CREAT SERPL-MCNC: 0.88 MG/DL (ref 0.6–1.3)
EST. AVERAGE GLUCOSE BLD GHB EST-MCNC: 123 MG/DL
GFR SERPL CREATININE-BSD FRML MDRD: 86 ML/MIN/1.73SQ M
GLUCOSE SERPL-MCNC: 88 MG/DL (ref 65–140)
HBA1C MFR BLD: 5.9 %
HDLC SERPL-MCNC: 31 MG/DL
LDLC SERPL CALC-MCNC: 71 MG/DL (ref 0–100)
MAGNESIUM SERPL-MCNC: 2 MG/DL (ref 1.6–2.6)
POTASSIUM SERPL-SCNC: 3.8 MMOL/L (ref 3.5–5.3)
PROT SERPL-MCNC: 6.4 G/DL (ref 6.4–8.4)
SODIUM SERPL-SCNC: 144 MMOL/L (ref 135–147)
TRIGL SERPL-MCNC: 109 MG/DL

## 2023-06-20 PROCEDURE — 99223 1ST HOSP IP/OBS HIGH 75: CPT | Performed by: INTERNAL MEDICINE

## 2023-06-20 PROCEDURE — 99232 SBSQ HOSP IP/OBS MODERATE 35: CPT | Performed by: PSYCHIATRY & NEUROLOGY

## 2023-06-20 PROCEDURE — 83036 HEMOGLOBIN GLYCOSYLATED A1C: CPT | Performed by: PSYCHIATRY & NEUROLOGY

## 2023-06-20 PROCEDURE — 97163 PT EVAL HIGH COMPLEX 45 MIN: CPT

## 2023-06-20 PROCEDURE — 92610 EVALUATE SWALLOWING FUNCTION: CPT

## 2023-06-20 PROCEDURE — 97167 OT EVAL HIGH COMPLEX 60 MIN: CPT

## 2023-06-20 PROCEDURE — 99223 1ST HOSP IP/OBS HIGH 75: CPT | Performed by: NURSE PRACTITIONER

## 2023-06-20 PROCEDURE — 99222 1ST HOSP IP/OBS MODERATE 55: CPT | Performed by: STUDENT IN AN ORGANIZED HEALTH CARE EDUCATION/TRAINING PROGRAM

## 2023-06-20 PROCEDURE — 80061 LIPID PANEL: CPT | Performed by: PSYCHIATRY & NEUROLOGY

## 2023-06-20 PROCEDURE — 83735 ASSAY OF MAGNESIUM: CPT | Performed by: PSYCHIATRY & NEUROLOGY

## 2023-06-20 PROCEDURE — 80053 COMPREHEN METABOLIC PANEL: CPT | Performed by: PSYCHIATRY & NEUROLOGY

## 2023-06-20 RX ADMIN — METOPROLOL TARTRATE 25 MG: 25 TABLET, FILM COATED ORAL at 10:08

## 2023-06-20 RX ADMIN — SERTRALINE HYDROCHLORIDE 100 MG: 100 TABLET ORAL at 00:31

## 2023-06-20 RX ADMIN — FINASTERIDE 5 MG: 5 TABLET, FILM COATED ORAL at 10:08

## 2023-06-20 RX ADMIN — NICOTINE 1 PATCH: 21 PATCH, EXTENDED RELEASE TRANSDERMAL at 10:08

## 2023-06-20 RX ADMIN — SERTRALINE HYDROCHLORIDE 100 MG: 100 TABLET ORAL at 22:32

## 2023-06-20 RX ADMIN — ACETAMINOPHEN 975 MG: 325 TABLET ORAL at 11:57

## 2023-06-20 RX ADMIN — ASPIRIN 81 MG: 81 TABLET, COATED ORAL at 10:08

## 2023-06-20 RX ADMIN — NITROGLYCERIN 1 PATCH: 0.4 PATCH TRANSDERMAL at 10:08

## 2023-06-20 RX ADMIN — HEPARIN SODIUM 5000 UNITS: 5000 INJECTION INTRAVENOUS; SUBCUTANEOUS at 13:25

## 2023-06-20 RX ADMIN — ACETAMINOPHEN 975 MG: 325 TABLET ORAL at 22:46

## 2023-06-20 RX ADMIN — ATORVASTATIN CALCIUM 40 MG: 40 TABLET, FILM COATED ORAL at 16:48

## 2023-06-20 RX ADMIN — HEPARIN SODIUM 5000 UNITS: 5000 INJECTION INTRAVENOUS; SUBCUTANEOUS at 06:52

## 2023-06-20 RX ADMIN — HEPARIN SODIUM 5000 UNITS: 5000 INJECTION INTRAVENOUS; SUBCUTANEOUS at 00:31

## 2023-06-20 RX ADMIN — HEPARIN SODIUM 5000 UNITS: 5000 INJECTION INTRAVENOUS; SUBCUTANEOUS at 22:32

## 2023-06-20 NOTE — PROGRESS NOTES
NEUROLOGY RESIDENCY PROGRESS NOTE     Name: Rachid Hall   Age & Sex: 71 y o  male   MRN: [id number]  [facility]   Encounter: [id number]    Rachid Hall will need follow up in in 3 months with general attending (Scheduled with Dr Julieta Elikns at [address])  He will not require outpatient neurological testing  Schedule outpatient follow up with me at [address]: This should be completed prior to removing patient from list or discharge     Pending for discharge: surgery    ASSESSMENT & PLAN     Spinal stenosis of lumbar region with neurogenic claudication  Assessment & Plan  Rachid Hall is a 71 y o  male  with pertinent history lumbar spondylosis, neurogenic claudication, CAD s/p CABG x 4, HTN, HLD, hx of PE (Not on thinners) who presented as stroke alert on 6/19/23 given acute onset worsening bilateral lower extremity weakness  Denies any urinary symptoms or saddle anesthesia  Initial NIHSS of 5 and LKW 1200  Initial BP on arrival was Blood Pressure: 110/53  As a result of lower suspicion for stroke and potential need for urgent surgery patient was determined to not be a candidate for tPA  • F/u exam:  HF strength 4-/5 R, 4/5 L  No ataxia or dysmetria noted on exam  Proprioception normal  Difficulty standing from a seated position without assistance  Romberg positive  • BP over last 24 hours: Blood Pressure: 118/88  current BP: Blood Pressure: 118/88    Imaging:  • CTH: No acute intracranial abnormality  • CTA: No significant stenosis of the cervical carotid or vertebral arteries  No high-grade intracranial stenosis, large vessel occlusion or aneurysm  • MRI brain: No MR evidence of acute ischemia  Small right mastoid effusion  • MRI L spine: Spondylotic degenerative changes again noted similar to the prior study most significantly at L3-4 and L4-5 where severe central stenosis is identified  Moderate to severe lateral recess stenosis also identified as described   Moderate central stenosis and mild bilateral foraminal narrowing noted at L2-3 with mild foraminal narrowing at L5-S1 bilaterally  • Echocardiogram: pending  • Telemetry: monitor  • LDL 71   • HBA1C 5 8    Impression: At this time history and physical along with available imaging concerning for worsening weakness in the setting of known spinal stenosis  One month ago patient strength 5/5 throughout vs today significantly week in bilateral HF  Plan:  • Appreciate neurosurgery eval and recommendations   • Plan for neurosurgery on Friday, patient and family aware and agreeable  Family able to help with recovery  • PMR on board, plan for acute post surgical rehab   • ASA on hold with anticipation to surgery    • BP goal: okay to normalize  • Statin  • Maintain glucose below 200  • Echo pending  • Neuro checks  • Monitor on telemetry   • PT/OT//PMR consults appreciated   • Call and discussed the above plan with daughter, Maricarmen Montez [facility])  Assessment & Plan  As per chart patient with history of schizophrenia  I reviewed listed home meds at bedside  Patient denies taking aripiprazole or risperidone  NARDA (acute kidney injury) ([facility] [address] )  Assessment & Plan  Possibly due to dehydration given poor PO intake date of admission  Cr 1 20 > 0 88, treated with NSS 1 L bolus, renal function assessment and lab monitoring  NARDA improved after iL bolus of NSS       Plan:  · Trend BMP    BPH (benign prostatic hyperplasia)  Assessment & Plan  On home finasteride 5 mg     Tobacco use  Assessment & Plan  Current smoker 1 PPD     Plan:  · Nicotine patch     HTN (hypertension)  Assessment & Plan  On home lisinopril 5 mg and Lopressor 25 mg     Plan:  · Normotension  · Hold lisinopril as BP stable    CAD (coronary artery disease)  Assessment & Plan  Patient is a 1 PPD smoker with hx of CAD s/p CABG x 4      Plan:  · ASA on hold with anticipation to surgery    · Cont atorvastatin 40 mg   · Echo   · Prn nitroglycerin   · Appreciate SOD for medical management       SUBJECTIVE     Patient was seen and examined  No acute events overnight  No change in weakness of the lower extremities  Denies LE sensory changes or urinary problems  MRI brain neg for stroke  L spine without changes  Discussed case with neurosurgery who plan for OR on Friday    }     Review of Systems   Constitutional: Negative for fever  Eyes: Negative for visual disturbance  Gastrointestinal: Negative for diarrhea, nausea and vomiting  Genitourinary: Negative for difficulty urinating  Musculoskeletal: Positive for gait problem  Neurological: Positive for weakness  Negative for numbness and headaches  Psychiatric/Behavioral: Negative for agitation  The patient is not nervous/anxious  OBJECTIVE     Patient ID: Daisy Kelley is a 71 y o  male  Vitals:    23 0800 23 0802 23 1008 23 1539   BP:  123/65 124/74 118/88   BP Location:    Left arm   Pulse:  79 74 70   Resp:  16  15   Temp:  98 7 °F (37 1 °C)  97 5 °F (36 4 °C)   TempSrc:    Oral   SpO2: 93% 95%  96%   Weight:          Temperature:   Temp (24hrs), Av 1 °F (36 7 °C), Min:97 5 °F (36 4 °C), Max:98 7 °F (37 1 °C)    Temperature: 97 5 °F (36 4 °C)      Physical Exam  Eyes:      Extraocular Movements: EOM normal    Neurological:      Mental Status: He is oriented to person, place, and time  Coordination: Romberg Test abnormal  Finger-Nose-Finger Test normal       Deep Tendon Reflexes:      Reflex Scores:       Bicep reflexes are 1+ on the right side and 1+ on the left side  Brachioradialis reflexes are 1+ on the right side and 1+ on the left side  Patellar reflexes are 2+ on the right side and 1+ on the left side  Neurologic Exam     Mental Status   Oriented to person, place, and time  Able to name object  Normal comprehension  Cranial Nerves     CN III, IV, VI   Extraocular motions are normal      CN V   Facial sensation intact       CN VII   Facial expression full, symmetric  Motor Exam   Muscle bulk: decreased  Overall muscle tone: normal  Right arm pronator drift: absent  Left arm pronator drift: absent    Strength   Right deltoid: 5/5  Left deltoid: 5/5  Right biceps: 5/5  Left biceps: 5/5  Right triceps: 5/5  Left triceps: 5/5  Right iliopsoas: 3/5  Left iliopsoas: 4/5  Right quadriceps: 4/5  Left quadriceps: 4/5  Right hamstrin/5  Left hamstrin/5  Right anterior tibial: 5/5  Left anterior tibial: 5/5  Right gastroc: 5/5  Left gastroc: 53+ R HF, 4- L HF     Sensory Exam   Right arm proprioception: normal  Left arm proprioception: normal  Pinprick normal      Gait, Coordination, and Reflexes     Coordination   Romberg: positive  Finger to nose coordination: normal    Tremor   Resting tremor: absent    Reflexes   Right brachioradialis: 1+  Left brachioradialis: 1+  Right biceps: 1+  Left biceps: 1+  Right patellar: 2+  Left patellar: 1+  Right plantar: equivocal  Left plantar: equivocal         LABORATORY DATA     Labs: I have personally reviewed pertinent reports  Results from last 7 days   Lab Units 23  1706 23  1420   WBC Thousand/uL  --  7 66   HEMOGLOBIN g/dL  --  12 9   HEMATOCRIT %  --  36 7   PLATELETS Thousands/uL 259 264   NEUTROS PCT %  --  73   MONOS PCT %  --  6   EOS PCT %  --  1      Results from last 7 days   Lab Units 23  0500 23  1420   SODIUM mmol/L 144 140   POTASSIUM mmol/L 3 8 3 7   CHLORIDE mmol/L 117* 116*   CO2 mmol/L 24 23   BUN mg/dL 17 16   CREATININE mg/dL 0 88 1 20   CALCIUM mg/dL 9 0 8 7   ALK PHOS U/L 82  --    ALT U/L 16  --    AST U/L 20  --      Results from last 7 days   Lab Units 23  0500   MAGNESIUM mg/dL 2 0          Results from last 7 days   Lab Units 23  1420   INR  1 04   PTT seconds 24               IMAGING & DIAGNOSTIC TESTING     Radiology Results: I have personally reviewed pertinent reports        MRI lumbar spine wo contrast   Final Result by Shaka Barbour Zenon Carpio DO (06/20 9304)      Spondylotic degenerative changes again noted similar to the prior study most significantly at L3-4 and L4-5 where severe central stenosis is identified  Moderate to severe lateral recess stenosis also identified as described  Moderate central stenosis and mild bilateral foraminal narrowing noted at L2-3 with mild foraminal narrowing at L5-S1 bilaterally  Workstation performed: [id number]         MRI brain wo contrast   Final Result by Falguni Pierce MD (06/20 0020)      No MR evidence of acute ischemia  Small right mastoid effusion  Workstation performed: [id number]         CTA stroke alert (head/neck)   Final Result by MANJULA King MD (06/19 1445)      No significant stenosis of the cervical carotid or vertebral arteries   No high-grade intracranial stenosis, large vessel occlusion or aneurysm  Findings were directly discussed with Charu Escalante at 2:41 p m  Workstation performed: [id number]         CT stroke alert brain   Final Result by MANJULA King MD (06/19 1446)      No acute intracranial abnormality  Findings were directly discussed with Charu Escalante at 2:41 p m  Workstation performed: [id number]             Other Diagnostic Testing: I have personally reviewed pertinent reports        ACTIVE MEDICATIONS     Current Facility-Administered Medications   Medication Dose Route Frequency   • acetaminophen (TYLENOL) tablet 975 mg  975 mg Oral Q6H PRN   • atorvastatin (LIPITOR) tablet 40 mg  40 mg Oral QPM   • finasteride (PROSCAR) tablet 5 mg  5 mg Oral Daily   • heparin (porcine) subcutaneous injection 5,000 Units  5,000 Units Subcutaneous Q8H [facility]   • metoprolol tartrate (LOPRESSOR) tablet 25 mg  25 mg Oral Daily   • nicotine (NICODERM CQ) 21 mg/24 hr TD 24 hr patch 1 patch  1 patch Transdermal Daily   • nitroglycerin (NITRODUR) 0 4 mg/hr TD 24 hr patch  1 patch Transdermal Daily   • sertraline (ZOLOFT) tablet 100 mg  100 mg Oral HS       Prior to Admission medications    Medication Sig Start Date End Date Taking? Authorizing Provider   Acetaminophen Extra Strength 500 MG tablet  5/12/22   Historical Provider, MD   ARIPiprazole (ABILIFY) 5 mg tablet Take 5 mg by mouth daily    Historical Provider, MD   Arthritis Pain Relief 650 MG CR tablet take 2 tablets by mouth twice a day if needed 3/15/23   Historical Provider, MD   aspirin 81 MG tablet Take 81 mg by mouth daily  Historical Provider, MD   Aspirin Low Dose 81 MG EC tablet  3/7/23   Historical Provider, MD   atorvastatin (LIPITOR) 40 mg tablet Take 40 mg by mouth daily    Historical Provider, MD   erythromycin (ILOTYCIN) ophthalmic ointment Place a 1/2 inch ribbon of ointment into the lower eyelid  12/4/20   Tavo Stewart MD   finasteride (PROSCAR) 5 mg tablet Take 5 mg by mouth daily 5/7/23   Historical Provider, MD   gabapentin (NEURONTIN) 100 mg capsule Take 3 capsules (300 mg total) by mouth daily at bedtime 5/24/22   SINGH Romeo   hydrOXYzine HCL (ATARAX) 25 mg tablet Take 1 tablet twice a day by oral route as needed for 30 days      Historical Provider, MD   lisinopril (ZESTRIL) 5 mg tablet  5/12/22   Historical Provider, MD   metoprolol tartrate (LOPRESSOR) 25 mg tablet Take 25 mg by mouth daily      Historical Provider, MD   naproxen (NAPROSYN) 250 mg tablet take 1 tablet by mouth twice a day if needed for 10 days 2/3/22   Historical Provider, MD   nicotine (NICODERM CQ) 21 mg/24 hr TD 24 hr patch apply 1 patch to CLEAN, DRY, AND INTACT SKIN once daily    Historical Provider, MD   nitroglycerin (NITRODUR) 0 4 mg/hr Place 1 patch on the skin 2 (two) times a day as needed 6/5/15   Historical Provider, MD   RA Melatonin 3 MG take 1 tablet by mouth EVERY DAY AT BEDTIME FOR 90 DAYS 2/15/23   Historical Provider, MD   risperiDONE (RisperDAL) 0 5 mg tablet  3/23/22   Historical Provider, MD   sertraline (ZOLOFT) 100 mg tablet take 1 tablet by mouth EVERY DAY IN THE EVENING FOR 90 DAYS 3/8/23   Historical Provider, MD   triamcinolone (KENALOG) 0 1 % ointment APPLY A THIN LAYER TO THE AFFECTED AREA(S) BY TOPICAL ROUTE TWO TIMES PER DAY AS NEEDED    Historical Provider, MD         VTE Pharmacologic Prophylaxis: Heparin  VTE Mechanical Prophylaxis: sequential compression device    ==  MD Shayan Love [facility] Neurology Residency, PGY-4

## 2023-06-20 NOTE — CASE MANAGEMENT
Case Management Assessment & Discharge Planning Note    Patient name Shiraz Velásquez  Location [address] [place] [address] [id number]  : 1950 Date 2023       Current Admission Date: 2023  Current Admission Diagnosis:CAD (coronary artery disease)   Patient Active Problem List    Diagnosis Date Noted   • Spinal stenosis of lumbar region with neurogenic claudication 2022   • Chronic pain syndrome 2022   • Lumbar radiculopathy 2022   • Lumbar spondylosis 2022   • DDD (degenerative disc disease), lumbar 2022   • Low back pain with sciatica 2022   • Closed compression fracture of first lumbar vertebra ([facility] [address] ) 2022   • Olecranon bursitis of right elbow 2022   • NARDA (acute kidney injury) ([facility] [address] ) 2018   • Sessile colonic polyp 04/10/2017   • Subclinical hypothyroidism 2017   • Chest pain 2016   • CAD (coronary artery disease) 2016   • S/P CABG x 4 2016   • Pre-diabetes 2016   • HLD (hyperlipidemia) 2016   • HTN (hypertension) 2016   • Tobacco use 2016   • Hx pulmonary embolism 2016   • BPH (benign prostatic hyperplasia) 2016   • Osteoarthritis 2013   • Schizophrenia ([address] ) 2013      LOS (days): 1  Geometric Mean LOS (GMLOS) (days): 2 80  Days to GMLOS:2     OBJECTIVE:    Risk of Unplanned Readmission Score: 11 69         Current admission status: Inpatient       Preferred Pharmacy:   [id number], [facility], [address] [place] [address] [address]  [address] [place] [address]  Phone: [phone] Fax: [address], [address] [address]  Phone: [phone] Fax: [phone]    Primary Care Provider: SINGH Mcgrath    Primary Insurance: [facility]  Secondary Insurance:     ASSESSMENT:  [address] Proxies    There are no active Health Care Proxies on file  Readmission Root Cause  30 Day Readmission: No    Patient Information  Admitted from[de-identified] Home  Mental Status: Alert  During Assessment patient was accompanied by: Not accompanied during assessment  Assessment information provided by[de-identified] Patient  Primary Caregiver: Self  Support Systems: [id number] of Residence: [address] [place] [address] do you live in?: General acute hospital entry access options   Select all that apply : Stairs  Number of steps to enter home : One Flight  Do the steps have railings?: Yes  Type of Current Residence: Apartment  Floor Level: 2  Upon entering residence, is there a bedroom on the main floor (no further steps)?: Yes  A bedroom is located on the following floor levels of residence (select all that apply):: 2nd Floor  Upon entering residence, is there a bathroom on the main floor (no further steps)?: Yes  In the last 12 months, was there a time when you were not able to pay the mortgage or rent on time?: No  In the last 12 months, how many places have you lived?: 2  In the last 12 months, was there a time when you did not have a steady place to sleep or slept in a shelter (including now)?: No  Homeless/housing insecurity resource given?: N/A  Living Arrangements: Lives Alone  Is patient a ?: No    Activities of Daily Living Prior to Admission  Functional Status: Independent  Completes ADLs independently?: Yes  Ambulates independently?: Yes  Does patient use assisted devices?: No  Does patient currently own DME?: Yes  What DME does the patient currently own?: Rollator, [address], Walker, Electric Wheelchair  Does patient have a history of Outpatient Therapy (PT/OT)?: No  Does the patient have a history of Short-Term Rehab?: No  Does patient have a history of HHC?: No  Does patient currently have [address]?: No         Patient Information Continued  Income Source: Unemployed  Does patient have prescription coverage?: Yes  Within the past 12 months, you worried that your food would run out before you got the money to buy more : Never true  Within the past 12 months, the food you bought just didn't last and you didn't have money to get more : Never true  Food insecurity resource given?: N/A  Does patient receive dialysis treatments?: No  Does patient have a history of substance abuse?: No  Does patient have a history of Mental Health Diagnosis?: Yes (pt is diagnosed with schizophrenia)  Is patient receiving treatment for mental health?: Yes  Has patient received inpatient treatment related to mental health in the last 2 years?: No         Means of Transportation  Means of Transport to Appts[de-identified] Drives Self  In the past 12 months, has lack of transportation kept you from medical appointments or from getting medications?: No  In the past 12 months, has lack of transportation kept you from meetings, work, or from getting things needed for daily living?: No  Was application for public transport provided?: N/A        DISCHARGE DETAILS:    Discharge planning discussed with[de-identified] discharge plan TBD  Freedom of Choice: Yes  Comments - Freedom of Choice: discussed [place] of Choice with pt  CM contacted family/caregiver?: No- see comments (pt stated emergency contact did not need to be called at this time)  Were Treatment Team discharge recommendations reviewed with patient/caregiver?: No (discharge TBD once medically stable)  Did patient/caregiver verbalize understanding of patient care needs?: No (discharge TBD)  Were patient/caregiver advised of the risks associated with not following Treatment Team discharge recommendations?: No- see comments (discharge TBD)    Contacts  Relationship to Patient[de-identified]  ([place] is pt's ex wife)         DME Referral Provided  Referral made for DME?: No         Would you like to participate in our [facility] [address] service program?  : No - Declined       Additional Comments: SW resident spoke with pt at bedside, using Language Line interpreters Alesia Cintron #[id number] and Andrew Chakraborty #[id number]), regarding diagnoses at time of admission  SW resident also spoke with pt regarding how frequent his falls are  Pt states he has fallen about 15 times over the course of a few years (pt could not recall exact amount of years)  Pt rents a 2nd floor apartment, lives alone, and has support once discharged from hospital  Pt states he has a neighbor who is a nurse who has volunteered to care for him once discharged, if needed  Pt also has a daughter Lisa Marinelli) and another friend Tilda Phoenix) who are able to care for him if needed   resident explored pt nutrition and if he is able to grocery shop and cook for himself  Pt states he grocery shops and cooks various foods for himself but just on the day of entering the ED, he did not eat and only had coffee with his cigarettes  Pt has DME (walker, cane, rollinator, and electric wheelchair)  Electric wheelchair is not used [place]ase [place]  Pt confirmed that he does see his psychiatrist and therapist monthly for med management of schizophrenia diagnosis  Pt states he takes his medications as prescribed

## 2023-06-20 NOTE — PLAN OF CARE
"  Problem: PHYSICAL THERAPY ADULT  Goal: Performs mobility at highest level of function for planned discharge setting  See evaluation for individualized goals  Description: Treatment/Interventions: Functional transfer training, LE strengthening/ROM, Elevations, Therapeutic exercise, Endurance training, Patient/family training, Equipment eval/education, Bed mobility, Gait training, Spoke to nursing, Spoke to case management, OT  Equipment Recommended: Meeta Samuels       See flowsheet documentation for full assessment, interventions and recommendations  Note: Prognosis: Good  Problem List: Decreased strength, Decreased endurance, Impaired balance, Decreased mobility, Pain, Orthopedic restrictions  Assessment: Pt is a 71 y o  male [facility] with b/l LE weakness and dizziness  Active problems include spinal stenosis of lumbar region with neurogenic claudication  Per chart review \"He was deemed a surgical candidate by Neurosurgery  However, the surgery was cancelled given patient lives on second floor and it would be hard for him to ambulate at home\"  Other active problems include Cad,  HTN, schizophrenia, BPH, tobacco use, HLD, pre-diabetes  Pt has the following comorbidities which affect their treatment: active problems as well as personal factors including living alone with stairs to access home  Pt has a high complexity clinical presentation due to Ongoing medical management for primary dx, Increased reliance on more restrictive AD compared to baseline, Decreased activity tolerance compared to baseline, Fall risk, Increased assistance needed from caregiver at current time, Ongoing telemetry monitoring, Trending lab values, Diagnostic imaging pending, Continuous pulse oximetry monitoring  , and PMH  PT was consulted to evaluate pt's functional mobility and discharge needs  Upon evaluation, patient required S for bed mobility, S for STS transfers, minAx1 for ambulation   Pt's functional impairments " include: impaired strength, balance, endurance, activity tolerance, and mobility  At conclusion of eval, pt remained seated in chair with chair alarm, phone, call bell, and all other personal needs within reach  Pt would benefit from skilled PT to address their functional mobility limitations  The patient's AM-PAC Basic Mobility Inpatient Short Form Raw Score is 17  A Raw score of greater than 16 suggests the patient may benefit from discharge to home  Please also refer to the recommendation of the Physical Therapist for safe discharge planning  However pt currently functioning well below his baseline and would benefit from inpatient rehab until further progress made for safe dc home alone  D/C recommendations are rehab pending progress or increased support  Barriers to Discharge: Inaccessible home environment, Decreased caregiver support     PT Discharge Recommendation: (S) Post acute rehabilitation services (pending progress and increased home support)    See flowsheet documentation for full assessment

## 2023-06-20 NOTE — UTILIZATION REVIEW
Initial Clinical Review    Admission: Date/Time/Statement:   Admission Orders (From admission, onward)     Ordered        06/19/23 1512  INPATIENT ADMISSION  Once                      Orders Placed This Encounter   Procedures   • INPATIENT ADMISSION     Standing Status:   Standing     Number of Occurrences:   1     Order Specific Question:   Level of Care     Answer:   Med Surg [16]     Order Specific Question:   Estimated length of stay     Answer:   More than 2 Midnights     Order Specific Question:   Certification     Answer:   I certify that inpatient services are medically necessary for this patient for a duration of greater than two midnights  See H&P and MD Progress Notes for additional information about the patient's course of treatment  ED Arrival Information     Expected   -    Arrival   6/19/2023 13:10    Acuity   Urgent            Means of arrival   Ambulance    Escorted by   [facility]    Service   Neurology    Admission type   Emergency            Arrival complaint   dizzi           Chief Complaint   Patient presents with   • Medical Problem     Per EMS pt was fine all morning around 12ish pt started getting super shakey having generalized weakness  Initial Presentation: 71 y o  male presents to ed from home via ems for evaluation and treatment of dizziness, shakiness and weakness  LKN 12 NOON  PMHX: CAD, HTN, PE  Clinical assessment significant for generalized bilateral lower extremity weakness  He has effort against gravity but cannot lift his legs  Ekg nsr occasional Pacs  He had been working outside prior to start of symptoms  Initially treated with iv ns bolus for possible dehydration but no improvement in lower extremity weakness  Stroke alert called because he is within tPA window  Imaging without acute finding  Received aspirin and  sq heparin  Admit to inpatient med surg    Date: 6-20-23    Day 2: inpatient med surg  MRI shows severe lumbar stenosis   Neuro surgery consulted  He has been scheduled for decompression in October 2022 but he cancelled it due to a social situation  Lumbar stenosis is worsening  He had been changing a tire prior to current event  BLLE strength 4 5  decreased bilateral patellar reflexes, subjective diminished sensation  Dizziness persists  Eduardo pending  Risk stratification for L3-L4 L4-L5 decompression on 6-23  Date: 6- 21-23  Day 3: Has surpassed a 2nd midnight with active treatments and services for surgical treatment of lumbar canal stenosis when medically optimized  ED Triage Vitals   06/19/23 1320 06/19/23 1320 06/19/23 1320 06/19/23 1320 06/19/23 1320   98 1 °F (36 7 °C) 95 20 110/53 98 %      Oral Monitor         No Pain          06/19/23 72 3 kg (159 lb 6 3 oz)     Additional Vital Signs:     Patient Vitals for the past 24 hrs:   BP Temp Temp src Pulse Resp SpO2   06/20/23 1539 118/88 97 5 °F (36 4 °C) Oral 70 15 96 %   06/20/23 1008 124/74 -- -- 74 -- --   06/20/23 0802 123/65 98 7 °F (37 1 °C) -- 79 16 95 %   06/20/23 0800 -- -- -- -- -- 93 %   06/19/23 1800 131/72 -- -- 69 20 96 %   06/19/23 1700 112/60 -- -- 69 20 96 %   06/19/23 1609 104/58 -- -- 76 20 98 %     Pertinent Labs/Diagnostic Test Results:     MRI lumbar spine wo contrast   Final  (06/20 0819)      Spondylotic degenerative changes again noted similar to the prior study most significantly at L3-4 and L4-5 where severe central stenosis is identified  Moderate to severe lateral recess stenosis also identified as described  Moderate central stenosis and mild bilateral foraminal narrowing noted at L2-3 with mild foraminal narrowing at L5-S1 bilaterally  MRI brain wo contrast   Final (06/20 0020)      No MR evidence of acute ischemia  Small right mastoid effusion              CTA stroke alert (head/neck)   Final (06/19 1445)      No significant stenosis of the cervical carotid or vertebral arteries   No high-grade intracranial stenosis, large vessel occlusion or aneurysm  CT stroke alert brain   Final (06/19 1446)      No acute intracranial abnormality              Results from last 7 days   Lab Units 06/19/23  1706 06/19/23  1420   WBC Thousand/uL  --  7 66   HEMOGLOBIN g/dL  --  12 9   HEMATOCRIT %  --  36 7   PLATELETS Thousands/uL 259 264   NEUTROS ABS Thousands/µL  --  5 57         Results from last 7 days   Lab Units 06/20/23  0500 06/19/23  1420   SODIUM mmol/L 144 140   POTASSIUM mmol/L 3 8 3 7   CHLORIDE mmol/L 117* 116*   CO2 mmol/L 24 23   ANION GAP mmol/L 3* 1*   BUN mg/dL 17 16   CREATININE mg/dL 0 88 1 20   EGFR ml/min/1 73sq m 86 60   CALCIUM mg/dL 9 0 8 7   MAGNESIUM mg/dL 2 0  --      Results from last 7 days   Lab Units 06/20/23  0500   AST U/L 20   ALT U/L 16   ALK PHOS U/L 82   TOTAL PROTEIN g/dL 6 4   ALBUMIN g/dL 3 0*   TOTAL BILIRUBIN mg/dL 0 18*     Results from last 7 days   Lab Units 06/19/23  1418   POC GLUCOSE mg/dl 153*     Results from last 7 days   Lab Units 06/20/23  0500 06/19/23  1420   GLUCOSE RANDOM mg/dL 88 158*       Results from last 7 days   Lab Units 06/19/23  1706 06/19/23  1420   HS TNI 0HR ng/L  --  6   HS TNI 2HR ng/L 6  --    HSTNI D2 ng/L 0  --          Results from last 7 days   Lab Units 06/19/23  1420   PROTIME seconds 13 8   INR  1 04   PTT seconds 24       ED Treatment:   Medication Administration from 06/19/2023 1310 to 06/19/2023 2033       Date/Time Order Dose Route Action     06/19/2023 1420 EDT sodium chloride 0 9 % bolus 1,000 mL 1,000 mL Intravenous New Bag     06/19/2023 1712 EDT nicotine (NICODERM CQ) 21 mg/24 hr TD 24 hr patch 1 patch 1 patch Transdermal Medication Applied     06/19/2023 1712 EDT atorvastatin (LIPITOR) tablet 40 mg 40 mg Oral Given     06/19/2023 1712 EDT aspirin tablet 325 mg 325 mg Oral Given     06/19/2023 1712 EDT heparin (porcine) subcutaneous injection 5,000 Units 5,000 Units Subcutaneous Given        Past Medical History:   Diagnosis Date   • BPH (benign prostatic hyperplasia)    • Coronary artery disease    • HTN (hypertension)    • Hx pulmonary embolism    • Hyperlipidemia    • Hypertension    • Prediabetes    • Tobacco use      Present on Admission:  • CAD (coronary artery disease)  • HLD (hyperlipidemia)  • HTN (hypertension)  • Tobacco use  • Schizophrenia ([facility] )  • Spinal stenosis of lumbar region with neurogenic claudication  • BPH (benign prostatic hyperplasia)  • Pre-diabetes      Admitting Diagnosis:     Dizziness [R42]  Heat exhaustion [T67  5XXA]  Osteoarthritis [M19 90]  Schizophrenia ([facility] ) [F20 9]  HTN (hypertension) [I10]  CAD (coronary artery disease) [I25 10]  HLD (hyperlipidemia) [E78 5]  Pre-diabetes [R73 03]  S/P CABG x 4 [Z95 1]  BPH (benign prostatic hyperplasia) [N40 0]  Spinal stenosis of lumbar region with neurogenic claudication [M48 062]  Tobacco use [Z72 0]  Stroke-like symptom [R29 90]  Weakness of both lower extremities [R29 898]    Age/Sex: 71 y o  male    Scheduled Medications:  atorvastatin, 40 mg, Oral, QPM  finasteride, 5 mg, Oral, Daily  heparin (porcine), 5,000 Units, Subcutaneous, Q8H [facility]  metoprolol tartrate, 25 mg, Oral, Daily  nicotine, 1 patch, Transdermal, Daily  nitroglycerin, 1 patch, Transdermal, Daily  sertraline, 100 mg, Oral, HS      Continuous IV Infusions:     PRN Meds:  acetaminophen, 975 mg, Oral, Q6H PRN        IP CONSULT TO NEUROLOGY  IP CONSULT TO PHYSICAL MEDICINE REHAB  IP CONSULT TO CASE MANAGEMENT  IP CONSULT TO NUTRITION SERVICES  IP CONSULT TO NEUROSURGERY  IP CONSULT TO SOD    Network Utilization Review Department  ATTENTION: Please call with any questions or concerns to [phone] and carefully listen to the prompts so that you are directed to the right person  All voicemails are confidential   Marcelino Calvert all requests for admission clinical reviews, approved or denied determinations and any other requests to dedicated fax number below belonging to the campus where the patient is receiving treatment   List of dedicated fax numbers for the Facilities:  FACILITY NAME UR FAX NUMBER   ADMISSION DENIALS (Administrative/Medical Necessity) [phone]   [address]  (Maternity/NICU/Pediatrics) [phone]   6 Tamara Alvarez [address] [phone]   [address] [place] [id number] [place] [address] [address] [facility] [place] 134 815 [facility] [phone]

## 2023-06-20 NOTE — CONSULTS
Spinal stenosis of lumbar region with neurogenic claudication  Assessment & Plan  Presenting with BLE weakness as well as room-spinning sensation and shakiness  Most recent MRI L spine 6/2023 showed worsening L3-L4, L4-L% stenosis  He was deemed a surgical candidate by Neurosurgery  However, the surgery was cancelled given patient lives on second floor and it would be hard for him to ambulate at home  His lower extremity weakness is likely related to this but does not explain his vertigo symptoms  CTA and CTH negative for acute pathology  Patient loaded with ASA  Patient not given tPA based on clinic picture but also possible need for acute surgical intervention with NSGY  Admitted on stroke pathway with Neurology primary  MRI brain with no acute ischemia  MRI LS pending  Echo pending  A1c, lipids pending  Permissive HTN for 24 h per Primary team  Glucose control BG < 200; glucose on ; add SSI if needed to maintain goal BG  Continue ASA 81 qd and atorvastatin 40 mg qd  Patient noted to have PACs/PVCs on auscultation of heart sounds - continue to monitor on telemetry and correlate with any events  PT/OT  Tylenol PRN for pain  Avoid gabapentin - which is on patient's home medication list; he says this causes him to have suicidal thoughts    CAD (coronary artery disease)  Assessment & Plan  S/p CABG x4  Loaded with ASA on presentation  Continue home ASA 81  Continue home statin 40 mg qd  Continue Lopressor 25 mg qd as long as BP tolerates    HTN (hypertension)  Assessment & Plan  BP well controlled at this time  Per Primary team, allow for permissive HTN for 24 hrs  Holding home lisinopril  Continue home Lopressor 25 mg qd as BP tolerates    Schizophrenia ([facility] )  Assessment & Plan  Patient's home medication list includes Abilify, Atarax, Risperdal, and Zoloft  Reviewed medications with patient and he reports of these, he only takes Zoloft    Continue Zoloft 100 mg qd  Avoid gabapentin - which is on patient's home medication list; he says this causes him to have suicidal thoughts    BPH (benign prostatic hyperplasia)  Assessment & Plan  Continue home finasteride 5 mg qd    Tobacco use  Assessment & Plan  1 PPD smoker  Continue nicotine patch    HLD (hyperlipidemia)  Assessment & Plan  Last lipid panel in 2021  Continue atorvastatin 40 mg qd  Recheck lipid panel as per stroke pathway    Pre-diabetes  Assessment & Plan  Last A1c recently 5 8  Follow up A1c  Goal BG <200, can add SSI if needed to maintain goal BG      VTE Pharmacologic Prophylaxis: Heparin  VTE Mechanical Prophylaxis: sequential compression device    HISTORY OF PRESENT ILLNESS   Reason for Admission / Principal Problem: Spinal stenosis of lumbar region with neurogenic claudication, stroke pathway  Reason for Consult: chronic medical problems    Samm Armijo 71 y o  male With history of lumbar spondylosis/spinal stenosis with neurogenic claudication, CAD s/p CABG x4, HTN, HLD, tobacco use, schizophrenia, prediabetes, BPH, and PE not on blood thinners who presents today as a stroke alert  He was in his usually state of health until earlier day of admission when he experienced a strange smell  He later develop room spinning sensation, shakiness, and worsening bilateral lower extremity weakness with difficulty standing up  No urinary symptoms or saddle anesthesia  NIHSS on arrival 5  No thrombolytics were administered given his clinical picture  /53, HR 95, RR 20, T 98 1 and spO2 98% on RA on presentation  Labs significant for glucose 158  CTH and CTA negative for acute abnormalities  Patient loaded with aspirin and admitted under stroke pathway with Neurology primary  Plan for MRI brain as well as MRI lumbar spine given significant history of spinal stenosis  SUBJECTIVE   Patient seen after coming back from MRI   used  He reports feeling well   He has no acute symptomatic complaints except that he has bilateral leg "\"heaviness\"  REVIEW OF SYSTEMS   Review of Systems   Constitutional: Negative for appetite change, chills and fever  Eyes: Negative for visual disturbance  Respiratory: Negative for cough and shortness of breath  Cardiovascular: Negative for chest pain and leg swelling  Gastrointestinal: Negative for abdominal pain, constipation, diarrhea and nausea  Genitourinary: Negative for dysuria  Musculoskeletal: Positive for back pain and gait problem  Neurological: Positive for dizziness, weakness and light-headedness  Negative for headaches  Psychiatric/Behavioral: Negative for confusion  OBJECTIVE     Vitals:    23 1505 23 1609 23 1700 23 1800   BP: 109/59 104/58 112/60 131/72   BP Location:  Right arm Right arm Left arm   Pulse: 84 76 69 69   Resp: 20 20 20 20   Temp:       TempSrc:       SpO2: 98% 98% 96% 96%   Weight:          Temperature:   Temp (24hrs), Av 1 °F (36 7 °C), Min:98 1 °F (36 7 °C), Max:98 1 °F (36 7 °C)    Temperature: 98 1 °F (36 7 °C)  Intake & Output:  I/O     None        Weights:        Body mass index is 23 54 kg/m²  Weight (last 2 days)     Date/Time Weight    23 1418 72 3 (159 39)        Physical Exam  Constitutional:       General: He is not in acute distress  HENT:      Mouth/Throat:      Mouth: Mucous membranes are moist    Eyes:      Extraocular Movements: Extraocular movements intact  Conjunctiva/sclera: Conjunctivae normal    Cardiovascular:      Rate and Rhythm: Extrasystoles are present  Heart sounds: Normal heart sounds  Pulmonary:      Effort: Pulmonary effort is normal       Breath sounds: Normal breath sounds  Abdominal:      General: There is no distension  Tenderness: There is no abdominal tenderness  Musculoskeletal:      Cervical back: Neck supple  Right lower leg: No edema  Left lower leg: No edema  Skin:     General: Skin is warm and dry     Neurological:      Mental Status: He is alert and " oriented to person, place, and time  Sensory: Sensation is intact  Coordination: Coordination is intact  Comments: 4/5 BLE strength   Psychiatric:         Speech: Speech normal          Behavior: Behavior normal  Behavior is cooperative  PAST MEDICAL HISTORY     Past Medical History:   Diagnosis Date   • BPH (benign prostatic hyperplasia)    • Coronary artery disease    • HTN (hypertension)    • Hx pulmonary embolism    • Hyperlipidemia    • Hypertension    • Prediabetes    • Tobacco use      PAST SURGICAL HISTORY     Past Surgical History:   Procedure Laterality Date   • CORONARY ARTERY BYPASS GRAFT     • OH COLONOSCOPY FLX DX W/COLLJ SPEC WHEN PFRMD N/A 4/6/2017    Procedure: COLONOSCOPY;  Surgeon: Ismael Ibrahim MD;  Location: [facility]; Service: Gastroenterology     SOCIAL & FAMILY HISTORY     Social History     Substance and Sexual Activity   Alcohol Use No     Substance and Sexual Activity   Alcohol Use No        Substance and Sexual Activity   Drug Use No     Social History     Tobacco Use   Smoking Status Every Day   • Packs/day: 0 50   • Years: 56 00   • Total pack years: 28 00   • Types: Cigarettes   Smokeless Tobacco Never   Tobacco Comments    started when Pt was 6years old  Pt smokes 1/2 to 1 pack a day when stressed     Family History   Problem Relation Age of Onset   • Diabetes Mother    • Diabetes Father      LABORATORY DATA     Labs: I have personally reviewed pertinent reports      Results from last 7 days   Lab Units 06/19/23  1706 06/19/23  1420   WBC Thousand/uL  --  7 66   HEMOGLOBIN g/dL  --  12 9   HEMATOCRIT %  --  36 7   PLATELETS Thousands/uL 259 264   NEUTROS PCT %  --  73   MONOS PCT %  --  6   EOS PCT %  --  1      Results from last 7 days   Lab Units 06/19/23  1420   POTASSIUM mmol/L 3 7   CHLORIDE mmol/L 116*   CO2 mmol/L 23   BUN mg/dL 16   CREATININE mg/dL 1 20   CALCIUM mg/dL 8 7              Results from last 7 days   Lab Units 06/19/23  1420   INR  1 04 PTT seconds 24             Micro:  Lab Results   Component Value Date    URINECX No Growth <1000 cfu/mL 04/01/2021     IMAGING & DIAGNOSTIC TESTS     Imaging: I have personally reviewed pertinent reports  CT stroke alert brain    Result Date: 6/19/2023  Impression: No acute intracranial abnormality  Findings were directly discussed with Ras Brooks at 2:41 p m  Workstation performed: [id number]     CTA stroke alert (head/neck)    Result Date: 6/19/2023  Impression: No significant stenosis of the cervical carotid or vertebral arteries No high-grade intracranial stenosis, large vessel occlusion or aneurysm  Findings were directly discussed with Ras Brooks at 2:41 p m  Workstation performed: [id number]     EKG, Pathology, and Other Studies: I have personally reviewed pertinent reports  ALLERGIES   No Known Allergies  MEDICATIONS PRIOR TO ARRIVAL     Prior to Admission medications    Medication Sig Start Date End Date Taking? Authorizing Provider   Acetaminophen Extra Strength 500 MG tablet  5/12/22   Historical Provider, MD   ARIPiprazole (ABILIFY) 5 mg tablet Take 5 mg by mouth daily    Historical Provider, MD   Arthritis Pain Relief 650 MG CR tablet take 2 tablets by mouth twice a day if needed 3/15/23   Historical Provider, MD   aspirin 81 MG tablet Take 81 mg by mouth daily  Historical Provider, MD   Aspirin Low Dose 81 MG EC tablet  3/7/23   Historical Provider, MD   atorvastatin (LIPITOR) 40 mg tablet Take 40 mg by mouth daily    Historical Provider, MD   erythromycin (ILOTYCIN) ophthalmic ointment Place a 1/2 inch ribbon of ointment into the lower eyelid   12/4/20   Umang Doran MD   finasteride (PROSCAR) 5 mg tablet Take 5 mg by mouth daily 5/7/23   Historical Provider, MD   gabapentin (NEURONTIN) 100 mg capsule Take 3 capsules (300 mg total) by mouth daily at bedtime 5/24/22   SINGH Ritter   hydrOXYzine HCL (ATARAX) 25 mg tablet Take 1 tablet twice a day by oral route as needed for 30 days      Historical Provider, MD   lisinopril (ZESTRIL) 5 mg tablet  5/12/22   Historical Provider, MD   metoprolol tartrate (LOPRESSOR) 25 mg tablet Take 25 mg by mouth daily      Historical Provider, MD   naproxen (NAPROSYN) 250 mg tablet take 1 tablet by mouth twice a day if needed for 10 days 2/3/22   Historical Provider, MD   nicotine (NICODERM CQ) 21 mg/24 hr TD 24 hr patch apply 1 patch to CLEAN, DRY, AND INTACT SKIN once daily    Historical Provider, MD   nitroglycerin (NITRODUR) 0 4 mg/hr Place 1 patch on the skin 2 (two) times a day as needed 6/5/15   Historical Provider, MD   RA Melatonin 3 MG take 1 tablet by mouth EVERY DAY AT BEDTIME FOR 90 DAYS 2/15/23   Historical Provider, MD   risperiDONE (RisperDAL) 0 5 mg tablet  3/23/22   Historical Provider, MD   sertraline (ZOLOFT) 100 mg tablet take 1 tablet by mouth EVERY DAY IN THE EVENING FOR 90 DAYS 3/8/23   Historical Provider, MD   triamcinolone (KENALOG) 0 1 % ointment APPLY A THIN LAYER TO THE AFFECTED AREA(S) BY TOPICAL ROUTE TWO TIMES PER DAY AS NEEDED    Historical Provider, MD     MEDICATIONS ADMINISTERED IN LAST 24 HOURS     Medication Administration - last 24 hours from 06/19/2023 0115 to 06/20/2023 0115       Date/Time Order Dose Route Action Action by     06/19/2023 1930 EDT sodium chloride 0 9 % bolus 1,000 mL 0 mL Intravenous Stopped Ngozi Gama RN     06/19/2023 1420 EDT sodium chloride 0 9 % bolus 1,000 mL 1,000 mL Intravenous Lisandra 37 Randy Dozier RN     06/19/2023 1428 EDT iohexol (OMNIPAQUE) 350 MG/ML injection (SINGLE-DOSE) 85 mL 85 mL Intravenous Given Arvis Larisa [facility]     06/19/2023 1712 EDT nicotine (NICODERM CQ) 21 mg/24 hr TD 24 hr patch 1 patch 1 patch Transdermal Medication Applied Holly Montiel RN     06/19/2023 1712 EDT atorvastatin (LIPITOR) tablet 40 mg 40 mg Oral Given Holly Montiel RN     06/19/2023 1712 EDT aspirin tablet 325 mg 325 mg Oral Given Holly Montiel RN     06/20/2023 0031 EDT heparin "(porcine) subcutaneous injection 5,000 Units 5,000 Units Subcutaneous Given Debby Chavez RN     06/19/2023 1712 EDT heparin (porcine) subcutaneous injection 5,000 Units 5,000 Units Subcutaneous Given Radha Sandoval RN     06/20/2023 0031 EDT sertraline (ZOLOFT) tablet 100 mg 100 mg Oral Given Debby Chavez RN        CURRENT MEDICATIONS     Current Facility-Administered Medications   Medication Dose Route Frequency Provider Last Rate   • acetaminophen  975 mg Oral Q6H PRN Krystyna Miguel MD     • aspirin  81 mg Oral Daily Krystyna Miguel MD     • atorvastatin  40 mg Oral QPM Krystyna Miguel MD     • finasteride  5 mg Oral Daily Krystyna Miguel MD     • heparin (porcine)  5,000 Units Subcutaneous Q8H [facility] Krystyna Miguel MD     • metoprolol tartrate  25 mg Oral Daily Krystyna Miguel MD     • nicotine  1 patch Transdermal Daily Krystyna Miguel MD     • nitroglycerin  1 patch Transdermal Daily Krystyna Miguel MD     • sertraline  100 mg Oral HS Krystyna Miguel MD          acetaminophen, 975 mg, Q6H PRN        Portions of the record may have been created with voice recognition software  Occasional wrong word or \"sound a like\" substitutions may have occurred due to the inherent limitations of voice recognition software    Read the chart carefully and recognize, using context, where substitutions have occurred     ==  Marcia Riley, [address]  Internal Medicine Residency  "

## 2023-06-20 NOTE — ASSESSMENT & PLAN NOTE
Patient appears to be on lisinopril 5 mg daily and Lopressor 25 mg daily at home  -Has been mostly normotensive with blood pressure systolics between 241-388 mmHg  -Hold lisinopril for now

## 2023-06-20 NOTE — QUICK NOTE
Patient has no active issues requiring medical management by our team  SOD will sign off  Please reach out to our team for any questions       Lauryn Medina DO, PGY-2

## 2023-06-20 NOTE — ASSESSMENT & PLAN NOTE
Last lipid panel in 2021    · Continue atorvastatin 80 mg qd  · Repeat lipid panel showing HDL at 31, LDL at 71, triglycerides at 109

## 2023-06-20 NOTE — PHYSICAL THERAPY NOTE
Physical Therapy Evaluation    Patient Name: Jarad Garcia    XTJYA'N Date: 6/20/2023     Problem List  Active Problems:    CAD (coronary artery disease)    Pre-diabetes    HLD (hyperlipidemia)    HTN (hypertension)    Tobacco use    BPH (benign prostatic hyperplasia)    Schizophrenia ([address] )    Spinal stenosis of lumbar region with neurogenic claudication       Past Medical History  Past Medical History:   Diagnosis Date    BPH (benign prostatic hyperplasia)     Coronary artery disease     HTN (hypertension)     Hx pulmonary embolism     Hyperlipidemia     Hypertension     Prediabetes     Tobacco use         Past Surgical History  Past Surgical History:   Procedure Laterality Date    CORONARY ARTERY BYPASS GRAFT      OH COLONOSCOPY FLX DX W/COLLJ SPEC WHEN PFRMD N/A 4/6/2017    Procedure: COLONOSCOPY;  Surgeon: Hoda Wayne MD;  Location: [facility]; Service: Gastroenterology           06/20/23 1034   PT Last Visit   PT Visit Date 06/20/23   Note Type   Note type Evaluation   Pain Assessment   Pain Assessment Tool 0-10   Pain Score 3   Pain Location/Orientation Orientation: Right  (ankle)   Hospital Pain Intervention(s) Repositioned; Ambulation/increased activity; Emotional support   Restrictions/Precautions   Weight Bearing Precautions Per Order No   Other Precautions Chair Alarm; Bed Alarm;Multiple lines;Telemetry; Fall Risk  (b/l LE weakness, Argentine speaking)   Home Living   Type of Home Apartment   Home Layout Stairs to enter with rails  (3 MARIE and full flight to 2nd floor apartment)   Bathroom Shower/Tub Tub/shower unit   [place]   Additional Comments occasional cane use   Prior Function   Level of [place] Independent with ADLs; Independent with functional mobility   Lives With (S)  Alone   Receives Help From Friend(s)  (girlfriend lives in same apartment building per pt)   Falls in the last 6 months (S)  >10 General   Family/Caregiver Present No   Cognition   Overall Cognitive Status WFL   Arousal/Participation Cooperative   Orientation Level Oriented X4   Following Commands Follows one step commands without difficulty   Comments very pleasant   RLE Assessment   RLE Assessment   (4+/5 grossly)   LLE Assessment   LLE Assessment   (4+/5 grossly)   Light Touch   RLE Light Touch Grossly intact   LLE Light Touch Grossly intact   Bed Mobility   Supine to Sit 5  Supervision   Additional items HOB elevated; Increased time required;Verbal cues   Sit to Supine Unable to assess   Additional Comments seated in chair with alarm upon conclusion   Transfers   Sit to Stand 5  Supervision   Additional items Increased time required;Verbal cues   Stand to Sit 5  Supervision   Additional items Increased time required;Verbal cues   Additional Comments c RW   Ambulation/Elevation   Gait pattern Improper Weight shift;Decreased foot clearance; Short stride; Excessively slow   Gait Assistance 4  Minimal assist   Additional items Assist x 1;Verbal cues   Assistive Device Rolling walker   Distance 60'x2   Stair Management Assistance Not tested   Ambulation/Elevation Additional Comments trials ambulating with and without RW  increased unsteadiness without RW  Balance   Static Sitting Fair +   Dynamic Sitting Fair   Static Standing Fair -   Dynamic Standing Poor +   Ambulatory Poor +   Endurance Deficit   Endurance Deficit Yes   Endurance Deficit Description weakness   Activity Tolerance   Activity Tolerance Patient limited by fatigue   Medical Staff Made Aware OT MARTHA; SPT Humble   Nurse Made Aware yes-cleared to mobilize   Assessment   Prognosis Good   Problem List Decreased strength;Decreased endurance; Impaired balance;Decreased mobility;Pain;Orthopedic restrictions   Assessment Pt is a 71 y o  male admitted to hospitals on 6/19/23 with b/l LE weakness and dizziness  Active problems include spinal stenosis of lumbar region with neurogenic claudication  "Per chart review \"He was deemed a surgical candidate by Neurosurgery  However, the surgery was cancelled given patient lives on second floor and it would be hard for him to ambulate at home\"  Other active problems include Cad,  HTN, schizophrenia, BPH, tobacco use, HLD, pre-diabetes  Pt has the following comorbidities which affect their treatment: active problems as well as personal factors including living alone with stairs to access home  Pt has a high complexity clinical presentation due to Ongoing medical management for primary dx, Increased reliance on more restrictive AD compared to baseline, Decreased activity tolerance compared to baseline, Fall risk, Increased assistance needed from caregiver at current time, Ongoing telemetry monitoring, Trending lab values, Diagnostic imaging pending, Continuous pulse oximetry monitoring  , and PMH  PT was consulted to evaluate pt's functional mobility and discharge needs  Upon evaluation, patient required S for bed mobility, S for STS transfers, minAx1 for ambulation  Pt's functional impairments include: impaired strength, balance, endurance, activity tolerance, and mobility  At conclusion of eval, pt remained seated in chair with chair alarm, phone, call bell, and all other personal needs within reach  Pt would benefit from skilled PT to address their functional mobility limitations  The patient's AM-PAC Basic Mobility Inpatient Short Form Raw Score is 17  A Raw score of greater than 16 suggests the patient may benefit from discharge to home  Please also refer to the recommendation of the Physical Therapist for safe discharge planning  However pt currently functioning well below his baseline and would benefit from inpatient rehab until further progress made for safe dc home alone  D/C recommendations are rehab pending progress or increased support     Barriers to Discharge Inaccessible home environment;Decreased caregiver support   Goals   Patient Goals to improve   STG " Expiration Date 07/04/23   Short Term Goal #1 In 14 days, pt will: 1) perform bed mobility with mod I to promote functional independence  2) perform transfers to<>from all surfaces with mod I to promote safety  3) ambulate 200' with mod I and least restrictive device to promote safe return to home  4) negotiate 13 stairs with mod I to promote safe return to home  5) improve balance grades by 1/2 to promote safety with functional mobility  6) improve strength grades by 1/2 to promote safety with functional mobility  PT Treatment Day 0   Plan   Treatment/Interventions Functional transfer training;LE strengthening/ROM; Elevations; Therapeutic exercise; Endurance training;Patient/family training;Equipment eval/education; Bed mobility;Gait training;Spoke to nursing;Spoke to case management;OT   PT Frequency 3-5x/wk   Recommendation   PT Discharge Recommendation (S)  Post acute rehabilitation services  (pending progress and increased home support)   Equipment Recommended [facility] Recommended Wheeled walker   AM-PAC Basic Mobility Inpatient   Turning in Flat Bed Without Bedrails 3   Lying on Back to Sitting on Edge of Flat Bed Without Bedrails 3   Moving Bed to Chair 3   Standing Up From Chair Using Arms 3   Walk in Room 3   Climb 3-5 Stairs With Railing 2   Basic Mobility Inpatient Raw Score 17   Basic Mobility Standardized Score 39 67   Highest Level Of Mobility   -HLM Goal 5: Stand one or more mins   JH-HLM Achieved 7: Walk 25 feet or more   Modified Mcnary Scale   Modified Gonzales Scale 4   Barthel Index   Feeding 10   Bathing 5   Grooming Score 5   Dressing Score 10   Bladder Score 10   Bowels Score 10   Toilet Use Score 5   Transfers (Bed/Chair) Score 10   Mobility (Level Surface) Score 0   Stairs Score 0   Barthel Index Score 65   Christiano Guzman, PT, DPT

## 2023-06-20 NOTE — ASSESSMENT & PLAN NOTE
Possibly due to dehydration given poor PO intake date of admission  Cr 1 20 > 0 88, treated with NSS 1 L bolus, renal function assessment and lab monitoring  NARDA improved after iL bolus of NSS       Plan:  · Trend BMP

## 2023-06-20 NOTE — CONSULTS
[facility]  Consult  Name: Taya Tinajero 71 y o  male I MRN: [id number]  Unit/Bed#: [facility] 708-01 I Date of Admission: 6/19/2023   Date of Service: 6/20/2023 I Hospital Day: 1    Inpatient consult to Neurosurgery  Consult performed by: SINGH Vicente  Consult ordered by: Janine Corral MD          Assessment/Plan   Spinal stenosis of lumbar region with neurogenic claudication  Assessment & Plan  Presents with worsening lower extremity weakness and ambulatory dysfunction  · Known to our service  Scheduled for decompression with Dr Melinda Otoole in 10/2022; cancelled due to social situation  · Hx of severe lumbar spinal stenosis with neurogenic claudication  · Worsening since 6/19 when stood up from changing a tire  · Presented as stroke alert so loaded with ASA  · On exam, bilateral LE weakness 4/5  Decreased bilateral patellar reflexes  Some subjective diminished sensation  Also c/o some dizziness  Imaging:  · MRI lumbar spine wo, 6/19/2023: Spondylotic degenerative changes again noted similar to the prior study most significantly at L3-4 and L4-5 where severe central stenosis is identified  Moderate to severe lateral recess stenosis also identified as described  Moderate central stenosis and mild bilateral foraminal narrowing noted at L2-3 with mild foraminal narrowing at L5-S1 bilaterally  Plan:  · Continue to monitor neuro exam closely  · Neurology primary - appreciate recommendations and care  · JAROD pending  · MRI brain without evidence of acute ischemia  CTA, CTH negative  · Pending surgical risk stratification  · Discontinue ASA (last dose on 6/20)  · Mobilize with PT/OT  · DVT ppx: SCD's, Heparin SQ   · Plan for L3-4 and L4-5 decompression on Friday, 6/23 with Gabriel  Patient is agreeable  · Disposition: per PT/OT will need rehab  PMR consult placed  Neurosurgery will continue to follow  Please call with questions           History of Present Illness     HPI: Gail Vega is a 71 y o  male with PMH including schizophrenia, tobacco use, BPH, HTN, prediabetes, CAD (CABG x4), previous PE, who presented to [facility] emergency department on 6/19/2023 as a stroke alert  He reported that he was changing a tire on his car and when he stood up he developed a sudden acute worsening of his bilateral lower extremities and then had worsening difficulty walking  He is well-known to our service as he has a history of severe lumbar spinal stenosis  In October 2022 he was scheduled for surgery with Dr Stewart Campos for decompression of his lumbar spine however he canceled the surgery secondary to social circumstances including stairs in his home and living alone  He was admitted as a stroke alert with NIHSS of 5  There was however low suspicion for stroke in setting of history of spinal stenosis  He was admitted to the neurology service  He was loaded with aspirin  Currently he continues to endorse some subjective numbness to his legs and as well as weakness and difficulty with ambulation  He denies any bowel or bladder disruption  He states he is concerned still about undergoing surgery as he lives alone  Review of Systems   Constitutional: Negative  Negative for activity change, appetite change and fatigue  HENT: Negative for ear pain, hearing loss, nosebleeds, postnasal drip, tinnitus, trouble swallowing and voice change  Eyes: Negative for pain and visual disturbance  Respiratory: Negative for chest tightness and shortness of breath  Cardiovascular: Negative for chest pain, palpitations and leg swelling  Gastrointestinal: Negative for abdominal pain, diarrhea, nausea and vomiting  Musculoskeletal: Negative for back pain, neck pain and neck stiffness  Skin: Negative for color change and pallor  Neurological: Positive for dizziness, weakness and numbness   Negative for tremors, seizures, syncope, facial asymmetry, speech difficulty, light-headedness and headaches  Psychiatric/Behavioral: Negative for agitation, behavioral problems and confusion  Historical Information   Past Medical History:   Diagnosis Date   • BPH (benign prostatic hyperplasia)    • Coronary artery disease    • HTN (hypertension)    • Hx pulmonary embolism    • Hyperlipidemia    • Hypertension    • Prediabetes    • Tobacco use      Past Surgical History:   Procedure Laterality Date   • CORONARY ARTERY BYPASS GRAFT     • SC COLONOSCOPY FLX DX W/COLLJ SPEC WHEN PFRMD N/A 4/6/2017    Procedure: COLONOSCOPY;  Surgeon: Janeth Tracy MD;  Location: [facility]; Service: Gastroenterology     Social History     Substance and Sexual Activity   Alcohol Use No     Social History     Substance and Sexual Activity   Drug Use No     Social History     Tobacco Use   Smoking Status Every Day   • Packs/day: 0 50   • Years: 56 00   • Total pack years: 28 00   • Types: Cigarettes   Smokeless Tobacco Never   Tobacco Comments    started when Pt was 6years old    Pt smokes 1/2 to 1 pack a day when stressed     Family History   Problem Relation Age of Onset   • Diabetes Mother    • Diabetes Father        Meds/Allergies   all current active meds have been reviewed and current meds:   Current Facility-Administered Medications   Medication Dose Route Frequency   • acetaminophen (TYLENOL) tablet 975 mg  975 mg Oral Q6H PRN   • atorvastatin (LIPITOR) tablet 40 mg  40 mg Oral QPM   • finasteride (PROSCAR) tablet 5 mg  5 mg Oral Daily   • heparin (porcine) subcutaneous injection 5,000 Units  5,000 Units Subcutaneous Q8H [facility]   • metoprolol tartrate (LOPRESSOR) tablet 25 mg  25 mg Oral Daily   • nicotine (NICODERM CQ) 21 mg/24 hr TD 24 hr patch 1 patch  1 patch Transdermal Daily   • nitroglycerin (NITRODUR) 0 4 mg/hr TD 24 hr patch  1 patch Transdermal Daily   • sertraline (ZOLOFT) tablet 100 mg  100 mg Oral HS     No Known Allergies    Objective   I/O     None          Physical Exam  Constitutional:       General: He is not in acute distress  Appearance: He is well-developed  He is not diaphoretic  Eyes:      General:         Right eye: No discharge  Left eye: No discharge  Extraocular Movements: EOM normal       Conjunctiva/sclera: Conjunctivae normal       Pupils: Pupils are equal, round, and reactive to light  Pulmonary:      Effort: Pulmonary effort is normal  No respiratory distress  Abdominal:      General: Bowel sounds are normal  There is no distension  Palpations: Abdomen is soft  Tenderness: There is no abdominal tenderness  Musculoskeletal:         General: Normal range of motion  Cervical back: Normal range of motion and neck supple  Skin:     General: Skin is warm and dry  Neurological:      General: No focal deficit present  Mental Status: He is alert and oriented to person, place, and time  Mental status is at baseline  Cranial Nerves: No cranial nerve deficit  Motor: Weakness present  Coordination: Finger-Nose-Finger Test normal       Deep Tendon Reflexes: Reflexes normal       Reflex Scores:       Patellar reflexes are 1+ on the right side and 1+ on the left side  Achilles reflexes are 1+ on the right side and 1+ on the left side  Psychiatric:         Speech: Speech normal          Behavior: Behavior normal          Thought Content: Thought content normal          Judgment: Judgment normal        Neurologic Exam     Mental Status   Oriented to person, place, and time  Oriented to person  Oriented to place  Oriented to time  Oriented to year, month and date  Registration: recalls 3 of 3 objects  Attention: normal  Concentration: normal    Speech: speech is normal   Level of consciousness: alert  Knowledge: good and consistent with education  Able to name object  Cranial Nerves     CN III, IV, VI   Pupils are equal, round, and reactive to light    Extraocular motions are normal    Right pupil: Size: 3 mm  Shape: regular  Reactivity: brisk  Consensual response: intact  Accommodation: intact  Left pupil: Size: 3 mm  Shape: regular  Reactivity: brisk  Consensual response: intact  Accommodation: intact  Nystagmus: none   Diplopia: none  Conjugate gaze: present    CN V   Right facial sensation deficit: none  Left facial sensation deficit: none    CN VII   Facial expression full, symmetric  CN VIII   Hearing: intact    CN IX, X   Palate: symmetric    CN XI   Right sternocleidomastoid strength: normal  Left sternocleidomastoid strength: normal  Right trapezius strength: normal  Left trapezius strength: normal    CN XII   Tongue: not atrophic  Fasciculations: absent  Tongue deviation: none    Motor Exam   Muscle bulk: normal  Overall muscle tone: normal  Right arm pronator drift: absent  Left arm pronator drift: absentRight LE -4/5, left LE 4/5     Sensory Exam   Light touch normal    Proprioception normal      Gait, Coordination, and Reflexes     Coordination   Finger to nose coordination: normal    Tremor   Resting tremor: absent  Intention tremor: absent  Action tremor: absent    Reflexes   Right patellar: 1+  Left patellar: 1+  Right achilles: 1+  Left achilles: 1+  Right Madison: absent  Left Madison: absent  Right ankle clonus: absent  Left ankle clonus: absent+ neurogenic claudication  Some subjective numbness  Vitals:Blood pressure 124/74, pulse 74, temperature 98 7 °F (37 1 °C), resp  rate 16, weight 72 3 kg (159 lb 6 3 oz), SpO2 95 %  ,Body mass index is 23 54 kg/m²       Lab Results:   Results from last 7 days   Lab Units 06/19/23  1706 06/19/23  1420   WBC Thousand/uL  --  7 66   HEMOGLOBIN g/dL  --  12 9   HEMATOCRIT %  --  36 7   PLATELETS Thousands/uL 259 264   NEUTROS PCT %  --  73   MONOS PCT %  --  6   EOS PCT %  --  1     Results from last 7 days   Lab Units 06/20/23  0500 06/19/23  1420   POTASSIUM mmol/L 3 8 3 7   CHLORIDE mmol/L 117* 116*   CO2 mmol/L 24 23   BUN mg/dL 17 "16   CREATININE mg/dL 0 88 1 20   CALCIUM mg/dL 9 0 8 7   ALK PHOS U/L 82  --    ALT U/L 16  --    AST U/L 20  --      Results from last 7 days   Lab Units 06/20/23  0500   MAGNESIUM mg/dL 2 0         Results from last 7 days   Lab Units 06/19/23  1420   INR  1 04   PTT seconds 24     No results found for: \"TROPONINT\"  ABG:No results found for: \"PHART\", \"PDJ6QDG\", \"PO2ART\", \"RXU1PPE\", \"W7BLLHND\", \"BEART\", \"SOURCE\"    Imaging Studies: I have personally reviewed pertinent reports  and I have personally reviewed pertinent films in PACS    MRI lumbar spine wo contrast    Result Date: 6/20/2023  Impression: Spondylotic degenerative changes again noted similar to the prior study most significantly at L3-4 and L4-5 where severe central stenosis is identified  Moderate to severe lateral recess stenosis also identified as described  Moderate central stenosis and mild bilateral foraminal narrowing noted at L2-3 with mild foraminal narrowing at L5-S1 bilaterally  Workstation performed: [id number]     MRI brain wo contrast    Result Date: 6/20/2023  Impression: No MR evidence of acute ischemia  Small right mastoid effusion  Workstation performed: [id number]     CT stroke alert brain    Result Date: 6/19/2023  Impression: No acute intracranial abnormality  Findings were directly discussed with Ebenezer Parks at 2:41 p m  Workstation performed: [id number]     CTA stroke alert (head/neck)    Result Date: 6/19/2023  Impression: No significant stenosis of the cervical carotid or vertebral arteries No high-grade intracranial stenosis, large vessel occlusion or aneurysm  Findings were directly discussed with Ebenezer Parks at 2:41 p m  Workstation performed: [id number]       EKG, Pathology, and Other Studies: I have personally reviewed pertinent reports     and I have personally reviewed pertinent films in PACS    VTE Prophylaxis: Sequential compression device (Venodyne)  and Heparin    Code Status: Level 1 - Full Code  Advance " Directive and Living Will:      Power of :    POLST:      Counseling / Coordination of Care  I spent 20 minutes with the patient

## 2023-06-20 NOTE — ASSESSMENT & PLAN NOTE
Presents with worsening lower extremity weakness and ambulatory dysfunction  · Known to our service  Scheduled for decompression with Dr Maninder Gee in 10/2022; cancelled due to social situation  · Hx of severe lumbar spinal stenosis with neurogenic claudication  · Worsening since 6/19 when stood up from changing a tire  · Presented as stroke alert so loaded with ASA  · On exam, bilateral LE weakness 4/5  Decreased bilateral patellar reflexes  Some subjective diminished sensation  Also c/o some dizziness  Imaging:  · MRI lumbar spine wo, 6/19/2023: Spondylotic degenerative changes again noted similar to the prior study most significantly at L3-4 and L4-5 where severe central stenosis is identified  Moderate to severe lateral recess stenosis also identified as described  Moderate central stenosis and mild bilateral foraminal narrowing noted at L2-3 with mild foraminal narrowing at L5-S1 bilaterally  Plan:  · Continue to monitor neuro exam closely  · Neurology primary - appreciate recommendations and care  · JAROD pending  · MRI brain without evidence of acute ischemia  CTA, CTH negative  · Pending surgical risk stratification  · Discontinue ASA (last dose on 6/20)  · Mobilize with PT/OT  · DVT ppx: SCD's, Heparin SQ   · Plan for L3-4 and L4-5 decompression on Friday, 6/23 with Gabriel  Patient is agreeable  · Disposition: per PT/OT will need rehab  PMR consult placed  Neurosurgery will continue to follow  Please call with questions

## 2023-06-20 NOTE — ASSESSMENT & PLAN NOTE
Patient's home medication list includes Abilify, Atarax, Risperdal, and Zoloft  Reviewed medications with patient and he reports of these, he only takes Zoloft    · Continue Zoloft 100 mg qd  · Avoid gabapentin - which is on patient's home medication list; he says this causes him to have suicidal thoughts

## 2023-06-20 NOTE — PLAN OF CARE
Problem: MOBILITY - ADULT  Goal: Maintain or return to baseline ADL function  Description: INTERVENTIONS:  -  Assess patient's ability to carry out ADLs; assess patient's baseline for ADL function and identify physical deficits which impact ability to perform ADLs (bathing, care of mouth/teeth, toileting, grooming, dressing, etc )  - Assess/evaluate cause of self-care deficits   - Assess range of motion  - Assess patient's mobility; develop plan if impaired  - Assess patient's need for assistive devices and provide as appropriate  - Encourage maximum independence but intervene and supervise when necessary  - Involve family in performance of ADLs  - Assess for home care needs following discharge   - Consider OT consult to assist with ADL evaluation and planning for discharge  - Provide patient education as appropriate  Outcome: Progressing  Goal: Maintains/Returns to pre admission functional level  Description: INTERVENTIONS:  - Perform BMAT or MOVE assessment daily    - Set and communicate daily mobility goal to care team and patient/family/caregiver  - Collaborate with rehabilitation services on mobility goals if consulted  - Perform Range of Motion 2 times a day  - Reposition patient every 2 hours    - Dangle patient 2 times a day  - Stand patient 2 times a day  - Ambulate patient 2 times a day  - Out of bed to chair 2 times a day   - Out of bed for meals 2 times a day  - Out of bed for toileting  - Record patient progress and toleration of activity level   Outcome: Progressing     Problem: NEUROSENSORY - ADULT  Goal: Achieves stable or improved neurological status  Description: INTERVENTIONS  - Monitor and report changes in neurological status  - Monitor vital signs such as temperature, blood pressure, glucose, and any other labs ordered   - Initiate measures to prevent increased intracranial pressure  - Monitor for seizure activity and implement precautions if appropriate      Outcome: Progressing  Goal: Remains free of injury related to seizures activity  Description: INTERVENTIONS  - Maintain airway, patient safety  and administer oxygen as ordered  - Monitor patient for seizure activity, document and report duration and description of seizure to physician/advanced practitioner  - If seizure occurs,  ensure patient safety during seizure  - Reorient patient post seizure  - Seizure pads on all 4 side rails  - Instruct patient/family to notify RN of any seizure activity including if an aura is experienced  - Instruct patient/family to call for assistance with activity based on nursing assessment  - Administer anti-seizure medications if ordered    Outcome: Progressing  Goal: Achieves maximal functionality and self care  Description: INTERVENTIONS  - Monitor swallowing and airway patency with patient fatigue and changes in neurological status  - Encourage and assist patient to increase activity and self care     - Encourage visually impaired, hearing impaired and aphasic patients to use assistive/communication devices  Outcome: Progressing     Problem: MUSCULOSKELETAL - ADULT  Goal: Maintain or return mobility to safest level of function  Description: INTERVENTIONS:  - Assess patient's ability to carry out ADLs; assess patient's baseline for ADL function and identify physical deficits which impact ability to perform ADLs (bathing, care of mouth/teeth, toileting, grooming, dressing, etc )  - Assess/evaluate cause of self-care deficits   - Assess range of motion  - Assess patient's mobility  - Assess patient's need for assistive devices and provide as appropriate  - Encourage maximum independence but intervene and supervise when necessary  - Involve family in performance of ADLs  - Assess for home care needs following discharge   - Consider OT consult to assist with ADL evaluation and planning for discharge  - Provide patient education as appropriate  Outcome: Progressing  Goal: Maintain proper alignment of affected body part  Description: INTERVENTIONS:  - Support, maintain and protect limb and body alignment  - Provide patient/ family with appropriate education  Outcome: Progressing     Problem: PAIN - ADULT  Goal: Verbalizes/displays adequate comfort level or baseline comfort level  Description: Interventions:  - Encourage patient to monitor pain and request assistance  - Assess pain using appropriate pain scale  - Administer analgesics based on type and severity of pain and evaluate response  - Implement non-pharmacological measures as appropriate and evaluate response  - Consider cultural and social influences on pain and pain management  - Notify physician/advanced practitioner if interventions unsuccessful or patient reports new pain  Outcome: Progressing     Problem: INFECTION - ADULT  Goal: Absence or prevention of progression during hospitalization  Description: INTERVENTIONS:  - Assess and monitor for signs and symptoms of infection  - Monitor lab/diagnostic results  - Monitor all insertion sites, i e  indwelling lines, tubes, and drains  - Monitor endotracheal if appropriate and nasal secretions for changes in amount and color  - [place] appropriate cooling/warming therapies per order  - Administer medications as ordered  - Instruct and encourage patient and family to use good hand hygiene technique  - Identify and instruct in appropriate isolation precautions for identified infection/condition  Outcome: Progressing  Goal: Absence of fever/infection during neutropenic period  Description: INTERVENTIONS:  - Monitor WBC    Outcome: Progressing     Problem: SAFETY ADULT  Goal: Maintain or return to baseline ADL function  Description: INTERVENTIONS:  -  Assess patient's ability to carry out ADLs; assess patient's baseline for ADL function and identify physical deficits which impact ability to perform ADLs (bathing, care of mouth/teeth, toileting, grooming, dressing, etc )  - Assess/evaluate cause of self-care deficits   - Assess range of motion  - Assess patient's mobility; develop plan if impaired  - Assess patient's need for assistive devices and provide as appropriate  - Encourage maximum independence but intervene and supervise when necessary  - Involve family in performance of ADLs  - Assess for home care needs following discharge   - Consider OT consult to assist with ADL evaluation and planning for discharge  - Provide patient education as appropriate  Outcome: Progressing  Goal: Maintains/Returns to pre admission functional level  Description: INTERVENTIONS:  - Perform BMAT or MOVE assessment daily    - Set and communicate daily mobility goal to care team and patient/family/caregiver  - Collaborate with rehabilitation services on mobility goals if consulted  - Perform Range of Motion 2 times a day  - Reposition patient every 2 hours    - Dangle patient 2 times a day  - Stand patient 2 times a day  - Ambulate patient 2 times a day  - Out of bed to chair 2 times a day   - Out of bed for meals 2 times a day  - Out of bed for toileting  - Record patient progress and toleration of activity level   Outcome: Progressing  Goal: Patient will remain free of falls  Description: INTERVENTIONS:  -  Assess patient's ability to carry out ADLs; assess patient's baseline for ADL function and identify physical deficits which impact ability to perform ADLs (bathing, care of mouth/teeth, toileting, grooming, dressing, etc )  - Assess/evaluate cause of self-care deficits   - Assess range of motion  - Assess patient's mobility; develop plan if impaired  - Assess patient's need for assistive devices and provide as appropriate  - Encourage maximum independence but intervene and supervise when necessary  - Involve family in performance of ADLs  - Assess for home care needs following discharge   - Consider OT consult to assist with ADL evaluation and planning for discharge  - Provide patient education as appropriate  Outcome: Progressing Problem: DISCHARGE PLANNING  Goal: Discharge to home or other facility with appropriate resources  Description: INTERVENTIONS:  - Identify barriers to discharge w/patient and caregiver  - Arrange for needed discharge resources and transportation as appropriate  - Identify discharge learning needs (meds, wound care, etc )  - Arrange for interpretive services to assist at discharge as needed  - Refer to Case Management Department for coordinating discharge planning if the patient needs post-hospital services based on physician/advanced practitioner order or complex needs related to functional status, cognitive ability, or social support system  Outcome: Progressing     Problem: Knowledge Deficit  Goal: Patient/family/caregiver demonstrates understanding of disease process, treatment plan, medications, and discharge instructions  Description: Complete learning assessment and assess knowledge base    Interventions:  - Provide teaching at level of understanding  - Provide teaching via preferred learning methods  Outcome: Progressing

## 2023-06-20 NOTE — SPEECH THERAPY NOTE
Speech-Language Pathology Bedside Swallow Evaluation      Patient Name: Francesca Landaverde    BUWEK'D Date: 6/20/2023     Problem List  Active Problems:    CAD (coronary artery disease)    Pre-diabetes    HLD (hyperlipidemia)    HTN (hypertension)    Tobacco use    BPH (benign prostatic hyperplasia)    Schizophrenia ([address] )    Spinal stenosis of lumbar region with neurogenic claudication      Past Medical History  Past Medical History:   Diagnosis Date   • BPH (benign prostatic hyperplasia)    • Coronary artery disease    • HTN (hypertension)    • Hx pulmonary embolism    • Hyperlipidemia    • Hypertension    • Prediabetes    • Tobacco use        Past Surgical History  Past Surgical History:   Procedure Laterality Date   • CORONARY ARTERY BYPASS GRAFT     • ID COLONOSCOPY FLX DX W/COLLJ SPEC WHEN PFRMD N/A 4/6/2017    Procedure: COLONOSCOPY;  Surgeon: Estefania Tran MD;  Location: [facility]; Service: Gastroenterology       Summary   Pt presented with functional appearing oral and pharyngeal stage swallowing skills with materials administered today  Pt is assessed with cookies and thin liquids  Pt is able to feed himself  Bite strength is adequate  Mastication is timely and efficient  Bolus formation is adequate  Transfer and swallow initiation are timely  Pt takes consecutive sips of water via straw  Strength to draw from straw is adequate  Laryngeal rise is palpated and judged to be [facility]  No overt s/s aspiration observed  Risk/s for Aspiration: low    Recommended Diet: regular diet and thin liquids   Recommended Form of Meds: whole with liquid   Aspiration precautions and swallowing strategies: upright posture, slow rate of feeding and small bites/sips  Other Recommendations: Continue frequent oral care      Current Medical Status  6/19/23:  Francesca Landaverde is a 71 y o  male  with pertinent history lumbar spondylosis, neurogenic claudication, CAD s/p CABG x 4, HTN, HLD, hx of PE (Not on thinners) who presents as a stroke alert for acute onset bilateral lower extremity weakness  Patient states that symptom onset was around noon  Patient was changing his car tire when he stood up he experienced a strange smell  He had a cigarette  When he entered his house he experienced an acute sensation of the room spinning and shakiness  Of note, he had not eaten anything to eat  Throughout the day had only had coffee and cigarettes  Around that time he experienced worsening of bilateral lower extremity weakness  He denies and urinary symptoms or saddle anesthesia  On arrival to the emergency department, /53, HR 95, RR 20 and SPO2 98% on room air  Stroke alert was called at 1418  NIHSS of 5 given for some effort against gravity in BLE and visual field deficits  HF strength 4-/5 R, 4/5 L  No ataxia or dysmetria noted on exam  Proprioception normal  Difficulty standing from a seated position without assistance  Romberg positive  CTH negative for acute intracranial abnormalities  CTA without significant vessel disease  Patient was not deemed a candidate for thrombolytics given clinical picture  In terms of his PMH, patient with a history of lumbar spondylosis with neurogenic claudication  He reports more recent increase in frequency of falls  He is followed by neurosurgery as outpatient    Patient was determined to be a candidate for L3-L5 bilateral laminectomies/foraminotomies  Surgery was cancelled giving his living situation  Patient reported he lives on a second floor and it would be hard for him to ambulate up and down stairs  Patient was last seen by neurosx on 5/19/2023  As per note, at that time patient exam 5/5 strength  Other than this, patient is a 1 PPD smoker with multiple vascular risk factors  Current Precautions:  Fall  Aspiration      Allergies:  No known food allergies    Past medical history:  Please see H&P for details    Special Studies:  Brain MRI 6/19/23:  No MR evidence of acute ischemia    Small right mastoid effusion  Social/Education/Vocational Hx:  Pt lives alone    Swallow Information   Current Risks for Dysphagia & Aspiration: none  Current Symptoms/Concerns: none  Current Diet: regular diet and thin liquids   Baseline Diet: regular diet and thin liquids     Baseline Assessment   Behavior/Cognition: alert  Speech/Language Status: able to participate in conversation and able to follow commands  Patient Positioning: upright in bed  Pain Status/Interventions/Response to Interventions:  No report of or nonverbal indications of pain  Swallow Mechanism Exam  Facial: symmetrical  Labial: WFL  Lingual: WFL  Velum: symmetrical  Mandible: adequate ROM  Dentition: limited dentition  Vocal quality:clear/adequate   Respiratory Status: on RA       Consistencies Assessed and Performance   Consistencies Administered: thin liquids and hard solids  Materials administered included water and cookies    Oral Stage: WFL  Mastication was adequate with the materials administered today  Bolus formation and transfer were functional with no significant oral residue noted  No overt s/s reduced oral control  Pharyngeal Stage: WFL  Swallow Mechanics:  Swallowing initiation appeared prompt  Laryngeal rise was palpated and judged to be within functional limits  No coughing, throat clearing, change in vocal quality or respiratory status noted today  Esophageal Concerns: none reported      Summary and Recommendations (see above)    Results Reviewed with: patient and RN     Treatment Recommended: evaluation only  No further ST warranted

## 2023-06-20 NOTE — OCCUPATIONAL THERAPY NOTE
Occupational Therapy Evaluation     Patient Name: Mary Mejía  OXJRX'J Date: 6/20/2023  Problem List  Active Problems:    CAD (coronary artery disease)    Pre-diabetes    HLD (hyperlipidemia)    HTN (hypertension)    Tobacco use    BPH (benign prostatic hyperplasia)    Schizophrenia ([address] )    Spinal stenosis of lumbar region with neurogenic claudication    Past Medical History  Past Medical History:   Diagnosis Date   • BPH (benign prostatic hyperplasia)    • Coronary artery disease    • HTN (hypertension)    • Hx pulmonary embolism    • Hyperlipidemia    • Hypertension    • Prediabetes    • Tobacco use      Past Surgical History  Past Surgical History:   Procedure Laterality Date   • CORONARY ARTERY BYPASS GRAFT     • DC COLONOSCOPY FLX DX W/COLLJ SPEC WHEN PFRMD N/A 4/6/2017    Procedure: COLONOSCOPY;  Surgeon: Luis Alberto Goyal MD;  Location: [facility]; Service: Gastroenterology         06/20/23 1000   OT Last Visit   OT Visit Date 06/20/23   Note Type   Note type Evaluation   Pain Assessment   Pain Assessment Tool 0-10   Pain Score 3   Pain Location/Orientation Location: Head   Hospital Pain Intervention(s) Repositioned; Ambulation/increased activity; Elevated; Emotional support; Rest   Restrictions/Precautions   Weight Bearing Precautions Per Order No   Other Precautions Chair Alarm; Bed Alarm;Cognitive;Telemetry; Fall Risk;Pain  (+[place] speaking)   Home Living   Type of Home Apartment   Home Layout Two level  (3STE, pt lives on 2nd floor, SO lives on first)   Bathroom Shower/Tub Tub/shower unit   [address] Walker;Cane   Prior Function   Level of [place] Independent with ADLs   Lives With (S)  Alone   Receives Help From [place])  (SO, neighbor a RN)   IADLs Family/Friend/Other provides transportation; Family/Friend/Other provides meals; Family/Friend/Other provides medication management   Falls in the last 6 months >10   Vocational Part time employment   Lifestyle   Autonomy I with ADL's/assistance with IaDL's, +SPC occassionally, SO can assist as needed  Reciprocal Relationships SO, neighbor, dtr in [employer] to Others  -part time employement   General   Family/Caregiver Present No   ADL   Eating Assistance 7  [address] 5  Supervision/Setup   [address] 4  [address]  [address]  [address]  4  Minimal Assistance   Bed Mobility   Supine to Sit 5  Supervision   Additional items Assist x 1;HOB elevated; Increased time required   Sit to Supine Unable to assess   Additional Comments pt remained seated in chair with all needs met and alarm engaged   Transfers   Sit to Stand 5  Supervision   Stand to Sit 5  Supervision   Additional Comments +RW   Functional Mobility   Functional Mobility 4  Minimal assistance   Additional Comments Min a with RW household distance functional mobiltiy B/L LE weakness, fair activity tolerance  Balance   Static Sitting Fair +   Dynamic Sitting Fair   Static Standing Fair -   Dynamic Standing Poor +   Ambulatory Poor +   Activity Tolerance   Activity Tolerance Patient limited by fatigue   Medical Staff Made Aware PT LAURA bowser due to the patient's co-morbidities, clinically unstable presentation, and present impairments which are a regression from the patient's baseline  Nurse Made Aware RN cleared pt for therapy   RUE Assessment   RUE Assessment WFL   LUE Assessment   LUE Assessment WFL   Hand Function   Gross Motor Coordination Functional   Fine Motor Coordination Functional   Vision-Basic Assessment   Current Vision Wears glasses only for reading   Perception   Inattention/Neglect Appears intact   Cognition   Arousal/Participation Alert; Responsive; Cooperative   Attention Within functional limits   Orientation Level Oriented X4   Memory (continue to assess) Following Commands Follows one step commands with increased time or repetition   Comments pt pleasant and cooperative, psych history, good attention, direction following with evaluation, continue to assess  Assessment   Assessment Pt is a 71 y o  male who was [facility] with <principal problem not specified> acute on set of B/L LE weakness and now here  with spinal stenosis of lumbar region with neurogenic claudication, schizophrenia, tobacco use, HTN , CAD  Pt's problem list also includes PMH of  has a past medical history of BPH (benign prostatic hyperplasia), Coronary artery disease, HTN (hypertension), pulmonary embolism, Hyperlipidemia, Hypertension, Prediabetes, and Tobacco use    At baseline pt was completing I with ADL's/IADL's, no AD with mobility  Pt lives alone in a 2nd floor apartment with 3STE  Currently pt requires  for overall ADLS and min a with RW for functional mobility/transfers  Pt currently presents with impairments in the following categories -steps to enter environment, difficulty performing ADLS and difficulty performing IADLS  activity tolerance, endurance, standing balance/tolerance, sitting balance/tolerance, safety , judgement , attention , sequencing , sensation , task initiation , communication and interpersonal skills  These impairments, as well as pt's fatigue, pain, decreased caregiver support and risk for falls  limit pt's ability to safely engage in all baseline areas of occupation, includingbathing, dressing, toileting, functional mobility/transfers, community mobility, laundry , driving, house maintenance, social participation  and leisure activities   The patient's raw score on the -PAC Daily Activity Inpatient Short Form is 21  A raw score of greater than or equal to 19 suggests the patient may benefit from discharge to home  Please refer to the recommendation of the Occupational Therapist for safe discharge planning    From OT standpoint, recommend STR upon D/C  OT will continue to follow to address the below stated goals  Goals   Patient Goals walk better   LTG Time Frame 10-14   Long Term Goal #1 see goals below   Plan   Treatment Interventions ADL retraining;Visual perceptual retraining;Functional transfer training;UE strengthening/ROM; Endurance training;Patient/family training; Compensatory technique education;Equipment evaluation/education; Energy conservation; Activityengagement;Continued evaluation   Goal Expiration Date 07/04/23   OT Frequency 2-3x/wk   Recommendation   OT Discharge Recommendation Post acute rehabilitation services   Equipment Recommended (TBD)   AM-PAC Daily Activity Inpatient   Lower Body Dressing 3   Bathing 3   Toileting 3   Upper Body Dressing 4   Grooming 4   Eating 4   Daily Activity Raw Score 21   Daily Activity Standardized Score (Calc for Raw Score >=11) 44 27   AM-PAC Applied Cognition Inpatient   Following a Speech/Presentation 3   Understanding Ordinary Conversation 4   Taking Medications 4   Remembering Where Things Are Placed or Put Away 4   Remembering List of 4-5 Errands 4   Taking Care of Complicated Tasks 3   Applied Cognition Raw Score 22   Applied Cognition Standardized Score 47 83   End of Consult   Patient Position at End of Consult All needs within reach;Bed/Chair alarm activated; Bedside chair   Nurse Communication Nurse aware of consult    Occupational Therapy Goals:    *Mod I with bed mobility to engage in functional tasks    *Mod I Adl's after setup with use of AE PRN  *Mod I toileting and clothing management   *Mod I functional mobility and transfers to/from all surfaces with Fair + dynamic balance and safety for participation in dynamic adls and iadl tasks   *Demonstrate good carryover with safe use of RW during functional tasks   *Assess DME needs   *Increase activity tolerance to 25-30 minutes for participation in adls and enjoyable activities  *Pt to participate in further cognitive testing with good attention and participation to assist with safe d/c recommendations  *Mod I with Simulated IADL management task  *Demonstrate good carryover of pt/family education and training with good tolerance for increased safety and independence with ADL's/ADl's  *Pt will improve standing balance to 4-5 minutes with functional tasks to increase I with toileting/transfers    *Patient will demonstrate 100% carryover of energy conservation techniques t/o functional I/ADL/leisure tasks w/o cues s/p skilled education to increase endurance during functional tasks    Cris Flor MOT, OTR/L

## 2023-06-20 NOTE — CONSULTS
PHYSICAL MEDICINE AND REHABILITATION CONSULT NOTE  Geremias Roy 71 y o  male MRN: [id number]  [facility] Encounter: [id number]    Requested by (Physician/Service): Ebenezer Parks MD  Reason for Consultation:  Assessment of rehabilitation needs    Assessment:  Rehabilitation Diagnosis:   • Lumbar spinal stenosis with neurogenic claudication  • Impaired mobility and self care  • Impaired cognition     Recommendations:  Rehabilitation Plan:  • Continue PT/OT (SLP) while on acute care  • I met with the friends and family as well as the patient today using a Pashto  and discussed as well with the neurology team   Consultation for neurosurgery pending however anticipate potential surgery sometime this week  I anticipate that even despite not having therapy scores and at this time that the patient will have functional and medical needs ongoing especially with pain management and ambulatory dysfunction and ADL deficits after his surgery and would recommend an acute inpatient rehabilitation program prior to discharging home  • Patient has a history of schizophrenia and sees a psychiatrist regularly once a month and seems to be stable at this time  • Covid-19 Testing: [facility] inpatient rehabilitation units require testing within 48 hours of all potential admissions at this time  *Re-testing is NOT required for patients recovering from COVID-19 infection if isolation has been discontinued per CDC criteria            Medical Co-morbidities Plan:  Coronary artery disease status post CABG x4 on aspirin and statin  Hypertension currently on Lopressor and holding home lisinopril  Schizophrenia on Abilify, Atarax, Risperdal and Zoloft-per patient only on Zoloft  BPH on finasteride  Tobacco abuse 1 pack/day smoker currently with nicotine patch  Dyslipidemia on atorvastatin 40 mg daily  Prediabetes  Lumbar spondylosis  Bowel plan: No documented bowel movement however short length of stay  Bladder plan: No documented voiding  DVT ppx: Heparin        History of Present Illness:  Rachid Hall is a 71 y o  male with  has a past medical history of BPH (benign prostatic hyperplasia), Coronary artery disease, HTN (hypertension), pulmonary embolism, Hyperlipidemia, Hypertension, Prediabetes, and Tobacco use     Patient presented to the [facility] on 6/19 for acute onset bilateral lower extremity wheezes and called as a stroke alert  States that the symptoms happened around noon the day of admission when he was changing his car tire he stood up and experienced a strange smell followed by room spinning and unsteadiness  He states he did not eat anything that morning and only drank coffee and had cigarettes throughout the day  At this time he did deny any urinary, bowel or saddle anesthesia symptoms  A CTh was negative for acute abnormalities and a CTA was completed without significant vessel disease and thrombolytics were not given  MRI was completed most recently on June 2, 2023 showing degenerative changes throughout the lumbar spine worse at the L3-4 level and L4-5 level with severe canal stenosis and multilevel foraminal narrowing with moderate to severe stenosis on the right L3-4 and 4-5  It was felt that some of his symptoms especially with the weakness in the legs could be related to chronic lumbar spinal stenosis with neurogenic claudication  I met with the patient and his friend and daughter today overall they have significant support network and are willing to help out as soon as he is discharging home  He denies any fever, chills, nausea, vomiting, cough, shortness of breath, diarrhea, constipation  Overall he complains he has numbness in his low back and buttocks as well as in the lateral thighs bilaterally that is causing difficulty with his walking  He has a history of many falls over the last year      Review of Systems: 10 point ROS negative except for what is noted in HPI    Function:  Prior level of function and living situation:  Patient lives in a second floor apartment and lives alone and has a neighbor who is a nurse who offered and volunteered to help them out at the time of discharge  Patient also has a daughter Soheila Bunch who is able to help out as needed additionally  Current level of function:  Physical Therapy: Pending therapies  Occupational Therapy: Pending therapies      Physical Exam:  /74   Pulse 74   Temp 98 7 °F (37 1 °C)   Resp 16   Wt 72 3 kg (159 lb 6 3 oz)   SpO2 95%   BMI 23 54 kg/m²      No intake or output data in the 24 hours ending 06/20/23 1158    Body mass index is 23 54 kg/m²  Physical Exam  Vitals reviewed  Constitutional:       General: He is not in acute distress  HENT:      Head: Normocephalic and atraumatic  Right Ear: External ear normal       Left Ear: External ear normal       Nose: Nose normal  No rhinorrhea  Mouth/Throat:      Mouth: Mucous membranes are moist       Pharynx: Oropharynx is clear  Eyes:      General: No scleral icterus  Cardiovascular:      Rate and Rhythm: Normal rate  Pulses: Normal pulses  Pulmonary:      Effort: Pulmonary effort is normal  No respiratory distress  Abdominal:      General: There is no distension  Palpations: Abdomen is soft  Musculoskeletal:      Cervical back: Normal range of motion and neck supple  Right lower leg: No edema  Left lower leg: No edema  Skin:     General: Skin is warm and dry  Neurological:      Mental Status: He is alert and oriented to person, place, and time  Comments: Generalized weakness in the lower limbs mostly 4/5 throughout mostly affecting the L4/5 myotomes    Subjective numbness throughout the bilateral lower buttocks, thighs as well as legs   Psychiatric:         Mood and Affect: Mood normal          Behavior: Behavior normal               Social History:    Social History     Socioeconomic History   • Marital status: /Civil Union     Spouse name: None   • Number of children: None   • Years of education: None   • Highest education level: None   Occupational History   • None   Tobacco Use   • Smoking status: Every Day     Packs/day: 0 50     Years: 56 00     Total pack years: 28 00     Types: Cigarettes   • Smokeless tobacco: Never   • Tobacco comments:     started when Pt was 6years old  Pt smokes 1/2 to 1 pack a day when stressed   Vaping Use   • Vaping Use: Never used   Substance and Sexual Activity   • Alcohol use: No   • Drug use: No   • Sexual activity: Not Currently   Other Topics Concern   • None   Social History Narrative   • None     Social Determinants of Health     Financial Resource Strain: Not on file   Food Insecurity: No Food Insecurity (6/20/2023)    Hunger Vital Sign    • Worried About Running Out of Food in the Last Year: Never true    • Ran Out of Food in the Last Year: Never true   Transportation Needs: No Transportation Needs (6/20/2023)    PRAPARE - Transportation    • Lack of Transportation (Medical): No    • Lack of Transportation (Non-Medical):  No   Physical Activity: Not on file   Stress: Not on file   Social Connections: Not on file   Intimate Partner Violence: Not on file   Housing Stability: Low Risk  (6/20/2023)    Housing Stability Vital Sign    • Unable to Pay for Housing in the Last Year: No    • Number of Places Lived in the Last Year: 2    • Unstable Housing in the Last Year: No        Family History:    Family History   Problem Relation Age of Onset   • Diabetes Mother    • Diabetes Father          Medications:     Current Facility-Administered Medications:   •  acetaminophen (TYLENOL) tablet 975 mg, 975 mg, Oral, Q6H PRN, Maria L Purcell MD, 975 mg at 06/20/23 1157  •  aspirin (ECOTRIN LOW STRENGTH) EC tablet 81 mg, 81 mg, Oral, Daily, Maria L Purcell MD, 81 mg at 06/20/23 1008  •  atorvastatin (LIPITOR) tablet 40 mg, 40 mg, Oral, QPM, Maria L Purcell MD, 40 mg at 06/19/23 1712  •  finasteride (PROSCAR) tablet 5 mg, 5 mg, Oral, Daily, Keyla Norton MD, 5 mg at 06/20/23 1008  •  heparin (porcine) subcutaneous injection 5,000 Units, 5,000 Units, Subcutaneous, Q8H [facility], 5,000 Units at 06/20/23 7596 **AND** [COMPLETED] Platelet count, , , Once, Keyla Norton MD  •  metoprolol tartrate (LOPRESSOR) tablet 25 mg, 25 mg, Oral, Daily, Keyla Norton MD, 25 mg at 06/20/23 1008  •  nicotine (NICODERM CQ) 21 mg/24 hr TD 24 hr patch 1 patch, 1 patch, Transdermal, Daily, Keyla Norton MD, 1 patch at 06/20/23 1008  •  nitroglycerin (NITRODUR) 0 4 mg/hr TD 24 hr patch, 1 patch, Transdermal, Daily, Keyla Norton MD, 1 patch at 06/20/23 1008  •  sertraline (ZOLOFT) tablet 100 mg, 100 mg, Oral, HS, Keyla Norton MD, 100 mg at 06/20/23 0031    Past Medical History:     Past Medical History:   Diagnosis Date   • BPH (benign prostatic hyperplasia)    • Coronary artery disease    • HTN (hypertension)    • Hx pulmonary embolism    • Hyperlipidemia    • Hypertension    • Prediabetes    • Tobacco use         Past Surgical History:     Past Surgical History:   Procedure Laterality Date   • CORONARY ARTERY BYPASS GRAFT     • MS COLONOSCOPY FLX DX W/COLLJ SPEC WHEN PFRMD N/A 4/6/2017    Procedure: COLONOSCOPY;  Surgeon: Smita Mejia MD;  Location: [facility];   Service: Gastroenterology         Allergies:     No Known Allergies        LABORATORY RESULTS:      Lab Results   Component Value Date    HGB 12 9 06/19/2023    HGB 11 7 (L) 12/31/2015    HCT 36 7 06/19/2023    HCT 34 4 (L) 12/31/2015    WBC 7 66 06/19/2023    WBC 9 28 12/31/2015     Lab Results   Component Value Date    BUN 17 06/20/2023    BUN 12 12/31/2015     12/31/2015    K 3 8 06/20/2023    K 3 6 12/31/2015     (H) 06/20/2023     12/31/2015    GLUCOSE 105 12/31/2015    CREATININE 0 88 06/20/2023    CREATININE 0 91 12/31/2015     Lab Results   Component Value Date    PROTIME 13 8 06/19/2023 PROTIME 17 4 (H) 12/31/2015    INR 1 04 06/19/2023    INR 1 48 (H) 12/31/2015        DIAGNOSTIC STUDIES: Reviewed  MRI lumbar spine wo contrast    Result Date: 6/20/2023  Impression: Spondylotic degenerative changes again noted similar to the prior study most significantly at L3-4 and L4-5 where severe central stenosis is identified  Moderate to severe lateral recess stenosis also identified as described  Moderate central stenosis and mild bilateral foraminal narrowing noted at L2-3 with mild foraminal narrowing at L5-S1 bilaterally  Workstation performed: [id number]     MRI brain wo contrast    Result Date: 6/20/2023  Impression: No MR evidence of acute ischemia  Small right mastoid effusion  Workstation performed: [id number]     CT stroke alert brain    Result Date: 6/19/2023  Impression: No acute intracranial abnormality  Findings were directly discussed with Vince Jones at 2:41 p m  Workstation performed: [id number]     CTA stroke alert (head/neck)    Result Date: 6/19/2023  Impression: No significant stenosis of the cervical carotid or vertebral arteries No high-grade intracranial stenosis, large vessel occlusion or aneurysm  Findings were directly discussed with Vince Jones at 2:41 p m  Workstation performed: [id number]       Elio Cota,   Physical Medicine and Zachary Mathias    I have spent a total time of 65 minutes on 06/20/23 in caring for this patient including Patient and family education, Counseling / Coordination of care, Documenting in the medical record, Reviewing / ordering tests, medicine, procedures  , Obtaining or reviewing history   and Communicating with other healthcare professionals

## 2023-06-20 NOTE — RESTORATIVE TECHNICIAN NOTE
Restorative Technician Note      Patient Name: Nic Aleman     Note Type: Mobility  Patient Position Upon Consult: Supine  Activity Performed: Ambulated; Dangled; Stood  Assistive Device: Other (Comment) (A x1)  Education Provided: Yes  Patient Position at End of Consult: Supine;  All needs within reach; Bed/Chair alarm activated    Tyrell RICKETTS, Restorative Technician, [employer]

## 2023-06-20 NOTE — PLAN OF CARE
Problem: NEUROSENSORY - ADULT  Goal: Achieves stable or improved neurological status  Description: INTERVENTIONS  - Monitor and report changes in neurological status  - Monitor vital signs such as temperature, blood pressure, glucose, and any other labs ordered   - Initiate measures to prevent increased intracranial pressure  - Monitor for seizure activity and implement precautions if appropriate      Outcome: Progressing     Problem: NEUROSENSORY - ADULT  Goal: Achieves maximal functionality and self care  Description: INTERVENTIONS  - Monitor swallowing and airway patency with patient fatigue and changes in neurological status  - Encourage and assist patient to increase activity and self care     - Encourage visually impaired, hearing impaired and aphasic patients to use assistive/communication devices  Outcome: Progressing     Problem: MUSCULOSKELETAL - ADULT  Goal: Maintain or return mobility to safest level of function  Description: INTERVENTIONS:  - Assess patient's ability to carry out ADLs; assess patient's baseline for ADL function and identify physical deficits which impact ability to perform ADLs (bathing, care of mouth/teeth, toileting, grooming, dressing, etc )  - Assess/evaluate cause of self-care deficits   - Assess range of motion  - Assess patient's mobility  - Assess patient's need for assistive devices and provide as appropriate  - Encourage maximum independence but intervene and supervise when necessary  - Involve family in performance of ADLs  - Assess for home care needs following discharge   - Consider OT consult to assist with ADL evaluation and planning for discharge  - Provide patient education as appropriate  Outcome: Progressing     Problem: SAFETY ADULT  Goal: Patient will remain free of falls  Description: INTERVENTIONS:  - Educate patient/family on patient safety including physical limitations  - Instruct patient to call for assistance with activity   - Consult OT/PT to assist with strengthening/mobility   - Keep Call bell within reach  - Keep bed low and locked with side rails adjusted as appropriate  - Keep care items and personal belongings within reach  - Initiate and maintain comfort rounds  - Make Fall Risk Sign visible to staff  - Initiate/Maintain bed alarm  - Obtain necessary fall risk management equipment: assistive devices as needed  - Apply yellow socks and bracelet for high fall risk patients  - Consider moving patient to room near nurses station  Outcome: Progressing Dilution (U/0.1 Cc): 2.6

## 2023-06-20 NOTE — ASSESSMENT & PLAN NOTE
Alonso Charles is a 71 y o  male  with pertinent history lumbar spondylosis, neurogenic claudication, CAD s/p CABG x 4, HTN, HLD, hx of PE (Not on thinners) who presented as stroke alert on 6/19/23 given acute onset worsening bilateral lower extremity weakness  Denies any urinary symptoms or saddle anesthesia  Initial NIHSS of 5 and LKW 1200  Initial BP on arrival was Blood Pressure: 110/53  As a result of lower suspicion for stroke and potential need for urgent surgery patient was determined to not be a candidate for tPA  • F/u exam:  HF strength 4-/5 R, 4/5 L  No ataxia or dysmetria noted on exam  Proprioception normal  Difficulty standing from a seated position without assistance  Romberg positive  • BP over last 24 hours: Blood Pressure: 136/79  current BP: Blood Pressure: 136/79     Imaging:  • CTH: No acute intracranial abnormality  • CTA: No significant stenosis of the cervical carotid or vertebral arteries  No high-grade intracranial stenosis, large vessel occlusion or aneurysm  • MRI brain: No MR evidence of acute ischemia  Small right mastoid effusion  • MRI L spine: Spondylotic degenerative changes again noted similar to the prior study most significantly at L3-4 and L4-5 where severe central stenosis is identified  Moderate to severe lateral recess stenosis also identified as described  Moderate central stenosis and mild bilateral foraminal narrowing noted at L2-3 with mild foraminal narrowing at L5-S1 bilaterally  • Echocardiogram: EF 50% with mild LA dilation   • Telemetry: monitor  • LDL 71   • HBA1C 5 8     Impression: At this time history and physical along with available imaging concerning for worsening weakness in the setting of known spinal stenosis   One month ago patient strength 5/5 throughout vs now significantly week in bilateral HF        Plan:  • S/p L3-4 and L4-5 decompression with Dr Edu Wagner 6/23  • Lumbar drain removed   • Stable for d/c to rehab per NSY   • Patient will have postop visits at 2-week for incision check  and 6 weeks with Dr Julia Morton  • Follow up with neurology as outpatient in 3 months  • On DVT ppx   • Family able to help with recovery  • PMR on board, plan for acute post surgical rehab   • Neuro checks  • PT/OT//PMR consults appreciated

## 2023-06-20 NOTE — ASSESSMENT & PLAN NOTE
Patient is a 1 PPD smoker with hx of CAD s/p CABG x 4   Complains of angina with exertion    Plan:  · Patient has small area of of ischemia on NM SPECT  · Cardiology consult  · Continue Metoprolol 25 mg BID  · Ranexa 500 mg BID for angina   · Atorvastatin to 80 mg daily  · Restart ASA 81 daily 2 weeks post op (July 8)  · Patient will re-establish with cardiology o/p after d/c

## 2023-06-21 ENCOUNTER — APPOINTMENT (INPATIENT)
Dept: NON INVASIVE DIAGNOSTICS | Facility: HOSPITAL | Age: 73
DRG: 519 | End: 2023-06-21
Payer: COMMERCIAL

## 2023-06-21 LAB
ANION GAP SERPL CALCULATED.3IONS-SCNC: 2 MMOL/L
AORTIC ROOT: 3.3 CM
AORTIC VALVE MEAN VELOCITY: 8.4 M/S
APICAL FOUR CHAMBER EJECTION FRACTION: 46 %
ASCENDING AORTA: 3.2 CM
AV AREA BY CONTINUOUS VTI: 1.7 CM2
AV AREA PEAK VELOCITY: 1.6 CM2
AV LVOT MEAN GRADIENT: 1 MMHG
AV LVOT PEAK GRADIENT: 2 MMHG
AV MEAN GRADIENT: 3 MMHG
AV PEAK GRADIENT: 6 MMHG
AV VALVE AREA: 1.65 CM2
AV VELOCITY RATIO: 0.58
BUN SERPL-MCNC: 17 MG/DL (ref 5–25)
CALCIUM SERPL-MCNC: 8.7 MG/DL (ref 8.3–10.1)
CHLORIDE SERPL-SCNC: 115 MMOL/L (ref 96–108)
CO2 SERPL-SCNC: 24 MMOL/L (ref 21–32)
CREAT SERPL-MCNC: 0.95 MG/DL (ref 0.6–1.3)
DOP CALC AO PEAK VEL: 1.24 M/S
DOP CALC AO VTI: 27.05 CM
DOP CALC LVOT AREA: 2.83 CM2
DOP CALC LVOT DIAMETER: 1.9 CM
DOP CALC LVOT PEAK VEL VTI: 15.77 CM
DOP CALC LVOT PEAK VEL: 0.72 M/S
DOP CALC LVOT STROKE INDEX: 24.5 ML/M2
DOP CALC LVOT STROKE VOLUME: 44.69
E WAVE DECELERATION TIME: 232 MS
ERYTHROCYTE [DISTWIDTH] IN BLOOD BY AUTOMATED COUNT: 14.1 % (ref 11.6–15.1)
FRACTIONAL SHORTENING: 15 (ref 28–44)
GFR SERPL CREATININE-BSD FRML MDRD: 80 ML/MIN/1.73SQ M
GLOBAL LONGITUIDAL STRAIN: -15 %
GLUCOSE SERPL-MCNC: 96 MG/DL (ref 65–140)
HCT VFR BLD AUTO: 37.4 % (ref 36.5–49.3)
HGB BLD-MCNC: 12.4 G/DL (ref 12–17)
INTERVENTRICULAR SEPTUM IN DIASTOLE (PARASTERNAL SHORT AXIS VIEW): 1 CM
INTERVENTRICULAR SEPTUM: 1 CM (ref 0.6–1.1)
LAAS-AP2: 27.3 CM2
LAAS-AP4: 23.7 CM2
LEFT ATRIUM SIZE: 4.1 CM
LEFT INTERNAL DIMENSION IN SYSTOLE: 4.6 CM (ref 2.1–4)
LEFT VENTRICLE DIASTOLIC VOLUME (MOD BIPLANE): 160 ML
LEFT VENTRICLE SYSTOLIC VOLUME (MOD BIPLANE): 85 ML
LEFT VENTRICULAR INTERNAL DIMENSION IN DIASTOLE: 5.4 CM (ref 3.5–6)
LEFT VENTRICULAR POSTERIOR WALL IN END DIASTOLE: 1 CM
LEFT VENTRICULAR STROKE VOLUME: 45 ML
LV EF: 47 %
LVSV (TEICH): 45 ML
MCH RBC QN AUTO: 31.6 PG (ref 26.8–34.3)
MCHC RBC AUTO-ENTMCNC: 33.2 G/DL (ref 31.4–37.4)
MCV RBC AUTO: 95 FL (ref 82–98)
MV E'TISSUE VEL-LAT: 9 CM/S
MV E'TISSUE VEL-SEP: 7 CM/S
MV PEAK A VEL: 0.56 M/S
MV PEAK E VEL: 74 CM/S
PLATELET # BLD AUTO: 264 THOUSANDS/UL (ref 149–390)
PMV BLD AUTO: 9.8 FL (ref 8.9–12.7)
POTASSIUM SERPL-SCNC: 4.3 MMOL/L (ref 3.5–5.3)
RA PRESSURE ESTIMATED: 5 MMHG
RBC # BLD AUTO: 3.92 MILLION/UL (ref 3.88–5.62)
RIGHT ATRIAL 2D VOLUME: 73 ML
RIGHT ATRIUM AREA SYSTOLE A4C: 17.5 CM2
RIGHT VENTRICLE ID DIMENSION: 3.8 CM
RV PSP: 36 MMHG
SL CV LEFT ATRIUM LENGTH A2C: 5.8 CM
SL CV LV EF: 50
SL CV PED ECHO LEFT VENTRICLE DIASTOLIC VOLUME (MOD BIPLANE) 2D: 140 ML
SL CV PED ECHO LEFT VENTRICLE SYSTOLIC VOLUME (MOD BIPLANE) 2D: 95 ML
SODIUM SERPL-SCNC: 141 MMOL/L (ref 135–147)
TR MAX PG: 31 MMHG
TR PEAK VELOCITY: 2.8 M/S
TRICUSPID ANNULAR PLANE SYSTOLIC EXCURSION: 2 CM
TRICUSPID VALVE PEAK REGURGITATION VELOCITY: 2.87 M/S
WBC # BLD AUTO: 7.36 THOUSAND/UL (ref 4.31–10.16)

## 2023-06-21 PROCEDURE — 93306 TTE W/DOPPLER COMPLETE: CPT

## 2023-06-21 PROCEDURE — 85027 COMPLETE CBC AUTOMATED: CPT | Performed by: PSYCHIATRY & NEUROLOGY

## 2023-06-21 PROCEDURE — 93306 TTE W/DOPPLER COMPLETE: CPT | Performed by: INTERNAL MEDICINE

## 2023-06-21 PROCEDURE — 99223 1ST HOSP IP/OBS HIGH 75: CPT | Performed by: INTERNAL MEDICINE

## 2023-06-21 PROCEDURE — 99232 SBSQ HOSP IP/OBS MODERATE 35: CPT | Performed by: NURSE PRACTITIONER

## 2023-06-21 PROCEDURE — 80048 BASIC METABOLIC PNL TOTAL CA: CPT | Performed by: PSYCHIATRY & NEUROLOGY

## 2023-06-21 PROCEDURE — 99232 SBSQ HOSP IP/OBS MODERATE 35: CPT | Performed by: PSYCHIATRY & NEUROLOGY

## 2023-06-21 RX ORDER — ACETAMINOPHEN 325 MG/1
975 TABLET ORAL EVERY 6 HOURS PRN
Status: DISCONTINUED | OUTPATIENT
Start: 2023-06-21 | End: 2023-06-26

## 2023-06-21 RX ORDER — FAMOTIDINE 20 MG/1
20 TABLET, FILM COATED ORAL 2 TIMES DAILY
Status: DISCONTINUED | OUTPATIENT
Start: 2023-06-21 | End: 2023-07-01 | Stop reason: HOSPADM

## 2023-06-21 RX ADMIN — FINASTERIDE 5 MG: 5 TABLET, FILM COATED ORAL at 08:20

## 2023-06-21 RX ADMIN — ACETAMINOPHEN 975 MG: 325 TABLET ORAL at 21:52

## 2023-06-21 RX ADMIN — HEPARIN SODIUM 5000 UNITS: 5000 INJECTION INTRAVENOUS; SUBCUTANEOUS at 05:13

## 2023-06-21 RX ADMIN — NICOTINE 1 PATCH: 21 PATCH, EXTENDED RELEASE TRANSDERMAL at 08:20

## 2023-06-21 RX ADMIN — ATORVASTATIN CALCIUM 40 MG: 40 TABLET, FILM COATED ORAL at 16:26

## 2023-06-21 RX ADMIN — METOPROLOL TARTRATE 25 MG: 25 TABLET, FILM COATED ORAL at 08:20

## 2023-06-21 RX ADMIN — NITROGLYCERIN 1 PATCH: 0.4 PATCH TRANSDERMAL at 08:20

## 2023-06-21 RX ADMIN — FAMOTIDINE 20 MG: 20 TABLET, FILM COATED ORAL at 21:52

## 2023-06-21 RX ADMIN — HEPARIN SODIUM 5000 UNITS: 5000 INJECTION INTRAVENOUS; SUBCUTANEOUS at 13:09

## 2023-06-21 RX ADMIN — ACETAMINOPHEN 975 MG: 325 TABLET ORAL at 11:51

## 2023-06-21 RX ADMIN — SERTRALINE HYDROCHLORIDE 100 MG: 100 TABLET ORAL at 21:52

## 2023-06-21 RX ADMIN — HEPARIN SODIUM 5000 UNITS: 5000 INJECTION INTRAVENOUS; SUBCUTANEOUS at 21:53

## 2023-06-21 NOTE — ASSESSMENT & PLAN NOTE
Presents with worsening lower extremity weakness and ambulatory dysfunction  · Known to our service  Scheduled for decompression with Dr Alaina Mclaughlin in 10/2022; cancelled due to social situation  · Hx of severe lumbar spinal stenosis with neurogenic claudication  · Worsening since 6/19 when stood up from changing a tire  · Presented as stroke alert so loaded with ASA  · On exam, bilateral LE weakness 4/5  Decreased bilateral patellar reflexes  Some subjective diminished sensation  Also c/o some dizziness  Imaging:  · MRI lumbar spine wo, 6/19/2023: Spondylotic degenerative changes again noted similar to the prior study most significantly at L3-4 and L4-5 where severe central stenosis is identified  Moderate to severe lateral recess stenosis also identified as described  Moderate central stenosis and mild bilateral foraminal narrowing noted at L2-3 with mild foraminal narrowing at L5-S1 bilaterally  Plan:  · Continue to monitor neuro exam closely  · Neurology primary - appreciate recommendations and care  · JAROD pending  · MRI brain without evidence of acute ischemia  CTA, CTH negative  · Pending surgical risk stratification  · Discontinue ASA (last dose on 6/20)  · Mobilize with PT/OT  · DVT ppx: SCD's, Heparin SQ   · Plan for L3-4 and L4-5 decompression on Friday, 6/23 with Gabriel  Patient is agreeable  · Disposition: per PT/OT will need rehab  PMR on board  Neurosurgery will continue to follow  Please call with questions

## 2023-06-21 NOTE — CASE MANAGEMENT
Case Management Discharge Planning Note    Patient name Jonna Mason  [address] [facility] [id number]  : 1950 Date 2023       Current Admission Date: 2023  Current Admission Diagnosis:CAD (coronary artery disease)   Patient Active Problem List    Diagnosis Date Noted   • Spinal stenosis of lumbar region with neurogenic claudication 2022   • Chronic pain syndrome 2022   • Lumbar radiculopathy 2022   • Lumbar spondylosis 2022   • DDD (degenerative disc disease), lumbar 2022   • Low back pain with sciatica 2022   • Closed compression fracture of first lumbar vertebra ([facility] [address] ) 2022   • Olecranon bursitis of right elbow 2022   • NARDA (acute kidney injury) ([facility] [address] ) 2018   • Sessile colonic polyp 04/10/2017   • Subclinical hypothyroidism 2017   • Chest pain 2016   • CAD (coronary artery disease) 2016   • S/P CABG x 4 2016   • Pre-diabetes 2016   • HLD (hyperlipidemia) 2016   • HTN (hypertension) 2016   • Tobacco use 2016   • Hx pulmonary embolism 2016   • BPH (benign prostatic hyperplasia) 2016   • Osteoarthritis 2013   • Schizophrenia ([address] ) 2013      LOS (days): 2  Geometric Mean LOS (GMLOS) (days): 2 80  Days to GMLOS:1     OBJECTIVE:  Risk of Unplanned Readmission Score: 11 35         Current admission status: Inpatient   Preferred Pharmacy:   [id number], [address] [facility], [place], [address] [place] [address]  [place] [address]  Phone: [phone] Fax: [address], [address] [address]  Phone: [phone] Fax: [phone]    Primary Care Provider: SINGH Benítez    Primary Insurance: Mobile [address] REP  Secondary Insurance:     DISCHARGE DETAILS:    Discharge planning discussed with[de-identified] CM spoke with the pt  Freedom of Choice: Yes  Comments - Freedom of Choice: FOC explored and the pt is amenable to IRF placement  CM contacted family/caregiver?: No- see comments  Were Treatment Team discharge recommendations reviewed with patient/caregiver?: Yes  Did patient/caregiver verbalize understanding of patient care needs?: Yes       Contacts  Reason/Outcome: Continuity of Care, Discharge [address]         Is the [place] interested [place] [address] at discharge?: No    DME Referral Provided  Referral made for DME?: No    Other Referral/Resources/Interventions Provided:  Interventions: Acute Rehab  Referral Comments: Pt referred to [facility] and [address] [address] on 6/20    Would you like to participate in our [address] service program?  : No - Declined    Treatment Team Recommendation: Acute Rehab  Discharge Destination Plan[de-identified] Acute Rehab                  Additional Comments: PT/OT/PMR evaluated the pt on 6/20 and recommend rehab placement  FOC was explored with the pt on 6/20 and he was amenable to IRF placement  The pt was referred to IRF's located within 10mi of his home address  [facility] and [facility] were referred to  FRANK is unclear as of this date as NSG intervention is planned  It is anticipated disposition planning will occur the week of 6/26  Of note, insurance authorization from [facility] will be needed to facilitate IRF admission   CM will continue to follow

## 2023-06-21 NOTE — ARC ADMISSION
Referral was received for consideration of patient for inpatient acute rehab at [facility]  We will follow patient at this time  Currently has pending surgery Friday and then will need post op notes
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
## 2023-06-21 NOTE — PROGRESS NOTES
"[facility]  Progress Note  Name: Suresh Paulino  MRN: [id number]  Unit/Bed#: [facility] 357-84 I Date of Admission: 6/19/2023   Date of Service: 6/21/2023 I Hospital Day: 2    Assessment/Plan   Spinal stenosis of lumbar region with neurogenic claudication  Assessment & Plan  Presents with worsening lower extremity weakness and ambulatory dysfunction  · Known to our service  Scheduled for decompression with Dr Kelly Levin in 10/2022; cancelled due to social situation  · Hx of severe lumbar spinal stenosis with neurogenic claudication  · Worsening since 6/19 when stood up from changing a tire  · Presented as stroke alert so loaded with ASA  · On exam, bilateral LE weakness 4/5  Decreased bilateral patellar reflexes  Some subjective diminished sensation  Also c/o some dizziness  Imaging:  · MRI lumbar spine wo, 6/19/2023: Spondylotic degenerative changes again noted similar to the prior study most significantly at L3-4 and L4-5 where severe central stenosis is identified  Moderate to severe lateral recess stenosis also identified as described  Moderate central stenosis and mild bilateral foraminal narrowing noted at L2-3 with mild foraminal narrowing at L5-S1 bilaterally  Plan:  · Continue to monitor neuro exam closely  · Neurology primary - appreciate recommendations and care  · JAROD pending  · MRI brain without evidence of acute ischemia  CTA, CTH negative  · Pending surgical risk stratification  · Discontinue ASA (last dose on 6/20)  · Mobilize with PT/OT  · DVT ppx: SCD's, Heparin SQ   · Plan for L3-4 and L4-5 decompression on Friday, 6/23 with Gabriel  Patient is agreeable  · Disposition: per PT/OT will need rehab  PMR on board  Neurosurgery will continue to follow  Please call with questions  Subjective/Objective   Chief Complaint: \"My legs feel heavy  \"    Subjective: Continues to endorse leg heaviness along with some back pain       Objective: NAD  " "Resting on side of bed  I/O       06/19 0701 06/20 0700 06/20 0701 06/21 0700 06/21 0701 06/22 0700    P  O   461 400    Total Intake(mL/kg)  461 (6 4) 400 (5 5)    Net  +461 +400           Unmeasured Urine Occurrence  2 x     Unmeasured Stool Occurrence  0 x           Invasive Devices     Peripheral Intravenous Line  Duration           Peripheral IV 06/19/23 Right Antecubital 1 day                Physical Exam:  Vitals: Blood pressure 137/67, pulse 69, temperature 97 7 °F (36 5 °C), resp  rate 17, height 5' 9\" (1 753 m), weight 72 1 kg (159 lb), SpO2 95 %  ,Body mass index is 23 48 kg/m²          General appearance: alert, appears stated age, cooperative and no distress  Head: Normocephalic, without obvious abnormality, atraumatic  Eyes: EOMI, PERRL  Neck: supple, symmetrical, trachea midline and NT  Back: no kyphosis present, no tenderness to percussion or palpation  Lungs: non labored breathing  Heart: regular heart rate  Neurologic:   Mental status: Alert, oriented x3, thought content appropriate  Cranial nerves: grossly intact (Cranial nerves II-XII)  Sensory: normal to LT  Motor: LLE -4/5, RLE 4/5      Lab Results:  Results from last 7 days   Lab Units 06/21/23  0523 06/19/23  1706 06/19/23  1420   WBC Thousand/uL 7 36  --  7 66   HEMOGLOBIN g/dL 12 4  --  12 9   HEMATOCRIT % 37 4  --  36 7   PLATELETS Thousands/uL 264 259 264   NEUTROS PCT %  --   --  73   MONOS PCT %  --   --  6   EOS PCT %  --   --  1     Results from last 7 days   Lab Units 06/21/23  0523 06/20/23  0500 06/19/23  1420   POTASSIUM mmol/L 4 3 3 8 3 7   CHLORIDE mmol/L 115* 117* 116*   CO2 mmol/L 24 24 23   BUN mg/dL 17 17 16   CREATININE mg/dL 0 95 0 88 1 20   CALCIUM mg/dL 8 7 9 0 8 7   ALK PHOS U/L  --  82  --    ALT U/L  --  16  --    AST U/L  --  20  --      Results from last 7 days   Lab Units 06/20/23  0500   MAGNESIUM mg/dL 2 0         Results from last 7 days   Lab Units 06/19/23  1420   INR  1 04   PTT seconds 24     No results " "found for: \"TROPONINT\"  ABG:No results found for: \"PHART\", \"OOZ2IWI\", \"PO2ART\", \"OHI5IOU\", \"J7VGBVPH\", \"BEART\", \"SOURCE\"    Imaging Studies: I have personally reviewed pertinent reports  and I have personally reviewed pertinent films in PACS    MRI lumbar spine wo contrast    Result Date: 6/20/2023  Impression: Spondylotic degenerative changes again noted similar to the prior study most significantly at L3-4 and L4-5 where severe central stenosis is identified  Moderate to severe lateral recess stenosis also identified as described  Moderate central stenosis and mild bilateral foraminal narrowing noted at L2-3 with mild foraminal narrowing at L5-S1 bilaterally  Workstation performed: [id number]     MRI brain wo contrast    Result Date: 6/20/2023  Impression: No MR evidence of acute ischemia  Small right mastoid effusion  Workstation performed: [id number]     CT stroke alert brain    Result Date: 6/19/2023  Impression: No acute intracranial abnormality  Findings were directly discussed with Anthony Kohler at 2:41 p m  Workstation performed: [id number]     CTA stroke alert (head/neck)    Result Date: 6/19/2023  Impression: No significant stenosis of the cervical carotid or vertebral arteries No high-grade intracranial stenosis, large vessel occlusion or aneurysm  Findings were directly discussed with Anthony Kohler at 2:41 p m  Workstation performed: [id number]       EKG, Pathology, and Other Studies: I have personally reviewed pertinent reports        VTE Pharmacologic Prophylaxis: Sequential compression device (Venodyne)  and Heparin    VTE Mechanical Prophylaxis: sequential compression device  "

## 2023-06-21 NOTE — PLAN OF CARE
Problem: MOBILITY - ADULT  Goal: Maintain or return to baseline ADL function  Description: INTERVENTIONS:  -  Assess patient's ability to carry out ADLs; assess patient's baseline for ADL function and identify physical deficits which impact ability to perform ADLs (bathing, care of mouth/teeth, toileting, grooming, dressing, etc )  - Assess/evaluate cause of self-care deficits   - Assess range of motion  - Assess patient's mobility; develop plan if impaired  - Assess patient's need for assistive devices and provide as appropriate  - Encourage maximum independence but intervene and supervise when necessary  - Involve family in performance of ADLs  - Assess for home care needs following discharge   - Consider OT consult to assist with ADL evaluation and planning for discharge  - Provide patient education as appropriate  Outcome: Progressing  Goal: Maintains/Returns to pre admission functional level  Description: INTERVENTIONS:  - Perform BMAT or MOVE assessment daily    - Set and communicate daily mobility goal to care team and patient/family/caregiver  - Collaborate with rehabilitation services on mobility goals if consulted  - Perform Range of Motion 2 times a day  - Reposition patient every 2 hours    - Dangle patient 2 times a day  - Stand patient 2 times a day  - Ambulate patient 2 times a day  - Out of bed to chair 2 times a day   - Out of bed for meals 2 times a day  - Out of bed for toileting  - Record patient progress and toleration of activity level   Outcome: Progressing     Problem: NEUROSENSORY - ADULT  Goal: Achieves stable or improved neurological status  Description: INTERVENTIONS  - Monitor and report changes in neurological status  - Monitor vital signs such as temperature, blood pressure, glucose, and any other labs ordered   - Initiate measures to prevent increased intracranial pressure  - Monitor for seizure activity and implement precautions if appropriate      Outcome: Progressing  Goal: Remains free of injury related to seizures activity  Description: INTERVENTIONS  - Maintain airway, patient safety  and administer oxygen as ordered  - Monitor patient for seizure activity, document and report duration and description of seizure to physician/advanced practitioner  - If seizure occurs,  ensure patient safety during seizure  - Reorient patient post seizure  - Seizure pads on all 4 side rails  - Instruct patient/family to notify RN of any seizure activity including if an aura is experienced  - Instruct patient/family to call for assistance with activity based on nursing assessment  - Administer anti-seizure medications if ordered    Outcome: Progressing  Goal: Achieves maximal functionality and self care  Description: INTERVENTIONS  - Monitor swallowing and airway patency with patient fatigue and changes in neurological status  - Encourage and assist patient to increase activity and self care     - Encourage visually impaired, hearing impaired and aphasic patients to use assistive/communication devices  Outcome: Progressing     Problem: MUSCULOSKELETAL - ADULT  Goal: Maintain or return mobility to safest level of function  Description: INTERVENTIONS:  - Assess patient's ability to carry out ADLs; assess patient's baseline for ADL function and identify physical deficits which impact ability to perform ADLs (bathing, care of mouth/teeth, toileting, grooming, dressing, etc )  - Assess/evaluate cause of self-care deficits   - Assess range of motion  - Assess patient's mobility  - Assess patient's need for assistive devices and provide as appropriate  - Encourage maximum independence but intervene and supervise when necessary  - Involve family in performance of ADLs  - Assess for home care needs following discharge   - Consider OT consult to assist with ADL evaluation and planning for discharge  - Provide patient education as appropriate  Outcome: Progressing  Goal: Maintain proper alignment of affected body part  Description: INTERVENTIONS:  - Support, maintain and protect limb and body alignment  - Provide patient/ family with appropriate education  Outcome: Progressing     Problem: PAIN - ADULT  Goal: Verbalizes/displays adequate comfort level or baseline comfort level  Description: Interventions:  - Encourage patient to monitor pain and request assistance  - Assess pain using appropriate pain scale  - Administer analgesics based on type and severity of pain and evaluate response  - Implement non-pharmacological measures as appropriate and evaluate response  - Consider cultural and social influences on pain and pain management  - Notify physician/advanced practitioner if interventions unsuccessful or patient reports new pain  Outcome: Progressing     Problem: INFECTION - ADULT  Goal: Absence or prevention of progression during hospitalization  Description: INTERVENTIONS:  - Assess and monitor for signs and symptoms of infection  - Monitor lab/diagnostic results  - Monitor all insertion sites, i e  indwelling lines, tubes, and drains  - Monitor endotracheal if appropriate and nasal secretions for changes in amount and color  - [place] appropriate cooling/warming therapies per order  - Administer medications as ordered  - Instruct and encourage patient and family to use good hand hygiene technique  - Identify and instruct in appropriate isolation precautions for identified infection/condition  Outcome: Progressing  Goal: Absence of fever/infection during neutropenic period  Description: INTERVENTIONS:  - Monitor WBC    Outcome: Progressing     Problem: SAFETY ADULT  Goal: Maintain or return to baseline ADL function  Description: INTERVENTIONS:  -  Assess patient's ability to carry out ADLs; assess patient's baseline for ADL function and identify physical deficits which impact ability to perform ADLs (bathing, care of mouth/teeth, toileting, grooming, dressing, etc )  - Assess/evaluate cause of self-care deficits   - Assess range of motion  - Assess patient's mobility; develop plan if impaired  - Assess patient's need for assistive devices and provide as appropriate  - Encourage maximum independence but intervene and supervise when necessary  - Involve family in performance of ADLs  - Assess for home care needs following discharge   - Consider OT consult to assist with ADL evaluation and planning for discharge  - Provide patient education as appropriate  Outcome: Progressing  Goal: Maintains/Returns to pre admission functional level  Description: INTERVENTIONS:  - Perform BMAT or MOVE assessment daily    - Set and communicate daily mobility goal to care team and patient/family/caregiver  - Collaborate with rehabilitation services on mobility goals if consulted  - Perform Range of Motion 2 times a day  - Reposition patient every 2 hours    - Dangle patient 2 times a day  - Stand patient 2 times a day  - Ambulate patient 2 times a day  - Out of bed to chair 2 times a day   - Out of bed for meals 2 times a day  - Out of bed for toileting  - Record patient progress and toleration of activity level   Outcome: Progressing  Goal: Patient will remain free of falls  Description: INTERVENTIONS:  - Educate patient/family on patient safety including physical limitations  - Instruct patient to call for assistance with activity   - Consult OT/PT to assist with strengthening/mobility   - Keep Call bell within reach  - Keep bed low and locked with side rails adjusted as appropriate  - Keep care items and personal belongings within reach  - Initiate and maintain comfort rounds  - Make Fall Risk Sign visible to staff  - Offer Toileting every 2 Hours, in advance of need  - Apply yellow socks and bracelet for high fall risk patients  - Consider moving patient to room near nurses station  Outcome: Progressing     Problem: DISCHARGE PLANNING  Goal: Discharge to home or other facility with appropriate resources  Description: INTERVENTIONS:  - Identify barriers to discharge w/patient and caregiver  - Arrange for needed discharge resources and transportation as appropriate  - Identify discharge learning needs (meds, wound care, etc )  - Arrange for interpretive services to assist at discharge as needed  - Refer to Case Management Department for coordinating discharge planning if the patient needs post-hospital services based on physician/advanced practitioner order or complex needs related to functional status, cognitive ability, or social support system  Outcome: Progressing     Problem: Knowledge Deficit  Goal: Patient/family/caregiver demonstrates understanding of disease process, treatment plan, medications, and discharge instructions  Description: Complete learning assessment and assess knowledge base  Interventions:  - Provide teaching at level of understanding  - Provide teaching via preferred learning methods  Outcome: Progressing     Problem: Neurological Deficit  Goal: Neurological status is stable or improving  Description: Interventions:  - Monitor and assess patient's level of consciousness, motor function, sensory function, and level of assistance needed for ADLs  - Monitor and report changes from baseline  Collaborate with interdisciplinary team to initiate plan and implement interventions as ordered  - Provide and maintain a safe environment  - Consider seizure precautions  - Consider fall precautions  - Consider aspiration precautions  - Consider bleeding precautions  Outcome: Progressing     Problem: Activity Intolerance/Impaired Mobility  Goal: Mobility/activity is maintained at optimum level for patient  Description: Interventions:  - Assess and monitor patient  barriers to mobility and need for assistive/adaptive devices  - Assess patient's emotional response to limitations  - Collaborate with interdisciplinary team and initiate plans and interventions as ordered    - Encourage independent activity per ability   - Maintain proper body alignment  - Perform active/passive rom as tolerated/ordered  - Plan activities to conserve energy   - Turn patient as appropriate  Outcome: Progressing     Problem: Communication Impairment  Goal: Ability to express needs and understand communication  Description: Assess patient's communication skills and ability to understand information  Patient will demonstrate use of effective communication techniques, alternative methods of communication and understanding even if not able to speak  - Encourage communication and provide alternate methods of communication as needed  - Collaborate with case management/ for discharge needs  - Include patient/family/caregiver in decisions related to communication  Outcome: Progressing     Problem: Potential for Aspiration  Goal: Non-ventilated patient's risk of aspiration is minimized  Description: Assess and monitor vital signs, respiratory status, and labs (WBC)  Monitor for signs of aspiration (tachypnea, cough, rales, wheezing, cyanosis, fever)  - Assess and monitor patient's ability to swallow  - Place patient up in chair to eat if possible  - HOB up at 90 degrees to eat if unable to get patient up into chair   - Supervise patient during oral intake  - Instruct patient/ family to take small bites  - Instruct patient/ family to take small single sips when taking liquids  - Follow patient-specific strategies generated by speech pathologist   Outcome: Progressing  Goal: Ventilated patient's risk of aspiration is minimized  Description: Assess and monitor vital signs, respiratory status, airway cuff pressure, and labs (WBC)  Monitor for signs of aspiration (tachypnea, cough, rales, wheezing, cyanosis, fever)  - Elevate head of bed 30 degrees if patient has tube feeding   - Monitor tube feeding    Outcome: Progressing     Problem: Nutrition  Goal: Nutrition/Hydration status is improving  Description: Monitor and assess patient's nutrition/hydration status for malnutrition (ex- brittle hair, bruises, dry skin, pale skin and conjunctiva, muscle wasting, smooth red tongue, and disorientation)  Collaborate with interdisciplinary team and initiate plan and interventions as ordered  Monitor patient's weight and dietary intake as ordered or per policy  Utilize nutrition screening tool and intervene per policy  Determine patient's food preferences and provide high-protein, high-caloric foods as appropriate  - Assist patient with eating   - Allow adequate time for meals   - Encourage patient to take dietary supplement as ordered  - Collaborate with clinical nutritionist   - Include patient/family/caregiver in decisions related to nutrition    Outcome: Progressing

## 2023-06-21 NOTE — PROGRESS NOTES
NEUROLOGY RESIDENCY PROGRESS NOTE     Name: Gail Vega   Age & Sex: 71 y o  male   MRN: [id number]  Unit/Bed#: [facility] 708-01   Encounter: [id number]    Gail Vega will need follow up in in 3 months with general attending (Scheduled with Dr Connie Gold at [address])  He will not require outpatient neurological testing  Schedule outpatient follow up with me at [address]: This should be completed prior to removing patient from list or discharge     Pending for discharge: surgery    ASSESSMENT & PLAN     Spinal stenosis of lumbar region with neurogenic claudication  Assessment & Plan  Gail Vega is a 71 y o  male  with pertinent history lumbar spondylosis, neurogenic claudication, CAD s/p CABG x 4, HTN, HLD, hx of PE (Not on thinners) who presented as stroke alert on 6/19/23 given acute onset worsening bilateral lower extremity weakness  Denies any urinary symptoms or saddle anesthesia  Initial NIHSS of 5 and LKW 1200  Initial BP on arrival was Blood Pressure: 110/53  As a result of lower suspicion for stroke and potential need for urgent surgery patient was determined to not be a candidate for tPA  • F/u exam:  HF strength 4-/5 R, 4/5 L  No ataxia or dysmetria noted on exam  Proprioception normal  Difficulty standing from a seated position without assistance  Romberg positive  • BP over last 24 hours: Blood Pressure: 118/88  current BP: Blood Pressure: 118/88    Imaging:  • CTH: No acute intracranial abnormality  • CTA: No significant stenosis of the cervical carotid or vertebral arteries  No high-grade intracranial stenosis, large vessel occlusion or aneurysm  • MRI brain: No MR evidence of acute ischemia  Small right mastoid effusion  • MRI L spine: Spondylotic degenerative changes again noted similar to the prior study most significantly at L3-4 and L4-5 where severe central stenosis is identified  Moderate to severe lateral recess stenosis also identified as described   Moderate central stenosis and mild bilateral foraminal narrowing noted at L2-3 with mild foraminal narrowing at L5-S1 bilaterally  • Echocardiogram: pending  • Telemetry: monitor  • LDL 71   • HBA1C 5 8    Impression: At this time history and physical along with available imaging concerning for worsening weakness in the setting of known spinal stenosis  One month ago patient strength 5/5 throughout vs now significantly week in bilateral HF  Plan:  • Appreciate neurosurgery eval and recommendations   • Plan for neurosurgery on Friday, patient and family aware and agreeable  Family able to help with recovery  • PMR on board, plan for acute post surgical rehab   • ASA on hold with anticipation to surgery    • Cardiology consult placed for preop risk stratification  Appreciate recs  • BP goal: okay to normalize  • Statin  • Maintain glucose below 200  • Echo pending  • Neuro checks  • Monitor on telemetry   • PT/OT//PMR consults appreciated       Schizophrenia [facility])  Assessment & Plan  As per chart patient with history of schizophrenia  I reviewed listed home meds at bedside  Patient denies taking aripiprazole or risperidone  NARDA (acute kidney injury) ([facility] [address] )  Assessment & Plan  Possibly due to dehydration given poor PO intake date of admission  Cr 1 20 > 0 88, treated with NSS 1 L bolus, renal function assessment and lab monitoring  NARDA improved after iL bolus of NSS       Plan:  · Trend BMP    BPH (benign prostatic hyperplasia)  Assessment & Plan  On home finasteride 5 mg     Tobacco use  Assessment & Plan  Current smoker 1 PPD     Plan:  · Nicotine patch     HTN (hypertension)  Assessment & Plan  On home lisinopril 5 mg and Lopressor 25 mg     Plan:  · Normotension  · Hold lisinopril as BP stable    CAD (coronary artery disease)  Assessment & Plan  Patient is a 1 PPD smoker with hx of CAD s/p CABG x 4      Plan:  · ASA on hold with anticipation to surgery    · Cont atorvastatin 40 mg   · Echo   · Prn nitroglycerin "  · Appreciate SOD for medical management       SUBJECTIVE     Patient was seen and examined  No acute events overnight  Continues to be stable  No change in weakness of the lower extremities  Denies LE sensory changes or urinary problems  No SOB headache or bowel or bladder problems  He states that up until recently he used to do a lot of heavy lifting, 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  Review of Systems   Constitutional: Negative for fever  Eyes: Negative for visual disturbance  Gastrointestinal: Negative for diarrhea, nausea and vomiting  Genitourinary: Negative for difficulty urinating  Musculoskeletal: Positive for gait problem  Neurological: Positive for weakness  Negative for numbness and headaches  Psychiatric/Behavioral: Negative for agitation  The patient is not nervous/anxious  OBJECTIVE     Patient ID: Dov Rodrigez is a 71 y o  male  Vitals:    23 2154 23 2249 23 0753 23 1015   BP: (!) 92/46 (!) 104/47 137/67 137/67   BP Location:       Pulse: 74 69  69   Resp:   17    Temp:   97 7 °F (36 5 °C)    TempSrc:       SpO2: 95% 95%     Weight:    72 1 kg (159 lb)   Height:    5' 9\" (1 753 m)      Temperature:   Temp (24hrs), Av °F (36 7 °C), Min:97 5 °F (36 4 °C), Max:98 7 °F (37 1 °C)    Temperature: 97 7 °F (36 5 °C)      Physical Exam  Eyes:      Extraocular Movements: EOM normal    Cardiovascular:      Rate and Rhythm: Normal rate and regular rhythm  Heart sounds: Normal heart sounds, S1 normal and S2 normal    Pulmonary:      Effort: Pulmonary effort is normal    Abdominal:      Palpations: Abdomen is soft  Neurological:      Mental Status: He is oriented to person, place, and time  Coordination: Romberg Test abnormal  Finger-Nose-Finger Test normal       Deep Tendon Reflexes:      Reflex Scores:       Bicep reflexes are 1+ on the right side and 1+ on the left side         Brachioradialis reflexes are 1+ on " the right side and 1+ on the left side  Patellar reflexes are 2+ on the right side and 1+ on the left side  Neurologic Exam     Mental Status   Oriented to person, place, and time  Able to name object  Normal comprehension  Cranial Nerves     CN III, IV, VI   Extraocular motions are normal      CN V   Facial sensation intact  CN VII   Facial expression full, symmetric  Motor Exam   Muscle bulk: decreased  Overall muscle tone: normal  Right arm pronator drift: absent  Left arm pronator drift: absent    Strength   Right deltoid: 5/5  Left deltoid: 5/5  Right biceps: 5/5  Left biceps: 5/5  Right triceps: 5/5  Left triceps: 5/5  Right iliopsoas: 3/5  Left iliopsoas: 4/5  Right quadriceps: 4/5  Left quadriceps: 4/5  Right hamstrin/5  Left hamstrin/5  Right anterior tibial: 5/5  Left anterior tibial: 5/5  Right gastroc: 5/5  Left gastroc: 53+ R HF, 4- L HF     Sensory Exam   Right arm proprioception: normal  Left arm proprioception: normal  Pinprick normal      Gait, Coordination, and Reflexes     Coordination   Romberg: positive  Finger to nose coordination: normal    Tremor   Resting tremor: absent    Reflexes   Right brachioradialis: 1+  Left brachioradialis: 1+  Right biceps: 1+  Left biceps: 1+  Right patellar: 2+  Left patellar: 1+  Right plantar: equivocal  Left plantar: equivocal         LABORATORY DATA     Labs: I have personally reviewed pertinent reports      Results from last 7 days   Lab Units 23  0523 23  1706 23  1420   WBC Thousand/uL 7 36  --  7 66   HEMOGLOBIN g/dL 12 4  --  12 9   HEMATOCRIT % 37 4  --  36 7   PLATELETS Thousands/uL 264 259 264   NEUTROS PCT %  --   --  73   MONOS PCT %  --   --  6   EOS PCT %  --   --  1      Results from last 7 days   Lab Units 23  0523 23  0500 23  1420   SODIUM mmol/L 141 144 140   POTASSIUM mmol/L 4 3 3 8 3 7   CHLORIDE mmol/L 115* 117* 116*   CO2 mmol/L 24 24 23   BUN mg/dL 17 17 16 CREATININE mg/dL 0 95 0 88 1 20   CALCIUM mg/dL 8 7 9 0 8 7   ALK PHOS U/L  --  82  --    ALT U/L  --  16  --    AST U/L  --  20  --      Results from last 7 days   Lab Units 06/20/23  0500   MAGNESIUM mg/dL 2 0          Results from last 7 days   Lab Units 06/19/23  1420   INR  1 04   PTT seconds 24               IMAGING & DIAGNOSTIC TESTING     Radiology Results: I have personally reviewed pertinent reports  MRI lumbar spine wo contrast   Final Result by Zamzam 6, DO (06/20 8951)      Spondylotic degenerative changes again noted similar to the prior study most significantly at L3-4 and L4-5 where severe central stenosis is identified  Moderate to severe lateral recess stenosis also identified as described  Moderate central stenosis and mild bilateral foraminal narrowing noted at L2-3 with mild foraminal narrowing at L5-S1 bilaterally  Workstation performed: [id number]         MRI brain wo contrast   Final Result by Jose Elizabeth MD (06/20 0020)      No MR evidence of acute ischemia  Small right mastoid effusion  Workstation performed: [id number]         CTA stroke alert (head/neck)   Final Result by MANJULA Ding MD (06/19 3901)      No significant stenosis of the cervical carotid or vertebral arteries   No high-grade intracranial stenosis, large vessel occlusion or aneurysm  Findings were directly discussed with Maria Del Carmen Marin at 2:41 p m  Workstation performed: [id number]         CT stroke alert brain   Final Result by MANJULA Ding MD (06/19 1080)      No acute intracranial abnormality  Findings were directly discussed with Maria Del Carmen Marin at 2:41 p m  Workstation performed: [id number]             Other Diagnostic Testing: I have personally reviewed pertinent reports        ACTIVE MEDICATIONS     Current Facility-Administered Medications   Medication Dose Route Frequency   • acetaminophen (TYLENOL) tablet 975 mg 975 mg Oral Q6H PRN   • atorvastatin (LIPITOR) tablet 40 mg  40 mg Oral QPM   • finasteride (PROSCAR) tablet 5 mg  5 mg Oral Daily   • heparin (porcine) subcutaneous injection 5,000 Units  5,000 Units Subcutaneous Q8H [address]   • metoprolol tartrate (LOPRESSOR) tablet 25 mg  25 mg Oral Daily   • nicotine (NICODERM CQ) 21 mg/24 hr TD 24 hr patch 1 patch  1 patch Transdermal Daily   • nitroglycerin (NITRODUR) 0 4 mg/hr TD 24 hr patch  1 patch Transdermal Daily   • sertraline (ZOLOFT) tablet 100 mg  100 mg Oral HS       Prior to Admission medications    Medication Sig Start Date End Date Taking? Authorizing Provider   Acetaminophen Extra Strength 500 MG tablet  5/12/22   Historical Provider, MD   ARIPiprazole (ABILIFY) 5 mg tablet Take 5 mg by mouth daily    Historical Provider, MD   Arthritis Pain Relief 650 MG CR tablet take 2 tablets by mouth twice a day if needed 3/15/23   Historical Provider, MD   aspirin 81 MG tablet Take 81 mg by mouth daily  Historical Provider, MD   Aspirin Low Dose 81 MG EC tablet  3/7/23   Historical Provider, MD   atorvastatin (LIPITOR) 40 mg tablet Take 40 mg by mouth daily    Historical Provider, MD   erythromycin (ILOTYCIN) ophthalmic ointment Place a 1/2 inch ribbon of ointment into the lower eyelid  12/4/20   Linda Guillen MD   finasteride (PROSCAR) 5 mg tablet Take 5 mg by mouth daily 5/7/23   Historical Provider, MD   gabapentin (NEURONTIN) 100 mg capsule Take 3 capsules (300 mg total) by mouth daily at bedtime 5/24/22   Verla Covert, CRNP   hydrOXYzine HCL (ATARAX) 25 mg tablet Take 1 tablet twice a day by oral route as needed for 30 days      Historical Provider, MD   lisinopril (ZESTRIL) 5 mg tablet  5/12/22   Historical Provider, MD   metoprolol tartrate (LOPRESSOR) 25 mg tablet Take 25 mg by mouth daily      Historical Provider, MD   naproxen (NAPROSYN) 250 mg tablet take 1 tablet by mouth twice a day if needed for 10 days 2/3/22   Historical Provider, MD   nicotine (NICODERM CQ) 21 mg/24 hr TD 24 hr patch apply 1 patch to CLEAN, DRY, AND INTACT SKIN once daily    Historical Provider, MD   nitroglycerin (NITRODUR) 0 4 mg/hr Place 1 patch on the skin 2 (two) times a day as needed 6/5/15   Historical Provider, MD   RA Melatonin 3 MG take 1 tablet by mouth EVERY DAY AT BEDTIME FOR 90 DAYS 2/15/23   Historical Provider, MD   risperiDONE (RisperDAL) 0 5 mg tablet  3/23/22   Historical Provider, MD   sertraline (ZOLOFT) 100 mg tablet take 1 tablet by mouth EVERY DAY IN THE EVENING FOR 90 DAYS 3/8/23   Historical Provider, MD   triamcinolone (KENALOG) 0 1 % ointment APPLY A THIN LAYER TO THE AFFECTED AREA(S) BY TOPICAL ROUTE TWO TIMES PER DAY AS NEEDED    Historical Provider, MD         VTE Pharmacologic Prophylaxis: Heparin  VTE Mechanical Prophylaxis: sequential compression device    ==  MD Leeanne Buenrostro Neurology Residency, PGY-4

## 2023-06-21 NOTE — RESTORATIVE TECHNICIAN NOTE
Restorative Technician Note      Patient Name: Maty Zaragoza     Note Type: Mobility  Patient Position Upon Consult: Supine  Activity Performed: Ambulated; Dangled; Stood  Assistive Device: Other (Comment) (none)  Education Provided: Yes  Patient Position at End of Consult: Supine;  All needs within reach; Bed/Chair alarm activated    Kim RICKETTS, Restorative Technician, [employer]

## 2023-06-22 ENCOUNTER — APPOINTMENT (INPATIENT)
Dept: RADIOLOGY | Facility: HOSPITAL | Age: 73
DRG: 519 | End: 2023-06-22
Payer: COMMERCIAL

## 2023-06-22 LAB
CHEST PAIN STATEMENT: NORMAL
MAX DIASTOLIC BP: 82 MMHG
MAX HEART RATE: 99 BPM
MAX PREDICTED HEART RATE: 149 BPM
MAX. SYSTOLIC BP: 138 MMHG
NUC STRESS EJECTION FRACTION: 47 %
PROTOCOL NAME: NORMAL
RATE PRESSURE PRODUCT: NORMAL
REASON FOR TERMINATION: NORMAL
SL CV REST NUCLEAR ISOTOPE DOSE: 10.4 MCI
SL CV STRESS NUCLEAR ISOTOPE DOSE: 31 MCI
SL CV STRESS RECOVERY BP: NORMAL MMHG
SL CV STRESS RECOVERY HR: 86 BPM
SL CV STRESS RECOVERY O2 SAT: 97 %
STRESS BASELINE BP: NORMAL MMHG
STRESS BASELINE HR: 63 BPM
STRESS O2 SAT REST: 100 %
STRESS PEAK HR: 98 BPM
STRESS POST O2 SAT PEAK: 100 %
STRESS POST PEAK BP: 122 MMHG
STRESS/REST PERFUSION RATIO: 1.09
TARGET HR FORMULA: NORMAL
TEST INDICATION: NORMAL
TIME IN EXERCISE PHASE: NORMAL

## 2023-06-22 PROCEDURE — 78452 HT MUSCLE IMAGE SPECT MULT: CPT | Performed by: INTERNAL MEDICINE

## 2023-06-22 PROCEDURE — 99233 SBSQ HOSP IP/OBS HIGH 50: CPT | Performed by: NURSE PRACTITIONER

## 2023-06-22 PROCEDURE — 99232 SBSQ HOSP IP/OBS MODERATE 35: CPT | Performed by: PSYCHIATRY & NEUROLOGY

## 2023-06-22 PROCEDURE — 99232 SBSQ HOSP IP/OBS MODERATE 35: CPT | Performed by: INTERNAL MEDICINE

## 2023-06-22 PROCEDURE — 93018 CV STRESS TEST I&R ONLY: CPT | Performed by: INTERNAL MEDICINE

## 2023-06-22 PROCEDURE — A9502 TC99M TETROFOSMIN: HCPCS

## 2023-06-22 PROCEDURE — 87081 CULTURE SCREEN ONLY: CPT | Performed by: NURSE PRACTITIONER

## 2023-06-22 PROCEDURE — 93017 CV STRESS TEST TRACING ONLY: CPT

## 2023-06-22 PROCEDURE — 78452 HT MUSCLE IMAGE SPECT MULT: CPT

## 2023-06-22 PROCEDURE — 93016 CV STRESS TEST SUPVJ ONLY: CPT | Performed by: INTERNAL MEDICINE

## 2023-06-22 PROCEDURE — G1004 CDSM NDSC: HCPCS

## 2023-06-22 RX ORDER — REGADENOSON 0.08 MG/ML
0.4 INJECTION, SOLUTION INTRAVENOUS ONCE
Status: COMPLETED | OUTPATIENT
Start: 2023-06-22 | End: 2023-06-22

## 2023-06-22 RX ORDER — CEFAZOLIN SODIUM 2 G/50ML
2000 SOLUTION INTRAVENOUS
Status: DISCONTINUED | OUTPATIENT
Start: 2023-06-23 | End: 2023-06-23

## 2023-06-22 RX ORDER — HEPARIN SODIUM 5000 [USP'U]/ML
5000 INJECTION, SOLUTION INTRAVENOUS; SUBCUTANEOUS EVERY 8 HOURS SCHEDULED
Status: COMPLETED | OUTPATIENT
Start: 2023-06-22 | End: 2023-06-22

## 2023-06-22 RX ORDER — CHLORHEXIDINE GLUCONATE 0.12 MG/ML
15 RINSE ORAL
Status: COMPLETED | OUTPATIENT
Start: 2023-06-23 | End: 2023-06-23

## 2023-06-22 RX ORDER — SODIUM CHLORIDE 9 MG/ML
125 INJECTION, SOLUTION INTRAVENOUS CONTINUOUS
Status: DISCONTINUED | OUTPATIENT
Start: 2023-06-22 | End: 2023-06-25

## 2023-06-22 RX ORDER — CHLORHEXIDINE GLUCONATE 0.12 MG/ML
15 RINSE ORAL ONCE
Status: DISCONTINUED | OUTPATIENT
Start: 2023-06-22 | End: 2023-06-22

## 2023-06-22 RX ADMIN — REGADENOSON 0.4 MG: 0.08 INJECTION, SOLUTION INTRAVENOUS at 11:23

## 2023-06-22 RX ADMIN — HEPARIN SODIUM 5000 UNITS: 5000 INJECTION INTRAVENOUS; SUBCUTANEOUS at 13:22

## 2023-06-22 RX ADMIN — HEPARIN SODIUM 5000 UNITS: 5000 INJECTION INTRAVENOUS; SUBCUTANEOUS at 22:08

## 2023-06-22 RX ADMIN — SERTRALINE HYDROCHLORIDE 100 MG: 100 TABLET ORAL at 22:08

## 2023-06-22 RX ADMIN — FAMOTIDINE 20 MG: 20 TABLET, FILM COATED ORAL at 08:04

## 2023-06-22 RX ADMIN — NICOTINE 1 PATCH: 21 PATCH, EXTENDED RELEASE TRANSDERMAL at 08:04

## 2023-06-22 RX ADMIN — ATORVASTATIN CALCIUM 40 MG: 40 TABLET, FILM COATED ORAL at 16:20

## 2023-06-22 RX ADMIN — FINASTERIDE 5 MG: 5 TABLET, FILM COATED ORAL at 08:04

## 2023-06-22 RX ADMIN — FAMOTIDINE 20 MG: 20 TABLET, FILM COATED ORAL at 16:20

## 2023-06-22 RX ADMIN — METOPROLOL TARTRATE 25 MG: 25 TABLET, FILM COATED ORAL at 08:04

## 2023-06-22 RX ADMIN — HEPARIN SODIUM 5000 UNITS: 5000 INJECTION INTRAVENOUS; SUBCUTANEOUS at 05:55

## 2023-06-22 RX ADMIN — SODIUM CHLORIDE 125 ML/HR: 0.9 INJECTION, SOLUTION INTRAVENOUS at 22:08

## 2023-06-22 NOTE — PROGRESS NOTES
"Progress Note - Cardiology Team 1  Samm Armijo 71 y o  male MRN: [id number]  Unit/Bed#: [facility] 708-01 Encounter: [id number]        Active Problems:    CAD (coronary artery disease)    Pre-diabetes    HLD (hyperlipidemia)    HTN (hypertension)    Tobacco use    BPH (benign prostatic hyperplasia)    NARDA (acute kidney injury) ([facility] [address] )    Schizophrenia ([facility] )    Spinal stenosis of lumbar region with neurogenic claudication      Assessment/Plan     1  Lumbar spinal stenosis with neurogenic claudication  Neurosurgery plans L3-4 and L4-5 decompression Friday 6/23  Cardiology requested for risk stratification  Patient with exertional chest pain  Checking stress test prior to surgery as surgery could be delayed if revascularization is warranted  LVEF 50%-hypokinetic apical septal- similar to 2015  Continue BB, statin, Nitrate  Asa on hold pending surgery     2  CAD history of CABG x4  [facility]  HS troponin -x2  ECG- non ischemic  No routine cardiology f/u   Previously medication non compliance noted  Pt reports current compliance with Asa, statin, BB, Ace inhibitor  Also noted has NTG patch   Reports exertional Chest pressure [place]  Relieves with rest within few mins  Was in ED 5/15 with chest pain , palpitations while visiting an intoxicated pt- MI ruled out  No recent ischemia evaluation  Checking stress test       3  HTN- normotensive   Reports compliance with lisinopril and metoprolol     5  Tobacco abuse- long history of tobacco abuse  1/2-1PPD  Recommend complete tobacco cessation     6  HLD-under good control currently   On atorvastatin 40 mg daily       Subjective/Objective   Chief Complaint/Subjective  No events overnight     No cp  Await stress test today        Vitals: /81   Pulse 74   Temp (!) 97 3 °F (36 3 °C)   Resp 18   Ht 5' 9\" (1 753 m)   Wt 72 1 kg (159 lb)   SpO2 97%   BMI 23 48 kg/m²     Vitals:    06/19/23 1418 06/21/23 1015   Weight: 72 3 kg (159 lb 6 3 " "oz) 72 1 kg (159 lb)     Orthostatic Blood Pressures    Flowsheet Row Most Recent Value   Blood Pressure 142/81 filed at 06/22/2023 0715   Patient Position - Orthostatic VS Lying filed at 06/20/2023 2135            Intake/Output Summary (Last 24 hours) at 6/22/2023 1051  Last data filed at 6/22/2023 0801  Gross per 24 hour   Intake 1060 ml   Output --   Net 1060 ml       Invasive Devices     Peripheral Intravenous Line  Duration           Peripheral IV 06/19/23 Right Antecubital 2 days                Current Facility-Administered Medications   Medication Dose Route Frequency   • acetaminophen (TYLENOL) tablet 975 mg  975 mg Oral Q6H PRN   • atorvastatin (LIPITOR) tablet 40 mg  40 mg Oral QPM   • famotidine (PEPCID) tablet 20 mg  20 mg Oral BID   • finasteride (PROSCAR) tablet 5 mg  5 mg Oral Daily   • heparin (porcine) subcutaneous injection 5,000 Units  5,000 Units Subcutaneous Q8H [address]   • metoprolol tartrate (LOPRESSOR) tablet 25 mg  25 mg Oral Daily   • nicotine (NICODERM CQ) 21 mg/24 hr TD 24 hr patch 1 patch  1 patch Transdermal Daily   • sertraline (ZOLOFT) tablet 100 mg  100 mg Oral HS         Physical Exam: /81   Pulse 74   Temp (!) 97 3 °F (36 3 °C)   Resp 18   Ht 5' 9\" (1 753 m)   Wt 72 1 kg (159 lb)   SpO2 97%   BMI 23 48 kg/m²     General Appearance:    Alert, cooperative, no distress, appears stated age   Head:    Normocephalic, no scleral icterus   Eyes:    PERRL   Nose:   Nares normal, septum midline, no drainage    Throat:   Lips, mucosa, and tongue normal   Neck:   Supple, symmetrical, trachea midline,             Lungs:     Clear to auscultation bilaterally, respirations unlabored   Chest Wall:    No tenderness or deformity    Heart:    Regular rate and rhythm, S1 and S2 normal, no murmur, rub   or gallop   Abdomen:     Soft, non-tender, bowel sounds active all four quadrants,     no masses, no organomegaly   Extremities:   Extremities normal, atraumatic, no cyanosis or edema     " "  Skin:   Skin color, texture, turgor normal, no rashes or lesions   Neurologic:   Alert and oriented to person place and time, no focal deficits                 Lab Results:   Recent Results (from the past 72 hour(s))   ECG 12 lead    Collection Time: 06/19/23  1:33 PM   Result Value Ref Range    Ventricular Rate 96 BPM    Atrial Rate 96 BPM    NJ Interval 130 ms    QRSD Interval 66 ms    QT Interval 318 ms    QTC Interval 401 ms    P Axis 154 degrees    QRS Axis 39 degrees    T Wave Axis -13 degrees   Fingerstick Glucose (POCT)    Collection Time: 06/19/23  2:18 PM   Result Value Ref Range    POC Glucose 153 (H) 65 - 140 mg/dl   CBC and differential    Collection Time: 06/19/23  2:20 PM   Result Value Ref Range    WBC 7 66 4 31 - 10 16 Thousand/uL    RBC 3 97 3 88 - 5 62 Million/uL    Hemoglobin 12 9 12 0 - 17 0 g/dL    Hematocrit 36 7 36 5 - 49 3 %    MCV 92 82 - 98 fL    MCH 32 5 26 8 - 34 3 pg    MCHC 35 1 31 4 - 37 4 g/dL    RDW 13 9 11 6 - 15 1 %    MPV 9 5 8 9 - 12 7 fL    Platelets 730 409 - 330 Thousands/uL    nRBC 0 /100 WBCs    Neutrophils Relative 73 43 - 75 %    Immat GRANS % 0 0 - 2 %    Lymphocytes Relative 20 14 - 44 %    Monocytes Relative 6 4 - 12 %    Eosinophils Relative 1 0 - 6 %    Basophils Relative 0 0 - 1 %    Neutrophils Absolute 5 57 1 85 - 7 62 Thousands/µL    Immature Grans Absolute 0 03 0 00 - 0 20 Thousand/uL    Lymphocytes Absolute 1 54 0 60 - 4 47 Thousands/µL    Monocytes Absolute 0 44 0 17 - 1 22 Thousand/µL    Eosinophils Absolute 0 06 0 00 - 0 61 Thousand/µL    Basophils Absolute 0 02 0 00 - 0 10 Thousands/µL   HS Troponin 0hr (reflex protocol)    Collection Time: 06/19/23  2:20 PM   Result Value Ref Range    hs TnI 0hr 6 \"Refer to ACS Flowchart\"- see link ng/L   Basic metabolic panel    Collection Time: 06/19/23  2:20 PM   Result Value Ref Range    Sodium 140 135 - 147 mmol/L    Potassium 3 7 3 5 - 5 3 mmol/L    Chloride 116 (H) 96 - 108 mmol/L    CO2 23 21 - 32 mmol/L    ANION " "GAP 1 (L) 4 - 13 mmol/L    BUN 16 5 - 25 mg/dL    Creatinine 1 20 0 60 - 1 30 mg/dL    Glucose 158 (H) 65 - 140 mg/dL    Calcium 8 7 8 3 - 10 1 mg/dL    eGFR 60 ml/min/1 73sq m   Protime-INR    Collection Time: 06/19/23  2:20 PM   Result Value Ref Range    Protime 13 8 11 6 - 14 5 seconds    INR 1 04 0 84 - 1 19   APTT    Collection Time: 06/19/23  2:20 PM   Result Value Ref Range    PTT 24 23 - 37 seconds   HS Troponin I 2hr    Collection Time: 06/19/23  5:06 PM   Result Value Ref Range    hs TnI 2hr 6 \"Refer to ACS Flowchart\"- see link ng/L    Delta 2hr hsTnI 0 <20 ng/L   Platelet count    Collection Time: 06/19/23  5:06 PM   Result Value Ref Range    Platelets 497 839 - 841 Thousands/uL    MPV 9 4 8 9 - 12 7 fL   Lipid Panel with Direct LDL reflex    Collection Time: 06/20/23  5:00 AM   Result Value Ref Range    Cholesterol 124 See Comment mg/dL    Triglycerides 109 See Comment mg/dL    HDL, Direct 31 (L) >=40 mg/dL    LDL Calculated 71 0 - 100 mg/dL   Hemoglobin A1c w/EAG Estimation    Collection Time: 06/20/23  5:00 AM   Result Value Ref Range    Hemoglobin A1C 5 9 (H) Normal 3 8-5 6%; PreDiabetic 5 7-6 4%;  Diabetic >=6 5%; Glycemic control for adults with diabetes <7 0% %     mg/dl   Comprehensive metabolic panel    Collection Time: 06/20/23  5:00 AM   Result Value Ref Range    Sodium 144 135 - 147 mmol/L    Potassium 3 8 3 5 - 5 3 mmol/L    Chloride 117 (H) 96 - 108 mmol/L    CO2 24 21 - 32 mmol/L    ANION GAP 3 (L) 4 - 13 mmol/L    BUN 17 5 - 25 mg/dL    Creatinine 0 88 0 60 - 1 30 mg/dL    Glucose 88 65 - 140 mg/dL    Calcium 9 0 8 3 - 10 1 mg/dL    Corrected Calcium 9 8 8 3 - 10 1 mg/dL    AST 20 5 - 45 U/L    ALT 16 12 - 78 U/L    Alkaline Phosphatase 82 46 - 116 U/L    Total Protein 6 4 6 4 - 8 4 g/dL    Albumin 3 0 (L) 3 5 - 5 0 g/dL    Total Bilirubin 0 18 (L) 0 20 - 1 00 mg/dL    eGFR 86 ml/min/1 73sq m   Magnesium    Collection Time: 06/20/23  5:00 AM   Result Value Ref Range    Magnesium 2 0 " 1 6 - 2 6 mg/dL   CBC    Collection Time: 06/21/23  5:23 AM   Result Value Ref Range    WBC 7 36 4 31 - 10 16 Thousand/uL    RBC 3 92 3 88 - 5 62 Million/uL    Hemoglobin 12 4 12 0 - 17 0 g/dL    Hematocrit 37 4 36 5 - 49 3 %    MCV 95 82 - 98 fL    MCH 31 6 26 8 - 34 3 pg    MCHC 33 2 31 4 - 37 4 g/dL    RDW 14 1 11 6 - 15 1 %    Platelets 316 043 - 492 Thousands/uL    MPV 9 8 8 9 - 12 7 fL   Basic metabolic panel    Collection Time: 06/21/23  5:23 AM   Result Value Ref Range    Sodium 141 135 - 147 mmol/L    Potassium 4 3 3 5 - 5 3 mmol/L    Chloride 115 (H) 96 - 108 mmol/L    CO2 24 21 - 32 mmol/L    ANION GAP 2 mmol/L    BUN 17 5 - 25 mg/dL    Creatinine 0 95 0 60 - 1 30 mg/dL    Glucose 96 65 - 140 mg/dL    Calcium 8 7 8 3 - 10 1 mg/dL    eGFR 80 ml/min/1 73sq m   Echo complete w/ contrast if indicated    Collection Time: 06/21/23 11:00 AM   Result Value Ref Range    A4C EF 46 %    LVOT stroke volume 44 69     LVOT stroke volume index 24 50 ml/m2    LV Diastolic Volume (BP) 634 mL    LV Systolic Volume (BP) 85 mL    EF 47 %    LVIDd 5 40 cm    LVIDS 4 60 cm    IVSd 1 00 cm    LVPWd 1 00 cm    FS 15 28 - 44    MV E' Tissue Velocity Septal 7 cm/s    MV E' Tissue Velocity Lateral 9 cm/s    E wave deceleration time 232 ms    MV Peak E Jose 74 cm/s    MV Peak A Jose 0 56 m/s    AV LVOT peak gradient 2 mmHg    LVOT peak VTI 15 77 cm    LVOT peak jose 0 72 m/s    RVID d 3 8 cm    Tricuspid annular plane systolic excursion 0 09 cm    LA size 4 1 cm    LA length (A2C) 5 80 cm    RA 2D Volume 73 0 mL    RAA A4C 17 5 cm2    LVOT diameter 1 9 cm    Aortic valve peak velocity 1 24 m/s    Ao VTI 27 05 cm    AV mean gradient 3 mmHg    LVOT mn grad 1 0 mmHg    AV peak gradient 6 mmHg    AV area by cont VTI 1 7 cm2    AV area peak jose 1 6 cm2    TR Peak Jose 2 8 m/s    Triscuspid Valve Regurgitation Peak Gradient 31 0 mmHg    Ao root 3 30 cm    Asc Ao 3 2 cm    Aortic valve mean velocity 8 40 m/s    Tricuspid valve peak regurgitation velocity 2 87 m/s    Left ventricular stroke volume (2D) 45 00 mL    IVS 1 cm    LEFT VENTRICLE SYSTOLIC VOLUME (MOD BIPLANE) 2D 95 mL    LV DIASTOLIC VOLUME (MOD BIPLANE) 2D 140 mL    Left Atrium Area-systolic Four Chamber 33 4 cm2    Left Atrium Area-systolic Apical Two Chamber 27 3 cm2    LVSV, 2D 45 mL    LVOT area 2 83 cm2    DVI 0 58     AV valve area 1 65 cm2    GLS -15 %    LV EF 50     Est  RA pres 5 0 mmHg    Right Ventricular Peak Systolic Pressure 86 33 mmHg     Imaging: I have personally reviewed pertinent reports  Left Ventricle Left ventricular cavity size is normal  Wall thickness is normal  The left ventricular ejection fraction is 50%  Systolic function is low normal  Global longitudinal strain is reduced at -15%  Diastolic function is normal       Wall Scoring Baseline     The following segments are hypokinetic: apical septal   All other segments are normal                Right Ventricle Right ventricular cavity size is normal  Systolic function is normal  Wall thickness is normal       Left Atrium The atrium is mildly dilated  Right Atrium The atrium is normal in size  Aortic Valve The aortic valve is trileaflet  The leaflets are mildly thickened  The leaflets are mildly calcified  The leaflets exhibit normal mobility  There is trace regurgitation  There is aortic valve sclerosis  Mitral Valve Mitral valve structure is normal  There is mild regurgitation  There is no evidence of stenosis  Tricuspid Valve Tricuspid valve structure is normal  There is mild regurgitation  There is no evidence of stenosis  The right ventricular systolic pressure is mildly elevated  The estimated right ventricular systolic pressure is 10 84 mmHg  Pulmonic Valve Pulmonic valve structure is normal  There is trace regurgitation  There is no evidence of stenosis  Ascending Aorta The aortic root is normal in size        IVC/SVC The right atrial pressure is estimated at 5 0 mmHg  The inferior vena cava is dilated  Pericardium There is no pericardial effusion  The pericardium is normal in appearance  Counseling / Coordination of Care  Total time spent today 25 minutes  Greater than 50% of total time was spent with the patient and / or family counseling and / or coordination of care

## 2023-06-22 NOTE — ASSESSMENT & PLAN NOTE
Presents with worsening lower extremity weakness and ambulatory dysfunction  · Known to our service  Scheduled for decompression with Dr Marylee Messier in 10/2022; cancelled due to social situation  · Hx of severe lumbar spinal stenosis with neurogenic claudication  · Worsening since 6/19 when stood up from changing a tire  · Presented as stroke alert so loaded with ASA  · On exam, bilateral LE weakness 4/5  Decreased bilateral patellar reflexes  Some subjective diminished sensation  Also c/o some dizziness  Imaging:  · MRI lumbar spine wo, 6/19/2023: Spondylotic degenerative changes again noted similar to the prior study most significantly at L3-4 and L4-5 where severe central stenosis is identified  Moderate to severe lateral recess stenosis also identified as described  Moderate central stenosis and mild bilateral foraminal narrowing noted at L2-3 with mild foraminal narrowing at L5-S1 bilaterally  Plan:  · Continue to monitor neuro exam closely  · Neurology primary - appreciate recommendations and care  · TTE - LVEF 50%, similar to 2015  · MRI brain without evidence of acute ischemia  CTA, CTH negative  · Pending surgical risk stratification  · Discontinue ASA (last dose on 6/20)  · Mobilize with PT/OT  · DVT ppx: SCD's, Heparin SQ  · Tentative plan for L3-4 and L4-5 decompression on Friday, 6/23 with Marylee Messier pending results of nuclear stress test   · Disposition: per PT/OT will need rehab  PMR on board  Neurosurgery will continue to follow  Please call with questions

## 2023-06-22 NOTE — PLAN OF CARE
Problem: MOBILITY - ADULT  Goal: Maintain or return to baseline ADL function  Description: INTERVENTIONS:  -  Assess patient's ability to carry out ADLs; assess patient's baseline for ADL function and identify physical deficits which impact ability to perform ADLs (bathing, care of mouth/teeth, toileting, grooming, dressing, etc )  - Assess/evaluate cause of self-care deficits   - Assess range of motion  - Assess patient's mobility; develop plan if impaired  - Assess patient's need for assistive devices and provide as appropriate  - Encourage maximum independence but intervene and supervise when necessary  - Involve family in performance of ADLs  - Assess for home care needs following discharge   - Consider OT consult to assist with ADL evaluation and planning for discharge  - Provide patient education as appropriate  Outcome: Progressing  Goal: Maintains/Returns to pre admission functional level  Description: INTERVENTIONS:  - Perform BMAT or MOVE assessment daily    - Set and communicate daily mobility goal to care team and patient/family/caregiver  - Collaborate with rehabilitation services on mobility goals if consulted  - Perform Range of Motion times a day  - Reposition patient every  hours    - Dangle patient  times a day  - Stand patient  times a day  - Ambulate patient  times a day  - Out of bed to chair  times a day   - Out of bed for meals  times a day  - Out of bed for toileting  - Record patient progress and toleration of activity level   Outcome: Progressing     Problem: NEUROSENSORY - ADULT  Goal: Achieves stable or improved neurological status  Description: INTERVENTIONS  - Monitor and report changes in neurological status  - Monitor vital signs such as temperature, blood pressure, glucose, and any other labs ordered   - Initiate measures to prevent increased intracranial pressure  - Monitor for seizure activity and implement precautions if appropriate      Outcome: Progressing  Goal: Remains free of injury related to seizures activity  Description: INTERVENTIONS  - Maintain airway, patient safety  and administer oxygen as ordered  - Monitor patient for seizure activity, document and report duration and description of seizure to physician/advanced practitioner  - If seizure occurs,  ensure patient safety during seizure  - Reorient patient post seizure  - Seizure pads on all 4 side rails  - Instruct patient/family to notify RN of any seizure activity including if an aura is experienced  - Instruct patient/family to call for assistance with activity based on nursing assessment  - Administer anti-seizure medications if ordered    Outcome: Progressing  Goal: Achieves maximal functionality and self care  Description: INTERVENTIONS  - Monitor swallowing and airway patency with patient fatigue and changes in neurological status  - Encourage and assist patient to increase activity and self care     - Encourage visually impaired, hearing impaired and aphasic patients to use assistive/communication devices  Outcome: Progressing     Problem: MUSCULOSKELETAL - ADULT  Goal: Maintain or return mobility to safest level of function  Description: INTERVENTIONS:  - Assess patient's ability to carry out ADLs; assess patient's baseline for ADL function and identify physical deficits which impact ability to perform ADLs (bathing, care of mouth/teeth, toileting, grooming, dressing, etc )  - Assess/evaluate cause of self-care deficits   - Assess range of motion  - Assess patient's mobility  - Assess patient's need for assistive devices and provide as appropriate  - Encourage maximum independence but intervene and supervise when necessary  - Involve family in performance of ADLs  - Assess for home care needs following discharge   - Consider OT consult to assist with ADL evaluation and planning for discharge  - Provide patient education as appropriate  Outcome: Progressing  Goal: Maintain proper alignment of affected body part  Description: INTERVENTIONS:  - Support, maintain and protect limb and body alignment  - Provide patient/ family with appropriate education  Outcome: Progressing     Problem: PAIN - ADULT  Goal: Verbalizes/displays adequate comfort level or baseline comfort level  Description: Interventions:  - Encourage patient to monitor pain and request assistance  - Assess pain using appropriate pain scale  - Administer analgesics based on type and severity of pain and evaluate response  - Implement non-pharmacological measures as appropriate and evaluate response  - Consider cultural and social influences on pain and pain management  - Notify physician/advanced practitioner if interventions unsuccessful or patient reports new pain  Outcome: Progressing     Problem: INFECTION - ADULT  Goal: Absence or prevention of progression during hospitalization  Description: INTERVENTIONS:  - Assess and monitor for signs and symptoms of infection  - Monitor lab/diagnostic results  - Monitor all insertion sites, i e  indwelling lines, tubes, and drains  - Monitor endotracheal if appropriate and nasal secretions for changes in amount and color  - [place] appropriate cooling/warming therapies per order  - Administer medications as ordered  - Instruct and encourage patient and family to use good hand hygiene technique  - Identify and instruct in appropriate isolation precautions for identified infection/condition  Outcome: Progressing  Goal: Absence of fever/infection during neutropenic period  Description: INTERVENTIONS:  - Monitor WBC    Outcome: Progressing     Problem: SAFETY ADULT  Goal: Maintain or return to baseline ADL function  Description: INTERVENTIONS:  -  Assess patient's ability to carry out ADLs; assess patient's baseline for ADL function and identify physical deficits which impact ability to perform ADLs (bathing, care of mouth/teeth, toileting, grooming, dressing, etc )  - Assess/evaluate cause of self-care deficits   - Assess range of motion  - Assess patient's mobility; develop plan if impaired  - Assess patient's need for assistive devices and provide as appropriate  - Encourage maximum independence but intervene and supervise when necessary  - Involve family in performance of ADLs  - Assess for home care needs following discharge   - Consider OT consult to assist with ADL evaluation and planning for discharge  - Provide patient education as appropriate  Outcome: Progressing  Goal: Maintains/Returns to pre admission functional level  Description: INTERVENTIONS:  - Perform BMAT or MOVE assessment daily    - Set and communicate daily mobility goal to care team and patient/family/caregiver  - Collaborate with rehabilitation services on mobility goals if consulted  - Perform Range of Motion  times a day  - Reposition patient every  hours    - Dangle patient  times a day  - Stand patient  times a day  - Ambulate patient  times a day  - Out of bed to chair  times a day   - Out of bed for meal times a day  - Out of bed for toileting  - Record patient progress and toleration of activity level   Outcome: Progressing  Goal: Patient will remain free of falls  Description: INTERVENTIONS:  -  Assess patient's ability to carry out ADLs; assess patient's baseline for ADL function and identify physical deficits which impact ability to perform ADLs (bathing, care of mouth/teeth, toileting, grooming, dressing, etc )  - Assess/evaluate cause of self-care deficits   - Assess range of motion  - Assess patient's mobility; develop plan if impaired  - Assess patient's need for assistive devices and provide as appropriate  - Encourage maximum independence but intervene and supervise when necessary  - Involve family in performance of ADLs  - Assess for home care needs following discharge   - Consider OT consult to assist with ADL evaluation and planning for discharge  - Provide patient education as appropriate  Outcome: Progressing     Problem: DISCHARGE PLANNING  Goal: Discharge to home or other facility with appropriate resources  Description: INTERVENTIONS:  - Identify barriers to discharge w/patient and caregiver  - Arrange for needed discharge resources and transportation as appropriate  - Identify discharge learning needs (meds, wound care, etc )  - Arrange for interpretive services to assist at discharge as needed  - Refer to Case Management Department for coordinating discharge planning if the patient needs post-hospital services based on physician/advanced practitioner order or complex needs related to functional status, cognitive ability, or social support system  Outcome: Progressing     Problem: Knowledge Deficit  Goal: Patient/family/caregiver demonstrates understanding of disease process, treatment plan, medications, and discharge instructions  Description: Complete learning assessment and assess knowledge base  Interventions:  - Provide teaching at level of understanding  - Provide teaching via preferred learning methods  Outcome: Progressing     Problem: Neurological Deficit  Goal: Neurological status is stable or improving  Description: Interventions:  - Monitor and assess patient's level of consciousness, motor function, sensory function, and level of assistance needed for ADLs  - Monitor and report changes from baseline  Collaborate with interdisciplinary team to initiate plan and implement interventions as ordered  - Provide and maintain a safe environment  - Consider seizure precautions  - Consider fall precautions  - Consider aspiration precautions  - Consider bleeding precautions  Outcome: Progressing     Problem: Activity Intolerance/Impaired Mobility  Goal: Mobility/activity is maintained at optimum level for patient  Description: Interventions:  - Assess and monitor patient  barriers to mobility and need for assistive/adaptive devices  - Assess patient's emotional response to limitations    - Collaborate with interdisciplinary team and initiate plans and interventions as ordered  - Encourage independent activity per ability   - Maintain proper body alignment  - Perform active/passive rom as tolerated/ordered    - Plan activities to conserve energy   - Turn patient as appropriate  Outcome: Progressing

## 2023-06-22 NOTE — PROGRESS NOTES
NEUROLOGY RESIDENCY PROGRESS NOTE     Name: Jarad Garcia   Age & Sex: 71 y o  male   MRN: [id number]  Unit/Bed#: [facility] 708-01   Encounter: [id number]    Jarad Garcia will need follow up in in 3 months with general attending (Scheduled with Dr Michelle Oreilly at [address])  He will not require outpatient neurological testing  Schedule outpatient follow up with me at [address]: This should be completed prior to removing patient from list or discharge     Pending for discharge: surgery    ASSESSMENT & PLAN     Spinal stenosis of lumbar region with neurogenic claudication  Assessment & Plan  Jarad Garcia is a 71 y o  male  with pertinent history lumbar spondylosis, neurogenic claudication, CAD s/p CABG x 4, HTN, HLD, hx of PE (Not on thinners) who presented as stroke alert on 6/19/23 given acute onset worsening bilateral lower extremity weakness  Denies any urinary symptoms or saddle anesthesia  Initial NIHSS of 5 and LKW 1200  Initial BP on arrival was Blood Pressure: 110/53  As a result of lower suspicion for stroke and potential need for urgent surgery patient was determined to not be a candidate for tPA  • F/u exam:  HF strength 4-/5 R, 4/5 L  No ataxia or dysmetria noted on exam  Proprioception normal  Difficulty standing from a seated position without assistance  Romberg positive  • BP over last 24 hours: Blood Pressure: 118/88  current BP: Blood Pressure: 118/88    Imaging:  • CTH: No acute intracranial abnormality  • CTA: No significant stenosis of the cervical carotid or vertebral arteries  No high-grade intracranial stenosis, large vessel occlusion or aneurysm  • MRI brain: No MR evidence of acute ischemia  Small right mastoid effusion  • MRI L spine: Spondylotic degenerative changes again noted similar to the prior study most significantly at L3-4 and L4-5 where severe central stenosis is identified  Moderate to severe lateral recess stenosis also identified as described   Moderate central stenosis and mild bilateral foraminal narrowing noted at L2-3 with mild foraminal narrowing at L5-S1 bilaterally  • Echocardiogram: EF 50% with mild LA dilation   • Telemetry: monitor  • LDL 71   • HBA1C 5 8    Impression: At this time history and physical along with available imaging concerning for worsening weakness in the setting of known spinal stenosis  One month ago patient strength 5/5 throughout vs now significantly week in bilateral HF  Plan:  • Appreciate neurosurgery eval and recommendations   • Plan for neurosurgery on Friday, patient and family aware and agreeable  Family able to help with recovery  • PMR on board, plan for acute post surgical rehab   • ASA on hold with anticipation to surgery    • Nuclear stress test done  Patient cleared for surgery tomorrow  • plan for L3-4 and L4-5 decompression on Friday, 6/23 with Dr Jean Claude Engel   • BP goal: normotension  • Statin  • Maintain glucose below 200  • Echo pending  • Neuro checks  • Monitor on telemetry   • PT/OT//PMR consults appreciated       Schizophrenia [facility])  Assessment & Plan  As per chart patient with history of schizophrenia  I reviewed listed home meds at bedside  Patient denies taking aripiprazole or risperidone  NARDA (acute kidney injury) ([facility] [address] )  Assessment & Plan  Possibly due to dehydration given poor PO intake date of admission  Cr 1 20 > 0 88, treated with NSS 1 L bolus, renal function assessment and lab monitoring  NARDA improved after iL bolus of NSS  Plan:  · Trend BMP    BPH (benign prostatic hyperplasia)  Assessment & Plan  On home finasteride 5 mg     Tobacco use  Assessment & Plan  Current smoker 1 PPD     Plan:  · Nicotine patch     HTN (hypertension)  Assessment & Plan  On home lisinopril 5 mg and Lopressor 25 mg     Plan:  · Normotension  · Hold lisinopril as BP stable    CAD (coronary artery disease)  Assessment & Plan  Patient is a 1 PPD smoker with hx of CAD s/p CABG x 4      Plan:  · Nuclear stress test done  "Cleared for surgery  Will need cardiology follow up  · ASA on hold with anticipation to surgery    · Cont atorvastatin 40 mg   · Prn nitroglycerin   · Appreciate SOD for medical management       SUBJECTIVE     Patient was seen and examined  No acute events overnight  Continues to be stable  No change in weakness of the lower extremities  Denies LE sensory changes or urinary problems  He will be undergoing nuclear medicine stress test today  Review of Systems   Constitutional: Negative for fever  Eyes: Negative for visual disturbance  Gastrointestinal: Negative for diarrhea, nausea and vomiting  Genitourinary: Negative for difficulty urinating  Musculoskeletal: Positive for gait problem  Neurological: Positive for weakness  Negative for numbness and headaches  Psychiatric/Behavioral: Negative for agitation  The patient is not nervous/anxious  OBJECTIVE     Patient ID: Maira Mcgee is a 71 y o  male  Vitals:    23 2257 23 0715 23 1114 23 1554   BP: 99/64 142/81  139/79   Pulse: 68 74     Resp: 18   15   Temp: 98 2 °F (36 8 °C) (!) 97 3 °F (36 3 °C)     TempSrc:       SpO2: 97% 97%     Weight:   72 1 kg (159 lb)    Height:   5' 9\" (1 753 m)       Temperature:   Temp (24hrs), Av 8 °F (36 6 °C), Min:97 3 °F (36 3 °C), Max:98 2 °F (36 8 °C)    Temperature: (!) 97 3 °F (36 3 °C)      Physical Exam  Eyes:      Extraocular Movements: EOM normal    Cardiovascular:      Rate and Rhythm: Normal rate and regular rhythm  Heart sounds: Normal heart sounds, S1 normal and S2 normal    Pulmonary:      Effort: Pulmonary effort is normal    Abdominal:      Palpations: Abdomen is soft  Neurological:      Mental Status: He is oriented to person, place, and time  Coordination: Romberg Test abnormal  Finger-Nose-Finger Test normal       Deep Tendon Reflexes:      Reflex Scores:       Bicep reflexes are 1+ on the right side and 1+ on the left side         " Brachioradialis reflexes are 1+ on the right side and 1+ on the left side  Patellar reflexes are 2+ on the right side and 1+ on the left side  Neurologic Exam     Mental Status   Oriented to person, place, and time  Able to name object  Normal comprehension  Cranial Nerves     CN III, IV, VI   Extraocular motions are normal      CN V   Facial sensation intact  CN VII   Facial expression full, symmetric  Motor Exam   Muscle bulk: decreased  Overall muscle tone: normal  Right arm pronator drift: absent  Left arm pronator drift: absent    Strength   Right deltoid: 5/5  Left deltoid: 5/5  Right biceps: 5/5  Left biceps: 5/5  Right triceps: 5/5  Left triceps: 5/5  Right iliopsoas: 3/5  Left iliopsoas: 4/5  Right quadriceps: 4/5  Left quadriceps: 4/5  Right hamstrin/5  Left hamstrin/5  Right anterior tibial: 5/5  Left anterior tibial: 5/5  Right gastroc: 5/5  Left gastroc: /53+ R HF, 4- L HF     Sensory Exam   Right arm proprioception: normal  Left arm proprioception: normal  Pinprick normal      Gait, Coordination, and Reflexes     Coordination   Romberg: positive  Finger to nose coordination: normal    Tremor   Resting tremor: absent    Reflexes   Right brachioradialis: 1+  Left brachioradialis: 1+  Right biceps: 1+  Left biceps: 1+  Right patellar: 2+  Left patellar: 1+  Right plantar: equivocal  Left plantar: equivocal         LABORATORY DATA     Labs: I have personally reviewed pertinent reports      Results from last 7 days   Lab Units 23  0523 23  1706 23  1420   WBC Thousand/uL 7 36  --  7 66   HEMOGLOBIN g/dL 12 4  --  12 9   HEMATOCRIT % 37 4  --  36 7   PLATELETS Thousands/uL 264 259 264   NEUTROS PCT %  --   --  73   MONOS PCT %  --   --  6   EOS PCT %  --   --  1      Results from last 7 days   Lab Units 23  0523 23  0500 23  1420   SODIUM mmol/L 141 144 140   POTASSIUM mmol/L 4 3 3 8 3 7   CHLORIDE mmol/L 115* 117* 116*   CO2 mmol/L 24 24 23   BUN mg/dL 17 17 16   CREATININE mg/dL 0 95 0 88 1 20   CALCIUM mg/dL 8 7 9 0 8 7   ALK PHOS U/L  --  82  --    ALT U/L  --  16  --    AST U/L  --  20  --      Results from last 7 days   Lab Units 06/20/23  0500   MAGNESIUM mg/dL 2 0          Results from last 7 days   Lab Units 06/19/23  1420   INR  1 04   PTT seconds 24               IMAGING & DIAGNOSTIC TESTING     Radiology Results: I have personally reviewed pertinent reports  MRI lumbar spine wo contrast   Final Result by Zamzam 6, DO (06/20 8670)      Spondylotic degenerative changes again noted similar to the prior study most significantly at L3-4 and L4-5 where severe central stenosis is identified  Moderate to severe lateral recess stenosis also identified as described  Moderate central stenosis and mild bilateral foraminal narrowing noted at L2-3 with mild foraminal narrowing at L5-S1 bilaterally  Workstation performed: [id number]         MRI brain wo contrast   Final Result by Weston Moe MD (06/20 0020)      No MR evidence of acute ischemia  Small right mastoid effusion  Workstation performed: [id number]         CTA stroke alert (head/neck)   Final Result by E Lethea Lombard, MD (06/19 1447)      No significant stenosis of the cervical carotid or vertebral arteries   No high-grade intracranial stenosis, large vessel occlusion or aneurysm  Findings were directly discussed with Jose Alfredo Parker at 2:41 p m  Workstation performed: [id number]         CT stroke alert brain   Final Result by E Lethea Lombard, MD (06/19 3032)      No acute intracranial abnormality  Findings were directly discussed with Jose Alfredo Parker at 2:41 p m  Workstation performed: [id number]             Other Diagnostic Testing: I have personally reviewed pertinent reports        ACTIVE MEDICATIONS     Current Facility-Administered Medications   Medication Dose Route Frequency   • acetaminophen (TYLENOL) tablet 975 mg  975 mg Oral Q6H PRN   • atorvastatin (LIPITOR) tablet 40 mg  40 mg Oral QPM   • [START ON 6/23/2023] ceFAZolin (ANCEF) IVPB (premix in dextrose) 2,000 mg 50 mL  2,000 mg Intravenous On Call   • [START ON 6/23/2023] chlorhexidine (PERIDEX) 0 12 % oral rinse 15 mL  15 mL Swish & Spit On Call   • famotidine (PEPCID) tablet 20 mg  20 mg Oral BID   • finasteride (PROSCAR) tablet 5 mg  5 mg Oral Daily   • heparin (porcine) subcutaneous injection 5,000 Units  5,000 Units Subcutaneous Q8H [address]   • metoprolol tartrate (LOPRESSOR) tablet 25 mg  25 mg Oral Daily   • nicotine (NICODERM CQ) 21 mg/24 hr TD 24 hr patch 1 patch  1 patch Transdermal Daily   • sertraline (ZOLOFT) tablet 100 mg  100 mg Oral HS   • sodium chloride 0 9 % infusion  125 mL/hr Intravenous Continuous       Prior to Admission medications    Medication Sig Start Date End Date Taking? Authorizing Provider   Acetaminophen Extra Strength 500 MG tablet  5/12/22   Historical Provider, MD   ARIPiprazole (ABILIFY) 5 mg tablet Take 5 mg by mouth daily    Historical Provider, MD   Arthritis Pain Relief 650 MG CR tablet take 2 tablets by mouth twice a day if needed 3/15/23   Historical Provider, MD   aspirin 81 MG tablet Take 81 mg by mouth daily  Historical Provider, MD   Aspirin Low Dose 81 MG EC tablet  3/7/23   Historical Provider, MD   atorvastatin (LIPITOR) 40 mg tablet Take 40 mg by mouth daily    Historical Provider, MD   erythromycin (ILOTYCIN) ophthalmic ointment Place a 1/2 inch ribbon of ointment into the lower eyelid  12/4/20   Mariya Shah MD   finasteride (PROSCAR) 5 mg tablet Take 5 mg by mouth daily 5/7/23   Historical Provider, MD   gabapentin (NEURONTIN) 100 mg capsule Take 3 capsules (300 mg total) by mouth daily at bedtime 5/24/22   SINGH Kam   hydrOXYzine HCL (ATARAX) 25 mg tablet Take 1 tablet twice a day by oral route as needed for 30 days      Historical Provider, MD   lisinopril (ZESTRIL) 5 mg tablet  5/12/22   Historical Provider, MD   metoprolol tartrate (LOPRESSOR) 25 mg tablet Take 25 mg by mouth daily      Historical Provider, MD   naproxen (NAPROSYN) 250 mg tablet take 1 tablet by mouth twice a day if needed for 10 days 2/3/22   Historical Provider, MD   nicotine (NICODERM CQ) 21 mg/24 hr TD 24 hr patch apply 1 patch to CLEAN, DRY, AND INTACT SKIN once daily    Historical Provider, MD   nitroglycerin (NITRODUR) 0 4 mg/hr Place 1 patch on the skin 2 (two) times a day as needed 6/5/15   Historical Provider, MD   RA Melatonin 3 MG take 1 tablet by mouth EVERY DAY AT BEDTIME FOR 90 DAYS 2/15/23   Historical Provider, MD   risperiDONE (RisperDAL) 0 5 mg tablet  3/23/22   Historical Provider, MD   sertraline (ZOLOFT) 100 mg tablet take 1 tablet by mouth EVERY DAY IN THE EVENING FOR 90 DAYS 3/8/23   Historical Provider, MD   triamcinolone (KENALOG) 0 1 % ointment APPLY A THIN LAYER TO THE AFFECTED AREA(S) BY TOPICAL ROUTE TWO TIMES PER DAY AS NEEDED    Historical Provider, MD         VTE Pharmacologic Prophylaxis: Heparin  VTE Mechanical Prophylaxis: sequential compression device    ==  MD Shayan Barker [facility] Neurology Residency, PGY-4

## 2023-06-22 NOTE — PROGRESS NOTES
[address] [facility]  Progress Note  Name: Oly Pollack  MRN: [id number]  Unit/Bed#: [facility] 243-88 I Date of Admission: 6/19/2023   Date of Service: 6/22/2023 I Hospital Day: 3    Assessment/Plan   Spinal stenosis of lumbar region with neurogenic claudication  Assessment & Plan  Presents with worsening lower extremity weakness and ambulatory dysfunction  · Known to our service  Scheduled for decompression with Dr Elvia Campuzano in 10/2022; cancelled due to social situation  · Hx of severe lumbar spinal stenosis with neurogenic claudication  · Worsening since 6/19 when stood up from changing a tire  · Presented as stroke alert so loaded with ASA  · On exam, bilateral LE weakness 4/5  Decreased bilateral patellar reflexes  Some subjective diminished sensation  Also c/o some dizziness  Imaging:  · MRI lumbar spine wo, 6/19/2023: Spondylotic degenerative changes again noted similar to the prior study most significantly at L3-4 and L4-5 where severe central stenosis is identified  Moderate to severe lateral recess stenosis also identified as described  Moderate central stenosis and mild bilateral foraminal narrowing noted at L2-3 with mild foraminal narrowing at L5-S1 bilaterally  Plan:  · Continue to monitor neuro exam closely  · Neurology primary - appreciate recommendations and care  · TTE - LVEF 50%, similar to 2015  · MRI brain without evidence of acute ischemia  CTA, CTH negative  · Pending surgical risk stratification  · Discontinue ASA (last dose on 6/20)  · Mobilize with PT/OT  · DVT ppx: SCD's, Heparin SQ  · Tentative plan for L3-4 and L4-5 decompression on Friday, 6/23 with Elvia Campuzano pending results of nuclear stress test   · Disposition: per PT/OT will need rehab  PMR on board  Neurosurgery will continue to follow  Please call with questions  CAD (coronary artery disease)  Assessment & Plan  Remote CABG x4  Reporting some exertional chest pressure    S/p nuclear "stress test today 6/22 - if evidence of ischemia, will need to delay surgical intervention  Subjective/Objective   Chief Complaint: \"Sometimes my chest hurts  \"    Subjective: Patient describes occasional dyspnea/chest pain on exertion  C/o dizziness while undergoing stress test  Continues to experience lower extremity weakness but states it improved somewhat  Objective: NAD  Resting comfortably in bed  I/O       06/20 0701  06/21 0700 06/21 0701  06/22 0700 06/22 0701  06/23 0700    P  O  461 1060 520    Total Intake(mL/kg) 461 (6 4) 1060 (14 7) 520 (7 2)    Net +461 +1060 +520           Unmeasured Urine Occurrence 2 x 3 x 1 x    Unmeasured Stool Occurrence 0 x 0 x 0 x          Invasive Devices     Peripheral Intravenous Line  Duration           Peripheral IV 06/19/23 Right Antecubital 3 days                Physical Exam:  Vitals: Blood pressure 142/81, pulse 74, temperature (!) 97 3 °F (36 3 °C), resp  rate 18, height 5' 9\" (1 753 m), weight 72 1 kg (159 lb), SpO2 97 %  ,Body mass index is 23 48 kg/m²          General appearance: alert, appears stated age, cooperative and no distress  Head: Normocephalic, without obvious abnormality, atraumatic  Eyes: EOMI, PERRL  Neck: supple, symmetrical, trachea midline and NT  Back: no kyphosis present, no tenderness to percussion or palpation  Lungs: non labored breathing  Heart: regular heart rate  Neurologic:   Mental status: Alert, oriented x3, thought content appropriate  Cranial nerves: grossly intact (Cranial nerves II-XII)  Sensory: normal to LT  Motor: LLE -4/5, RLE 4/5  Reflexes: 2+ and symmetric        Lab Results:  Results from last 7 days   Lab Units 06/21/23  0523 06/19/23  1706 06/19/23  1420   WBC Thousand/uL 7 36  --  7 66   HEMOGLOBIN g/dL 12 4  --  12 9   HEMATOCRIT % 37 4  --  36 7   PLATELETS Thousands/uL 264 259 264   NEUTROS PCT %  --   --  73   MONOS PCT %  --   --  6   EOS PCT %  --   --  1     Results from last 7 days   Lab Units " "06/21/23  0523 06/20/23  0500 06/19/23  1420   POTASSIUM mmol/L 4 3 3 8 3 7   CHLORIDE mmol/L 115* 117* 116*   CO2 mmol/L 24 24 23   BUN mg/dL 17 17 16   CREATININE mg/dL 0 95 0 88 1 20   CALCIUM mg/dL 8 7 9 0 8 7   ALK PHOS U/L  --  82  --    ALT U/L  --  16  --    AST U/L  --  20  --      Results from last 7 days   Lab Units 06/20/23  0500   MAGNESIUM mg/dL 2 0         Results from last 7 days   Lab Units 06/19/23  1420   INR  1 04   PTT seconds 24     No results found for: \"TROPONINT\"  ABG:No results found for: \"PHART\", \"WDN7VMO\", \"PO2ART\", \"DOR9LRC\", \"L4EXTBXL\", \"BEART\", \"SOURCE\"    Imaging Studies: I have personally reviewed pertinent reports  and I have personally reviewed pertinent films in PACS    MRI lumbar spine wo contrast    Result Date: 6/20/2023  Impression: Spondylotic degenerative changes again noted similar to the prior study most significantly at L3-4 and L4-5 where severe central stenosis is identified  Moderate to severe lateral recess stenosis also identified as described  Moderate central stenosis and mild bilateral foraminal narrowing noted at L2-3 with mild foraminal narrowing at L5-S1 bilaterally  Workstation performed: [id number]     MRI brain wo contrast    Result Date: 6/20/2023  Impression: No MR evidence of acute ischemia  Small right mastoid effusion  Workstation performed: [id number]     CT stroke alert brain    Result Date: 6/19/2023  Impression: No acute intracranial abnormality  Findings were directly discussed with Kitchfix Bee at 2:41 p m  Workstation performed: [id number]     CTA stroke alert (head/neck)    Result Date: 6/19/2023  Impression: No significant stenosis of the cervical carotid or vertebral arteries No high-grade intracranial stenosis, large vessel occlusion or aneurysm  Findings were directly discussed with Whitmire Bee at 2:41 p m  Workstation performed: [id number]       EKG, Pathology, and Other Studies: I have personally reviewed pertinent reports        VTE " Pharmacologic Prophylaxis: Sequential compression device (Venodyne)  and Heparin    VTE Mechanical Prophylaxis: sequential compression device

## 2023-06-23 ENCOUNTER — APPOINTMENT (INPATIENT)
Dept: RADIOLOGY | Facility: HOSPITAL | Age: 73
DRG: 519 | End: 2023-06-23
Payer: COMMERCIAL

## 2023-06-23 ENCOUNTER — ANESTHESIA EVENT (INPATIENT)
Dept: PERIOP | Facility: HOSPITAL | Age: 73
DRG: 519 | End: 2023-06-23
Payer: COMMERCIAL

## 2023-06-23 ENCOUNTER — ANESTHESIA (INPATIENT)
Dept: PERIOP | Facility: HOSPITAL | Age: 73
DRG: 519 | End: 2023-06-23
Payer: COMMERCIAL

## 2023-06-23 LAB
ABO GROUP BLD: NORMAL
ABO GROUP BLD: NORMAL
ALBUMIN SERPL BCP-MCNC: 3.3 G/DL (ref 3.5–5)
ALP SERPL-CCNC: 82 U/L (ref 46–116)
ALT SERPL W P-5'-P-CCNC: 30 U/L (ref 12–78)
ANION GAP SERPL CALCULATED.3IONS-SCNC: -1 MMOL/L
APTT PPP: 29 SECONDS (ref 23–37)
AST SERPL W P-5'-P-CCNC: 30 U/L (ref 5–45)
BILIRUB SERPL-MCNC: 0.42 MG/DL (ref 0.2–1)
BLD GP AB SCN SERPL QL: NEGATIVE
BNP SERPL-MCNC: 63 PG/ML (ref 0–100)
BUN SERPL-MCNC: 19 MG/DL (ref 5–25)
CALCIUM ALBUM COR SERPL-MCNC: 9.4 MG/DL (ref 8.3–10.1)
CALCIUM SERPL-MCNC: 8.8 MG/DL (ref 8.3–10.1)
CHLORIDE SERPL-SCNC: 114 MMOL/L (ref 96–108)
CO2 SERPL-SCNC: 27 MMOL/L (ref 21–32)
CREAT SERPL-MCNC: 1.02 MG/DL (ref 0.6–1.3)
ERYTHROCYTE [DISTWIDTH] IN BLOOD BY AUTOMATED COUNT: 13.6 % (ref 11.6–15.1)
GFR SERPL CREATININE-BSD FRML MDRD: 73 ML/MIN/1.73SQ M
GLUCOSE SERPL-MCNC: 92 MG/DL (ref 65–140)
GLUCOSE SERPL-MCNC: 94 MG/DL (ref 65–140)
HCT VFR BLD AUTO: 43 % (ref 36.5–49.3)
HGB BLD-MCNC: 13.9 G/DL (ref 12–17)
INR PPP: 0.96 (ref 0.84–1.19)
MCH RBC QN AUTO: 30.9 PG (ref 26.8–34.3)
MCHC RBC AUTO-ENTMCNC: 32.3 G/DL (ref 31.4–37.4)
MCV RBC AUTO: 96 FL (ref 82–98)
MRSA NOSE QL CULT: NORMAL
PLATELET # BLD AUTO: 279 THOUSANDS/UL (ref 149–390)
PMV BLD AUTO: 9.8 FL (ref 8.9–12.7)
POTASSIUM SERPL-SCNC: 4.1 MMOL/L (ref 3.5–5.3)
PROT SERPL-MCNC: 6.7 G/DL (ref 6.4–8.4)
PROTHROMBIN TIME: 13 SECONDS (ref 11.6–14.5)
RBC # BLD AUTO: 4.5 MILLION/UL (ref 3.88–5.62)
RH BLD: POSITIVE
RH BLD: POSITIVE
SODIUM SERPL-SCNC: 140 MMOL/L (ref 135–147)
SPECIMEN EXPIRATION DATE: NORMAL
WBC # BLD AUTO: 7.11 THOUSAND/UL (ref 4.31–10.16)

## 2023-06-23 PROCEDURE — 63047 LAM FACETEC & FORAMOT LUMBAR: CPT | Performed by: STUDENT IN AN ORGANIZED HEALTH CARE EDUCATION/TRAINING PROGRAM

## 2023-06-23 PROCEDURE — 85730 THROMBOPLASTIN TIME PARTIAL: CPT | Performed by: NURSE PRACTITIONER

## 2023-06-23 PROCEDURE — 85610 PROTHROMBIN TIME: CPT | Performed by: NURSE PRACTITIONER

## 2023-06-23 PROCEDURE — 86901 BLOOD TYPING SEROLOGIC RH(D): CPT | Performed by: NURSE PRACTITIONER

## 2023-06-23 PROCEDURE — 72020 X-RAY EXAM OF SPINE 1 VIEW: CPT

## 2023-06-23 PROCEDURE — 86900 BLOOD TYPING SEROLOGIC ABO: CPT | Performed by: NURSE PRACTITIONER

## 2023-06-23 PROCEDURE — 82948 REAGENT STRIP/BLOOD GLUCOSE: CPT

## 2023-06-23 PROCEDURE — 86850 RBC ANTIBODY SCREEN: CPT | Performed by: NURSE PRACTITIONER

## 2023-06-23 PROCEDURE — 99232 SBSQ HOSP IP/OBS MODERATE 35: CPT | Performed by: INTERNAL MEDICINE

## 2023-06-23 PROCEDURE — 00NY0ZZ RELEASE LUMBAR SPINAL CORD, OPEN APPROACH: ICD-10-PCS | Performed by: STUDENT IN AN ORGANIZED HEALTH CARE EDUCATION/TRAINING PROGRAM

## 2023-06-23 PROCEDURE — 83880 ASSAY OF NATRIURETIC PEPTIDE: CPT | Performed by: NURSE PRACTITIONER

## 2023-06-23 PROCEDURE — 99232 SBSQ HOSP IP/OBS MODERATE 35: CPT | Performed by: PSYCHIATRY & NEUROLOGY

## 2023-06-23 PROCEDURE — 97116 GAIT TRAINING THERAPY: CPT

## 2023-06-23 PROCEDURE — 63048 LAM FACETEC &FORAMOT EA ADDL: CPT | Performed by: STUDENT IN AN ORGANIZED HEALTH CARE EDUCATION/TRAINING PROGRAM

## 2023-06-23 PROCEDURE — 85027 COMPLETE CBC AUTOMATED: CPT | Performed by: NURSE PRACTITIONER

## 2023-06-23 PROCEDURE — 97530 THERAPEUTIC ACTIVITIES: CPT

## 2023-06-23 PROCEDURE — 80053 COMPREHEN METABOLIC PANEL: CPT | Performed by: NURSE PRACTITIONER

## 2023-06-23 RX ORDER — HYDROMORPHONE HCL/PF 1 MG/ML
0.5 SYRINGE (ML) INJECTION
Status: DISCONTINUED | OUTPATIENT
Start: 2023-06-23 | End: 2023-06-23 | Stop reason: HOSPADM

## 2023-06-23 RX ORDER — HYDROMORPHONE HCL IN WATER/PF 6 MG/30 ML
0.2 PATIENT CONTROLLED ANALGESIA SYRINGE INTRAVENOUS EVERY 4 HOURS PRN
Status: DISCONTINUED | OUTPATIENT
Start: 2023-06-23 | End: 2023-07-01 | Stop reason: HOSPADM

## 2023-06-23 RX ORDER — ROCURONIUM BROMIDE 10 MG/ML
INJECTION, SOLUTION INTRAVENOUS AS NEEDED
Status: DISCONTINUED | OUTPATIENT
Start: 2023-06-23 | End: 2023-06-23

## 2023-06-23 RX ORDER — BACITRACIN, NEOMYCIN, POLYMYXIN B 400; 3.5; 5 [USP'U]/G; MG/G; [USP'U]/G
OINTMENT TOPICAL AS NEEDED
Status: DISCONTINUED | OUTPATIENT
Start: 2023-06-23 | End: 2023-06-23 | Stop reason: HOSPADM

## 2023-06-23 RX ORDER — SODIUM CHLORIDE 9 MG/ML
INJECTION, SOLUTION INTRAVENOUS CONTINUOUS PRN
Status: DISCONTINUED | OUTPATIENT
Start: 2023-06-23 | End: 2023-06-23

## 2023-06-23 RX ORDER — PROPOFOL 10 MG/ML
INJECTION, EMULSION INTRAVENOUS AS NEEDED
Status: DISCONTINUED | OUTPATIENT
Start: 2023-06-23 | End: 2023-06-23

## 2023-06-23 RX ORDER — CEFAZOLIN SODIUM 1 G/50ML
1000 SOLUTION INTRAVENOUS EVERY 8 HOURS
Status: COMPLETED | OUTPATIENT
Start: 2023-06-23 | End: 2023-06-24

## 2023-06-23 RX ORDER — OXYCODONE HYDROCHLORIDE 5 MG/1
5 TABLET ORAL EVERY 4 HOURS PRN
Status: DISCONTINUED | OUTPATIENT
Start: 2023-06-23 | End: 2023-06-25

## 2023-06-23 RX ORDER — FENTANYL CITRATE 50 UG/ML
INJECTION, SOLUTION INTRAMUSCULAR; INTRAVENOUS AS NEEDED
Status: DISCONTINUED | OUTPATIENT
Start: 2023-06-23 | End: 2023-06-23

## 2023-06-23 RX ORDER — ONDANSETRON 2 MG/ML
4 INJECTION INTRAMUSCULAR; INTRAVENOUS ONCE AS NEEDED
Status: DISCONTINUED | OUTPATIENT
Start: 2023-06-23 | End: 2023-06-23 | Stop reason: HOSPADM

## 2023-06-23 RX ORDER — ONDANSETRON 2 MG/ML
INJECTION INTRAMUSCULAR; INTRAVENOUS AS NEEDED
Status: DISCONTINUED | OUTPATIENT
Start: 2023-06-23 | End: 2023-06-23

## 2023-06-23 RX ORDER — ENOXAPARIN SODIUM 100 MG/ML
40 INJECTION SUBCUTANEOUS DAILY
Status: COMPLETED | OUTPATIENT
Start: 2023-06-25 | End: 2023-06-25

## 2023-06-23 RX ORDER — METHYLPREDNISOLONE ACETATE 40 MG/ML
INJECTION, SUSPENSION INTRA-ARTICULAR; INTRALESIONAL; INTRAMUSCULAR; SOFT TISSUE AS NEEDED
Status: DISCONTINUED | OUTPATIENT
Start: 2023-06-23 | End: 2023-06-23 | Stop reason: HOSPADM

## 2023-06-23 RX ORDER — CEFAZOLIN SODIUM 2 G/50ML
SOLUTION INTRAVENOUS AS NEEDED
Status: DISCONTINUED | OUTPATIENT
Start: 2023-06-23 | End: 2023-06-23

## 2023-06-23 RX ORDER — LIDOCAINE HYDROCHLORIDE 20 MG/ML
INJECTION, SOLUTION EPIDURAL; INFILTRATION; INTRACAUDAL; PERINEURAL AS NEEDED
Status: DISCONTINUED | OUTPATIENT
Start: 2023-06-23 | End: 2023-06-23

## 2023-06-23 RX ORDER — ACETAMINOPHEN 325 MG/1
975 TABLET ORAL EVERY 8 HOURS SCHEDULED
Status: DISCONTINUED | OUTPATIENT
Start: 2023-06-23 | End: 2023-07-01 | Stop reason: HOSPADM

## 2023-06-23 RX ORDER — LIDOCAINE HYDROCHLORIDE AND EPINEPHRINE 10; 10 MG/ML; UG/ML
INJECTION, SOLUTION INFILTRATION; PERINEURAL AS NEEDED
Status: DISCONTINUED | OUTPATIENT
Start: 2023-06-23 | End: 2023-06-23 | Stop reason: HOSPADM

## 2023-06-23 RX ORDER — FENTANYL CITRATE/PF 50 MCG/ML
50 SYRINGE (ML) INJECTION
Status: COMPLETED | OUTPATIENT
Start: 2023-06-23 | End: 2023-06-23

## 2023-06-23 RX ORDER — HYDROMORPHONE HCL/PF 1 MG/ML
SYRINGE (ML) INJECTION AS NEEDED
Status: DISCONTINUED | OUTPATIENT
Start: 2023-06-23 | End: 2023-06-23

## 2023-06-23 RX ORDER — BUPIVACAINE HYDROCHLORIDE 2.5 MG/ML
INJECTION, SOLUTION EPIDURAL; INFILTRATION; INTRACAUDAL AS NEEDED
Status: DISCONTINUED | OUTPATIENT
Start: 2023-06-23 | End: 2023-06-23 | Stop reason: HOSPADM

## 2023-06-23 RX ORDER — DEXAMETHASONE SODIUM PHOSPHATE 10 MG/ML
INJECTION, SOLUTION INTRAMUSCULAR; INTRAVENOUS AS NEEDED
Status: DISCONTINUED | OUTPATIENT
Start: 2023-06-23 | End: 2023-06-23

## 2023-06-23 RX ORDER — NEOSTIGMINE METHYLSULFATE 1 MG/ML
INJECTION INTRAVENOUS AS NEEDED
Status: DISCONTINUED | OUTPATIENT
Start: 2023-06-23 | End: 2023-06-23

## 2023-06-23 RX ORDER — GLYCOPYRROLATE 0.2 MG/ML
INJECTION INTRAMUSCULAR; INTRAVENOUS AS NEEDED
Status: DISCONTINUED | OUTPATIENT
Start: 2023-06-23 | End: 2023-06-23

## 2023-06-23 RX ADMIN — ACETAMINOPHEN 975 MG: 325 TABLET, FILM COATED ORAL at 22:11

## 2023-06-23 RX ADMIN — SODIUM CHLORIDE 125 ML/HR: 0.9 INJECTION, SOLUTION INTRAVENOUS at 13:33

## 2023-06-23 RX ADMIN — PHENYLEPHRINE HYDROCHLORIDE 50 MCG/MIN: 10 INJECTION INTRAVENOUS at 16:33

## 2023-06-23 RX ADMIN — PROPOFOL 150 MG: 10 INJECTION, EMULSION INTRAVENOUS at 15:58

## 2023-06-23 RX ADMIN — SERTRALINE HYDROCHLORIDE 100 MG: 100 TABLET ORAL at 22:11

## 2023-06-23 RX ADMIN — NEOSTIGMINE METHYLSULFATE 3 MG: 1 INJECTION INTRAVENOUS at 18:15

## 2023-06-23 RX ADMIN — DEXAMETHASONE SODIUM PHOSPHATE 10 MG: 10 INJECTION, SOLUTION INTRAMUSCULAR; INTRAVENOUS at 16:42

## 2023-06-23 RX ADMIN — FENTANYL CITRATE 50 MCG: 50 INJECTION, SOLUTION INTRAMUSCULAR; INTRAVENOUS at 15:58

## 2023-06-23 RX ADMIN — METOPROLOL TARTRATE 25 MG: 25 TABLET, FILM COATED ORAL at 09:16

## 2023-06-23 RX ADMIN — SODIUM CHLORIDE: 0.9 INJECTION, SOLUTION INTRAVENOUS at 17:16

## 2023-06-23 RX ADMIN — FAMOTIDINE 20 MG: 20 TABLET, FILM COATED ORAL at 09:16

## 2023-06-23 RX ADMIN — HYDROMORPHONE HYDROCHLORIDE 0.5 MG: 1 INJECTION, SOLUTION INTRAMUSCULAR; INTRAVENOUS; SUBCUTANEOUS at 17:58

## 2023-06-23 RX ADMIN — LIDOCAINE HYDROCHLORIDE 100 MG: 20 INJECTION, SOLUTION EPIDURAL; INFILTRATION; INTRACAUDAL; PERINEURAL at 15:58

## 2023-06-23 RX ADMIN — NICOTINE 1 PATCH: 21 PATCH, EXTENDED RELEASE TRANSDERMAL at 09:16

## 2023-06-23 RX ADMIN — ONDANSETRON 4 MG: 2 INJECTION INTRAMUSCULAR; INTRAVENOUS at 16:42

## 2023-06-23 RX ADMIN — FINASTERIDE 5 MG: 5 TABLET, FILM COATED ORAL at 09:16

## 2023-06-23 RX ADMIN — CHLORHEXIDINE GLUCONATE 15 ML: 1.2 SOLUTION ORAL at 06:04

## 2023-06-23 RX ADMIN — ROCURONIUM BROMIDE 50 MG: 10 INJECTION, SOLUTION INTRAVENOUS at 15:58

## 2023-06-23 RX ADMIN — GLYCOPYRROLATE 0.6 MG: 0.2 INJECTION, SOLUTION INTRAMUSCULAR; INTRAVENOUS at 18:15

## 2023-06-23 RX ADMIN — FENTANYL CITRATE 50 MCG: 50 INJECTION, SOLUTION INTRAMUSCULAR; INTRAVENOUS at 16:15

## 2023-06-23 RX ADMIN — SODIUM CHLORIDE: 0.9 INJECTION, SOLUTION INTRAVENOUS at 15:53

## 2023-06-23 RX ADMIN — CEFAZOLIN SODIUM 1000 MG: 1 SOLUTION INTRAVENOUS at 22:12

## 2023-06-23 RX ADMIN — ROCURONIUM BROMIDE 20 MG: 10 INJECTION, SOLUTION INTRAVENOUS at 17:03

## 2023-06-23 RX ADMIN — OXYCODONE HYDROCHLORIDE 5 MG: 5 TABLET ORAL at 22:11

## 2023-06-23 RX ADMIN — FENTANYL CITRATE 50 MCG: 50 INJECTION, SOLUTION INTRAMUSCULAR; INTRAVENOUS at 19:19

## 2023-06-23 RX ADMIN — FENTANYL CITRATE 50 MCG: 50 INJECTION, SOLUTION INTRAMUSCULAR; INTRAVENOUS at 19:26

## 2023-06-23 RX ADMIN — CEFAZOLIN SODIUM 2000 MG: 2 SOLUTION INTRAVENOUS at 16:30

## 2023-06-23 RX ADMIN — SODIUM CHLORIDE 125 ML/HR: 0.9 INJECTION, SOLUTION INTRAVENOUS at 06:09

## 2023-06-23 NOTE — PHYSICAL THERAPY NOTE
06/23/23 0815   PT Last Visit   PT Visit Date 06/23/23   Education   Education Provided Yes   End of Consult   Patient Position at End of Consult Bedside chair; All needs within reach;Bed/Chair alarm activated   Pain Assessment   Pain Assessment Tool 0-10   Pain Score No Pain   Restrictions/Precautions   Other Precautions Chair Alarm; Bed Alarm;Multiple lines;Telemetry; Fall Risk  (b/l LE weakness, Chinese speaking)   Subjective   Subjective pt in recliner with all needs in reach with wife by his side pt con t to have   b/l LE weakness, Chinese speaking   Transfers   Sit to Stand 5  Supervision   Additional items Increased time required;Verbal cues   Stand to Sit 5  Supervision   Additional items Increased time required;Verbal cues   Stand pivot 5  Supervision   Additional items Verbal cues   Additional Comments w/o RW HHA and IV pole   Ambulation/Elevation   Gait pattern Improper Weight shift;Decreased foot clearance; Short stride; Excessively slow   Gait Assistance 4  Minimal assist   Additional items Assist x 1;Verbal cues   Assistive Device Other (Comment)  (HHA right side and IV pole)   Distance 150   Stair Management Assistance Not tested   Ambulation/Elevation Additional Comments possible surgery today   Balance   Static Sitting Fair +   Dynamic Sitting Fair   Static Standing Fair -   Dynamic Standing Poor +   Ambulatory Poor +   Endurance Deficit   Endurance Deficit Yes   Endurance Deficit Description weakness   Activity Tolerance   Activity Tolerance Patient limited by fatigue   Assessment   Prognosis Good   Problem List Decreased strength;Decreased endurance; Impaired balance;Decreased mobility;Pain;Orthopedic restrictions   Assessment pt engaged in skilled PT focus on STS/SPT with ambulation with HHA right side and IV pole for steadyness    pt overall progressing with S in standing with ambulation HHA right side , Pt will cont to need skilled PT possible surgery today and pt cont to have Decreased strength; Decreased endurance; Impaired balance; Decreased mobility; Pain; Orthopedic restrictions  Pt in recliner with all needs in reach and chait alarm on with his wife in room   Cont POC   Goals   STG Expiration Date 07/04/23   Plan   Treatment/Interventions Functional transfer training;LE strengthening/ROM; Elevations; Therapeutic exercise; Endurance training;Patient/family training;Equipment eval/education; Bed mobility;Gait training   PT Frequency 3-5x/wk   Recommendation   PT Discharge Recommendation Post acute rehabilitation services

## 2023-06-23 NOTE — PLAN OF CARE
Problem: MUSCULOSKELETAL - ADULT  Goal: Maintain or return mobility to safest level of function  Description: INTERVENTIONS:  - Assess patient's ability to carry out ADLs; assess patient's baseline for ADL function and identify physical deficits which impact ability to perform ADLs (bathing, care of mouth/teeth, toileting, grooming, dressing, etc )  - Assess/evaluate cause of self-care deficits   - Assess range of motion  - Assess patient's mobility  - Assess patient's need for assistive devices and provide as appropriate  - Encourage maximum independence but intervene and supervise when necessary  - Involve family in performance of ADLs  - Assess for home care needs following discharge   - Consider OT consult to assist with ADL evaluation and planning for discharge  - Provide patient education as appropriate  Outcome: Progressing     Problem: NEUROSENSORY - ADULT  Goal: Achieves stable or improved neurological status  Description: INTERVENTIONS  - Monitor and report changes in neurological status  - Monitor vital signs such as temperature, blood pressure, glucose, and any other labs ordered   - Initiate measures to prevent increased intracranial pressure  - Monitor for seizure activity and implement precautions if appropriate      Outcome: Progressing

## 2023-06-23 NOTE — PLAN OF CARE
Problem: PHYSICAL THERAPY ADULT  Goal: Performs mobility at highest level of function for planned discharge setting  See evaluation for individualized goals  Description: Treatment/Interventions: Functional transfer training, LE strengthening/ROM, Elevations, Therapeutic exercise, Endurance training, Patient/family training, Equipment eval/education, Bed mobility, Gait training, Spoke to nursing, Spoke to case management, OT  Equipment Recommended: Gisella Hartman       See flowsheet documentation for full assessment, interventions and recommendations  Outcome: Progressing  Note: Prognosis: Good  Problem List: Decreased strength, Decreased endurance, Impaired balance, Decreased mobility, Pain, Orthopedic restrictions  Assessment: pt engaged in skilled PT focus on STS/SPT with ambulation with HHA right side and IV pole for steadyness   pt overall progressing with S in standing with ambulation HHA right side , Pt will cont to need skilled PT possible surgery today and pt cont to have Decreased strength; Decreased endurance; Impaired balance; Decreased mobility; Pain; Orthopedic restrictions  Pt in recliner with all needs in reach and chait alarm on with his wife in room   Cont POC  Barriers to Discharge: Inaccessible home environment, Decreased caregiver support     PT Discharge Recommendation: Post acute rehabilitation services    See flowsheet documentation for full assessment

## 2023-06-23 NOTE — PROGRESS NOTES
INTERNAL MEDICINE RESIDENCY PROGRESS NOTE     Name: Shiraz Velásquez   Age & Sex: 71 y o  male   MRN: [id number]  Unit/Bed#: [facility] 708-01   Encounter: [id number]  Team: SOD Team B     PATIENT INFORMATION     Name: Shiraz Velásquez   Age & Sex: 71 y o  male   MRN: [id number]  Hospital Stay Days: 4    ASSESSMENT/PLAN     Active Problems:    CAD (coronary artery disease)    Pre-diabetes    HLD (hyperlipidemia)    HTN (hypertension)    Tobacco use    BPH (benign prostatic hyperplasia)    NARDA (acute kidney injury) ([facility] [place] [address] )    [facility]    Spinal stenosis of lumbar region with neurogenic claudication      * Spinal stenosis of lumbar region with neurogenic claudication  Assessment & Plan  Shiraz Velásquez is a 71 y o  male  with pertinent history lumbar spondylosis, neurogenic claudication, CAD s/p CABG x 4, HTN, HLD, hx of PE (Not on thinners) who presented as stroke alert on 6/19/23 given acute onset worsening bilateral lower extremity weakness  Denies any urinary symptoms or saddle anesthesia  Initial NIHSS of 5 and LKW 1200  Initial BP on arrival was Blood Pressure: 110/53  As a result of lower suspicion for stroke and potential need for urgent surgery patient was determined to not be a candidate for tPA  • F/u exam:  HF strength 4-/5 R, 4/5 L  No ataxia or dysmetria noted on exam  Proprioception normal  Difficulty standing from a seated position without assistance  Romberg positive  • BP over last 24 hours: Blood Pressure: 136/79  current BP: Blood Pressure: 136/79     Imaging:  • CTH: No acute intracranial abnormality  • CTA: No significant stenosis of the cervical carotid or vertebral arteries  No high-grade intracranial stenosis, large vessel occlusion or aneurysm  • MRI brain: No MR evidence of acute ischemia  Small right mastoid effusion    • MRI L spine: Spondylotic degenerative changes again noted similar to the prior study most significantly at L3-4 and L4-5 where severe central stenosis is identified  Moderate to severe lateral recess stenosis also identified as described  Moderate central stenosis and mild bilateral foraminal narrowing noted at L2-3 with mild foraminal narrowing at L5-S1 bilaterally  • Echocardiogram: EF 50% with mild LA dilation   • Telemetry: monitor  • LDL 71   • HBA1C 5 8     Impression: At this time history and physical along with available imaging concerning for worsening weakness in the setting of known spinal stenosis  One month ago patient strength 5/5 throughout vs now significantly week in bilateral HF        Plan:  • Plan for L3-4 and L4-5 decompression today with Dr Chastity Mosher  • Family able to help with recovery  • PMR on board, plan for acute post surgical rehab   • Appreciate neurosurgery eval and recommendations   • ASA on hold with anticipation to surgery    • BP goal: normotension  • Statin  • Maintain glucose below 200  • Neuro checks  • PT/OT//PMR consults appreciated   • Follow up with neurology as outpatient in 3 months      CAD (coronary artery disease)  Assessment & Plan  Patient is a 1 PPD smoker with hx of CAD s/p CABG x 4       Plan:  • Nuclear stress test done  Cleared for surgery from cardiology standpoint  • ASA on hold with anticipation to surgery    • Cont atorvastatin 40 mg   • Prn nitroglycerin   • Cardiology following    HLD (hyperlipidemia)  Assessment & Plan  Last lipid panel in 2021  · Continue atorvastatin 40 mg qd  · Repeat lipid panel showing HDL at 31, LDL at 71, triglycerides at 109    Pre-diabetes  Assessment & Plan  Last A1c recently 5 8  · Follow up A1c  · Goal BG <200, can add SSI if needed to maintain goal BG    HTN (hypertension)  Assessment & Plan  Patient appears to be on lisinopril 5 mg daily and Lopressor 25 mg daily at home  -Has been mostly normotensive with blood pressure systolics between 197-239 mmHg  -Hold lisinopril for now    Tobacco use  Assessment & Plan  1 PPD smoker    · Continue nicotine patch    NARDA (acute kidney "injury) [facility])  Assessment & Plan  Patient with concerns for acute kidney injury with serum creatinine at 1 20  Currently at 1 0, likely resolved  -Continue to monitor with daily BMPs    BPH (benign prostatic hyperplasia)  Assessment & Plan  Continue home finasteride 5 mg qd    Schizophrenia ([facility] [address] )  Assessment & Plan  Patient's home medication list includes Abilify, Atarax, Risperdal, and Zoloft  Reviewed medications with patient and he reports of these, he only takes Zoloft  · Continue Zoloft 100 mg qd  · Avoid gabapentin - which is on patient's home medication list; he says this causes him to have suicidal thoughts      Disposition: Will require inpatient stay for postoperative management and neurosurgical clearance for discharge planning  SUBJECTIVE     As per HPI by Dr Mary Montague, Elvia Omer is a 71 y o  male  with pertinent history lumbar spondylosis, neurogenic claudication, CAD s/p CABG x 4, HTN, HLD, hx of PE (Not on thinners) who presents as a stroke alert for acute onset bilateral lower extremity weakness  Patient states that symptom onset was around noon  Patient was changing his car tire when he stood up he experienced a strange smell  He had a cigarette  When he entered his house he experienced an acute sensation of the room spinning and shakiness  Of note, he had not eaten anything to eat  Throughout the day had only had coffee and cigarettes  Around that time he experienced worsening of bilateral lower extremity weakness  He denies and urinary symptoms or saddle anesthesia       On arrival to the emergency department, /53, HR 95, RR 20 and SPO2 98% on room air  Stroke alert was called at 1418  NIHSS of 5 given for some effort against gravity in BLE and visual field deficits  HF strength 4-/5 R, 4/5 L  No ataxia or dysmetria noted on exam  Proprioception normal  Difficulty standing from a seated position without assistance  Romberg positive   CTH negative for acute intracranial " "abnormalities  CTA without significant vessel disease  Patient was not deemed a candidate for thrombolytics given clinical picture       In terms of his PMH, patient with a history of lumbar spondylosis with neurogenic claudication  He reports more recent increase in frequency of falls  He is followed by neurosurgery as outpatient    Patient was determined to be a candidate for L3-L5 bilateral laminectomies/foraminotomies  Surgery was cancelled giving his living situation  Patient reported he lives on a second floor and it would be hard for him to ambulate up and down stairs  Patient was last seen by neurosx on 5/19/2023  As per note, at that time patient exam 5/5 strength  Other than this, patient is a 1 PPD smoker with multiple vascular risk factors  \"    Patient was seen and evaluated by neurosurgery during this time, underwent an echocardiogram that revealed LVEF of 50% with further imaging including MRI brain without any evidence of acute ischemia  Patient also had a CTA and CT head that were noted to be negative for any acute intracranial pathology  He underwent surgical restratification with cardiology and his aspirin was discontinued after 06/20 with plans for L3-L4 and L4-L5 decompression on 06/23  Patient was deemed stable for transfer from neurology service to internal medicine service  Accepting team will be SOD-B for further postoperative management        OBJECTIVE   Vitals:  Temp:  [97 5 °F (36 4 °C)-97 7 °F (36 5 °C)] 97 5 °F (36 4 °C)  HR:  [62-64] 62  Resp:  [15-18] 18  BP: (136-139)/(79) 136/79    Recent Labs:  Results from last 7 days   Lab Units 06/23/23  0456 06/21/23  0523 06/20/23  0500   SODIUM mmol/L 140 141 144   POTASSIUM mmol/L 4 1 4 3 3 8   CO2 mmol/L 27 24 24   CHLORIDE mmol/L 114* 115* 117*   BUN mg/dL 19 17 17   CREATININE mg/dL 1 02 0 95 0 88   EGFR ml/min/1 73sq m 73 80 86     Results from last 7 days   Lab Units 06/23/23  0456 06/20/23  0500   AST U/L 30 20   ALT U/L 30 16   ALK " PHOS U/L 82 82   TOTAL PROTEIN g/dL 6 7 6 4   ALBUMIN g/dL 3 3* 3 0*   TOTAL BILIRUBIN mg/dL 0 42 0 18*     Results from last 7 days   Lab Units 06/23/23  0456 06/19/23  1420   PROTIME seconds 13 0 13 8   INR  0 96 1 04     Results from last 7 days   Lab Units 06/23/23  0456 06/21/23  0523 06/19/23  1706 06/19/23  1420   HEMOGLOBIN g/dL 13 9 12 4  --  12 9   HEMATOCRIT % 43 0 37 4  --  36 7   PLATELETS Thousands/uL 279 264 259 264     Results from last 7 days   Lab Units 06/20/23  0500   TRIGLYCERIDES mg/dL 109   HDL mg/dL 31*   LDL CALC mg/dL 71   HEMOGLOBIN A1C % 5 9*                 Intake and Outputs:  I/O       06/21 0701  06/22 0700 06/22 0701  06/23 0700 06/23 0701  06/24 0700    P  O  1060 520 0    I V  (mL/kg)  1002 1 (13 9)     Total Intake(mL/kg) 1060 (14 7) 1522 1 (21 1) 0 (0)    Net +1060 +1522 1 0           Unmeasured Urine Occurrence 3 x 1 x 1 x    Unmeasured Stool Occurrence 0 x 0 x           Physical Exam  Vitals reviewed  HENT:      Head: Normocephalic  Cardiovascular:      Rate and Rhythm: Normal rate and regular rhythm  Pulses: Normal pulses  Heart sounds: Normal heart sounds  Pulmonary:      Effort: Pulmonary effort is normal       Breath sounds: Normal breath sounds  Abdominal:      General: Bowel sounds are normal       Palpations: Abdomen is soft  Skin:     General: Skin is warm and dry  Capillary Refill: Capillary refill takes less than 2 seconds  Neurological:      Mental Status: He is alert and oriented to person, place, and time  Comments: BLE 4/5         IMAGING & DIAGNOSTIC TESTING     Radiology Results: I have personally reviewed pertinent reports  MRI lumbar spine wo contrast    Result Date: 6/20/2023  Impression: Spondylotic degenerative changes again noted similar to the prior study most significantly at L3-4 and L4-5 where severe central stenosis is identified  Moderate to severe lateral recess stenosis also identified as described   Moderate central "stenosis and mild bilateral foraminal narrowing noted at L2-3 with mild foraminal narrowing at L5-S1 bilaterally  Workstation performed: [id number]     MRI brain wo contrast    Result Date: 6/20/2023  Impression: No MR evidence of acute ischemia  Small right mastoid effusion  Workstation performed: [id number]     CT stroke alert brain    Result Date: 6/19/2023  Impression: No acute intracranial abnormality  Findings were directly discussed with [employer] at 2:41 p m  Workstation performed: [id number]     CTA stroke alert (head/neck)    Result Date: 6/19/2023  Impression: No significant stenosis of the cervical carotid or vertebral arteries No high-grade intracranial stenosis, large vessel occlusion or aneurysm  Findings were directly discussed with [employer] at 2:41 p m  Workstation performed: [id number]     Other Diagnostic Testing: I have personally reviewed pertinent reports  VTE Pharmacologic Prophylaxis: Heparin  VTE Mechanical Prophylaxis: sequential compression device    Portions of the record may have been created with voice recognition software  Occasional wrong word or \"sound a like\" substitutions may have occurred due to the inherent limitations of voice recognition software  Read the chart carefully and recognize, using context, where substitutions have occurred        Eduin Krishnan MD  Internal Medicine Residency PGY-2  [place]        "

## 2023-06-23 NOTE — ASSESSMENT & PLAN NOTE
Patient with concerns for acute kidney injury with serum creatinine at 1 20  Currently at 1 0, likely resolved    -Continue to monitor with daily BMPs

## 2023-06-23 NOTE — ANESTHESIA PROCEDURE NOTES
Arterial Line Insertion    Performed by: Kaiden Feldman CRNA  Authorized by: Garwin Klinefelter, MD  Consent: Verbal consent obtained  Written consent not obtained  Risks and benefits: risks, benefits and alternatives were discussed  Consent given by: patient  Patient understanding: patient states understanding of the procedure being performed  Patient consent: the patient's understanding of the procedure matches consent given  Procedure consent: procedure consent matches procedure scheduled  Relevant documents: relevant documents present and verified  Test results: test results available and properly labeled  Site marked: the operative site was marked  Radiology Images: Radiology Images displayed and confirmed   If images not available, report reviewed  Patient identity confirmed: arm band  Indications: hemodynamic monitoring  Orientation:  Right  Location: radial artery  Sedation:  Patient sedated: yes  Analgesia: see MAR for details    Procedure Details:  Needle gauge: 20  Number of attempts: 2    Post-procedure:  Post-procedure: dressing applied  Waveform: good waveform  Post-procedure CNS: normal

## 2023-06-23 NOTE — PROGRESS NOTES
NEUROLOGY RESIDENCY PROGRESS NOTE     Name: Marbella Brown   Age & Sex: 71 y o  male   MRN: [id number]  [facility]   Encounter: [id number]    Marbella Brown will need follow up in in 3 months with general attending (Scheduled with Dr Pamela Centeno at [address])  He will not require outpatient neurological testing  Schedule outpatient follow up with me at [address]: This should be completed prior to removing patient from list or discharge     Pending for discharge: surgery    ASSESSMENT & PLAN     Spinal stenosis of lumbar region with neurogenic claudication  Assessment & Plan  Marbella Brown is a 71 y o  male  with pertinent history lumbar spondylosis, neurogenic claudication, CAD s/p CABG x 4, HTN, HLD, hx of PE (Not on thinners) who presented as stroke alert on 6/19/23 given acute onset worsening bilateral lower extremity weakness  Denies any urinary symptoms or saddle anesthesia  Initial NIHSS of 5 and LKW 1200  Initial BP on arrival was Blood Pressure: 110/53  As a result of lower suspicion for stroke and potential need for urgent surgery patient was determined to not be a candidate for tPA  • F/u exam:  HF strength 4-/5 R, 4/5 L  No ataxia or dysmetria noted on exam  Proprioception normal  Difficulty standing from a seated position without assistance  Romberg positive  • BP over last 24 hours: Blood Pressure: 136/79  current BP: Blood Pressure: 136/79    Imaging:  • CTH: No acute intracranial abnormality  • CTA: No significant stenosis of the cervical carotid or vertebral arteries  No high-grade intracranial stenosis, large vessel occlusion or aneurysm  • MRI brain: No MR evidence of acute ischemia  Small right mastoid effusion  • MRI L spine: Spondylotic degenerative changes again noted similar to the prior study most significantly at L3-4 and L4-5 where severe central stenosis is identified  Moderate to severe lateral recess stenosis also identified as described   Moderate central stenosis and mild bilateral foraminal narrowing noted at L2-3 with mild foraminal narrowing at L5-S1 bilaterally  • Echocardiogram: EF 50% with mild LA dilation   • Telemetry: monitor  • LDL 71   • HBA1C 5 8    Impression: At this time history and physical along with available imaging concerning for worsening weakness in the setting of known spinal stenosis  One month ago patient strength 5/5 throughout vs now significantly week in bilateral HF  Plan:  • Plan for L3-4 and L4-5 decompression today with Dr Candace Davis   • Family able to help with recovery  • PMR on board, plan for acute post surgical rehab   • Appreciate neurosurgery eval and recommendations   • ASA on hold with anticipation to surgery    • BP goal: normotension  • Statin  • Maintain glucose below 200  • Neuro checks  • PT/OT//PMR consults appreciated   • Follow up with me as outpatient in 3 months   • Will transfer to [place] for further medical management  • No further inpatient neuro recs, TT if any questions       Schizophrenia [facility])  Assessment & Plan  As per chart patient with history of schizophrenia  I reviewed listed home meds at bedside  Patient denies taking aripiprazole or risperidone  NARDA (acute kidney injury) ([facility] [address] )  Assessment & Plan  Possibly due to dehydration given poor PO intake date of admission  Cr 1 20 > 0 88, treated with NSS 1 L bolus, renal function assessment and lab monitoring  NARDA improved after iL bolus of NSS  Plan:  · Trend BMP    BPH (benign prostatic hyperplasia)  Assessment & Plan  On home finasteride 5 mg     Tobacco use  Assessment & Plan  Current smoker 1 PPD     Plan:  · Nicotine patch     HTN (hypertension)  Assessment & Plan  On home lisinopril 5 mg and Lopressor 25 mg     Plan:  · Normotension  · Hold lisinopril as BP stable    CAD (coronary artery disease)  Assessment & Plan  Patient is a 1 PPD smoker with hx of CAD s/p CABG x 4      Plan:  · Nuclear stress test done  Cleared for surgery   Will need cardiology follow up  · ASA on hold with anticipation to surgery    · Cont atorvastatin 40 mg   · Prn nitroglycerin   · Appreciate SOD for medical management       SUBJECTIVE     Patient was seen and examined  No acute events overnight  Continues to be stable  No change in weakness of the lower extremities  Denies LE sensory changes or urinary problems  He denies abdominal pain, chest pain or headaches  He is NPO for lumbar decompression later today  Review of Systems   Constitutional: Negative for fever  Eyes: Negative for visual disturbance  Gastrointestinal: Negative for diarrhea, nausea and vomiting  Genitourinary: Negative for difficulty urinating  Musculoskeletal: Positive for gait problem  Neurological: Positive for weakness  Negative for numbness and headaches  Psychiatric/Behavioral: Negative for agitation  The patient is not nervous/anxious  OBJECTIVE     Patient ID: Shiraz Velásquez is a 71 y o  male  Vitals:    23 1554 23 2205 23 0000 23 0745   BP: 139/79 137/79  136/79   Pulse:  64  62   Resp: 15 18     Temp:  97 7 °F (36 5 °C)  97 5 °F (36 4 °C)   TempSrc:       SpO2:  96% 96% 98%   Weight:       Height:          Temperature:   Temp (24hrs), Av 6 °F (36 4 °C), Min:97 5 °F (36 4 °C), Max:97 7 °F (36 5 °C)    Temperature: 97 5 °F (36 4 °C)      Physical Exam  Constitutional:       Appearance: Normal appearance  HENT:      Head: Normocephalic and atraumatic  Eyes:      Extraocular Movements: EOM normal    Cardiovascular:      Rate and Rhythm: Normal rate and regular rhythm  Heart sounds: Normal heart sounds, S1 normal and S2 normal    Pulmonary:      Effort: Pulmonary effort is normal    Abdominal:      General: Abdomen is flat  Bowel sounds are normal       Palpations: Abdomen is soft  Neurological:      Mental Status: He is alert and oriented to person, place, and time        Coordination: Finger-Nose-Finger Test normal       Deep Tendon Reflexes:      Reflex Scores:       Bicep reflexes are 1+ on the right side and 1+ on the left side  Brachioradialis reflexes are 1+ on the right side and 1+ on the left side  Patellar reflexes are 2+ on the right side and 1+ on the left side  Psychiatric:         Mood and Affect: Mood normal          Behavior: Behavior normal          Thought Content: Thought content normal          Judgment: Judgment normal           Neurologic Exam     Mental Status   Oriented to person, place, and time  Able to name object  Normal comprehension  Cranial Nerves     CN III, IV, VI   Extraocular motions are normal      CN V   Facial sensation intact  CN VII   Facial expression full, symmetric  Motor Exam   Muscle bulk: decreased  Overall muscle tone: normal  Right arm pronator drift: absent  Left arm pronator drift: absent    Strength   Right deltoid: 5/5  Left deltoid: 5/5  Right biceps: 5/5  Left biceps: 5/5  Right triceps: 5/5  Left triceps: 5/5  Right iliopsoas: 3/5  Left iliopsoas: 4/5  Right quadriceps: 4/5  Left quadriceps: 4/5  Right hamstrin/5  Left hamstrin/5  Right anterior tibial: 5/5  Left anterior tibial: 5/5  Right gastroc: 5/5  Left gastroc: 5/53+ R HF, 4- L HF     Sensory Exam   Right arm proprioception: normal  Left arm proprioception: normal  Pinprick normal      Gait, Coordination, and Reflexes     Coordination   Finger to nose coordination: normal    Tremor   Resting tremor: absent    Reflexes   Right brachioradialis: 1+  Left brachioradialis: 1+  Right biceps: 1+  Left biceps: 1+  Right patellar: 2+  Left patellar: 1+  Right plantar: equivocal  Left plantar: equivocal         LABORATORY DATA     Labs: I have personally reviewed pertinent reports      Results from last 7 days   Lab Units 23  0456 23  0523 23  1706 23  1420   WBC Thousand/uL 7 11 7 36  --  7 66   HEMOGLOBIN g/dL 13 9 12 4  --  12 9   HEMATOCRIT % 43 0 37 4  --  36 7   PLATELETS Thousands/uL 279 264 259 264   NEUTROS PCT %  --   --   --  73   MONOS PCT %  --   --   --  6   EOS PCT %  --   --   --  1      Results from last 7 days   Lab Units 06/23/23  0456 06/21/23  0523 06/20/23  0500   SODIUM mmol/L 140 141 144   POTASSIUM mmol/L 4 1 4 3 3 8   CHLORIDE mmol/L 114* 115* 117*   CO2 mmol/L 27 24 24   BUN mg/dL 19 17 17   CREATININE mg/dL 1 02 0 95 0 88   CALCIUM mg/dL 8 8 8 7 9 0   ALK PHOS U/L 82  --  82   ALT U/L 30  --  16   AST U/L 30  --  20     Results from last 7 days   Lab Units 06/20/23  0500   MAGNESIUM mg/dL 2 0          Results from last 7 days   Lab Units 06/23/23  0456 06/19/23  1420   INR  0 96 1 04   PTT seconds 29 24               IMAGING & DIAGNOSTIC TESTING     Radiology Results: I have personally reviewed pertinent reports  MRI lumbar spine wo contrast   Final Result by Zamzam 6, DO (06/20 9048)      Spondylotic degenerative changes again noted similar to the prior study most significantly at L3-4 and L4-5 where severe central stenosis is identified  Moderate to severe lateral recess stenosis also identified as described  Moderate central stenosis and mild bilateral foraminal narrowing noted at L2-3 with mild foraminal narrowing at L5-S1 bilaterally  Workstation performed: [id number]         MRI brain wo contrast   Final Result by Tal Carson MD (06/20 0020)      No MR evidence of acute ischemia  Small right mastoid effusion  Workstation performed: [id number]         CTA stroke alert (head/neck)   Final Result by MANJULA Fan MD (06/19 4079)      No significant stenosis of the cervical carotid or vertebral arteries   No high-grade intracranial stenosis, large vessel occlusion or aneurysm  Findings were directly discussed with Henna Briscoe at 2:41 p m  Workstation performed: [id number]         CT stroke alert brain   Final Result by MANJULA Fan MD (06/19 5248)      No acute intracranial abnormality  Findings were directly discussed with Raza Good at 2:41 p m  Workstation performed: [id number]             Other Diagnostic Testing: I have personally reviewed pertinent reports  ACTIVE MEDICATIONS     Current Facility-Administered Medications   Medication Dose Route Frequency   • acetaminophen (TYLENOL) tablet 975 mg  975 mg Oral Q6H PRN   • atorvastatin (LIPITOR) tablet 40 mg  40 mg Oral QPM   • ceFAZolin (ANCEF) IVPB (premix in dextrose) 2,000 mg 50 mL  2,000 mg Intravenous On Call   • famotidine (PEPCID) tablet 20 mg  20 mg Oral BID   • finasteride (PROSCAR) tablet 5 mg  5 mg Oral Daily   • metoprolol tartrate (LOPRESSOR) tablet 25 mg  25 mg Oral Daily   • neomycin-polymyxin B (NEOSPORIN-) 1 mL in sodium chloride 0 9 % 1,000 mL irrigation   Irrigation Once   • nicotine (NICODERM CQ) 21 mg/24 hr TD 24 hr patch 1 patch  1 patch Transdermal Daily   • sertraline (ZOLOFT) tablet 100 mg  100 mg Oral HS   • sodium chloride 0 9 % infusion  125 mL/hr Intravenous Continuous       Prior to Admission medications    Medication Sig Start Date End Date Taking? Authorizing Provider   Acetaminophen Extra Strength 500 MG tablet  5/12/22   Historical Provider, MD   ARIPiprazole (ABILIFY) 5 mg tablet Take 5 mg by mouth daily    Historical Provider, MD   Arthritis Pain Relief 650 MG CR tablet take 2 tablets by mouth twice a day if needed 3/15/23   Historical Provider, MD   aspirin 81 MG tablet Take 81 mg by mouth daily  Historical Provider, MD   Aspirin Low Dose 81 MG EC tablet  3/7/23   Historical Provider, MD   atorvastatin (LIPITOR) 40 mg tablet Take 40 mg by mouth daily    Historical Provider, MD   erythromycin (ILOTYCIN) ophthalmic ointment Place a 1/2 inch ribbon of ointment into the lower eyelid   12/4/20   Aidan Sadler MD   finasteride (PROSCAR) 5 mg tablet Take 5 mg by mouth daily 5/7/23   Historical Provider, MD   gabapentin (NEURONTIN) 100 mg capsule Take 3 capsules (300 mg total) by mouth daily at bedtime 5/24/22   SINGH Jennings   hydrOXYzine HCL (ATARAX) 25 mg tablet Take 1 tablet twice a day by oral route as needed for 30 days      Historical Provider, MD   lisinopril (ZESTRIL) 5 mg tablet  5/12/22   Historical Provider, MD   metoprolol tartrate (LOPRESSOR) 25 mg tablet Take 25 mg by mouth daily      Historical Provider, MD   naproxen (NAPROSYN) 250 mg tablet take 1 tablet by mouth twice a day if needed for 10 days 2/3/22   Historical Provider, MD   nicotine (NICODERM CQ) 21 mg/24 hr TD 24 hr patch apply 1 patch to CLEAN, DRY, AND INTACT SKIN once daily    Historical Provider, MD   nitroglycerin (NITRODUR) 0 4 mg/hr Place 1 patch on the skin 2 (two) times a day as needed 6/5/15   Historical Provider, MD   RA Melatonin 3 MG take 1 tablet by mouth EVERY DAY AT BEDTIME FOR 90 DAYS 2/15/23   Historical Provider, MD   risperiDONE (RisperDAL) 0 5 mg tablet  3/23/22   Historical Provider, MD   sertraline (ZOLOFT) 100 mg tablet take 1 tablet by mouth EVERY DAY IN THE EVENING FOR 90 DAYS 3/8/23   Historical Provider, MD   triamcinolone (KENALOG) 0 1 % ointment APPLY A THIN LAYER TO THE AFFECTED AREA(S) BY TOPICAL ROUTE TWO TIMES PER DAY AS NEEDED    Historical Provider, MD         VTE Pharmacologic Prophylaxis: Heparin  VTE Mechanical Prophylaxis: sequential compression device    ==  MD Shyaan Chavarria [facility] Neurology Residency, PGY-4

## 2023-06-23 NOTE — PLAN OF CARE
Problem: NEUROSENSORY - ADULT  Goal: Achieves stable or improved neurological status  Description: INTERVENTIONS  - Monitor and report changes in neurological status  - Monitor vital signs such as temperature, blood pressure, glucose, and any other labs ordered   - Initiate measures to prevent increased intracranial pressure  - Monitor for seizure activity and implement precautions if appropriate      Outcome: Progressing  Goal: Remains free of injury related to seizures activity  Description: INTERVENTIONS  - Maintain airway, patient safety  and administer oxygen as ordered  - Monitor patient for seizure activity, document and report duration and description of seizure to physician/advanced practitioner  - If seizure occurs,  ensure patient safety during seizure  - Reorient patient post seizure  - Seizure pads on all 4 side rails  - Instruct patient/family to notify RN of any seizure activity including if an aura is experienced  - Instruct patient/family to call for assistance with activity based on nursing assessment  - Administer anti-seizure medications if ordered    Outcome: Progressing  Goal: Achieves maximal functionality and self care  Description: INTERVENTIONS  - Monitor swallowing and airway patency with patient fatigue and changes in neurological status  - Encourage and assist patient to increase activity and self care     - Encourage visually impaired, hearing impaired and aphasic patients to use assistive/communication devices  Outcome: Progressing     Problem: PAIN - ADULT  Goal: Verbalizes/displays adequate comfort level or baseline comfort level  Description: Interventions:  - Encourage patient to monitor pain and request assistance  - Assess pain using appropriate pain scale  - Administer analgesics based on type and severity of pain and evaluate response  - Implement non-pharmacological measures as appropriate and evaluate response  - Consider cultural and social influences on pain and pain management  - Notify physician/advanced practitioner if interventions unsuccessful or patient reports new pain  Outcome: Progressing     Problem: INFECTION - ADULT  Goal: Absence or prevention of progression during hospitalization  Description: INTERVENTIONS:  - Assess and monitor for signs and symptoms of infection  - Monitor lab/diagnostic results  - Monitor all insertion sites, i e  indwelling lines, tubes, and drains  - Monitor endotracheal if appropriate and nasal secretions for changes in amount and color  - Crocker appropriate cooling/warming therapies per order  - Administer medications as ordered  - Instruct and encourage patient and family to use good hand hygiene technique  - Identify and instruct in appropriate isolation precautions for identified infection/condition  Outcome: Progressing  Goal: Absence of fever/infection during neutropenic period  Description: INTERVENTIONS:  - Monitor WBC    Outcome: Progressing

## 2023-06-23 NOTE — ANESTHESIA PREPROCEDURE EVALUATION
Procedure:  L3-4 and L4-5 laminectomy, bilateral foraminotomy decompresion (Bilateral: Spine Lumbar)    Relevant Problems   CARDIO   (+) CAD (coronary artery disease)   (+) Chest pain   (+) HLD (hyperlipidemia)   (+) HTN (hypertension)      ENDO   (+) Subclinical hypothyroidism      /RENAL   (+) NARDA (acute kidney injury) (HCC)   (+) BPH (benign prostatic hyperplasia)      MUSCULOSKELETAL   (+) DDD (degenerative disc disease), lumbar   (+) Low back pain with sciatica   (+) Lumbar spondylosis   (+) Osteoarthritis      NEURO/PSYCH   (+) Chronic pain syndrome   (+) Schizophrenia (HCC)        Physical Exam    Airway    Mallampati score: I  TM Distance: >3 FB  Neck ROM: full     Dental       Cardiovascular  Cardiovascular exam normal    Pulmonary  Pulmonary exam normal Decreased breath sounds,     Other Findings        Anesthesia Plan  ASA Score- 3     Anesthesia Type- general with ASA Monitors  Additional Monitors: arterial line  Airway Plan: ETT  Comment: Presented as stroke alert 6/19 with worsening bilat lower extremity weakness/decreased sensation  Stroke w/o neg  Severe stenosis L3-5- for urgent decompression  S/P CABG x4; HTN, SMOKER Schizophrenia  ECHO (6/21): EF 50%, hypokinetic apical/septal segm, mild LA dilation, low normal syst fxn, aortic valve sclerosis, mild MR/  Plan Factors-Exercise tolerance (METS): >4 METS  Chart reviewed  EKG reviewed  Imaging results reviewed  Existing labs reviewed  Patient summary reviewed  Patient is a current smoker  Patient instructed to abstain from smoking on day of procedure  Patient did not smoke on day of surgery  Induction- intravenous  Postoperative Plan- Plan for postoperative opioid use  Planned trial extubation    Informed Consent- Anesthetic plan and risks discussed with patient  I personally reviewed this patient with the CRNA  Discussed and agreed on the Anesthesia Plan with the CRNA  Michael Givens

## 2023-06-23 NOTE — RESTORATIVE TECHNICIAN NOTE
Restorative Technician Note      Patient Name: Kayla Valentine     Note Type: Mobility  Patient Position Upon Consult: Supine  Activity Performed: Ambulated; Dangled; Stood  Assistive Device: Other (Comment) (A x1)  Education Provided: Yes  Patient Position at End of Consult: Bedside chair; Bed/Chair alarm activated;  All needs within reach  Mino Tijerina BS, Restorative Technician, [employer]

## 2023-06-24 LAB
ANION GAP SERPL CALCULATED.3IONS-SCNC: 1 MMOL/L
APTT PPP: 25 SECONDS (ref 23–37)
BUN SERPL-MCNC: 19 MG/DL (ref 5–25)
CALCIUM SERPL-MCNC: 8.3 MG/DL (ref 8.3–10.1)
CHLORIDE SERPL-SCNC: 116 MMOL/L (ref 96–108)
CO2 SERPL-SCNC: 23 MMOL/L (ref 21–32)
CREAT SERPL-MCNC: 1.15 MG/DL (ref 0.6–1.3)
ERYTHROCYTE [DISTWIDTH] IN BLOOD BY AUTOMATED COUNT: 13.8 % (ref 11.6–15.1)
GFR SERPL CREATININE-BSD FRML MDRD: 63 ML/MIN/1.73SQ M
GLUCOSE SERPL-MCNC: 135 MG/DL (ref 65–140)
HCT VFR BLD AUTO: 39.7 % (ref 36.5–49.3)
HGB BLD-MCNC: 13.4 G/DL (ref 12–17)
INR PPP: 1 (ref 0.84–1.19)
MCH RBC QN AUTO: 31.9 PG (ref 26.8–34.3)
MCHC RBC AUTO-ENTMCNC: 33.8 G/DL (ref 31.4–37.4)
MCV RBC AUTO: 95 FL (ref 82–98)
PLATELET # BLD AUTO: 274 THOUSANDS/UL (ref 149–390)
PLATELET # BLD AUTO: 296 THOUSANDS/UL (ref 149–390)
PMV BLD AUTO: 10 FL (ref 8.9–12.7)
PMV BLD AUTO: 9.9 FL (ref 8.9–12.7)
POTASSIUM SERPL-SCNC: 4.7 MMOL/L (ref 3.5–5.3)
PROTHROMBIN TIME: 13.4 SECONDS (ref 11.6–14.5)
RBC # BLD AUTO: 4.2 MILLION/UL (ref 3.88–5.62)
SODIUM SERPL-SCNC: 140 MMOL/L (ref 135–147)
WBC # BLD AUTO: 10.87 THOUSAND/UL (ref 4.31–10.16)

## 2023-06-24 PROCEDURE — 85049 AUTOMATED PLATELET COUNT: CPT | Performed by: PHYSICIAN ASSISTANT

## 2023-06-24 PROCEDURE — 80048 BASIC METABOLIC PNL TOTAL CA: CPT | Performed by: PHYSICIAN ASSISTANT

## 2023-06-24 PROCEDURE — 99232 SBSQ HOSP IP/OBS MODERATE 35: CPT | Performed by: INTERNAL MEDICINE

## 2023-06-24 PROCEDURE — 85730 THROMBOPLASTIN TIME PARTIAL: CPT | Performed by: PHYSICIAN ASSISTANT

## 2023-06-24 PROCEDURE — 85610 PROTHROMBIN TIME: CPT | Performed by: PHYSICIAN ASSISTANT

## 2023-06-24 PROCEDURE — 99024 POSTOP FOLLOW-UP VISIT: CPT | Performed by: NURSE PRACTITIONER

## 2023-06-24 PROCEDURE — 85027 COMPLETE CBC AUTOMATED: CPT | Performed by: PHYSICIAN ASSISTANT

## 2023-06-24 RX ORDER — ATORVASTATIN CALCIUM 80 MG/1
80 TABLET, FILM COATED ORAL EVERY EVENING
Status: DISCONTINUED | OUTPATIENT
Start: 2023-06-24 | End: 2023-07-01 | Stop reason: HOSPADM

## 2023-06-24 RX ORDER — RANOLAZINE 500 MG/1
500 TABLET, EXTENDED RELEASE ORAL EVERY 12 HOURS SCHEDULED
Status: DISCONTINUED | OUTPATIENT
Start: 2023-06-24 | End: 2023-07-01 | Stop reason: HOSPADM

## 2023-06-24 RX ADMIN — FAMOTIDINE 20 MG: 20 TABLET, FILM COATED ORAL at 18:03

## 2023-06-24 RX ADMIN — OXYCODONE HYDROCHLORIDE 5 MG: 5 TABLET ORAL at 15:50

## 2023-06-24 RX ADMIN — HYDROMORPHONE HYDROCHLORIDE 0.2 MG: 0.2 INJECTION, SOLUTION INTRAMUSCULAR; INTRAVENOUS; SUBCUTANEOUS at 18:03

## 2023-06-24 RX ADMIN — CEFAZOLIN SODIUM 1000 MG: 1 SOLUTION INTRAVENOUS at 15:50

## 2023-06-24 RX ADMIN — SODIUM CHLORIDE 125 ML/HR: 0.9 INJECTION, SOLUTION INTRAVENOUS at 15:51

## 2023-06-24 RX ADMIN — ACETAMINOPHEN 975 MG: 325 TABLET, FILM COATED ORAL at 06:23

## 2023-06-24 RX ADMIN — NICOTINE 1 PATCH: 21 PATCH, EXTENDED RELEASE TRANSDERMAL at 08:34

## 2023-06-24 RX ADMIN — CEFAZOLIN SODIUM 1000 MG: 1 SOLUTION INTRAVENOUS at 06:23

## 2023-06-24 RX ADMIN — ACETAMINOPHEN 975 MG: 325 TABLET, FILM COATED ORAL at 22:00

## 2023-06-24 RX ADMIN — FAMOTIDINE 20 MG: 20 TABLET, FILM COATED ORAL at 00:33

## 2023-06-24 RX ADMIN — FINASTERIDE 5 MG: 5 TABLET, FILM COATED ORAL at 08:34

## 2023-06-24 RX ADMIN — SODIUM CHLORIDE 125 ML/HR: 0.9 INJECTION, SOLUTION INTRAVENOUS at 06:23

## 2023-06-24 RX ADMIN — OXYCODONE HYDROCHLORIDE 5 MG: 5 TABLET ORAL at 06:24

## 2023-06-24 RX ADMIN — ATORVASTATIN CALCIUM 80 MG: 80 TABLET, FILM COATED ORAL at 18:03

## 2023-06-24 RX ADMIN — RANOLAZINE 500 MG: 500 TABLET, FILM COATED, EXTENDED RELEASE ORAL at 22:00

## 2023-06-24 RX ADMIN — ACETAMINOPHEN 975 MG: 325 TABLET, FILM COATED ORAL at 15:50

## 2023-06-24 RX ADMIN — SERTRALINE HYDROCHLORIDE 100 MG: 100 TABLET ORAL at 22:00

## 2023-06-24 RX ADMIN — METOPROLOL TARTRATE 25 MG: 25 TABLET, FILM COATED ORAL at 08:34

## 2023-06-24 RX ADMIN — OXYCODONE HYDROCHLORIDE 5 MG: 5 TABLET ORAL at 22:01

## 2023-06-24 RX ADMIN — RANOLAZINE 500 MG: 500 TABLET, FILM COATED, EXTENDED RELEASE ORAL at 12:04

## 2023-06-24 RX ADMIN — ATORVASTATIN CALCIUM 40 MG: 40 TABLET, FILM COATED ORAL at 00:32

## 2023-06-24 NOTE — ASSESSMENT & PLAN NOTE
POD 1 bilateral L3-4 and L4-5 posterior lumbar decompression with Dr Tramaine Acevedo (6/23)  · Presented with worsening lower extremity weakness and ambulatory dysfunction  · Known to our service  Scheduled for decompression with Dr Tramaine Acevedo in 10/2022; cancelled due to social situation  · Hx of severe lumbar spinal stenosis with neurogenic claudication  · Worsening since 6/19 when stood up from changing a tire  · Presented as stroke alert so loaded with ASA  · On exam, bilateral LE weakness 4/5  Decreased bilateral patellar reflexes  Some subjective diminished sensation  Also c/o some dizziness  Imaging:  · MRI lumbar spine wo, 6/19/2023: Spondylotic degenerative changes again noted similar to the prior study most significantly at L3-4 and L4-5 where severe central stenosis is identified  Moderate to severe lateral recess stenosis also identified as described  Moderate central stenosis and mild bilateral foraminal narrowing noted at L2-3 with mild foraminal narrowing at L5-S1 bilaterally  Plan:  · Continue to monitor neuro exam closely  · 1 hemovac drain to full suction - 125 mL output since OR  · Neurology originally primary, now under medicine service  · TTE - LVEF 50%, similar to 2015  · MRI brain without evidence of acute ischemia  CTA, CTH negative  · Hold ASA x 2 weeks  · Mobilize with PT/OT  · DVT ppx: SCD's, Heparin SQ   · Disposition: per PT/OT will need rehab  PMR on board  Neurosurgery will continue to follow  Please call with questions

## 2023-06-24 NOTE — PLAN OF CARE
Problem: NEUROSENSORY - ADULT  Goal: Achieves stable or improved neurological status  Description: INTERVENTIONS  - Monitor and report changes in neurological status  - Monitor vital signs such as temperature, blood pressure, glucose, and any other labs ordered     Outcome: Progressing      Outcome: Progressing  Goal: Achieves maximal functionality and self care  Description: INTERVENTIONS  - Encourage and assist patient to increase activity and self care     - Encourage visually impaired, hearing impaired and aphasic patients to use assistive/communication devices  Outcome: Progressing

## 2023-06-24 NOTE — RESTORATIVE TECHNICIAN NOTE
Restorative Technician Note      Patient Name: Milagros Petty     Note Type: Mobility  Patient Position Upon Consult: Supine  Activity Performed: Ambulated; Dangled; Stood  Assistive Device: Other (Comment) (none)  Education Provided: Yes  Patient Position at End of Consult: Supine;  All needs within reach; Bed/Chair alarm activated  Sandra RICKETTS, Restorative Technician, [employer]

## 2023-06-24 NOTE — PROGRESS NOTES
"Cardiology Progress Note - Marbella Brown 71 y o  male MRN: [id number]    Unit/Bed#: [facility] 708-01 Encounter: [id number]    Hospital Problems:  Principal Problem:    Spinal stenosis of lumbar region with neurogenic claudication  Active Problems:    CAD (coronary artery disease)    Pre-diabetes    HLD (hyperlipidemia)    HTN (hypertension)    Tobacco use    BPH (benign prostatic hyperplasia)    NARDA (acute kidney injury) ([facility] )    Schizophrenia ([facility] )      Assessment & Plan:      1  CAD - s/p CABG  Has angina with exertion and small area of of ischemia on NM SPECT  - continue metoprolol 25 mg BID  - add ranexa 500 mg BID for additional anti-anginal benefit  - last LDL 6/20/23 was 71 mg/dL on atorvastatin 40 mg daily; increase to 80 mg daily  - restart aspirin 81 mg daily when able from surgical perspective  2  HTN - controlled      Subjective:   Patient seen and examined  No significant events overnight  Objective:     Vitals: Blood pressure 113/61, pulse 71, temperature 98 °F (36 7 °C), resp  rate 16, height 5' 9\" (1 753 m), weight 72 1 kg (159 lb), SpO2 96 %  , Body mass index is 23 48 kg/m² ,   Orthostatic Blood Pressures    Flowsheet Row Most Recent Value   Blood Pressure 113/61 filed at 06/24/2023 2107   Patient Position - Orthostatic VS Lying filed at 06/20/2023 2135            Intake/Output Summary (Last 24 hours) at 6/24/2023 1008  Last data filed at 6/24/2023 0840  Gross per 24 hour   Intake 1780 ml   Output 415 ml   Net 1365 ml           Physical Exam:    General: Marbella Brown is a well appearing male, in no acute distress, sitting comfortably  HEENT: moist mucous membranes, EOMI  Neck:  No JVD, supple, trachea midline  Cardiovascular: unremarkable S1/S2, regular rate and rhythm, 2/6 SM  Pulmonary: normal respiratory effort, CTAB  Abdomen: soft and nondistended  Extremities: No lower extremity edema  Warm and well perfused extremities    Neuro: no focal motor deficits, AAOx3 (person, place, time)  Psych: " Normal mood and affect, cooperative      Medications:      Current Facility-Administered Medications:   •  acetaminophen (TYLENOL) tablet 975 mg, 975 mg, Oral, Q6H PRN, Ami Fabiana PA-C, 975 mg at 06/21/23 2152  •  acetaminophen (TYLENOL) tablet 975 mg, 975 mg, Oral, Q8H [address], Ami Fabiana, PA-C, 975 mg at 06/24/23 6467  •  atorvastatin (LIPITOR) tablet 40 mg, 40 mg, Oral, QPM, Ami Fabiana, PA-C, 40 mg at 06/24/23 0032  •  ceFAZolin (ANCEF) IVPB (premix in dextrose) 1,000 mg 50 mL, 1,000 mg, Intravenous, Q8H, THALIA Foster-C, Last Rate: 100 mL/hr at 06/24/23 3281, 1,000 mg at 06/24/23 9032  •  [START ON 6/25/2023] enoxaparin (LOVENOX) subcutaneous injection 40 mg, 40 mg, Subcutaneous, Daily, Lillian Jung PA-C  •  famotidine (PEPCID) tablet 20 mg, 20 mg, Oral, BID, Ami Fabiana, PA-C, 20 mg at 06/24/23 9692  •  finasteride (PROSCAR) tablet 5 mg, 5 mg, Oral, Daily, Ami Fabiana, PA-C, 5 mg at 06/24/23 0219  •  HYDROmorphone HCl (DILAUDID) injection 0 2 mg, 0 2 mg, Intravenous, Q4H PRN, Lillian Jung PA-C  •  metoprolol tartrate (LOPRESSOR) tablet 25 mg, 25 mg, Oral, Daily, Ami Fabiana, PA-C, 25 mg at 06/24/23 4333  •  nicotine (NICODERM CQ) 21 mg/24 hr TD 24 hr patch 1 patch, 1 patch, Transdermal, Daily, THALIA Foster-C, 1 patch at 06/24/23 1055  •  oxyCODONE (ROXICODONE) IR tablet 5 mg, 5 mg, Oral, Q4H PRN, Ami Fabiana, PA-C, 5 mg at 06/24/23 8192  •  oxyCODONE (ROXICODONE) split tablet 2 5 mg, 2 5 mg, Oral, Q4H PRN, Lillian Jung PA-C  •  sertraline (ZOLOFT) tablet 100 mg, 100 mg, Oral, HS, Lillian Jung PA-C, 100 mg at 06/23/23 2211  •  sodium chloride 0 9 % infusion, 125 mL/hr, Intravenous, Continuous, Lillian Jung PA-C, Last Rate: 125 mL/hr at 06/24/23 0623, 125 mL/hr at 06/24/23 0623     Labs & Results:        Results from last 7 days   Lab Units 06/24/23  0600 06/24/23  0037 06/23/23  0456 06/21/23  0523   WBC Thousand/uL 10 87* "--  7 11 7 36   HEMOGLOBIN g/dL 13 4  --  13 9 12 4   HEMATOCRIT % 39 7  --  43 0 37 4   PLATELETS Thousands/uL 296 274 279 264     Results from last 7 days   Lab Units 06/20/23  0500   TRIGLYCERIDES mg/dL 109   HDL mg/dL 31*     Results from last 7 days   Lab Units 06/24/23  0600 06/23/23  0456 06/21/23  0523 06/20/23  0500   POTASSIUM mmol/L 4 7 4 1 4 3 3 8   CHLORIDE mmol/L 116* 114* 115* 117*   CO2 mmol/L 23 27 24 24   BUN mg/dL 19 19 17 17   CREATININE mg/dL 1 15 1 02 0 95 0 88   CALCIUM mg/dL 8 3 8 8 8 7 9 0   ALK PHOS U/L  --  82  --  82   ALT U/L  --  30  --  16   AST U/L  --  30  --  20     Results from last 7 days   Lab Units 06/24/23  0600 06/23/23  0456 06/19/23  1420   INR  1 00 0 96 1 04   PTT seconds 25 29 24     Results from last 7 days   Lab Units 06/20/23  0500   MAGNESIUM mg/dL 2 0            This note was completed in part utilizing M*Modal fluency direct voice recognition software  Grammatical errors, random word insertion, spelling mistakes, occasional wrong word or \"sound-alike\" substitutions and incomplete sentences may be an occasional consequence of the system secondary to software limitations, ambient noise and hardware issues  At the time of dictation, efforts were made to edit, clarify and /or correct errors  Please read the chart carefully and recognize, using context, where substitutions have occurred  If you have any questions or concerns about the context, text or information contained within the body of this dictation, please contact myself, the provider, for further clarification      "

## 2023-06-24 NOTE — ASSESSMENT & PLAN NOTE
Remote CABG x4  Reporting some exertional chest pressure  S/p nuclear stress test 6/22 without evidence of acute ischemia  Cardiology following  Appreciate recommendations

## 2023-06-24 NOTE — PLAN OF CARE
Problem: MOBILITY - ADULT  Goal: Maintain or return to baseline ADL function  Description: INTERVENTIONS:  -  Assess patient's ability to carry out ADLs; assess patient's baseline for ADL function and identify physical deficits which impact ability to perform ADLs (bathing, care of mouth/teeth, toileting, grooming, dressing, etc )  - Assess/evaluate cause of self-care deficits   - Assess range of motion  - Assess patient's mobility; develop plan if impaired  - Assess patient's need for assistive devices and provide as appropriate  - Encourage maximum independence but intervene and supervise when necessary  - Involve family in performance of ADLs  - Assess for home care needs following discharge   - Consider OT consult to assist with ADL evaluation and planning for discharge  - Provide patient education as appropriate  Outcome: Progressing  Goal: Maintains/Returns to pre admission functional level  Description: INTERVENTIONS:  - Perform BMAT or MOVE assessment daily    - Set and communicate daily mobility goal to care team and patient/family/caregiver  - Collaborate with rehabilitation services on mobility goals if consulted  - Perform Range of Motion 2 times a day  - Reposition patient every 2 hours    - Dangle patient 2 times a day  - Stand patient 3 times a day  - Ambulate patient 3 times a day  - Out of bed to chair 3 times a day   - Out of bed for meals 3 times a day  - Out of bed for toileting  - Record patient progress and toleration of activity level   Outcome: Progressing     Problem: NEUROSENSORY - ADULT  Goal: Achieves stable or improved neurological status  Description: INTERVENTIONS  - Monitor and report changes in neurological status  - Monitor vital signs such as temperature, blood pressure, glucose, and any other labs ordered   - Initiate measures to prevent increased intracranial pressure  - Monitor for seizure activity and implement precautions if appropriate      Outcome: Progressing  Goal: Remains free of injury related to seizures activity  Description: INTERVENTIONS  - Maintain airway, patient safety  and administer oxygen as ordered  - Monitor patient for seizure activity, document and report duration and description of seizure to physician/advanced practitioner  - If seizure occurs,  ensure patient safety during seizure  - Reorient patient post seizure  - Seizure pads on all 4 side rails  - Instruct patient/family to notify RN of any seizure activity including if an aura is experienced  - Instruct patient/family to call for assistance with activity based on nursing assessment  - Administer anti-seizure medications if ordered    Outcome: Progressing  Goal: Achieves maximal functionality and self care  Description: INTERVENTIONS  - Monitor swallowing and airway patency with patient fatigue and changes in neurological status  - Encourage and assist patient to increase activity and self care     - Encourage visually impaired, hearing impaired and aphasic patients to use assistive/communication devices  Outcome: Progressing     Problem: MUSCULOSKELETAL - ADULT  Goal: Maintain or return mobility to safest level of function  Description: INTERVENTIONS:  - Assess patient's ability to carry out ADLs; assess patient's baseline for ADL function and identify physical deficits which impact ability to perform ADLs (bathing, care of mouth/teeth, toileting, grooming, dressing, etc )  - Assess/evaluate cause of self-care deficits   - Assess range of motion  - Assess patient's mobility  - Assess patient's need for assistive devices and provide as appropriate  - Encourage maximum independence but intervene and supervise when necessary  - Involve family in performance of ADLs  - Assess for home care needs following discharge   - Consider OT consult to assist with ADL evaluation and planning for discharge  - Provide patient education as appropriate  Outcome: Progressing  Goal: Maintain proper alignment of affected body part  Description: INTERVENTIONS:  - Support, maintain and protect limb and body alignment  - Provide patient/ family with appropriate education  Outcome: Progressing     Problem: PAIN - ADULT  Goal: Verbalizes/displays adequate comfort level or baseline comfort level  Description: Interventions:  - Encourage patient to monitor pain and request assistance  - Assess pain using appropriate pain scale  - Administer analgesics based on type and severity of pain and evaluate response  - Implement non-pharmacological measures as appropriate and evaluate response  - Consider cultural and social influences on pain and pain management  - Notify physician/advanced practitioner if interventions unsuccessful or patient reports new pain  Outcome: Progressing     Problem: INFECTION - ADULT  Goal: Absence or prevention of progression during hospitalization  Description: INTERVENTIONS:  - Assess and monitor for signs and symptoms of infection  - Monitor lab/diagnostic results  - Monitor all insertion sites, i e  indwelling lines, tubes, and drains  - Monitor endotracheal if appropriate and nasal secretions for changes in amount and color  - [place] appropriate cooling/warming therapies per order  - Administer medications as ordered  - Instruct and encourage patient and family to use good hand hygiene technique  - Identify and instruct in appropriate isolation precautions for identified infection/condition  Outcome: Progressing  Goal: Absence of fever/infection during neutropenic period  Description: INTERVENTIONS:  - Monitor WBC    Outcome: Progressing     Problem: SAFETY ADULT  Goal: Maintain or return to baseline ADL function  Description: INTERVENTIONS:  -  Assess patient's ability to carry out ADLs; assess patient's baseline for ADL function and identify physical deficits which impact ability to perform ADLs (bathing, care of mouth/teeth, toileting, grooming, dressing, etc )  - Assess/evaluate cause of self-care deficits   - Assess range of motion  - Assess patient's mobility; develop plan if impaired  - Assess patient's need for assistive devices and provide as appropriate  - Encourage maximum independence but intervene and supervise when necessary  - Involve family in performance of ADLs  - Assess for home care needs following discharge   - Consider OT consult to assist with ADL evaluation and planning for discharge  - Provide patient education as appropriate  Outcome: Progressing  Goal: Maintains/Returns to pre admission functional level  Description: INTERVENTIONS:  - Perform BMAT or MOVE assessment daily    - Set and communicate daily mobility goal to care team and patient/family/caregiver  - Collaborate with rehabilitation services on mobility goals if consulted  - Perform Range of Motion 2 times a day  - Reposition patient every 2 hours    - Dangle patient 2 times a day  - Stand patient 3 times a day  - Ambulate patient 3 times a day  - Out of bed to chair 3 times a day   - Out of bed for meals 3 times a day  - Out of bed for toileting  - Record patient progress and toleration of activity level   Outcome: Progressing  Goal: Patient will remain free of falls  Description: INTERVENTIONS:  - Educate patient/family on patient safety including physical limitations  - Instruct patient to call for assistance with activity   - Consult OT/PT to assist with strengthening/mobility   - Keep Call bell within reach  - Keep bed low and locked with side rails adjusted as appropriate  - Keep care items and personal belongings within reach  - Initiate and maintain comfort rounds  - Make Fall Risk Sign visible to staff  - Offer Toileting every 2 Hours, in advance of need  - Initiate/Maintain bed alarm  - Apply yellow socks and bracelet for high fall risk patients  - Consider moving patient to room near nurses station  Outcome: Progressing     Problem: DISCHARGE PLANNING  Goal: Discharge to home or other facility with appropriate resources  Description: INTERVENTIONS:  - Identify barriers to discharge w/patient and caregiver  - Arrange for needed discharge resources and transportation as appropriate  - Identify discharge learning needs (meds, wound care, etc )  - Arrange for interpretive services to assist at discharge as needed  - Refer to Case Management Department for coordinating discharge planning if the patient needs post-hospital services based on physician/advanced practitioner order or complex needs related to functional status, cognitive ability, or social support system  Outcome: Progressing     Problem: Knowledge Deficit  Goal: Patient/family/caregiver demonstrates understanding of disease process, treatment plan, medications, and discharge instructions  Description: Complete learning assessment and assess knowledge base  Interventions:  - Provide teaching at level of understanding  - Provide teaching via preferred learning methods  Outcome: Progressing     Problem: Neurological Deficit  Goal: Neurological status is stable or improving  Description: Interventions:  - Monitor and assess patient's level of consciousness, motor function, sensory function, and level of assistance needed for ADLs  - Monitor and report changes from baseline  Collaborate with interdisciplinary team to initiate plan and implement interventions as ordered  - Provide and maintain a safe environment  - Consider seizure precautions  - Consider fall precautions  - Consider aspiration precautions  - Consider bleeding precautions  Outcome: Progressing     Problem: Activity Intolerance/Impaired Mobility  Goal: Mobility/activity is maintained at optimum level for patient  Description: Interventions:  - Assess and monitor patient  barriers to mobility and need for assistive/adaptive devices  - Assess patient's emotional response to limitations  - Collaborate with interdisciplinary team and initiate plans and interventions as ordered    - Encourage independent activity per ability   - Maintain proper body alignment  - Perform active/passive rom as tolerated/ordered  - Plan activities to conserve energy   - Turn patient as appropriate  Outcome: Progressing     Problem: Communication Impairment  Goal: Ability to express needs and understand communication  Description: Assess patient's communication skills and ability to understand information  Patient will demonstrate use of effective communication techniques, alternative methods of communication and understanding even if not able to speak  - Encourage communication and provide alternate methods of communication as needed  - Collaborate with case management/ for discharge needs  - Include patient/family/caregiver in decisions related to communication  Outcome: Progressing     Problem: Potential for Aspiration  Goal: Non-ventilated patient's risk of aspiration is minimized  Description: Assess and monitor vital signs, respiratory status, and labs (WBC)  Monitor for signs of aspiration (tachypnea, cough, rales, wheezing, cyanosis, fever)  - Assess and monitor patient's ability to swallow  - Place patient up in chair to eat if possible  - HOB up at 90 degrees to eat if unable to get patient up into chair   - Supervise patient during oral intake  - Instruct patient/ family to take small bites  - Instruct patient/ family to take small single sips when taking liquids  - Follow patient-specific strategies generated by speech pathologist   Outcome: Progressing  Goal: Ventilated patient's risk of aspiration is minimized  Description: Assess and monitor vital signs, respiratory status, airway cuff pressure, and labs (WBC)  Monitor for signs of aspiration (tachypnea, cough, rales, wheezing, cyanosis, fever)  - Elevate head of bed 30 degrees if patient has tube feeding   - Monitor tube feeding    Outcome: Progressing     Problem: Nutrition  Goal: Nutrition/Hydration status is improving  Description: Monitor and assess patient's nutrition/hydration status for malnutrition (ex- brittle hair, bruises, dry skin, pale skin and conjunctiva, muscle wasting, smooth red tongue, and disorientation)  Collaborate with interdisciplinary team and initiate plan and interventions as ordered  Monitor patient's weight and dietary intake as ordered or per policy  Utilize nutrition screening tool and intervene per policy  Determine patient's food preferences and provide high-protein, high-caloric foods as appropriate  - Assist patient with eating   - Allow adequate time for meals   - Encourage patient to take dietary supplement as ordered  - Collaborate with clinical nutritionist   - Include patient/family/caregiver in decisions related to nutrition    Outcome: Progressing

## 2023-06-24 NOTE — PROGRESS NOTES
INTERNAL MEDICINE RESIDENCY PROGRESS NOTE     Name: Jarad Garcia   Age & Sex: 71 y o  male   MRN: [id number]  Unit/Bed#: [facility] 708-01   Encounter: [id number]  Team: SOD Team B     PATIENT INFORMATION     Name: Jarad Garcia   Age & Sex: 71 y o  male   MRN: [id number]  Hospital Stay Days: 5    ASSESSMENT/PLAN     Principal Problem:    Spinal stenosis of lumbar region with neurogenic claudication  Active Problems:    CAD (coronary artery disease)    Pre-diabetes    HLD (hyperlipidemia)    HTN (hypertension)    Tobacco use    BPH (benign prostatic hyperplasia)    NARDA (acute kidney injury) ([facility] [address] )    [facility] ([facility] )      * Spinal stenosis of lumbar region with neurogenic claudication  Assessment & Plan  Jarad Garcia is a 71 y o  male  with pertinent history lumbar spondylosis, neurogenic claudication, CAD s/p CABG x 4, HTN, HLD, hx of PE (Not on thinners) who presented as stroke alert on 6/19/23 given acute onset worsening bilateral lower extremity weakness  Denies any urinary symptoms or saddle anesthesia  Initial NIHSS of 5 and LKW 1200  Initial BP on arrival was Blood Pressure: 110/53  As a result of lower suspicion for stroke and potential need for urgent surgery patient was determined to not be a candidate for tPA  • F/u exam:  HF strength 4-/5 R, 4/5 L  No ataxia or dysmetria noted on exam  Proprioception normal  Difficulty standing from a seated position without assistance  Romberg positive  • BP over last 24 hours: Blood Pressure: 136/79  current BP: Blood Pressure: 136/79     Imaging:  • CTH: No acute intracranial abnormality  • CTA: No significant stenosis of the cervical carotid or vertebral arteries  No high-grade intracranial stenosis, large vessel occlusion or aneurysm  • MRI brain: No MR evidence of acute ischemia  Small right mastoid effusion    • MRI L spine: Spondylotic degenerative changes again noted similar to the prior study most significantly at L3-4 and L4-5 where severe central stenosis is identified  Moderate to severe lateral recess stenosis also identified as described  Moderate central stenosis and mild bilateral foraminal narrowing noted at L2-3 with mild foraminal narrowing at L5-S1 bilaterally  • Echocardiogram: EF 50% with mild LA dilation   • Telemetry: monitor  • LDL 71   • HBA1C 5 8     Impression: At this time history and physical along with available imaging concerning for worsening weakness in the setting of known spinal stenosis  One month ago patient strength 5/5 throughout vs now significantly week in bilateral HF        Plan:  • S/p L3-4 and L4-5 decompression with Dr Arturo Calloway 6/23  • Family able to help with recovery  • PMR on board, plan for acute post surgical rehab   • Appreciate neurosurgery eval and recommendations   • Neuro checks  • PT/OT//PMR consults appreciated   • Follow up with neurology as outpatient in 3 months      Schizophrenia [facility])  Assessment & Plan  Patient's home medication list includes Abilify, Atarax, Risperdal, and Zoloft  Reviewed medications with patient and he reports of these, he only takes Zoloft  · Continue Zoloft 100 mg qd  · Avoid gabapentin - which is on patient's home medication list; he says this causes him to have suicidal thoughts    NARDA (acute kidney injury) [facility])  Assessment & Plan  Patient with concerns for acute kidney injury with serum creatinine at 1 20  Currently at 1 0, likely resolved  -Continue to monitor with daily BMPs    BPH (benign prostatic hyperplasia)  Assessment & Plan  Continue home finasteride 5 mg qd    Tobacco use  Assessment & Plan  1 PPD smoker  · Continue nicotine patch    HTN (hypertension)  Assessment & Plan  Patient appears to be on lisinopril 5 mg daily and Lopressor 25 mg daily at home  -Has been mostly normotensive with blood pressure systolics between 028-055 mmHg  -Hold lisinopril for now    HLD (hyperlipidemia)  Assessment & Plan  Last lipid panel in 2021    · Continue atorvastatin 80 mg qd  · Repeat lipid panel showing HDL at 31, LDL at 71, triglycerides at 109    Pre-diabetes  Assessment & Plan  Last A1c recently 5 8  · Follow up A1c  · Goal BG <200, can add SSI if needed to maintain goal BG    CAD (coronary artery disease)  Assessment & Plan  Patient is a 1 PPD smoker with hx of CAD s/p CABG x 4  Complains of angina with exertion    Plan:  · Patient has small area of of ischemia on NM SPECT  · Cardiology consult  · Continue Metoprolol 25 mg BID  · Start Ranexa 500 mg BID for angina   · Increase atorvastatin to 80 mg daily  · Restart ASA 81 daily once able to from surgical standpoint      Disposition: PT/OT, dispo planning     SUBJECTIVE     Patient seen and examined  No acute events overnight   used  Patient's pain is well controlled  Patient has no acute complaints today, denies fevers, chills, nausea, vomiting  He is moving his bowels and bladder without issue  He feels he has good strength in his lower extremities compared to when he was admitted  OBJECTIVE     Vitals:    23 2153 23 0409 23 0734 23 0833   BP: 104/65 110/66 (!) 101/48 113/61   Pulse: 89  79 71   Resp: 18 20 16    Temp: 97 7 °F (36 5 °C) 98 3 °F (36 8 °C) 98 °F (36 7 °C)    TempSrc:       SpO2: 94%  95% 96%   Weight:       Height:          Temperature:   Temp (24hrs), Av 9 °F (36 6 °C), Min:97 4 °F (36 3 °C), Max:98 3 °F (36 8 °C)    Temperature: 98 °F (36 7 °C)  Intake & Output:  I/O        0701   0700  0701   0700  0701   0700    P  O  520 480     I V  (mL/kg) 1002 1 (13 9) 1300 (18)     Total Intake(mL/kg) 1522 1 (21 1) 1780 (24 7)     Urine (mL/kg/hr)  250 (0 1)     Drains  125 40    Total Output  375 40    Net +1522 1 +1405 -40           Unmeasured Urine Occurrence 1 x 1 x     Unmeasured Stool Occurrence 0 x          Weights:   IBW (Ideal Body Weight): 70 7 kg    Body mass index is 23 48 kg/m²    Weight (last 2 days)     Date/Time Weight    23 1114 72 1 (159)        Physical Exam:  General: No apparent distress, resting comfortably   Head: Normocephalic, atraumatic  Eyes: Anicteric, no conjunctival erythema  ENT: External ear normal, no nasal discharge  Neck: Trachea midline, no visible lymphadenopathy or goiter  Respiratory: Non-labored respirations, symmetric thorax expansion  Cardiovascular: Extremities appear well-perfused  Abdomen: Non-distended  Extremities: Moves extremities spontaneously, no peripheral edema  Skin: No visible rashes, wounds, or jaundice  Neuro: Bilateral lower extremity proximal strength 4/5  Distal strength 5/5 bilaterally  No other focal deficits  LABORATORY DATA     Labs: I have personally reviewed pertinent reports  Results from last 7 days   Lab Units 06/24/23  0600 06/24/23  0037 06/23/23  0456 06/21/23  0523 06/19/23  1706 06/19/23  1420   WBC Thousand/uL 10 87*  --  7 11 7 36  --  7 66   HEMOGLOBIN g/dL 13 4  --  13 9 12 4  --  12 9   HEMATOCRIT % 39 7  --  43 0 37 4  --  36 7   PLATELETS Thousands/uL 296 274 279 264   < > 264   NEUTROS PCT %  --   --   --   --   --  73   MONOS PCT %  --   --   --   --   --  6   EOS PCT %  --   --   --   --   --  1    < > = values in this interval not displayed  Results from last 7 days   Lab Units 06/24/23  0600 06/23/23  0456 06/21/23  0523 06/20/23  0500   POTASSIUM mmol/L 4 7 4 1 4 3 3 8   CHLORIDE mmol/L 116* 114* 115* 117*   CO2 mmol/L 23 27 24 24   BUN mg/dL 19 19 17 17   CREATININE mg/dL 1 15 1 02 0 95 0 88   CALCIUM mg/dL 8 3 8 8 8 7 9 0   ALK PHOS U/L  --  82  --  82   ALT U/L  --  30  --  16   AST U/L  --  30  --  20     Results from last 7 days   Lab Units 06/20/23  0500   MAGNESIUM mg/dL 2 0          Results from last 7 days   Lab Units 06/24/23  0600 06/23/23  0456 06/19/23  1420   INR  1 00 0 96 1 04   PTT seconds 25 29 24               IMAGING & DIAGNOSTIC TESTING     Radiology Results: I have personally reviewed pertinent reports    XR spine lumbosacral 1 view    Result Date: 6/23/2023  Impression: Fluoroscopic guidance provided for procedure guidance  Please refer to the separate procedure notes for additional details  Workstation performed: [id number]     MRI lumbar spine wo contrast    Result Date: 6/20/2023  Impression: Spondylotic degenerative changes again noted similar to the prior study most significantly at L3-4 and L4-5 where severe central stenosis is identified  Moderate to severe lateral recess stenosis also identified as described  Moderate central stenosis and mild bilateral foraminal narrowing noted at L2-3 with mild foraminal narrowing at L5-S1 bilaterally  Workstation performed: [id number]     MRI brain wo contrast    Result Date: 6/20/2023  Impression: No MR evidence of acute ischemia  Small right mastoid effusion  Workstation performed: [id number]     CT stroke alert brain    Result Date: 6/19/2023  Impression: No acute intracranial abnormality  Findings were directly discussed with Ruba Garcia at 2:41 p m  Workstation performed: [id number]     CTA stroke alert (head/neck)    Result Date: 6/19/2023  Impression: No significant stenosis of the cervical carotid or vertebral arteries No high-grade intracranial stenosis, large vessel occlusion or aneurysm  Findings were directly discussed with Ruba Garcia at 2:41 p m  Workstation performed: [id number]     Other Diagnostic Testing: I have personally reviewed pertinent reports      ACTIVE MEDICATIONS     Current Facility-Administered Medications   Medication Dose Route Frequency   • acetaminophen (TYLENOL) tablet 975 mg  975 mg Oral Q6H PRN   • acetaminophen (TYLENOL) tablet 975 mg  975 mg Oral Q8H [address]   • atorvastatin (LIPITOR) tablet 80 mg  80 mg Oral QPM   • ceFAZolin (ANCEF) IVPB (premix in dextrose) 1,000 mg 50 mL  1,000 mg Intravenous Q8H   • [START ON 6/25/2023] enoxaparin (LOVENOX) subcutaneous injection 40 mg  40 mg Subcutaneous Daily   • famotidine (PEPCID) tablet 20 mg  20 mg Oral BID   • "finasteride (PROSCAR) tablet 5 mg  5 mg Oral Daily   • HYDROmorphone HCl (DILAUDID) injection 0 2 mg  0 2 mg Intravenous Q4H PRN   • metoprolol tartrate (LOPRESSOR) tablet 25 mg  25 mg Oral Daily   • nicotine (NICODERM CQ) 21 mg/24 hr TD 24 hr patch 1 patch  1 patch Transdermal Daily   • oxyCODONE (ROXICODONE) IR tablet 5 mg  5 mg Oral Q4H PRN   • oxyCODONE (ROXICODONE) split tablet 2 5 mg  2 5 mg Oral Q4H PRN   • ranolazine (RANEXA) 12 hr tablet 500 mg  500 mg Oral Q12H FRANKI   • sertraline (ZOLOFT) tablet 100 mg  100 mg Oral HS   • sodium chloride 0 9 % infusion  125 mL/hr Intravenous Continuous       VTE Pharmacologic Prophylaxis: Enoxaparin  VTE Mechanical Prophylaxis: sequential compression device    Portions of the record may have been created with voice recognition software  Occasional wrong word or \"sound a like\" substitutions may have occurred due to the inherent limitations of voice recognition software  Read the chart carefully and recognize, using context, where substitutions have occurred    ==  Lani Bernheim, [address] [facility]  Internal Medicine Residency PGY-2     "

## 2023-06-24 NOTE — PROGRESS NOTES
[facility]  Progress Note  Name: Maia Carpio  MRN: [id number]  [facility] Date of Admission: 6/19/2023   Date of Service: 6/24/2023 I Hospital Day: 5    Assessment/Plan   CAD (coronary artery disease)  Assessment & Plan  Remote CABG x4  Reporting some exertional chest pressure  S/p nuclear stress test 6/22 without evidence of acute ischemia  Cardiology following  Appreciate recommendations  * Spinal stenosis of lumbar region with neurogenic claudication  Assessment & Plan  POD 1 bilateral L3-4 and L4-5 posterior lumbar decompression with Dr Trish Merritt (6/23)  · Presented with worsening lower extremity weakness and ambulatory dysfunction  · Known to our service  Scheduled for decompression with Dr Trish Merritt in 10/2022; cancelled due to social situation  · Hx of severe lumbar spinal stenosis with neurogenic claudication  · Worsening since 6/19 when stood up from changing a tire  · Presented as stroke alert so loaded with ASA  · On exam, bilateral LE weakness 4/5  Decreased bilateral patellar reflexes  Some subjective diminished sensation  Also c/o some dizziness  Imaging:  · MRI lumbar spine wo, 6/19/2023: Spondylotic degenerative changes again noted similar to the prior study most significantly at L3-4 and L4-5 where severe central stenosis is identified  Moderate to severe lateral recess stenosis also identified as described  Moderate central stenosis and mild bilateral foraminal narrowing noted at L2-3 with mild foraminal narrowing at L5-S1 bilaterally  Plan:  · Continue to monitor neuro exam closely  · 1 hemovac drain to full suction - 125 mL output since OR  · Neurology originally primary, now under medicine service  · TTE - LVEF 50%, similar to 2015  · MRI brain without evidence of acute ischemia  CTA, CTH negative  · Hold ASA x 2 weeks  · Mobilize with PT/OT  · DVT ppx: SCD's, Heparin SQ   · Disposition: per PT/OT will need rehab   PMR "on board  Neurosurgery will continue to follow  Please call with questions  Subjective/Objective   Chief Complaint: \"I'm ok  \"    Subjective: C/o some back pain  States leg heaviness sensation has improved somewhat  Denies new deficits  Objective: NAD  Lumbar incision dressing clean, dry and intact  1 hemovac drain  I/O       06/22 0701  06/23 0700 06/23 0701  06/24 0700 06/24 0701  06/25 0700    P  O  520 480     I V  (mL/kg) 1002 1 (13 9) 1300 (18)     Total Intake(mL/kg) 1522 1 (21 1) 1780 (24 7)     Urine (mL/kg/hr)  250 (0 1)     Drains  125 40    Total Output  375 40    Net +1522 1 +1405 -40           Unmeasured Urine Occurrence 1 x 1 x     Unmeasured Stool Occurrence 0 x            Invasive Devices     Peripheral Intravenous Line  Duration           Peripheral IV 06/23/23 Left Antecubital 1 day    Peripheral IV 06/23/23 Right Forearm <1 day          Drain  Duration           Closed/Suction Drain Left Back Accordion 7 Fr  <1 day                Physical Exam:  Vitals: Blood pressure 128/71, pulse 78, temperature 98 °F (36 7 °C), temperature source Oral, resp  rate 16, height 5' 9\" (1 753 m), weight 72 1 kg (159 lb), SpO2 97 %  ,Body mass index is 23 48 kg/m²          General appearance: alert, appears stated age, cooperative and no distress  Head: Normocephalic, without obvious abnormality, atraumatic  Eyes: EOMI, PERRL  Neck: supple, symmetrical, trachea midline and NT  Back: lumbar incisional dressing clean, dry and intact  Lungs: non labored breathing  Heart: regular heart rate  Neurologic:   Mental status: Alert, oriented x3, thought content appropriate  Cranial nerves: grossly intact (Cranial nerves II-XII)  Sensory: normal to LT  Motor: LLE -4/5, RLE 4/5        Lab Results:  Results from last 7 days   Lab Units 06/24/23  0600 06/24/23  0037 06/23/23  0456 06/21/23  0523 06/19/23  1706 06/19/23  1420   WBC Thousand/uL 10 87*  --  7 11 7 36  --  7 66   HEMOGLOBIN g/dL 13 4  --  13 9 12 4  --  " "12 9   HEMATOCRIT % 39 7  --  43 0 37 4  --  36 7   PLATELETS Thousands/uL 296 274 279 264   < > 264   NEUTROS PCT %  --   --   --   --   --  73   MONOS PCT %  --   --   --   --   --  6   EOS PCT %  --   --   --   --   --  1    < > = values in this interval not displayed  Results from last 7 days   Lab Units 06/24/23  0600 06/23/23  0456 06/21/23  0523 06/20/23  0500   POTASSIUM mmol/L 4 7 4 1 4 3 3 8   CHLORIDE mmol/L 116* 114* 115* 117*   CO2 mmol/L 23 27 24 24   BUN mg/dL 19 19 17 17   CREATININE mg/dL 1 15 1 02 0 95 0 88   CALCIUM mg/dL 8 3 8 8 8 7 9 0   ALK PHOS U/L  --  82  --  82   ALT U/L  --  30  --  16   AST U/L  --  30  --  20     Results from last 7 days   Lab Units 06/20/23  0500   MAGNESIUM mg/dL 2 0         Results from last 7 days   Lab Units 06/24/23  0600 06/23/23  0456 06/19/23  1420   INR  1 00 0 96 1 04   PTT seconds 25 29 24     No results found for: \"TROPONINT\"  ABG:No results found for: \"PHART\", \"JLC7CBC\", \"PO2ART\", \"JCL6JVZ\", \"E6BNKKNH\", \"BEART\", \"SOURCE\"    Imaging Studies: I have personally reviewed pertinent reports  and I have personally reviewed pertinent films in PACS    XR spine lumbosacral 1 view    Result Date: 6/23/2023  Impression: Fluoroscopic guidance provided for procedure guidance  Please refer to the separate procedure notes for additional details  Workstation performed: [id number]     MRI lumbar spine wo contrast    Result Date: 6/20/2023  Impression: Spondylotic degenerative changes again noted similar to the prior study most significantly at L3-4 and L4-5 where severe central stenosis is identified  Moderate to severe lateral recess stenosis also identified as described  Moderate central stenosis and mild bilateral foraminal narrowing noted at L2-3 with mild foraminal narrowing at L5-S1 bilaterally  Workstation performed: [id number]     MRI brain wo contrast    Result Date: 6/20/2023  Impression: No MR evidence of acute ischemia  Small right mastoid effusion   " Workstation performed: [id number]     CT stroke alert brain    Result Date: 6/19/2023  Impression: No acute intracranial abnormality  Findings were directly discussed with Nadege Guillaume at 2:41 p m  Workstation performed: [id number]     CTA stroke alert (head/neck)    Result Date: 6/19/2023  Impression: No significant stenosis of the cervical carotid or vertebral arteries No high-grade intracranial stenosis, large vessel occlusion or aneurysm  Findings were directly discussed with Nadege Guillaume at 2:41 p m  Workstation performed: [id number]       EKG, Pathology, and Other Studies: I have personally reviewed pertinent reports        VTE Pharmacologic Prophylaxis: Sequential compression device (Venodyne)     VTE Mechanical Prophylaxis: sequential compression device

## 2023-06-25 PROBLEM — N17.9 AKI (ACUTE KIDNEY INJURY) (HCC): Status: RESOLVED | Noted: 2018-08-06 | Resolved: 2023-06-25

## 2023-06-25 LAB
ANION GAP SERPL CALCULATED.3IONS-SCNC: -2 MMOL/L
BASOPHILS # BLD AUTO: 0.03 THOUSANDS/ÂΜL (ref 0–0.1)
BASOPHILS NFR BLD AUTO: 0 % (ref 0–1)
BUN SERPL-MCNC: 15 MG/DL (ref 5–25)
CALCIUM SERPL-MCNC: 8.2 MG/DL (ref 8.3–10.1)
CHLORIDE SERPL-SCNC: 117 MMOL/L (ref 96–108)
CO2 SERPL-SCNC: 26 MMOL/L (ref 21–32)
CREAT SERPL-MCNC: 0.91 MG/DL (ref 0.6–1.3)
EOSINOPHIL # BLD AUTO: 0.07 THOUSAND/ÂΜL (ref 0–0.61)
EOSINOPHIL NFR BLD AUTO: 1 % (ref 0–6)
ERYTHROCYTE [DISTWIDTH] IN BLOOD BY AUTOMATED COUNT: 14.3 % (ref 11.6–15.1)
GFR SERPL CREATININE-BSD FRML MDRD: 84 ML/MIN/1.73SQ M
GLUCOSE SERPL-MCNC: 97 MG/DL (ref 65–140)
HCT VFR BLD AUTO: 35.1 % (ref 36.5–49.3)
HGB BLD-MCNC: 11.8 G/DL (ref 12–17)
IMM GRANULOCYTES # BLD AUTO: 0.03 THOUSAND/UL (ref 0–0.2)
IMM GRANULOCYTES NFR BLD AUTO: 0 % (ref 0–2)
LYMPHOCYTES # BLD AUTO: 2.3 THOUSANDS/ÂΜL (ref 0.6–4.47)
LYMPHOCYTES NFR BLD AUTO: 24 % (ref 14–44)
MCH RBC QN AUTO: 32.1 PG (ref 26.8–34.3)
MCHC RBC AUTO-ENTMCNC: 33.6 G/DL (ref 31.4–37.4)
MCV RBC AUTO: 95 FL (ref 82–98)
MONOCYTES # BLD AUTO: 0.9 THOUSAND/ÂΜL (ref 0.17–1.22)
MONOCYTES NFR BLD AUTO: 9 % (ref 4–12)
NEUTROPHILS # BLD AUTO: 6.35 THOUSANDS/ÂΜL (ref 1.85–7.62)
NEUTS SEG NFR BLD AUTO: 66 % (ref 43–75)
NRBC BLD AUTO-RTO: 0 /100 WBCS
PLATELET # BLD AUTO: 253 THOUSANDS/UL (ref 149–390)
PMV BLD AUTO: 10 FL (ref 8.9–12.7)
POTASSIUM SERPL-SCNC: 4.3 MMOL/L (ref 3.5–5.3)
RBC # BLD AUTO: 3.68 MILLION/UL (ref 3.88–5.62)
SODIUM SERPL-SCNC: 141 MMOL/L (ref 135–147)
WBC # BLD AUTO: 9.68 THOUSAND/UL (ref 4.31–10.16)

## 2023-06-25 PROCEDURE — 97164 PT RE-EVAL EST PLAN CARE: CPT

## 2023-06-25 PROCEDURE — 99024 POSTOP FOLLOW-UP VISIT: CPT | Performed by: NURSE PRACTITIONER

## 2023-06-25 PROCEDURE — 85025 COMPLETE CBC W/AUTO DIFF WBC: CPT | Performed by: STUDENT IN AN ORGANIZED HEALTH CARE EDUCATION/TRAINING PROGRAM

## 2023-06-25 PROCEDURE — 80048 BASIC METABOLIC PNL TOTAL CA: CPT | Performed by: STUDENT IN AN ORGANIZED HEALTH CARE EDUCATION/TRAINING PROGRAM

## 2023-06-25 PROCEDURE — 99232 SBSQ HOSP IP/OBS MODERATE 35: CPT | Performed by: INTERNAL MEDICINE

## 2023-06-25 PROCEDURE — 97168 OT RE-EVAL EST PLAN CARE: CPT

## 2023-06-25 RX ORDER — OXYCODONE HYDROCHLORIDE 10 MG/1
10 TABLET ORAL EVERY 4 HOURS PRN
Status: DISCONTINUED | OUTPATIENT
Start: 2023-06-25 | End: 2023-07-01 | Stop reason: HOSPADM

## 2023-06-25 RX ORDER — ENOXAPARIN SODIUM 100 MG/ML
40 INJECTION SUBCUTANEOUS DAILY
Status: DISCONTINUED | OUTPATIENT
Start: 2023-06-26 | End: 2023-06-25

## 2023-06-25 RX ORDER — ENOXAPARIN SODIUM 100 MG/ML
40 INJECTION SUBCUTANEOUS DAILY
Status: DISCONTINUED | OUTPATIENT
Start: 2023-06-26 | End: 2023-07-01 | Stop reason: HOSPADM

## 2023-06-25 RX ORDER — OXYCODONE HYDROCHLORIDE 5 MG/1
5 TABLET ORAL EVERY 4 HOURS PRN
Status: DISCONTINUED | OUTPATIENT
Start: 2023-06-25 | End: 2023-07-01 | Stop reason: HOSPADM

## 2023-06-25 RX ADMIN — METOPROLOL TARTRATE 25 MG: 25 TABLET, FILM COATED ORAL at 09:37

## 2023-06-25 RX ADMIN — ACETAMINOPHEN 975 MG: 325 TABLET, FILM COATED ORAL at 22:13

## 2023-06-25 RX ADMIN — OXYCODONE HYDROCHLORIDE 5 MG: 5 TABLET ORAL at 05:51

## 2023-06-25 RX ADMIN — RANOLAZINE 500 MG: 500 TABLET, FILM COATED, EXTENDED RELEASE ORAL at 09:37

## 2023-06-25 RX ADMIN — FAMOTIDINE 20 MG: 20 TABLET, FILM COATED ORAL at 17:30

## 2023-06-25 RX ADMIN — ENOXAPARIN SODIUM 40 MG: 40 INJECTION SUBCUTANEOUS at 09:38

## 2023-06-25 RX ADMIN — ATORVASTATIN CALCIUM 80 MG: 80 TABLET, FILM COATED ORAL at 17:30

## 2023-06-25 RX ADMIN — NICOTINE 1 PATCH: 21 PATCH, EXTENDED RELEASE TRANSDERMAL at 09:38

## 2023-06-25 RX ADMIN — ACETAMINOPHEN 975 MG: 325 TABLET, FILM COATED ORAL at 14:27

## 2023-06-25 RX ADMIN — FAMOTIDINE 20 MG: 20 TABLET, FILM COATED ORAL at 00:30

## 2023-06-25 RX ADMIN — RANOLAZINE 500 MG: 500 TABLET, FILM COATED, EXTENDED RELEASE ORAL at 22:13

## 2023-06-25 RX ADMIN — SODIUM CHLORIDE 125 ML/HR: 0.9 INJECTION, SOLUTION INTRAVENOUS at 07:56

## 2023-06-25 RX ADMIN — ACETAMINOPHEN 975 MG: 325 TABLET, FILM COATED ORAL at 05:51

## 2023-06-25 RX ADMIN — FINASTERIDE 5 MG: 5 TABLET, FILM COATED ORAL at 09:37

## 2023-06-25 RX ADMIN — OXYCODONE HYDROCHLORIDE 5 MG: 5 TABLET ORAL at 10:47

## 2023-06-25 RX ADMIN — SERTRALINE HYDROCHLORIDE 100 MG: 100 TABLET ORAL at 22:13

## 2023-06-25 RX ADMIN — SODIUM CHLORIDE 125 ML/HR: 0.9 INJECTION, SOLUTION INTRAVENOUS at 00:30

## 2023-06-25 NOTE — ASSESSMENT & PLAN NOTE
POD 2 bilateral L3-4 and L4-5 posterior lumbar decompression with Dr Almendarez (6/23)  · Presented with worsening lower extremity weakness and ambulatory dysfunction  · Known to our service  Scheduled for decompression with Dr Almendarez in 10/2022; cancelled due to social situation  · Hx of severe lumbar spinal stenosis with neurogenic claudication  · Worsening since 6/19 when stood up from changing a tire  · Presented as stroke alert so loaded with ASA  · On exam, bilateral LE weakness 4/5  Decreased bilateral patellar reflexes  Some subjective diminished sensation  Also c/o some dizziness  Imaging:  · MRI lumbar spine wo, 6/19/2023: Spondylotic degenerative changes again noted similar to the prior study most significantly at L3-4 and L4-5 where severe central stenosis is identified  Moderate to severe lateral recess stenosis also identified as described  Moderate central stenosis and mild bilateral foraminal narrowing noted at L2-3 with mild foraminal narrowing at L5-S1 bilaterally  Plan:  · Continue to monitor neuro exam closely  · 1 hemovac drain to full suction - 175 mL/24 hours  Will maintain  AM Lovenox held in anticipation of this  · Neurology originally primary, now under medicine service  · TTE - LVEF 50%, similar to 2015  · MRI brain without evidence of acute ischemia  CTA, CTH negative  · Hold ASA x 2 weeks  · Mobilize with PT/OT  · DVT ppx: SCD's, Heparin SQ   · Disposition: per PT/OT will need rehab  PMR on board  Neurosurgery will continue to follow  Please call with questions

## 2023-06-25 NOTE — PLAN OF CARE
Problem: OCCUPATIONAL THERAPY ADULT  Goal: Performs self-care activities at highest level of function for planned discharge setting  See evaluation for individualized goals  Description: Treatment Interventions: ADL retraining, Functional transfer training, Endurance training, Patient/family training, Equipment evaluation/education, Compensatory technique education, Energy conservation, Activityengagement  Equipment Recommended:  (TBD)       See flowsheet documentation for full assessment, interventions and recommendations  Note: Limitation: Decreased ADL status, Decreased endurance, Decreased high-level ADLs, Decreased self-care trans  Prognosis: Good  Assessment: Pt seen this date for OT re-eval, 2' undergoing L3-4 and L4-5 posterior lumbar decompression 6/23/23  Pt w active OT eval and treat orders  Please see IE for PLOF and home set-up  Currently, pt is Min Ax1 for UB ADL, Mod Ax1 for LB ADL, and completed transfers/FM w Mod Ax1  Pt is limited at this time 2* decreased endurance/activtiy tolerance, decreased cognition, decreased ADL/High-level ADL status, decreased self-care trans, decreased safety awareness, limited home support and is a fall risk  This impacts pt's ability to complete UB and LB dressing and bathing, toileting, transfers, functional mobility, community mobility, home and health maintenance, and safe engagement in typical daily routine  The patient's raw score on the AM-PAC Daily Activity Inpatient Short Form is 18  A raw score of less than 19 suggests the patient may benefit from discharge to post-acute rehabilitation services  Please refer to the recommendation of the Occupational Therapist for safe discharge planning  From OT standpoint, pt should D/C to STR when medically stable  Pt will benefit from continued acute OT services 2-3 x/wk for 10-14 days to meet goals       OT Discharge Recommendation: Post acute rehabilitation services

## 2023-06-25 NOTE — PLAN OF CARE
Problem: MOBILITY - ADULT  Goal: Maintain or return to baseline ADL function  Description: INTERVENTIONS:  -  Assess patient's ability to carry out ADLs; assess patient's baseline for ADL function and identify physical deficits which impact ability to perform ADLs (bathing, care of mouth/teeth, toileting, grooming, dressing, etc )  - Assess/evaluate cause of self-care deficits   - Assess range of motion  - Assess patient's mobility; develop plan if impaired  - Assess patient's need for assistive devices and provide as appropriate  - Encourage maximum independence but intervene and supervise when necessary  - Involve family in performance of ADLs  - Assess for home care needs following discharge   - Consider OT consult to assist with ADL evaluation and planning for discharge  - Provide patient education as appropriate  Outcome: Progressing  Goal: Maintains/Returns to pre admission functional level  Description: INTERVENTIONS:  - Perform BMAT or MOVE assessment daily    - Set and communicate daily mobility goal to care team and patient/family/caregiver  - Collaborate with rehabilitation services on mobility goals if consulted  - Perform Range of Motion 2 times a day  - Reposition patient every 2 hours    - Dangle patient 2 times a day  - Stand patient 2 times a day  - Ambulate patient 2 times a day  - Out of bed to chair 2 times a day   - Out of bed for meals 2 times a day  - Out of bed for toileting  - Record patient progress and toleration of activity level   Outcome: Progressing     Problem: NEUROSENSORY - ADULT  Goal: Achieves stable or improved neurological status  Description: INTERVENTIONS  - Monitor and report changes in neurological status  - Monitor vital signs such as temperature, blood pressure, glucose, and any other labs ordered   - Initiate measures to prevent increased intracranial pressure  - Monitor for seizure activity and implement precautions if appropriate      Outcome: Progressing  Goal: Remains free of injury related to seizures activity  Description: INTERVENTIONS  - Maintain airway, patient safety  and administer oxygen as ordered  - Monitor patient for seizure activity, document and report duration and description of seizure to physician/advanced practitioner  - If seizure occurs,  ensure patient safety during seizure  - Reorient patient post seizure  - Seizure pads on all 4 side rails  - Instruct patient/family to notify RN of any seizure activity including if an aura is experienced  - Instruct patient/family to call for assistance with activity based on nursing assessment  - Administer anti-seizure medications if ordered    Outcome: Progressing  Goal: Achieves maximal functionality and self care  Description: INTERVENTIONS  - Monitor swallowing and airway patency with patient fatigue and changes in neurological status  - Encourage and assist patient to increase activity and self care     - Encourage visually impaired, hearing impaired and aphasic patients to use assistive/communication devices  Outcome: Progressing     Problem: MUSCULOSKELETAL - ADULT  Goal: Maintain or return mobility to safest level of function  Description: INTERVENTIONS:  - Assess patient's ability to carry out ADLs; assess patient's baseline for ADL function and identify physical deficits which impact ability to perform ADLs (bathing, care of mouth/teeth, toileting, grooming, dressing, etc )  - Assess/evaluate cause of self-care deficits   - Assess range of motion  - Assess patient's mobility  - Assess patient's need for assistive devices and provide as appropriate  - Encourage maximum independence but intervene and supervise when necessary  - Involve family in performance of ADLs  - Assess for home care needs following discharge   - Consider OT consult to assist with ADL evaluation and planning for discharge  - Provide patient education as appropriate  Outcome: Progressing  Goal: Maintain proper alignment of affected body part  Description: INTERVENTIONS:  - Support, maintain and protect limb and body alignment  - Provide patient/ family with appropriate education  Outcome: Progressing     Problem: PAIN - ADULT  Goal: Verbalizes/displays adequate comfort level or baseline comfort level  Description: Interventions:  - Encourage patient to monitor pain and request assistance  - Assess pain using appropriate pain scale  - Administer analgesics based on type and severity of pain and evaluate response  - Implement non-pharmacological measures as appropriate and evaluate response  - Consider cultural and social influences on pain and pain management  - Notify physician/advanced practitioner if interventions unsuccessful or patient reports new pain  Outcome: Progressing     Problem: INFECTION - ADULT  Goal: Absence or prevention of progression during hospitalization  Description: INTERVENTIONS:  - Assess and monitor for signs and symptoms of infection  - Monitor lab/diagnostic results  - Monitor all insertion sites, i e  indwelling lines, tubes, and drains  - Monitor endotracheal if appropriate and nasal secretions for changes in amount and color  - [place] appropriate cooling/warming therapies per order  - Administer medications as ordered  - Instruct and encourage patient and family to use good hand hygiene technique  - Identify and instruct in appropriate isolation precautions for identified infection/condition  Outcome: Progressing  Goal: Absence of fever/infection during neutropenic period  Description: INTERVENTIONS:  - Monitor WBC    Outcome: Progressing     Problem: SAFETY ADULT  Goal: Maintain or return to baseline ADL function  Description: INTERVENTIONS:  -  Assess patient's ability to carry out ADLs; assess patient's baseline for ADL function and identify physical deficits which impact ability to perform ADLs (bathing, care of mouth/teeth, toileting, grooming, dressing, etc )  - Assess/evaluate cause of self-care deficits   - Assess range of motion  - Assess patient's mobility; develop plan if impaired  - Assess patient's need for assistive devices and provide as appropriate  - Encourage maximum independence but intervene and supervise when necessary  - Involve family in performance of ADLs  - Assess for home care needs following discharge   - Consider OT consult to assist with ADL evaluation and planning for discharge  - Provide patient education as appropriate  Outcome: Progressing  Goal: Maintains/Returns to pre admission functional level  Description: INTERVENTIONS:  - Perform BMAT or MOVE assessment daily    - Set and communicate daily mobility goal to care team and patient/family/caregiver  - Collaborate with rehabilitation services on mobility goals if consulted  - Perform Range of Motion 2 times a day  - Reposition patient every 2 hours    - Dangle patient 2 times a day  - Stand patient 2 times a day  - Ambulate patient 2 times a day  - Out of bed to chair 2 times a day   - Out of bed for meals 2 times a day  - Out of bed for toileting  - Record patient progress and toleration of activity level   Outcome: Progressing  Goal: Patient will remain free of falls  Description: INTERVENTIONS:  - Educate patient/family on patient safety including physical limitations  - Instruct patient to call for assistance with activity   - Consult OT/PT to assist with strengthening/mobility   - Keep Call bell within reach  - Keep bed low and locked with side rails adjusted as appropriate  - Keep care items and personal belongings within reach  - Initiate and maintain comfort rounds  - Make Fall Risk Sign visible to staff  - Offer Toileting every 2 Hours, in advance of need  - Apply yellow socks and bracelet for high fall risk patients  - Consider moving patient to room near nurses station  Outcome: Progressing     Problem: DISCHARGE PLANNING  Goal: Discharge to home or other facility with appropriate resources  Description: INTERVENTIONS:  - Identify barriers to discharge w/patient and caregiver  - Arrange for needed discharge resources and transportation as appropriate  - Identify discharge learning needs (meds, wound care, etc )  - Arrange for interpretive services to assist at discharge as needed  - Refer to Case Management Department for coordinating discharge planning if the patient needs post-hospital services based on physician/advanced practitioner order or complex needs related to functional status, cognitive ability, or social support system  Outcome: Progressing     Problem: Knowledge Deficit  Goal: Patient/family/caregiver demonstrates understanding of disease process, treatment plan, medications, and discharge instructions  Description: Complete learning assessment and assess knowledge base  Interventions:  - Provide teaching at level of understanding  - Provide teaching via preferred learning methods  Outcome: Progressing     Problem: Neurological Deficit  Goal: Neurological status is stable or improving  Description: Interventions:  - Monitor and assess patient's level of consciousness, motor function, sensory function, and level of assistance needed for ADLs  - Monitor and report changes from baseline  Collaborate with interdisciplinary team to initiate plan and implement interventions as ordered  - Provide and maintain a safe environment  - Consider seizure precautions  - Consider fall precautions  - Consider aspiration precautions  - Consider bleeding precautions  Outcome: Progressing     Problem: Activity Intolerance/Impaired Mobility  Goal: Mobility/activity is maintained at optimum level for patient  Description: Interventions:  - Assess and monitor patient  barriers to mobility and need for assistive/adaptive devices  - Assess patient's emotional response to limitations  - Collaborate with interdisciplinary team and initiate plans and interventions as ordered    - Encourage independent activity per ability   - Maintain proper body alignment  - Perform active/passive rom as tolerated/ordered  - Plan activities to conserve energy   - Turn patient as appropriate  Outcome: Progressing     Problem: Communication Impairment  Goal: Ability to express needs and understand communication  Description: Assess patient's communication skills and ability to understand information  Patient will demonstrate use of effective communication techniques, alternative methods of communication and understanding even if not able to speak  - Encourage communication and provide alternate methods of communication as needed  - Collaborate with case management/ for discharge needs  - Include patient/family/caregiver in decisions related to communication  Outcome: Progressing     Problem: Potential for Aspiration  Goal: Non-ventilated patient's risk of aspiration is minimized  Description: Assess and monitor vital signs, respiratory status, and labs (WBC)  Monitor for signs of aspiration (tachypnea, cough, rales, wheezing, cyanosis, fever)  - Assess and monitor patient's ability to swallow  - Place patient up in chair to eat if possible  - HOB up at 90 degrees to eat if unable to get patient up into chair   - Supervise patient during oral intake  - Instruct patient/ family to take small bites  - Instruct patient/ family to take small single sips when taking liquids  - Follow patient-specific strategies generated by speech pathologist   Outcome: Progressing  Goal: Ventilated patient's risk of aspiration is minimized  Description: Assess and monitor vital signs, respiratory status, airway cuff pressure, and labs (WBC)  Monitor for signs of aspiration (tachypnea, cough, rales, wheezing, cyanosis, fever)  - Elevate head of bed 30 degrees if patient has tube feeding   - Monitor tube feeding    Outcome: Progressing

## 2023-06-25 NOTE — PHYSICAL THERAPY NOTE
Physical Therapy Evaluation     Patient's Name: Amanda Bunn    Admitting Diagnosis  Dizziness [R42]  Heat exhaustion Dustin Porsha  5XXA]  Osteoarthritis [M19 90]  Schizophrenia ([address] ) [F20 9]  HTN (hypertension) [I10]  CAD (coronary artery disease) [I25 10]  HLD (hyperlipidemia) [E78 5]  Pre-diabetes [R73 03]  S/P CABG x 4 [Z95 1]  BPH (benign prostatic hyperplasia) [N40 0]  Spinal stenosis of lumbar region with neurogenic claudication [M48 062]  Tobacco use [Z72 0]  Stroke-like symptom [R29 90]  Weakness of both lower extremities [R29 898]    Problem List  Patient Active Problem List   Diagnosis    Chest pain    CAD (coronary artery disease)    S/P CABG x 4    Pre-diabetes    HLD (hyperlipidemia)    HTN (hypertension)    Tobacco use    Hx pulmonary embolism    BPH (benign prostatic hyperplasia)    Subclinical hypothyroidism    Schizophrenia (HCC)    Sessile colonic polyp    Osteoarthritis    Lumbar radiculopathy    Lumbar spondylosis    DDD (degenerative disc disease), lumbar    Low back pain with sciatica    Closed compression fracture of first lumbar vertebra (HCC)    Olecranon bursitis of right elbow    Chronic pain syndrome    Spinal stenosis of lumbar region with neurogenic claudication       Past Medical History  Past Medical History:   Diagnosis Date    BPH (benign prostatic hyperplasia)     Coronary artery disease     HTN (hypertension)     Hx pulmonary embolism     Hyperlipidemia     Hypertension     Prediabetes     Tobacco use        Past Surgical History  Past Surgical History:   Procedure Laterality Date    CORONARY ARTERY BYPASS GRAFT      ND COLONOSCOPY FLX DX W/COLLJ SPEC WHEN PFRMD N/A 4/6/2017    Procedure: COLONOSCOPY;  Surgeon: Miki Kincaid MD;  Location: [facility]; Service: Gastroenterology            06/25/23 1014   PT Last Visit   PT Visit Date 06/25/23   Note Type   Note type Re-Evaluation   End of Consult   Patient Position at End of Consult Bed/Chair alarm activated; All needs within "reach; Bedside chair   Pain Assessment   Pain Assessment Tool 0-10   Pain Score 5   Pain Location/Orientation Location: Back   Patient's Stated Pain Goal No pain   Hospital Pain Intervention(s) Repositioned; Ambulation/increased activity   Restrictions/Precautions   Weight Bearing Precautions Per Order No   Other Precautions Chair Alarm; Bed Alarm;Multiple lines; Fall Risk;Spinal precautions  (Hemovac Lumbar area)   Home Living   Type of Home Apartment   Home Layout Two level;Stairs to enter with rails  (3STE, pt lives on 2nd floor , SO lives on first floor)   Home Equipment Cane;Walker   Additional Comments see IE   Prior Function   Level of [place] Independent with ADLs; Independent with functional mobility   Lives With Alone   Comments see IE   Cognition   Overall Cognitive Status WFL   Arousal/Participation Alert   Attention Within functional limits   Orientation Level Oriented X4   Following Commands Follows one step commands without difficulty   Comments Pt cooperative during session   Subjective   Subjective Pt states \" legs feel heavy\"   RLE Assessment   RLE Assessment   (grossly 3-3+/5)   LLE Assessment   LLE Assessment   (grossly 3-3+/5)   Light Touch   RLE Light Touch Grossly intact   LLE Light Touch Grossly intact   Bed Mobility   Additional Comments Pt noted to be sittign EOB upon arrival    Transfers   Sit to Stand 4  Minimal assistance   Additional items Assist x 1; Increased time required;Verbal cues   Stand to Sit 4  Minimal assistance   Additional items Assist x 1; Increased time required;Verbal cues   Additional Comments completes STS with RW   Ambulation/Elevation   Gait pattern Improper Weight shift;Retropulsion; Short stride; Step to   Gait Assistance 4  Minimal assist   Additional items Assist x 1;Verbal cues   Assistive Device Rolling walker   Distance 40 ft   Stair Management Assistance Not tested   Ambulation/Elevation Additional Comments Pt ambulates with assist x 1, limited distance 2* " unsteady gait + retropulsive   Balance   Static Sitting Fair +   Dynamic Sitting Fair   Static Standing Fair -   Dynamic Standing Poor +   Ambulatory Poor   Endurance Deficit   Endurance Deficit Yes   Activity Tolerance   Activity Tolerance Patient limited by fatigue   Medical Staff Made Aware SUSIE Lisbeth Hull  Ok to mobilize per [employer]   Nurse Made Aware RN cleared pt for therapy   Assessment   Prognosis Fair   Problem List Decreased strength;Decreased endurance;Decreased mobility;Pain   Assessment Pt is a 71 y o  male seen for PT evaluation s/p admit [facility] on 6/19/[facility]23  Pt was admitted with a primary dx of Spinal stenosis of lumbar area with neurogenic claudication now s/p  L3-4 and L4-5 decompression on 6/23  Pt's PMH significant for CABG, HTN, PE    Pt seen by PT for a Re-evaluation post surgical intervention  Upon evaluation, pt currently is requiring Assist x 1 for transfers and ambulation, used RW for mobility, but noted to be unsteady with gait  Pt presents at PT eval functioning below baseline and currently w/ overall mobility deficits 2* to: BLE weakness, impaired balance, decreased endurance, gait deviations, pain, decreased functional mobility tolerance compared to baseline, fall risk, spinal precautions  Pt currently at a fall risk 2* to impairments listed above  Pt will continue to benefit from skilled acute PT interventions to address stated impairments; to maximize functional mobility; for ongoing pt training; and DME needs  At conclusion of PT session chair alarm engaged, all needs in reach, RN notified of session findings/recommendations and pt returned back in recliner chair with phone and call bell within reach  The patient's AM-PAC Basic Mobility Inpatient Short Form Raw Score is 16  A Raw score of less than or equal to 16 suggests the patient may benefit from discharge to post-acute rehabilitation services   Please also refer to the recommendation of the Physical Therapist for safe discharge planning  Recommend Rehab upon hospital D/C  Barriers to Discharge Inaccessible home environment;Decreased caregiver support   Goals   Patient Goals get better   STG Expiration Date 07/09/23   Short Term Goal #1 STG 1  Pt will be able to perform functional transfer with Mod Ind in order to improve overall functional mobility and assist in safe d/c  STG 2  Pt will be able to ambulate at least 200 feet with least restrictive device with Mod Ind A in order to improve overall functional mobility and assist in safe d/c  STG 3  Pt will improve sitting/standing static/dynamic balance 1/2 grade in order to improve functional mobility and assist in safe d/c  STG 4  Pt will improve LE strength by 1/2 grade in order to improve functional mobility and assist in safe d/c  STG 7  Pt will be able to negotiate at least 12 stairs with least restrictive device with Supervision A in order to improve overall functional mobility and assist in safe d/c    PT Treatment Day 0   Plan   Treatment/Interventions Functional transfer training; Therapeutic exercise;Gait training;Spoke to nursing;OT   PT Frequency 3-5x/wk   Recommendation   PT Discharge Recommendation (S)  Post acute rehabilitation services   Equipment Recommended Walker   AM-PAC Basic Mobility Inpatient   Turning in Flat Bed Without Bedrails 3   Lying on Back to Sitting on Edge of Flat Bed Without Bedrails 3   Moving Bed to Chair 3   Standing Up From Chair Using Arms 3   Walk in Room 3   Climb 3-5 Stairs With Railing 1   Basic Mobility Inpatient Raw Score 16   Basic Mobility Standardized Score 38 32   Highest Level Of Mobility   JH-HLM Goal 5: Stand one or more mins   JH-HLM Achieved 7: Walk 25 feet or more   Modified [place] Scale   Modified [place] Scale 4       Gurdeep Jhaveri, PT DPT

## 2023-06-25 NOTE — OCCUPATIONAL THERAPY NOTE
"    Occupational Therapy Re-Evaluation     Patient Name: Yareli Cunha  PHDXD'O Date: 6/25/2023  Problem List  Principal Problem:    Spinal stenosis of lumbar region with neurogenic claudication  Active Problems:    CAD (coronary artery disease)    Pre-diabetes    HLD (hyperlipidemia)    HTN (hypertension)    Tobacco use    BPH (benign prostatic hyperplasia)    NARDA (acute kidney injury) ([facility] [address] )    Schizophrenia ([facility] )    Past Medical History  Past Medical History:   Diagnosis Date    BPH (benign prostatic hyperplasia)     Coronary artery disease     HTN (hypertension)     Hx pulmonary embolism     Hyperlipidemia     Hypertension     Prediabetes     Tobacco use      Past Surgical History  Past Surgical History:   Procedure Laterality Date    CORONARY ARTERY BYPASS GRAFT      [place] COLONOSCOPY FLX DX W/COLLJ SPEC WHEN PFRMD N/A 4/6/2017    Procedure: COLONOSCOPY;  Surgeon: Corrina Pavon MD;  Location: [facility]; Service: Gastroenterology        06/25/23 1012   OT Last Visit   OT Visit Date 06/25/23   Note Type   Note type Re-Evaluation   Pain Assessment   Pain Assessment Tool 0-10   Pain Score 5   Pain Location/Orientation Location: Back   Patient's Stated Pain Goal No pain   Hospital Pain Intervention(s) Repositioned; Ambulation/increased activity; Emotional support   Restrictions/Precautions   Weight Bearing Precautions Per Order No   Other Precautions Chair Alarm; Bed Alarm; Fall Risk  (lumbar drain)   Home Living   Additional Comments see IE   Prior Function   Comments see IE   Lifestyle   Autonomy I with ADL's/assistance with IaDL's, +SPC occassionally, SO can assist as needed  Reciprocal Relationships SO, neighbor, dtr in [employer]   Service to Others  -part time employement   Subjective   Subjective \"I have been walking\"- translation provided     ADL   Where Assessed Edge of bed   Eating Assistance 4  Minimal Assistance   Grooming Assistance 3  Moderate Assistance   UB Bathing Assistance 4  Minimal " Assistance   LB Bathing Assistance 3  Moderate Assistance   UB Dressing Assistance 4  Minimal Assistance   LB Dressing Assistance 3  Moderate Assistance   Toileting Assistance  3  Moderate Assistance   Functional Assistance 3  Moderate Assistance   Bed Mobility   Additional Comments found sitting EOB, left in chair w all needs in reach and alarm on   Transfers   Sit to Stand 4  Minimal assistance   Additional items Assist x 1; Increased time required;Verbal cues   Stand to Sit 4  Minimal assistance   Additional items Assist x 1; Increased time required;Verbal cues   Additional Comments c rw   Functional Mobility   Functional Mobility 3  Moderate assistance   Additional Comments (S)  ax1, short distance  with inc distance, he became in weak and slightly retropulsive  Additional items Rolling walker   Balance   Static Sitting Fair +   Dynamic Sitting Fair   Static Standing Fair -   Dynamic Standing Poor +   Ambulatory Poor   Activity Tolerance   Activity Tolerance Patient limited by fatigue   Medical Staff Made Aware DPT Janette, ok to mobilize per neurosx   Nurse Made Aware ok per RN   RUE Assessment   RUE Assessment WFL   LUE Assessment   LUE Assessment WFL   Hand Function   Gross Motor Coordination Functional   Fine Motor Coordination Functional   Psychosocial   Psychosocial (WDL) WDL   Cognition   Overall Cognitive Status WFL   Arousal/Participation Alert; Responsive; Cooperative   Attention Within functional limits   Orientation Level Oriented X4   Memory Within functional limits   Following Commands Follows all commands and directions without difficulty   Comments pleasant and cooperative, overall G safety awareness and insight to condition  primarily [address] speaking, I provided translation  Assessment   Limitation Decreased ADL status; Decreased endurance;Decreased high-level ADLs; Decreased self-care trans   Prognosis Good   Assessment Pt seen this date for OT re-eval, 2' undergoing L3-4 and L4-5 posterior lumbar decompression 6/23/23  Pt w active OT eval and treat orders  Please see IE for PLOF and home set-up  Currently, pt is Min Ax1 for UB ADL, Mod Ax1 for LB ADL, and completed transfers/FM w Mod Ax1  Pt is limited at this time 2* decreased endurance/activtiy tolerance, decreased cognition, decreased ADL/High-level ADL status, decreased self-care trans, decreased safety awareness, limited home support and is a fall risk  This impacts pt's ability to complete UB and LB dressing and bathing, toileting, transfers, functional mobility, community mobility, home and health maintenance, and safe engagement in typical daily routine  The patient's raw score on the AM-PAC Daily Activity Inpatient Short Form is 18  A raw score of less than 19 suggests the patient may benefit from discharge to post-acute rehabilitation services  Please refer to the recommendation of the Occupational Therapist for safe discharge planning  From OT standpoint, pt should D/C to STR when medically stable  Pt will benefit from continued acute OT services 2-3 x/wk for 10-14 days to meet goals  Goals   Patient Goals get better   LTG Time Frame 10-14   Long Term Goal #1 see below   Plan   Treatment Interventions ADL retraining;Functional transfer training; Endurance training;Patient/family training;Equipment evaluation/education; Compensatory technique education; Energy conservation; Activityengagement   Goal Expiration Date 07/09/23   OT Frequency 2-3x/wk   Recommendation   OT Discharge Recommendation Post acute rehabilitation services   [facility] Daily Activity Inpatient   Lower Body Dressing 2   Bathing 2   Toileting 3   Upper Body Dressing 3   Grooming 4   Eating 4   Daily Activity Raw Score 18   Daily Activity Standardized Score (Calc for Raw Score >=11) 38 66   AM-PAC Applied Cognition Inpatient   Following a Speech/Presentation 4   Understanding Ordinary Conversation 4   Taking Medications 4   Remembering Where Things Are Placed or Put Away 4   Remembering List of 4-5 Errands 4   Taking Care of Complicated Tasks 4   Applied Cognition Raw Score 24   Applied Cognition Standardized Score 62 21   Modified Gonzales Scale   Modified Iliff Scale 4   End of Consult   Education Provided Yes   Patient Position at End of Consult Bedside chair; All needs within reach;Bed/Chair alarm activated   Nurse Communication Nurse aware of consult     Pt will complete functional mobility with Mod I using appropriate DME as needed  Pt will complete UB dressing and bathing with Mod I using appropriate DME as needed  Pt will complete LB dressing and bathing with Mod I using appropriate DME as needed  Pt will complete transfers with Mod I using appropriate DME as needed  Pt will complete toileting with Mod I using appropriate DME as needed  Pt will complete home maintenance task with Mod I using appropriate DME as needed  Pt will utilize energy conservation techniques throughout functional activity/ADL s/p skilled education  Pt will demonstrate increased safety awareness during functional tasks/ADL's s/p skilled education  Pt will increase activity tolerance to 30 minutes in order to complete ADL's/ functional tasks, using appropriate DME as needed  Pt will engage in ongoing cog assessment w/ G participation to assist with safe d/c planning       Tora Councilman, MOT, OTR/L

## 2023-06-25 NOTE — OCCUPATIONAL THERAPY NOTE
Occupational Therapy Treatment Note:      06/25/23 0815   OT Last Visit   OT Visit Date 06/25/23   Note Type   Cancel Reasons Other  (pt was in or for spinal decompression   will need re eval when appropriate )     April A Storm

## 2023-06-25 NOTE — PLAN OF CARE
Problem: PHYSICAL THERAPY ADULT  Goal: Performs mobility at highest level of function for planned discharge setting  See evaluation for individualized goals  Description: Treatment/Interventions: Functional transfer training, LE strengthening/ROM, Elevations, Therapeutic exercise, Endurance training, Patient/family training, Equipment eval/education, Bed mobility, Gait training, Spoke to nursing, Spoke to case management, OT  Equipment Recommended: Yumiko Gee       See flowsheet documentation for full assessment, interventions and recommendations  Outcome: Progressing  Note: Prognosis: Fair  Problem List: Decreased strength, Decreased endurance, Decreased mobility, Pain  Assessment: Pt is a 71 y o  male seen for PT evaluation s/p admit [facility] on 6/19/2023  Pt was admitted with a primary dx of Spinal stenosis of lumbar area with neurogenic claudication now s/p  L3-4 and L4-5 decompression on 6/23  Pt's PMH significant for CABG, HTN, PE  Pt seen by PT for a Re-evaluation post surgical intervention  Upon evaluation, pt currently is requiring Assist x 1 for transfers and ambulation, used RW for mobility, but noted to be unsteady with gait  Pt presents at PT eval functioning below baseline and currently w/ overall mobility deficits 2* to: BLE weakness, impaired balance, decreased endurance, gait deviations, pain, decreased functional mobility tolerance compared to baseline, fall risk, spinal precautions  Pt currently at a fall risk 2* to impairments listed above  Pt will continue to benefit from skilled acute PT interventions to address stated impairments; to maximize functional mobility; for ongoing pt training; and DME needs  At conclusion of PT session chair alarm engaged, all needs in reach, RN notified of session findings/recommendations and pt returned back in recliner chair with phone and call bell within reach  The patient's AM-PAC Basic Mobility Inpatient Short Form Raw Score is 16   A Raw score of less than or equal to 16 suggests the patient may benefit from discharge to post-acute rehabilitation services  Please also refer to the recommendation of the Physical Therapist for safe discharge planning  Recommend Rehab upon hospital D/C  Barriers to Discharge: Inaccessible home environment, Decreased caregiver support     PT Discharge Recommendation: (S) Post acute rehabilitation services    See flowsheet documentation for full assessment

## 2023-06-25 NOTE — CASE MANAGEMENT
Case Management Assessment & Discharge Planning Note    Patient name Alejandra Rodriguez  Location [address] [place] [address] 708/PPHP [id number] MRN [id number]  : 1950 Date 2023       Current Admission Date: 2023  Current Admission Diagnosis:Spinal stenosis of lumbar region with neurogenic claudication   Patient Active Problem List    Diagnosis Date Noted   • Spinal stenosis of lumbar region with neurogenic claudication 2022   • Chronic pain syndrome 2022   • Lumbar radiculopathy 2022   • Lumbar spondylosis 2022   • DDD (degenerative disc disease), lumbar 2022   • Low back pain with sciatica 2022   • Closed compression fracture of first lumbar vertebra ([facility] [address] ) 2022   • Olecranon bursitis of right elbow 2022   • Sessile colonic polyp 04/10/2017   • Subclinical hypothyroidism 2017   • Chest pain 2016   • CAD (coronary artery disease) 2016   • S/P CABG x 4 2016   • Pre-diabetes 2016   • HLD (hyperlipidemia) 2016   • HTN (hypertension) 2016   • Tobacco use 2016   • Hx pulmonary embolism 2016   • BPH (benign prostatic hyperplasia) 2016   • Osteoarthritis 2013   • Schizophrenia ([address] ) 2013      LOS (days): 6  Geometric Mean LOS (GMLOS) (days): 2 80  Days to GMLOS:-3 2     OBJECTIVE:    Risk of Unplanned Readmission Score: 16 58         Current admission status: Inpatient       Preferred Pharmacy:   [address] [facility] [id number], [facility], [address] [place] [address] [address] [address]  Phone: [phone] Fax: [address], [facility] [address] [address]  Phone: [phone] Fax: [phone]    Primary Care Provider: SINGH Zimmerman    Primary Insurance: [facility]  Secondary Insurance:     ASSESSMENT:  [address] Proxies    There are no active Health Care Proxies on file  DISCHARGE DETAILS:        Additional Comments: CM reviewed EMR and is aware the pt is POD 2 bilateral L3-4 and L4-5 posterior lumbar decompression  PT/OT re-evaluated the pt and continue to recommend STR  PMR will be re-consulted as initial eval was performed 6/20 prior to nsg intervention  Once cleared by nsg and SOD, it is anticipated the pt will transition to either [facility] vs [facility]  Insurance authorization from [facility] is required to facilitate IRF admission  CM will continue to follow

## 2023-06-25 NOTE — PROGRESS NOTES
INTERNAL MEDICINE RESIDENCY PROGRESS NOTE     Name: Ricky Rowland   Age & Sex: 71 y o  male   MRN: [id number]  [facility]   Encounter: [id number]  Team: SOD Team B     PATIENT INFORMATION     Name: Ricky Rowland   Age & Sex: 71 y o  male   MRN: [id number]  Hospital Stay Days: 6    ASSESSMENT/PLAN     Principal Problem:    Spinal stenosis of lumbar region with neurogenic claudication  Active Problems:    CAD (coronary artery disease)    Pre-diabetes    HLD (hyperlipidemia)    HTN (hypertension)    Tobacco use    BPH (benign prostatic hyperplasia)    Schizophrenia ([facility] [address] )      * Spinal stenosis of lumbar region with neurogenic claudication  Assessment & Plan  Ricky Rowland is a 71 y o  male  with pertinent history lumbar spondylosis, neurogenic claudication, CAD s/p CABG x 4, HTN, HLD, hx of PE (Not on thinners) who presented as stroke alert on 6/19/23 given acute onset worsening bilateral lower extremity weakness  Denies any urinary symptoms or saddle anesthesia  Initial NIHSS of 5 and LKW 1200  Initial BP on arrival was Blood Pressure: 110/53  As a result of lower suspicion for stroke and potential need for urgent surgery patient was determined to not be a candidate for tPA  • F/u exam:  HF strength 4-/5 R, 4/5 L  No ataxia or dysmetria noted on exam  Proprioception normal  Difficulty standing from a seated position without assistance  Romberg positive  • BP over last 24 hours: Blood Pressure: 136/79  current BP: Blood Pressure: 136/79     Imaging:  • CTH: No acute intracranial abnormality  • CTA: No significant stenosis of the cervical carotid or vertebral arteries  No high-grade intracranial stenosis, large vessel occlusion or aneurysm  • MRI brain: No MR evidence of acute ischemia  Small right mastoid effusion  • MRI L spine: Spondylotic degenerative changes again noted similar to the prior study most significantly at L3-4 and L4-5 where severe central stenosis is identified   Moderate to severe lateral recess stenosis also identified as described  Moderate central stenosis and mild bilateral foraminal narrowing noted at L2-3 with mild foraminal narrowing at L5-S1 bilaterally  • Echocardiogram: EF 50% with mild LA dilation   • Telemetry: monitor  • LDL 71   • HBA1C 5 8     Impression: At this time history and physical along with available imaging concerning for worsening weakness in the setting of known spinal stenosis  One month ago patient strength 5/5 throughout vs now significantly week in bilateral HF        Plan:  • S/p L3-4 and L4-5 decompression with Dr Karlos Gyoal 6/23  • Lumbar drain in place - play to remove tmmrw by NSY   • Start DVT ppx today  • Family able to help with recovery  • PMR on board, plan for acute post surgical rehab   • Appreciate neurosurgery eval and recommendations   • Neuro checks  • PT/OT//PMR consults appreciated   • Follow up with neurology as outpatient in 3 months    Schizophrenia [facility])  Assessment & Plan  Patient's home medication list includes Abilify, Atarax, Risperdal, and Zoloft  Reviewed medications with patient and he reports of these, he only takes Zoloft  · Continue Zoloft 100 mg qd  · Avoid gabapentin - which is on patient's home medication list; he says this causes him to have suicidal thoughts    BPH (benign prostatic hyperplasia)  Assessment & Plan  Continue home finasteride 5 mg qd    Tobacco use  Assessment & Plan  1 PPD smoker  · Continue nicotine patch    HTN (hypertension)  Assessment & Plan  Patient appears to be on lisinopril 5 mg daily and Lopressor 25 mg daily at home  -Has been mostly normotensive with blood pressure systolics between 937-702 mmHg  -Hold lisinopril for now    HLD (hyperlipidemia)  Assessment & Plan  Last lipid panel in 2021  · Continue atorvastatin 80 mg qd  · Repeat lipid panel showing HDL at 31, LDL at 71, triglycerides at 109    Pre-diabetes  Assessment & Plan  Last A1c recently 5 8    · Follow up A1c  · Goal BG <200, can add SSI if needed to maintain goal BG    CAD (coronary artery disease)  Assessment & Plan  Patient is a 1 PPD smoker with hx of CAD s/p CABG x 4  Complains of angina with exertion    Plan:  · Patient has small area of of ischemia on NM SPECT  · Cardiology consult  · Continue Metoprolol 25 mg BID  · Start Ranexa 500 mg BID for angina   · Increase atorvastatin to 80 mg daily  · Restart ASA 81 daily 2 weeks post op ()      Disposition: continue  level of care, dispo planning for rehab, [facility]     SUBJECTIVE     Patient seen and examined  No acute events overnight  Pain controlled  Lumbar drain in place  No complaints at this time  OBJECTIVE     Vitals:    23 1547 23 1548 23 2140 23 0806   BP: 142/100 142/100 115/60 114/59   BP Location:  Right arm  Right arm   Pulse: 73 76 71 91   Resp:  16 20 20   Temp: 97 6 °F (36 4 °C) 97 6 °F (36 4 °C) 98 3 °F (36 8 °C) 98 2 °F (36 8 °C)   TempSrc:  Oral  Oral   SpO2: 97% 97% 97% 96%   Weight:       Height:          Temperature:   Temp (24hrs), Av 9 °F (36 6 °C), Min:97 6 °F (36 4 °C), Max:98 3 °F (36 8 °C)    Temperature: 98 2 °F (36 8 °C)  Intake & Output:  I/O        0701   0700  0701   0700  0701   0700    P  O  480      I V  (mL/kg) 1300 (18) 1950 (27)     Total Intake(mL/kg) 1780 (24 7) 1950 (27)     Urine (mL/kg/hr) 250 (0 1) 1350 (0 8)     Drains 125 175     Total Output 375 1525     Net +1405 +425            Unmeasured Urine Occurrence 1 x          Weights:   IBW (Ideal Body Weight): 70 7 kg    Body mass index is 23 48 kg/m²  Weight (last 2 days)     None        Physical Exam  Vitals and nursing note reviewed  Constitutional:       General: He is not in acute distress  Appearance: He is well-developed  HENT:      Head: Normocephalic and atraumatic  Eyes:      Conjunctiva/sclera: Conjunctivae normal    Cardiovascular:      Rate and Rhythm: Normal rate and regular rhythm        Heart sounds: No murmur heard  Pulmonary:      Effort: Pulmonary effort is normal  No respiratory distress  Breath sounds: Normal breath sounds  Abdominal:      General: Bowel sounds are normal       Palpations: Abdomen is soft  Tenderness: There is no abdominal tenderness  Musculoskeletal:         General: No swelling  Cervical back: Neck supple  Right lower leg: No edema  Left lower leg: No edema  Comments: Proximal BL LE 4/5   Skin:     General: Skin is warm and dry  Capillary Refill: Capillary refill takes less than 2 seconds  Neurological:      Mental Status: He is alert and oriented to person, place, and time  Psychiatric:         Mood and Affect: Mood normal        LABORATORY DATA     Labs: I have personally reviewed pertinent reports  Results from last 7 days   Lab Units 06/25/23 0454 06/24/23  0600 06/24/23  0037 06/23/23  0456 06/19/23  1706 06/19/23  1420   WBC Thousand/uL 9 68 10 87*  --  7 11   < > 7 66   HEMOGLOBIN g/dL 11 8* 13 4  --  13 9   < > 12 9   HEMATOCRIT % 35 1* 39 7  --  43 0   < > 36 7   PLATELETS Thousands/uL 253 296 274 279   < > 264   NEUTROS PCT % 66  --   --   --   --  73   MONOS PCT % 9  --   --   --   --  6   EOS PCT % 1  --   --   --   --  1    < > = values in this interval not displayed  Results from last 7 days   Lab Units 06/25/23 0454 06/24/23 0600 06/23/23 0456 06/21/23  0523 06/20/23  0500   POTASSIUM mmol/L 4 3 4 7 4 1   < > 3 8   CHLORIDE mmol/L 117* 116* 114*   < > 117*   CO2 mmol/L 26 23 27   < > 24   BUN mg/dL 15 19 19   < > 17   CREATININE mg/dL 0 91 1 15 1 02   < > 0 88   CALCIUM mg/dL 8 2* 8 3 8 8   < > 9 0   ALK PHOS U/L  --   --  82  --  82   ALT U/L  --   --  30  --  16   AST U/L  --   --  30  --  20    < > = values in this interval not displayed       Results from last 7 days   Lab Units 06/20/23  0500   MAGNESIUM mg/dL 2 0          Results from last 7 days   Lab Units 06/24/23  0600 06/23/23  0456 06/19/23  1420   INR  1 00 0  96 1 04   PTT seconds 25 29 24               IMAGING & DIAGNOSTIC TESTING     Radiology Results: I have personally reviewed pertinent reports  XR spine lumbosacral 1 view    Result Date: 6/23/2023  Impression: Fluoroscopic guidance provided for procedure guidance  Please refer to the separate procedure notes for additional details  Workstation performed: [id number]     MRI lumbar spine wo contrast    Result Date: 6/20/2023  Impression: Spondylotic degenerative changes again noted similar to the prior study most significantly at L3-4 and L4-5 where severe central stenosis is identified  Moderate to severe lateral recess stenosis also identified as described  Moderate central stenosis and mild bilateral foraminal narrowing noted at L2-3 with mild foraminal narrowing at L5-S1 bilaterally  Workstation performed: [id number]     MRI brain wo contrast    Result Date: 6/20/2023  Impression: No MR evidence of acute ischemia  Small right mastoid effusion  Workstation performed: [id number]     CT stroke alert brain    Result Date: 6/19/2023  Impression: No acute intracranial abnormality  Findings were directly discussed with Nolan Cid at 2:41 p m  Workstation performed: [id number]     CTA stroke alert (head/neck)    Result Date: 6/19/2023  Impression: No significant stenosis of the cervical carotid or vertebral arteries No high-grade intracranial stenosis, large vessel occlusion or aneurysm  Findings were directly discussed with Nolan Cid at 2:41 p m  Workstation performed: [id number]     Other Diagnostic Testing: I have personally reviewed pertinent reports      ACTIVE MEDICATIONS     Current Facility-Administered Medications   Medication Dose Route Frequency   • acetaminophen (TYLENOL) tablet 975 mg  975 mg Oral Q6H PRN   • acetaminophen (TYLENOL) tablet 975 mg  975 mg Oral Q8H [facility]   • atorvastatin (LIPITOR) tablet 80 mg  80 mg Oral QPM   • [START ON 6/26/2023] enoxaparin (LOVENOX) subcutaneous injection 40 mg  40 "mg Subcutaneous Daily   • famotidine (PEPCID) tablet 20 mg  20 mg Oral BID   • finasteride (PROSCAR) tablet 5 mg  5 mg Oral Daily   • HYDROmorphone HCl (DILAUDID) injection 0 2 mg  0 2 mg Intravenous Q4H PRN   • metoprolol tartrate (LOPRESSOR) tablet 25 mg  25 mg Oral Daily   • nicotine (NICODERM CQ) 21 mg/24 hr TD 24 hr patch 1 patch  1 patch Transdermal Daily   • oxyCODONE (ROXICODONE) IR tablet 5 mg  5 mg Oral Q4H PRN   • oxyCODONE (ROXICODONE) split tablet 2 5 mg  2 5 mg Oral Q4H PRN   • ranolazine (RANEXA) 12 hr tablet 500 mg  500 mg Oral Q12H FRANKI   • sertraline (ZOLOFT) tablet 100 mg  100 mg Oral HS       VTE Pharmacologic Prophylaxis: Enoxaparin (Lovenox)  VTE Mechanical Prophylaxis: sequential compression device    Portions of the record may have been created with voice recognition software  Occasional wrong word or \"sound a like\" substitutions may have occurred due to the inherent limitations of voice recognition software    Read the chart carefully and recognize, using context, where substitutions have occurred   ==  Elva Girard, [facility]  Internal Medicine Residency PGY-2       "

## 2023-06-25 NOTE — PLAN OF CARE
Problem: MOBILITY - ADULT  Goal: Maintain or return to baseline ADL function  Description: INTERVENTIONS:  -  Assess patient's ability to carry out ADLs; assess patient's baseline for ADL function and identify physical deficits which impact ability to perform ADLs (bathing, care of mouth/teeth, toileting, grooming, dressing, etc )  - Assess/evaluate cause of self-care deficits   - Assess range of motion  - Assess patient's mobility; develop plan if impaired  - Assess patient's need for assistive devices and provide as appropriate  - Encourage maximum independence but intervene and supervise when necessary  - Involve family in performance of ADLs  - Assess for home care needs following discharge   - Consider OT consult to assist with ADL evaluation and planning for discharge  - Provide patient education as appropriate  Outcome: Progressing  Goal: Maintains/Returns to pre admission functional level  Description: INTERVENTIONS:  - Perform BMAT or MOVE assessment daily    - Set and communicate daily mobility goal to care team and patient/family/caregiver  - Collaborate with rehabilitation services on mobility goals if consulted  - Perform Range of Motion 4 times a day  - Reposition patient every 2 hours    - Dangle patient 4 times a day  - Stand patient 4 times a day  - Ambulate patient 4 times a day  - Out of bed to chair 3 times a day   - Out of bed for meals 3 times a day  - Out of bed for toileting  - Record patient progress and toleration of activity level   Outcome: Progressing     Problem: NEUROSENSORY - ADULT  Goal: Achieves stable or improved neurological status  Description: INTERVENTIONS  - Monitor and report changes in neurological status  - Monitor vital signs such as temperature, blood pressure, glucose, and any other labs ordered   - Initiate measures to prevent increased intracranial pressure  - Monitor for seizure activity and implement precautions if appropriate      Outcome: Progressing  Goal: Remains free of injury related to seizures activity  Description: INTERVENTIONS  - Maintain airway, patient safety  and administer oxygen as ordered  - Monitor patient for seizure activity, document and report duration and description of seizure to physician/advanced practitioner  - If seizure occurs,  ensure patient safety during seizure  - Reorient patient post seizure  - Seizure pads on all 4 side rails  - Instruct patient/family to notify RN of any seizure activity including if an aura is experienced  - Instruct patient/family to call for assistance with activity based on nursing assessment  - Administer anti-seizure medications if ordered    Outcome: Progressing  Goal: Achieves maximal functionality and self care  Description: INTERVENTIONS  - Monitor swallowing and airway patency with patient fatigue and changes in neurological status  - Encourage and assist patient to increase activity and self care     - Encourage visually impaired, hearing impaired and aphasic patients to use assistive/communication devices  Outcome: Progressing     Problem: MUSCULOSKELETAL - ADULT  Goal: Maintain or return mobility to safest level of function  Description: INTERVENTIONS:  - Assess patient's ability to carry out ADLs; assess patient's baseline for ADL function and identify physical deficits which impact ability to perform ADLs (bathing, care of mouth/teeth, toileting, grooming, dressing, etc )  - Assess/evaluate cause of self-care deficits   - Assess range of motion  - Assess patient's mobility  - Assess patient's need for assistive devices and provide as appropriate  - Encourage maximum independence but intervene and supervise when necessary  - Involve family in performance of ADLs  - Assess for home care needs following discharge   - Consider OT consult to assist with ADL evaluation and planning for discharge  - Provide patient education as appropriate  Outcome: Progressing  Goal: Maintain proper alignment of affected body part  Description: INTERVENTIONS:  - Support, maintain and protect limb and body alignment  - Provide patient/ family with appropriate education  Outcome: Progressing

## 2023-06-25 NOTE — PROGRESS NOTES
[facility]  Progress Note  Name: Akin Dickerson  MRN: [id number]  [facility] Date of Admission: 6/19/2023   Date of Service: 6/25/2023 I Hospital Day: 6    Assessment/Plan   CAD (coronary artery disease)  Assessment & Plan  Remote CABG x4  Reporting some exertional chest pressure  S/p nuclear stress test 6/22 without evidence of acute ischemia  Cardiology following  Appreciate recommendations  * Spinal stenosis of lumbar region with neurogenic claudication  Assessment & Plan  POD 2 bilateral L3-4 and L4-5 posterior lumbar decompression with Dr Yara Perdue (6/23)  · Presented with worsening lower extremity weakness and ambulatory dysfunction  · Known to our service  Scheduled for decompression with Dr Yara Perdue in 10/2022; cancelled due to social situation  · Hx of severe lumbar spinal stenosis with neurogenic claudication  · Worsening since 6/19 when stood up from changing a tire  · Presented as stroke alert so loaded with ASA  · On exam, bilateral LE weakness 4/5  Decreased bilateral patellar reflexes  Some subjective diminished sensation  Also c/o some dizziness  Imaging:  · MRI lumbar spine wo, 6/19/2023: Spondylotic degenerative changes again noted similar to the prior study most significantly at L3-4 and L4-5 where severe central stenosis is identified  Moderate to severe lateral recess stenosis also identified as described  Moderate central stenosis and mild bilateral foraminal narrowing noted at L2-3 with mild foraminal narrowing at L5-S1 bilaterally  Plan:  · Continue to monitor neuro exam closely  · 1 hemovac drain to full suction - 175 mL/24 hours  Will maintain  AM Lovenox held in anticipation of this  · Neurology originally primary, now under medicine service  · TTE - LVEF 50%, similar to 2015  · MRI brain without evidence of acute ischemia  CTA, CTH negative  · Hold ASA x 2 weeks  · Mobilize with PT/OT    · DVT ppx: SCD's, Heparin "SQ   · Disposition: per PT/OT will need rehab  PMR on board  Neurosurgery will continue to follow  Please call with questions  Subjective/Objective   Chief Complaint: \"I'm ok  \"    Subjective: Patient c/o some back pain  States legs still feel heavy but improved  Objective: NAD  Lumbar incision clean and dry  1 hemovac drain  I/O       06/23 0701  06/24 0700 06/24 0701  06/25 0700 06/25 0701  06/26 0700    P  O  480      I V  (mL/kg) 1300 (18) 1950 (27)     Total Intake(mL/kg) 1780 (24 7) 1950 (27)     Urine (mL/kg/hr) 250 (0 1) 1350 (0 8)     Drains 125 175     Total Output 375 1525     Net +1405 +425            Unmeasured Urine Occurrence 1 x            Invasive Devices     Peripheral Intravenous Line  Duration           Peripheral IV 06/23/23 Left Antecubital 2 days    Peripheral IV 06/23/23 Right Forearm 1 day          Drain  Duration           Closed/Suction Drain Left Back Accordion 7 Fr  1 day                Physical Exam:  Vitals: Blood pressure 114/59, pulse 91, temperature 98 2 °F (36 8 °C), temperature source Oral, resp  rate 20, height 5' 9\" (1 753 m), weight 72 1 kg (159 lb), SpO2 96 %  ,Body mass index is 23 48 kg/m²        General appearance: alert, appears stated age, cooperative and no distress  Head: Normocephalic, without obvious abnormality, atraumatic  Eyes: EOMI, PERRL  Neck: supple, symmetrical, trachea midline and NT  Back: lumbar incision clean and dry, 1 hemovac drain  Lungs: non labored breathing  Heart: regular heart rate  Neurologic:   Mental status: Alert, oriented x3, thought content appropriate  Cranial nerves: grossly intact (Cranial nerves II-XII)  Sensory: normal to LT  Motor: LLE -4/5, RLE 4/5  Reflexes: 2+ and symmetric  Coordination: finger to nose normal bilaterally, no drift bilaterally      Lab Results:  Results from last 7 days   Lab Units 06/25/23  0454 06/24/23  0600 06/24/23  0037 06/23/23  0456 06/19/23  1706 06/19/23  1420   WBC Thousand/uL 9 68 10 87*  " "--  7 11   < > 7 66   HEMOGLOBIN g/dL 11 8* 13 4  --  13 9   < > 12 9   HEMATOCRIT % 35 1* 39 7  --  43 0   < > 36 7   PLATELETS Thousands/uL 253 296 274 279   < > 264   NEUTROS PCT % 66  --   --   --   --  73   MONOS PCT % 9  --   --   --   --  6   EOS PCT % 1  --   --   --   --  1    < > = values in this interval not displayed  Results from last 7 days   Lab Units 06/25/23  0454 06/24/23  0600 06/23/23  0456 06/21/23  0523 06/20/23  0500   POTASSIUM mmol/L 4 3 4 7 4 1   < > 3 8   CHLORIDE mmol/L 117* 116* 114*   < > 117*   CO2 mmol/L 26 23 27   < > 24   BUN mg/dL 15 19 19   < > 17   CREATININE mg/dL 0 91 1 15 1 02   < > 0 88   CALCIUM mg/dL 8 2* 8 3 8 8   < > 9 0   ALK PHOS U/L  --   --  82  --  82   ALT U/L  --   --  30  --  16   AST U/L  --   --  30  --  20    < > = values in this interval not displayed  Results from last 7 days   Lab Units 06/20/23  0500   MAGNESIUM mg/dL 2 0         Results from last 7 days   Lab Units 06/24/23  0600 06/23/23  0456 06/19/23  1420   INR  1 00 0 96 1 04   PTT seconds 25 29 24     No results found for: \"TROPONINT\"  ABG:No results found for: \"PHART\", \"OBQ3RTG\", \"PO2ART\", \"LFC1WZR\", \"K4LGXBGL\", \"BEART\", \"SOURCE\"    Imaging Studies: I have personally reviewed pertinent reports  and I have personally reviewed pertinent films in PACS    XR spine lumbosacral 1 view    Result Date: 6/23/2023  Impression: Fluoroscopic guidance provided for procedure guidance  Please refer to the separate procedure notes for additional details  Workstation performed: [id number]     MRI lumbar spine wo contrast    Result Date: 6/20/2023  Impression: Spondylotic degenerative changes again noted similar to the prior study most significantly at L3-4 and L4-5 where severe central stenosis is identified  Moderate to severe lateral recess stenosis also identified as described   Moderate central stenosis and mild bilateral foraminal narrowing noted at L2-3 with mild foraminal narrowing at L5-S1 " bilaterally  Workstation performed: [id number]     MRI brain wo contrast    Result Date: 6/20/2023  Impression: No MR evidence of acute ischemia  Small right mastoid effusion  Workstation performed: [id number]     CT stroke alert brain    Result Date: 6/19/2023  Impression: No acute intracranial abnormality  Findings were directly discussed with Ani Peres at 2:41 p m  Workstation performed: [id number]     CTA stroke alert (head/neck)    Result Date: 6/19/2023  Impression: No significant stenosis of the cervical carotid or vertebral arteries No high-grade intracranial stenosis, large vessel occlusion or aneurysm  Findings were directly discussed with Ani Peres at 2:41 p m  Workstation performed: [id number]       EKG, Pathology, and Other Studies: I have personally reviewed pertinent reports        VTE Pharmacologic Prophylaxis: Sequential compression device (Venodyne)  and Enoxaparin (Lovenox)    VTE Mechanical Prophylaxis: sequential compression device

## 2023-06-25 NOTE — RESTORATIVE TECHNICIAN NOTE
Restorative Technician Note      Patient Name: Letitia Bird     Note Type: Mobility  Patient Position Upon Consult: Bedside chair  Activity Performed: Ambulated; Dangled; Stood  Assistive Device: Other (Comment) (none)  Education Provided: Yes  Patient Position at End of Consult: Supine;  All needs within reach; Bed/Chair alarm activated    Dayna RICKETTS, Restorative Technician, [employer]

## 2023-06-25 NOTE — RESTORATIVE TECHNICIAN NOTE
Restorative Technician Note      Patient Name: Daisy Kelley     Note Type: Mobility  Patient Position Upon Consult: Supine  Activity Performed: Ambulated; Stood; Dangled  Assistive Device: Other (Comment) (none)  Education Provided: Yes  Patient Position at End of Consult: Bed/Chair alarm activated; All needs within reach;  Bedside chair    Lois RICKETTS, Restorative Technician, [employer]

## 2023-06-26 ENCOUNTER — TELEPHONE (OUTPATIENT)
Dept: NEUROSURGERY | Facility: CLINIC | Age: 73
End: 2023-06-26

## 2023-06-26 LAB
ANION GAP SERPL CALCULATED.3IONS-SCNC: 1 MMOL/L
BASOPHILS # BLD AUTO: 0.03 THOUSANDS/ÂΜL (ref 0–0.1)
BASOPHILS NFR BLD AUTO: 0 % (ref 0–1)
BUN SERPL-MCNC: 13 MG/DL (ref 5–25)
CALCIUM SERPL-MCNC: 8.9 MG/DL (ref 8.3–10.1)
CHLORIDE SERPL-SCNC: 111 MMOL/L (ref 96–108)
CO2 SERPL-SCNC: 27 MMOL/L (ref 21–32)
CREAT SERPL-MCNC: 0.89 MG/DL (ref 0.6–1.3)
EOSINOPHIL # BLD AUTO: 0.15 THOUSAND/ÂΜL (ref 0–0.61)
EOSINOPHIL NFR BLD AUTO: 2 % (ref 0–6)
ERYTHROCYTE [DISTWIDTH] IN BLOOD BY AUTOMATED COUNT: 14.1 % (ref 11.6–15.1)
GFR SERPL CREATININE-BSD FRML MDRD: 86 ML/MIN/1.73SQ M
GLUCOSE SERPL-MCNC: 97 MG/DL (ref 65–140)
HCT VFR BLD AUTO: 37.8 % (ref 36.5–49.3)
HGB BLD-MCNC: 12.3 G/DL (ref 12–17)
IMM GRANULOCYTES # BLD AUTO: 0.04 THOUSAND/UL (ref 0–0.2)
IMM GRANULOCYTES NFR BLD AUTO: 0 % (ref 0–2)
LYMPHOCYTES # BLD AUTO: 2.74 THOUSANDS/ÂΜL (ref 0.6–4.47)
LYMPHOCYTES NFR BLD AUTO: 28 % (ref 14–44)
MCH RBC QN AUTO: 31.1 PG (ref 26.8–34.3)
MCHC RBC AUTO-ENTMCNC: 32.5 G/DL (ref 31.4–37.4)
MCV RBC AUTO: 96 FL (ref 82–98)
MONOCYTES # BLD AUTO: 1.04 THOUSAND/ÂΜL (ref 0.17–1.22)
MONOCYTES NFR BLD AUTO: 11 % (ref 4–12)
NEUTROPHILS # BLD AUTO: 5.72 THOUSANDS/ÂΜL (ref 1.85–7.62)
NEUTS SEG NFR BLD AUTO: 59 % (ref 43–75)
NRBC BLD AUTO-RTO: 0 /100 WBCS
PLATELET # BLD AUTO: 245 THOUSANDS/UL (ref 149–390)
PMV BLD AUTO: 9.8 FL (ref 8.9–12.7)
POTASSIUM SERPL-SCNC: 3.9 MMOL/L (ref 3.5–5.3)
RBC # BLD AUTO: 3.95 MILLION/UL (ref 3.88–5.62)
SODIUM SERPL-SCNC: 139 MMOL/L (ref 135–147)
WBC # BLD AUTO: 9.72 THOUSAND/UL (ref 4.31–10.16)

## 2023-06-26 PROCEDURE — 99232 SBSQ HOSP IP/OBS MODERATE 35: CPT | Performed by: INTERNAL MEDICINE

## 2023-06-26 PROCEDURE — 97116 GAIT TRAINING THERAPY: CPT

## 2023-06-26 PROCEDURE — 99024 POSTOP FOLLOW-UP VISIT: CPT | Performed by: PHYSICIAN ASSISTANT

## 2023-06-26 PROCEDURE — 97530 THERAPEUTIC ACTIVITIES: CPT

## 2023-06-26 PROCEDURE — 85025 COMPLETE CBC W/AUTO DIFF WBC: CPT | Performed by: STUDENT IN AN ORGANIZED HEALTH CARE EDUCATION/TRAINING PROGRAM

## 2023-06-26 PROCEDURE — 80048 BASIC METABOLIC PNL TOTAL CA: CPT | Performed by: STUDENT IN AN ORGANIZED HEALTH CARE EDUCATION/TRAINING PROGRAM

## 2023-06-26 RX ORDER — ACETAMINOPHEN 325 MG/1
975 TABLET ORAL EVERY 6 HOURS PRN
Status: DISCONTINUED | OUTPATIENT
Start: 2023-06-26 | End: 2023-07-01 | Stop reason: HOSPADM

## 2023-06-26 RX ADMIN — FAMOTIDINE 20 MG: 20 TABLET, FILM COATED ORAL at 16:43

## 2023-06-26 RX ADMIN — RANOLAZINE 500 MG: 500 TABLET, FILM COATED, EXTENDED RELEASE ORAL at 22:10

## 2023-06-26 RX ADMIN — FAMOTIDINE 20 MG: 20 TABLET, FILM COATED ORAL at 22:51

## 2023-06-26 RX ADMIN — METOPROLOL TARTRATE 25 MG: 25 TABLET, FILM COATED ORAL at 08:32

## 2023-06-26 RX ADMIN — ATORVASTATIN CALCIUM 80 MG: 80 TABLET, FILM COATED ORAL at 16:43

## 2023-06-26 RX ADMIN — FINASTERIDE 5 MG: 5 TABLET, FILM COATED ORAL at 08:32

## 2023-06-26 RX ADMIN — SERTRALINE HYDROCHLORIDE 100 MG: 100 TABLET ORAL at 22:08

## 2023-06-26 RX ADMIN — RANOLAZINE 500 MG: 500 TABLET, FILM COATED, EXTENDED RELEASE ORAL at 08:32

## 2023-06-26 RX ADMIN — ACETAMINOPHEN 975 MG: 325 TABLET, FILM COATED ORAL at 22:08

## 2023-06-26 RX ADMIN — ACETAMINOPHEN 975 MG: 325 TABLET, FILM COATED ORAL at 05:41

## 2023-06-26 RX ADMIN — NICOTINE 1 PATCH: 21 PATCH, EXTENDED RELEASE TRANSDERMAL at 08:32

## 2023-06-26 RX ADMIN — ENOXAPARIN SODIUM 40 MG: 40 INJECTION SUBCUTANEOUS at 08:33

## 2023-06-26 RX ADMIN — ACETAMINOPHEN 975 MG: 325 TABLET, FILM COATED ORAL at 12:52

## 2023-06-26 NOTE — PROGRESS NOTES
[facility]  Progress Note  Name: Andrew Rose  MRN: [id number]  Unit/Bed#: [facility] 942-04 I Date of Admission: 6/19/2023   Date of Service: 6/26/2023 I Hospital Day: 7    Assessment/Plan   * Spinal stenosis of lumbar region with neurogenic claudication  Assessment & Plan  POD 3 bilateral L3-4 and L4-5 posterior lumbar decompression with Dr Candace Davis (6/23)  · Presented with worsening lower extremity weakness and ambulatory dysfunction  · Known to our service  Scheduled for decompression with Dr Candace Davis in 10/2022; cancelled due to social situation  · Hx of severe lumbar spinal stenosis with neurogenic claudication  · Worsening since 6/19 when stood up from changing a tire  · Presented as stroke alert so loaded with ASA  · On exam, bilateral LE weakness 4/5  Decreased bilateral patellar reflexes  Some subjective diminished sensation  Also c/o some dizziness  Imaging:  · MRI lumbar spine wo, 6/19/2023: Spondylotic degenerative changes again noted similar to the prior study most significantly at L3-4 and L4-5 where severe central stenosis is identified  Moderate to severe lateral recess stenosis also identified as described  Moderate central stenosis and mild bilateral foraminal narrowing noted at L2-3 with mild foraminal narrowing at L5-S1 bilaterally  Plan:  · Continue to monitor neuro exam closely  · Hemovac drain d/c'ed pt tolerated drain removal well  · Neurology originally primary, now under medicine service  · TTE - LVEF 50%, similar to 2015  · MRI brain without evidence of acute ischemia  CTA, CTH negative  · Hold ASA x 2 weeks  · Mobilize with PT/OT  · DVT ppx: SCD's, Heparin SQ   · Disposition: per PT/OT   · No further neurosurgical intervention is anticipated at this time  · Neurosurgery will see patient as needed during the remainder of this hospitalization  Patient will have postop visits at 2-week for incision check  and 6 weeks with Dr Candace Davis  "Please contact neurosurgery with any questions or concerns  CAD (coronary artery disease)  Assessment & Plan  Remote CABG x4  Reporting some exertional chest pressure  S/p nuclear stress test 6/22 without evidence of acute ischemia  Cardiology following  Appreciate recommendations  Subjective/Objective     Chief Complaint: Reported mild back pain (in [place])    Subjective: Reported mild back pain  He denies any new numbness, tingling or weakness  Objective: Awake, no acute distress    I/O       06/24 0701  06/25 0700 06/25 0701  06/26 0700 06/26 0701  06/27 0700    P  O   420 300    I V  (mL/kg) 1950 (27) 1445 8 (20 1)     Total Intake(mL/kg) 1950 (27) 1865 8 (25 9) 300 (4 2)    Urine (mL/kg/hr) 1350 (0 8) 700 (0 4) 800 (2 1)    Drains 175 20 25    Total Output 1525 720 825    Net +425 +1145 8 -525           Unmeasured Urine Occurrence  1 x           Invasive Devices     Peripheral Intravenous Line  Duration           Peripheral IV 06/23/23 Left Antecubital 3 days          Drain  Duration           Closed/Suction Drain Left Back Accordion 7 Fr  2 days                Physical Exam:  Vitals: Blood pressure 106/61, pulse 82, temperature 98 3 °F (36 8 °C), resp  rate 15, height 5' 9\" (1 753 m), weight 72 1 kg (159 lb), SpO2 97 %  ,Body mass index is 23 48 kg/m²  General appearance:  Appears stated age  Head: Normocephalic, without obvious abnormality, atraumatic  Eyes: EOMI, PERRL  Neck: supple, symmetrical, trachea midline   Lungs: non labored breathing  Heart: regular heart rate  Neurologic:   Mental status: Alert, oriented, thought content appropriate  Cranial nerves: grossly intact (Cranial nerves II-XII)  Sensory: normal to LT x4  Motor: moving all extremities, strength LLE 4-/5, RLE 4/5     Reflexes: 2+ and symmetric  Coordination: finger to nose test normal bilaterally, no drift in bilateral upper extremities      Lab Results:  Results from last 7 days   Lab Units 06/26/23  0546 " 06/25/23  0454 06/24/23  0600 06/19/23  1706 06/19/23  1420   WBC Thousand/uL 9 72 9 68 10 87*   < > 7 66   HEMOGLOBIN g/dL 12 3 11 8* 13 4   < > 12 9   HEMATOCRIT % 37 8 35 1* 39 7   < > 36 7   PLATELETS Thousands/uL 245 253 296   < > 264   NEUTROS PCT % 59 66  --   --  73   MONOS PCT % 11 9  --   --  6   EOS PCT % 2 1  --   --  1    < > = values in this interval not displayed  Results from last 7 days   Lab Units 06/26/23  0546 06/25/23  0454 06/24/23  0600 06/23/23  0456 06/21/23  0523 06/20/23  0500   POTASSIUM mmol/L 3 9 4 3 4 7 4 1   < > 3 8   CHLORIDE mmol/L 111* 117* 116* 114*   < > 117*   CO2 mmol/L 27 26 23 27   < > 24   BUN mg/dL 13 15 19 19   < > 17   CREATININE mg/dL 0 89 0 91 1 15 1 02   < > 0 88   CALCIUM mg/dL 8 9 8 2* 8 3 8 8   < > 9 0   ALK PHOS U/L  --   --   --  82  --  82   ALT U/L  --   --   --  30  --  16   AST U/L  --   --   --  30  --  20    < > = values in this interval not displayed  Results from last 7 days   Lab Units 06/20/23  0500   MAGNESIUM mg/dL 2 0         Results from last 7 days   Lab Units 06/24/23  0600 06/23/23  0456 06/19/23  1420   INR  1 00 0 96 1 04   PTT seconds 25 29 24     Imaging Studies: I have personally reviewed pertinent reports and I have personally reviewed pertinent films in PACS    EKG, Pathology, and Other Studies: I have personally reviewed pertinent reports  PLEASE NOTE:  This encounter may have been completed utilizing the [employer] Direct Speech Voice Recognition Software  Grammatical errors, random word insertions, pronoun errors and incomplete sentences are occasional consequences of the system due to software limitations, ambient noise and hardware issues  These may be missed by proof reading prior to affixing electronic signature  Any questions or concerns about the content, text or information contained within the body of this dictation should be directly addressed to the advanced practitioner or physician for clarification

## 2023-06-26 NOTE — ASSESSMENT & PLAN NOTE
POD 3 bilateral L3-4 and L4-5 posterior lumbar decompression with Dr Paddy Sawant (6/23)  · Presented with worsening lower extremity weakness and ambulatory dysfunction  · Known to our service  Scheduled for decompression with Dr Paddy Sawant in 10/2022; cancelled due to social situation  · Hx of severe lumbar spinal stenosis with neurogenic claudication  · Worsening since 6/19 when stood up from changing a tire  · Presented as stroke alert so loaded with ASA  · On exam, bilateral LE weakness 4/5  Decreased bilateral patellar reflexes  Some subjective diminished sensation  Also c/o some dizziness  Imaging:  · MRI lumbar spine wo, 6/19/2023: Spondylotic degenerative changes again noted similar to the prior study most significantly at L3-4 and L4-5 where severe central stenosis is identified  Moderate to severe lateral recess stenosis also identified as described  Moderate central stenosis and mild bilateral foraminal narrowing noted at L2-3 with mild foraminal narrowing at L5-S1 bilaterally  Plan:  · Continue to monitor neuro exam closely  · Hemovac drain d/c'ed pt tolerated drain removal well  · Neurology originally primary, now under medicine service  · TTE - LVEF 50%, similar to 2015  · MRI brain without evidence of acute ischemia  CTA, CTH negative  · Hold ASA x 2 weeks  · Mobilize with PT/OT  · DVT ppx: SCD's, Heparin SQ   · Disposition: per PT/OT   · No further neurosurgical intervention is anticipated at this time  · Neurosurgery will see patient as needed during the remainder of this hospitalization  Patient will have postop visits at 2-week for incision check  and 6 weeks with Dr Paddy Sawant  Please contact neurosurgery with any questions or concerns

## 2023-06-26 NOTE — PLAN OF CARE
Problem: PHYSICAL THERAPY ADULT  Goal: Performs mobility at highest level of function for planned discharge setting  See evaluation for individualized goals  Description: Treatment/Interventions: Functional transfer training, LE strengthening/ROM, Elevations, Therapeutic exercise, Endurance training, Patient/family training, Equipment eval/education, Bed mobility, Gait training, Spoke to nursing, Spoke to case management, OT  Equipment Recommended: Scottie Terry       See flowsheet documentation for full assessment, interventions and recommendations  Note: Prognosis: Good  Problem List: Decreased strength, Decreased endurance, Decreased mobility, Impaired balance, Pain, Orthopedic restrictions  Assessment: Pt provided consent for PT treatment  Pt performed outlined activities  Pt noted having back pain near incision site  Pt performed bed mobility with S  Pt progressed ambulation distance with RW and Min Ax1 with VCs provided to maintain upright posture  Pt negotiated a total of 5 stairs at a min Ax1 level and will need to continue to progress in order to safely navigate his home environment  Pt left in bed side chair with feet up, chair alarm activated, call bell, and all needs within reach  Further skilled PT is necessary to address deficits in functional mobility, balance, and strength  Current d/c recommendation remains post acute rehabilitation services pending pt progress and increased home support  Barriers to Discharge: Inaccessible home environment, Decreased caregiver support     PT Discharge Recommendation: Post acute rehabilitation services (pending progress and increased home support)    See flowsheet documentation for full assessment

## 2023-06-26 NOTE — PHYSICAL THERAPY NOTE
Physical Therapy Treatment Session    Patient Name: Reanna Cid    AAEWO'D Date: 6/26/2023     Problem List  Principal Problem:    Spinal stenosis of lumbar region with neurogenic claudication  Active Problems:    CAD (coronary artery disease)    Pre-diabetes    HLD (hyperlipidemia)    HTN (hypertension)    Tobacco use    BPH (benign prostatic hyperplasia)    Schizophrenia ([facility] [address] )       Past Medical History  Past Medical History:   Diagnosis Date    BPH (benign prostatic hyperplasia)     Coronary artery disease     HTN (hypertension)     Hx pulmonary embolism     Hyperlipidemia     Hypertension     Prediabetes     Tobacco use         Past Surgical History  Past Surgical History:   Procedure Laterality Date    CORONARY ARTERY BYPASS GRAFT      DECOMPRESSION SPINE LUMBAR POSTERIOR Bilateral 6/23/2023    Procedure: L3-4 and L4-5 laminectomy, bilateral foraminotomy decompresion;  Surgeon: Dewayne Lara MD;  Location: [facility];  Service: Neurosurgery    ME COLONOSCOPY FLX DX W/COLLJ SPEC WHEN PFRMD N/A 4/6/2017    Procedure: COLONOSCOPY;  Surgeon: Smita Mejia MD;  Location: [facility]; Service: Gastroenterology           06/26/23 1025   PT Last Visit   PT Visit Date 06/26/23   Note Type   Note Type Treatment   Pain Assessment   Pain Assessment Tool 0-10   Pain Score 3   Pain Location/Orientation Location: Back;Orientation: Lower; Location: Incision   Hospital Pain Intervention(s) Repositioned; Ambulation/increased activity; Rest   Precautions   Spinal Precautions Yes   Restrictions/Precautions   Weight Bearing Precautions Per Order No   Other Precautions Chair Alarm; Bed Alarm;Cognitive;Telemetry; Fall Risk;Pain;Spinal precautions  (Wolof speaking; HemoVac drain)   General   Chart Reviewed Yes   Cognition   Overall Cognitive Status WFL   Arousal/Participation Cooperative   Attention Within functional limits   Following Commands Follows one step commands with increased time or repetition   Bed Mobility   Supine to Sit 5  Supervision   Additional items Assist x 1; Increased time required   Transfers   Sit to Stand 4  Minimal assistance  (x5)   Additional items Assist x 1; Increased time required   Stand to Sit 5  Supervision   Additional items Assist x 1; Increased time required   Ambulation/Elevation   Gait pattern Improper Weight shift;Decreased foot clearance;Retropulsion; Short stride; Excessively slow   Gait Assistance 4  Minimal assist   Additional items Assist x 1;Verbal cues  (VCs to promote upright posture)   Assistive Device Rolling walker   Distance 100' + 30' + 30' + 30  (seated rest break in between in bed side chair)   Stair Management Assistance 4  Minimal assist   Additional items Assist x 1; Increased time required   Stair Management Technique One rail L;Sideways   Number of Stairs 5  (2 + 3)   Balance   Static Sitting Fair +   Dynamic Sitting Fair +   Static Standing Fair -   Dynamic Standing Fair -   Ambulatory Poor +   Endurance Deficit   Endurance Deficit Yes   Endurance Deficit Description pain; weakness   Activity Tolerance   Activity Tolerance Patient limited by pain   Medical Staff Made Aware PT [address] cleared by RN   Exercises   Hip Extension Standing;10 reps;Bilateral   Marching Standing;20 reps;Bilateral   Neuro re-ed Lateral walking 2x10' min Ax1 +b/l UE support at [employer]   Balance training  Tandem walking 2x10' min A x1 + LUE A at [employer]   Assessment   Prognosis Good   Problem List Decreased strength;Decreased endurance;Decreased mobility; Impaired balance;Pain;Orthopedic restrictions   Assessment Pt provided consent for PT treatment  Pt performed outlined activities  Pt noted having back pain near incision site  Pt performed bed mobility with S  Pt progressed ambulation distance with RW and Min Ax1 with VCs provided to maintain upright posture   Pt negotiated a total of 5 stairs at a min Ax1 level and will need to continue to progress in order to safely navigate his home environment  Pt left in bed side chair with feet up, chair alarm activated, call bell, and all needs within reach  Further skilled PT is necessary to address deficits in functional mobility, balance, and strength  Current d/c recommendation remains post acute rehabilitation services pending pt progress and increased home support  Barriers to Discharge Inaccessible home environment;Decreased caregiver support   Goals   STG Expiration Date 07/04/23   Short Term Goal #1 In 14 days, pt will: 1) perform bed mobility with mod I to promote functional independence  2) perform transfers to<>from all surfaces with mod I to promote safety  3) ambulate 200' with mod I and least restrictive device to promote safe return to home  4) negotiate 13 stairs with mod I to promote safe return to home  5) improve balance grades by 1/2 to promote safety with functional mobility  6) improve strength grades by 1/2 to promote safety with functional mobility  Plan   Treatment/Interventions Functional transfer training;LE strengthening/ROM; Elevations; Therapeutic exercise;Patient/family training;Bed mobility;Gait training;Spoke to nursing;Spoke to case management;OT   Progress Progressing toward goals   PT Frequency 3-5x/wk   Recommendation   PT Discharge Recommendation Post acute rehabilitation services  (pending progress and increased home support)   Equipment Recommended [facility] Recommended Wheeled walker   AM-PAC Basic Mobility Inpatient   Turning in Flat Bed Without Bedrails 3   Lying on Back to Sitting on Edge of Flat Bed Without Bedrails 3   Moving Bed to Chair 3   Standing Up From Chair Using Arms 3   Walk in Room 3   Climb 3-5 Stairs With Railing 3   Basic Mobility Inpatient Raw Score 18   Basic Mobility Standardized Score 41 05   Highest Level Of Mobility   JH-HLM Goal 6: Walk 10 steps or more   JH-HLM Achieved 7: Walk 25 feet or more     Humble Hnasen SPT

## 2023-06-26 NOTE — PROGRESS NOTES
INTERNAL MEDICINE RESIDENCY PROGRESS NOTE     Name: Amanda Bunn   Age & Sex: 71 y o  male   MRN: [id number]  Unit/Bed#: [facility] 708-01   Encounter: [id number]  Team: SOD Team B     PATIENT INFORMATION     Name: Amanda Bunn   Age & Sex: 71 y o  male   MRN: [id number]  Hospital Stay Days: 7    ASSESSMENT/PLAN     Principal Problem:    Spinal stenosis of lumbar region with neurogenic claudication  Active Problems:    CAD (coronary artery disease)    Pre-diabetes    HLD (hyperlipidemia)    HTN (hypertension)    Tobacco use    BPH (benign prostatic hyperplasia)    Schizophrenia ([facility] [address] )      * Spinal stenosis of lumbar region with neurogenic claudication  Assessment & Plan  Amanda Bunn is a 71 y o  male  with pertinent history lumbar spondylosis, neurogenic claudication, CAD s/p CABG x 4, HTN, HLD, hx of PE (Not on thinners) who presented as stroke alert on 6/19/23 given acute onset worsening bilateral lower extremity weakness  Denies any urinary symptoms or saddle anesthesia  Initial NIHSS of 5 and LKW 1200  Initial BP on arrival was Blood Pressure: 110/53  As a result of lower suspicion for stroke and potential need for urgent surgery patient was determined to not be a candidate for tPA  • F/u exam:  HF strength 4-/5 R, 4/5 L  No ataxia or dysmetria noted on exam  Proprioception normal  Difficulty standing from a seated position without assistance  Romberg positive  • BP over last 24 hours: Blood Pressure: 136/79  current BP: Blood Pressure: 136/79     Imaging:  • CTH: No acute intracranial abnormality  • CTA: No significant stenosis of the cervical carotid or vertebral arteries  No high-grade intracranial stenosis, large vessel occlusion or aneurysm  • MRI brain: No MR evidence of acute ischemia  Small right mastoid effusion  • MRI L spine: Spondylotic degenerative changes again noted similar to the prior study most significantly at L3-4 and L4-5 where severe central stenosis is identified   Moderate to severe lateral recess stenosis also identified as described  Moderate central stenosis and mild bilateral foraminal narrowing noted at L2-3 with mild foraminal narrowing at L5-S1 bilaterally  • Echocardiogram: EF 50% with mild LA dilation   • Telemetry: monitor  • LDL 71   • HBA1C 5 8     Impression: At this time history and physical along with available imaging concerning for worsening weakness in the setting of known spinal stenosis  One month ago patient strength 5/5 throughout vs now significantly week in bilateral HF        Plan:  • S/p L3-4 and L4-5 decompression with Dr Keagan Carlson 6/23  • Lumbar drain in place - management per NSY   • On DVT ppx today  • Family able to help with recovery  • PMR on board, plan for acute post surgical rehab   • Appreciate neurosurgery eval and recommendations   • Neuro checks  • PT/OT//PMR consults appreciated   • Follow up with neurology as outpatient in 3 months    Schizophrenia [facility])  Assessment & Plan  Patient's home medication list includes Abilify, Atarax, Risperdal, and Zoloft  Reviewed medications with patient and he reports of these, he only takes Zoloft  · Continue Zoloft 100 mg qd  · Avoid gabapentin - which is on patient's home medication list; he says this causes him to have suicidal thoughts    BPH (benign prostatic hyperplasia)  Assessment & Plan  Continue home finasteride 5 mg qd    Tobacco use  Assessment & Plan  1 PPD smoker  · Continue nicotine patch    HTN (hypertension)  Assessment & Plan  Patient appears to be on lisinopril 5 mg daily and Lopressor 25 mg daily at home  -Has been mostly normotensive with blood pressure systolics between 843-446 mmHg  -Hold lisinopril for now    HLD (hyperlipidemia)  Assessment & Plan  Last lipid panel in 2021  · Continue atorvastatin 80 mg qd  · Repeat lipid panel showing HDL at 31, LDL at 71, triglycerides at 109    Pre-diabetes  Assessment & Plan  Last A1c recently 5 8    · Follow up A1c  · Goal BG <200, can add SSI if needed to maintain goal BG    CAD (coronary artery disease)  Assessment & Plan  Patient is a 1 PPD smoker with hx of CAD s/p CABG x 4  Complains of angina with exertion    Plan:  · Patient has small area of of ischemia on NM SPECT  · Cardiology consult  · Continue Metoprolol 25 mg BID  · Start Ranexa 500 mg BID for angina   · Increase atorvastatin to 80 mg daily  · Restart ASA 81 daily 2 weeks post op ()      Disposition: continue IP care until cleared by NSY for dc to rehab     SUBJECTIVE     Patient seen and examined  No acute events overnight  Pain controlled     OBJECTIVE     Vitals:    23 0806 23 1535 23 2156 23 0831   BP: 114/59 113/64 120/66 106/61   BP Location: Right arm Right arm     Pulse: 91 78 88 82   Resp: 20 16 18 15   Temp: 98 2 °F (36 8 °C) 98 7 °F (37 1 °C) 97 8 °F (36 6 °C) 98 3 °F (36 8 °C)   TempSrc: Oral Oral     SpO2: 96% 94% 94% 97%   Weight:       Height:          Temperature:   Temp (24hrs), Av 3 °F (36 8 °C), Min:97 8 °F (36 6 °C), Max:98 7 °F (37 1 °C)    Temperature: 98 3 °F (36 8 °C)  Intake & Output:  I/O        0701   0700  0701   0700  0701   0700    P  O   420 300    I V  (mL/kg) 1950 (27) 1445 8 (20 1)     Total Intake(mL/kg) 1950 (27) 1865 8 (25 9) 300 (4 2)    Urine (mL/kg/hr) 1350 (0 8) 700 (0 4) 800 (2 2)    Drains 175 20 25    Total Output 1525 720 825    Net +425 +1145 8 -525           Unmeasured Urine Occurrence  1 x         Weights:   IBW (Ideal Body Weight): 70 7 kg    Body mass index is 23 48 kg/m²  Weight (last 2 days)     None        Physical Exam  LABORATORY DATA     Labs: I have personally reviewed pertinent reports    Results from last 7 days   Lab Units 23  0546 23  0454 23  0600 23  1706 23  1420   WBC Thousand/uL 9 72 9 68 10 87*   < > 7 66   HEMOGLOBIN g/dL 12 3 11 8* 13 4   < > 12 9   HEMATOCRIT % 37 8 35 1* 39 7   < > 36 7   PLATELETS Thousands/uL 245 253 296   < > 264   NEUTROS PCT % 59 66  --   --  73   MONOS PCT % 11 9  --   --  6   EOS PCT % 2 1  --   --  1    < > = values in this interval not displayed  Results from last 7 days   Lab Units 06/26/23  0546 06/25/23  0454 06/24/23  0600 06/23/23  0456 06/21/23  0523 06/20/23  0500   POTASSIUM mmol/L 3 9 4 3 4 7 4 1   < > 3 8   CHLORIDE mmol/L 111* 117* 116* 114*   < > 117*   CO2 mmol/L 27 26 23 27   < > 24   BUN mg/dL 13 15 19 19   < > 17   CREATININE mg/dL 0 89 0 91 1 15 1 02   < > 0 88   CALCIUM mg/dL 8 9 8 2* 8 3 8 8   < > 9 0   ALK PHOS U/L  --   --   --  82  --  82   ALT U/L  --   --   --  30  --  16   AST U/L  --   --   --  30  --  20    < > = values in this interval not displayed  Results from last 7 days   Lab Units 06/20/23  0500   MAGNESIUM mg/dL 2 0          Results from last 7 days   Lab Units 06/24/23  0600 06/23/23  0456 06/19/23  1420   INR  1 00 0 96 1 04   PTT seconds 25 29 24               IMAGING & DIAGNOSTIC TESTING     Radiology Results: I have personally reviewed pertinent reports  XR spine lumbosacral 1 view    Result Date: 6/23/2023  Impression: Fluoroscopic guidance provided for procedure guidance  Please refer to the separate procedure notes for additional details  Workstation performed: [id number]     MRI lumbar spine wo contrast    Result Date: 6/20/2023  Impression: Spondylotic degenerative changes again noted similar to the prior study most significantly at L3-4 and L4-5 where severe central stenosis is identified  Moderate to severe lateral recess stenosis also identified as described  Moderate central stenosis and mild bilateral foraminal narrowing noted at L2-3 with mild foraminal narrowing at L5-S1 bilaterally  Workstation performed: [id number]     MRI brain wo contrast    Result Date: 6/20/2023  Impression: No MR evidence of acute ischemia  Small right mastoid effusion   Workstation performed: [id number]     CT stroke alert brain    Result Date: 6/19/2023  Impression: No acute "intracranial abnormality  Findings were directly discussed with Ebenezer Parks at 2:41 p m  Workstation performed: [id number]     CTA stroke alert (head/neck)    Result Date: 6/19/2023  Impression: No significant stenosis of the cervical carotid or vertebral arteries No high-grade intracranial stenosis, large vessel occlusion or aneurysm  Findings were directly discussed with Ebenezer Parks at 2:41 p m  Workstation performed: [id number]     Other Diagnostic Testing: I have personally reviewed pertinent reports  ACTIVE MEDICATIONS     Current Facility-Administered Medications   Medication Dose Route Frequency   • acetaminophen (TYLENOL) tablet 975 mg  975 mg Oral Q8H [address]   • acetaminophen (TYLENOL) tablet 975 mg  975 mg Oral Q6H PRN   • atorvastatin (LIPITOR) tablet 80 mg  80 mg Oral QPM   • enoxaparin (LOVENOX) subcutaneous injection 40 mg  40 mg Subcutaneous Daily   • famotidine (PEPCID) tablet 20 mg  20 mg Oral BID   • finasteride (PROSCAR) tablet 5 mg  5 mg Oral Daily   • HYDROmorphone HCl (DILAUDID) injection 0 2 mg  0 2 mg Intravenous Q4H PRN   • metoprolol tartrate (LOPRESSOR) tablet 25 mg  25 mg Oral Daily   • nicotine (NICODERM CQ) 21 mg/24 hr TD 24 hr patch 1 patch  1 patch Transdermal Daily   • oxyCODONE (ROXICODONE) immediate release tablet 10 mg  10 mg Oral Q4H PRN   • oxyCODONE (ROXICODONE) IR tablet 5 mg  5 mg Oral Q4H PRN   • ranolazine (RANEXA) 12 hr tablet 500 mg  500 mg Oral Q12H FRANKI   • sertraline (ZOLOFT) tablet 100 mg  100 mg Oral HS       VTE Pharmacologic Prophylaxis: Enoxaparin (Lovenox)  VTE Mechanical Prophylaxis: sequential compression device    Portions of the record may have been created with voice recognition software  Occasional wrong word or \"sound a like\" substitutions may have occurred due to the inherent limitations of voice recognition software    Read the chart carefully and recognize, using context, where substitutions have occurred   ==  Elva Padilla Peoples, DO  [facility]  Internal Medicine Residency PGY-2

## 2023-06-26 NOTE — TELEPHONE ENCOUNTER
06/30/2023-PT [address]    06/28/2023-PT STILL IN HOSPITAL    06/27/2023-PT STILL IN HOSPITAL    06/26/2023-PT STILL IN HOSPITAL  07/07/2023-2 WK POV W/NURSE  08/10/2023-6 WK POV W/FELICIA [facility] please arrange 2-week POV with RN for incision check and 6 weeks POV with Dr Candis Leary (no imaging)     Surgery on 6/23 for lumbar spine decompression

## 2023-06-26 NOTE — PROGRESS NOTES
"Progress Note - Cardiology   [facility] [place] 71 [place]  [place] MRN: [id number]  Unit/Bed#: [facility] 708-01 Encounter: [id number]    Assessment:  Principal Problem:    Spinal stenosis of lumbar region with neurogenic claudication  Active Problems:    CAD (coronary artery disease)    Pre-diabetes    HLD (hyperlipidemia)    HTN (hypertension)    Tobacco use    BPH (benign prostatic hyperplasia)    Schizophrenia (HCC)    Coronary artery disease, prior CABG  Recently no outpatient cardiology follow-up  Presented with symptoms of spinal stenosis, now status post lumbar decompression 6-23  Preoperative, had a pharmacologic nuclear stress test with old infarct with mild fidel-infarct ischemia  Tolerated the surgery well from a cardiac standpoint  LVEF low normal       Plan:    He is going to remain off of aspirin for total of 2 weeks with plans to resume July 8  He is otherwise on 80 mg of atorvastatin, metoprolol 25 twice daily, and Ranexa was added for chronic stable angina  No high risk abnormality noted on his nuclear stress test, therefore no immediate plans for cardiac catheterization  Hemodynamically doing well  Cardiology team will sign off  Outpatient follow-up will be arranged with his prior cardiologist, Dr Charity Etienne  Subjective/Objective     Subjective:   Denies any cardiac complaints  He is having back pain but nothing in the chest  Denies any shortness of breath, PND, orthopnea, edema  His surgery was uncomplicated from a cardiac standpoint  Message was already sent to the office to have him reestablish with his cardiologist after discharge      Objective:    Vitals: /61   Pulse 82   Temp 98 3 °F (36 8 °C)   Resp 15   Ht 5' 9\" (1 753 m)   Wt 72 1 kg (159 lb)   SpO2 97%   BMI 23 48 kg/m²   Vitals:    06/21/23 1015 06/22/23 1114   Weight: 72 1 kg (159 lb) 72 1 kg (159 lb)     Orthostatic Blood Pressures    Flowsheet Row Most Recent Value   Blood Pressure 106/61 filed at 06/26/2023 0831 " Patient Position - Orthostatic VS Lying filed at 06/25/2023 1535            Intake/Output Summary (Last 24 hours) at 6/26/2023 1226  Last data filed at 6/26/2023 0801  Gross per 24 hour   Intake 720 ml   Output 1545 ml   Net -825 ml     Physical Exam:   General appearance: Uncomfortable with pain  Head: Normocephalic, without obvious abnormality, atraumatic  Neck: no carotid bruit, no JVD and supple, symmetrical, trachea midline  Lungs: clear to auscultation bilaterally  Normal air entry  Normal effort  Heart: S1, S2 normal and no S3 or S4  No murmurs    Abdomen: soft, non-tender; bowel sounds normal; no masses,  no organomegaly  Extremities: extremities normal, atraumatic, no cyanosis or edema  Pulses: 2+ and symmetric bilaterally  Skin: Skin color, texture, turgor normal  No rashes or lesions    Medications:    Current Facility-Administered Medications:   •  acetaminophen (TYLENOL) tablet 975 mg, 975 mg, Oral, Q8H [address], Vita March PA-C, 975 mg at 06/26/23 0541  •  acetaminophen (TYLENOL) tablet 975 mg, 975 mg, Oral, Q6H PRN, Cynthia Cristina MD  •  atorvastatin (LIPITOR) tablet 80 mg, 80 mg, Oral, QPM, Joshua Lion MD, 80 mg at 06/25/23 1730  •  enoxaparin (LOVENOX) subcutaneous injection 40 mg, 40 mg, Subcutaneous, Daily, Elva Houston DO, 40 mg at 06/26/23 0441  •  famotidine (PEPCID) tablet 20 mg, 20 mg, Oral, BID, Tanisha Cuenca PA-C, 20 mg at 06/25/23 1730  •  finasteride (PROSCAR) tablet 5 mg, 5 mg, Oral, Daily, Tanisha Cuenca PA-C, 5 mg at 06/26/23 5846  •  HYDROmorphone HCl (DILAUDID) injection 0 2 mg, 0 2 mg, Intravenous, Q4H PRN, Tanisha Cuenca PA-C, 0 2 mg at 06/24/23 1803  •  metoprolol tartrate (LOPRESSOR) tablet 25 mg, 25 mg, Oral, Daily, Tanisha Cuenca PA-C, 25 mg at 06/26/23 4125  •  nicotine (NICODERM CQ) 21 mg/24 hr TD 24 hr patch 1 patch, 1 patch, Transdermal, Daily, Tanisha Cuenca PA-C, 1 patch at 06/26/23 9862  •  oxyCODONE (ROXICODONE) immediate release tablet 10 mg, 10 mg, Oral, Q4H PRN, Elva Houston, DO  •  oxyCODONE (ROXICODONE) IR tablet 5 mg, 5 mg, Oral, Q4H PRN, Elva Houston, DO  •  ranolazine (RANEXA) 12 hr tablet 500 mg, 500 mg, Oral, Q12H [address], Daphne Chaparro MD, 500 mg at 06/26/23 7657  •  sertraline (ZOLOFT) tablet 100 mg, 100 mg, Oral, HS, Belem Wallace PA-C, 100 mg at 06/25/23 2213    Lab Results:      Results from last 7 days   Lab Units 06/26/23  0546 06/25/23  0454 06/24/23  0600   WBC Thousand/uL 9 72 9 68 10 87*   HEMOGLOBIN g/dL 12 3 11 8* 13 4   HEMATOCRIT % 37 8 35 1* 39 7   PLATELETS Thousands/uL 245 253 296     Results from last 7 days   Lab Units 06/20/23  0500   TRIGLYCERIDES mg/dL 109   HDL mg/dL 31*     Results from last 7 days   Lab Units 06/26/23  0546 06/25/23  0454 06/24/23  0600 06/23/23  0456 06/21/23  0523 06/20/23  0500   SODIUM mmol/L 139 141 140 140   < > 144   POTASSIUM mmol/L 3 9 4 3 4 7 4 1   < > 3 8   CHLORIDE mmol/L 111* 117* 116* 114*   < > 117*   CO2 mmol/L 27 26 23 27   < > 24   BUN mg/dL 13 15 19 19   < > 17   CREATININE mg/dL 0 89 0 91 1 15 1 02   < > 0 88   CALCIUM mg/dL 8 9 8 2* 8 3 8 8   < > 9 0   ALK PHOS U/L  --   --   --  82  --  82   ALT U/L  --   --   --  30  --  16   AST U/L  --   --   --  30  --  20    < > = values in this interval not displayed  Results from last 7 days   Lab Units 06/24/23  0600 06/23/23  0456 06/19/23  1420   INR  1 00 0 96 1 04   PTT seconds 25 29 24     Results from last 7 days   Lab Units 06/20/23  0500   MAGNESIUM mg/dL 2 0     Nuc stress 6/22/23:  Stress ECG: The ECG was negative for ischemia  The stress ECG is negative for ischemia after pharmacologic vasodilation, without reproduction of symptoms  •  Perfusion: There is a left ventricular perfusion defect that is small to medium in size with moderate reduction in uptake present in the apical anterior, septal and apex location(s) that is predominantly fixed    •  Stress Function: Left ventricular function post-stress is abnormal  There was a single regional abnormality during stress  Stress ejection fraction is 47 %  There is a defect in the apical location(s)  The defect has moderately reduced function  •  Stress Combined Conclusion: There is a probable infarct      Abnormal SPECT imaging consistent with prior small apical infarction and small territory of fidel-infarct ischemia  This may be partially attributable to attenuation artifact although gated imaging reveals wall motion abnormality in this territory  Infarct would be consistent with wall motion abnormality reported on recent echo dated 6/21/2023  There is a fixed apical defect on SPECT dated 2/13/2015 attributed to artifact  If present finding is infarct, it may have been present since 2015 SPECT  Echo 6/21/23:   Left Ventricle: Left ventricular cavity size is normal  Wall thickness is normal  The left ventricular ejection fraction is 50%  Systolic function is low normal  Global longitudinal strain is reduced at -15%  Diastolic function is normal   •  The following segments are hypokinetic: apical septal   •  All other segments are normal   •  Left Atrium: The atrium is mildly dilated  •  Aortic Valve: There is aortic valve sclerosis  •  Mitral Valve: There is mild regurgitation  •  Tricuspid Valve: There is mild regurgitation  The right ventricular systolic pressure is mildly elevated  The estimated right ventricular systolic pressure is 57 17 mmHg

## 2023-06-26 NOTE — RESTORATIVE TECHNICIAN NOTE
Restorative Technician Note      Patient Name: Amanda Bunn     Note Type: Mobility  Patient Position Upon Consult: Supine  Activity Performed: Ambulated; Dangled; Stood  Assistive Device: Other (Comment) (A x1)  Education Provided: Yes  Patient Position at End of Consult: Supine;  All needs within reach; Bed/Chair alarm activated      Skylar RICKETTS, Restorative Technician, [employer]

## 2023-06-27 LAB
ANION GAP SERPL CALCULATED.3IONS-SCNC: 3 MMOL/L
BASOPHILS # BLD AUTO: 0.02 THOUSANDS/ÂΜL (ref 0–0.1)
BASOPHILS NFR BLD AUTO: 0 % (ref 0–1)
BUN SERPL-MCNC: 14 MG/DL (ref 5–25)
CALCIUM SERPL-MCNC: 9.1 MG/DL (ref 8.3–10.1)
CHLORIDE SERPL-SCNC: 108 MMOL/L (ref 96–108)
CO2 SERPL-SCNC: 27 MMOL/L (ref 21–32)
CREAT SERPL-MCNC: 0.95 MG/DL (ref 0.6–1.3)
EOSINOPHIL # BLD AUTO: 0.17 THOUSAND/ÂΜL (ref 0–0.61)
EOSINOPHIL NFR BLD AUTO: 2 % (ref 0–6)
ERYTHROCYTE [DISTWIDTH] IN BLOOD BY AUTOMATED COUNT: 13.8 % (ref 11.6–15.1)
GFR SERPL CREATININE-BSD FRML MDRD: 80 ML/MIN/1.73SQ M
GLUCOSE SERPL-MCNC: 94 MG/DL (ref 65–140)
HCT VFR BLD AUTO: 40 % (ref 36.5–49.3)
HGB BLD-MCNC: 13.2 G/DL (ref 12–17)
IMM GRANULOCYTES # BLD AUTO: 0.02 THOUSAND/UL (ref 0–0.2)
IMM GRANULOCYTES NFR BLD AUTO: 0 % (ref 0–2)
LYMPHOCYTES # BLD AUTO: 2.81 THOUSANDS/ÂΜL (ref 0.6–4.47)
LYMPHOCYTES NFR BLD AUTO: 31 % (ref 14–44)
MCH RBC QN AUTO: 31.3 PG (ref 26.8–34.3)
MCHC RBC AUTO-ENTMCNC: 33 G/DL (ref 31.4–37.4)
MCV RBC AUTO: 95 FL (ref 82–98)
MONOCYTES # BLD AUTO: 0.89 THOUSAND/ÂΜL (ref 0.17–1.22)
MONOCYTES NFR BLD AUTO: 10 % (ref 4–12)
NEUTROPHILS # BLD AUTO: 5.18 THOUSANDS/ÂΜL (ref 1.85–7.62)
NEUTS SEG NFR BLD AUTO: 57 % (ref 43–75)
NRBC BLD AUTO-RTO: 0 /100 WBCS
PLATELET # BLD AUTO: 281 THOUSANDS/UL (ref 149–390)
PMV BLD AUTO: 10.1 FL (ref 8.9–12.7)
POTASSIUM SERPL-SCNC: 4.1 MMOL/L (ref 3.5–5.3)
RBC # BLD AUTO: 4.22 MILLION/UL (ref 3.88–5.62)
SODIUM SERPL-SCNC: 138 MMOL/L (ref 135–147)
WBC # BLD AUTO: 9.09 THOUSAND/UL (ref 4.31–10.16)

## 2023-06-27 PROCEDURE — 85025 COMPLETE CBC W/AUTO DIFF WBC: CPT | Performed by: STUDENT IN AN ORGANIZED HEALTH CARE EDUCATION/TRAINING PROGRAM

## 2023-06-27 PROCEDURE — 99232 SBSQ HOSP IP/OBS MODERATE 35: CPT | Performed by: INTERNAL MEDICINE

## 2023-06-27 PROCEDURE — 80048 BASIC METABOLIC PNL TOTAL CA: CPT | Performed by: STUDENT IN AN ORGANIZED HEALTH CARE EDUCATION/TRAINING PROGRAM

## 2023-06-27 PROCEDURE — 97164 PT RE-EVAL EST PLAN CARE: CPT

## 2023-06-27 RX ORDER — AMOXICILLIN 250 MG
2 CAPSULE ORAL ONCE
Status: COMPLETED | OUTPATIENT
Start: 2023-06-27 | End: 2023-06-27

## 2023-06-27 RX ORDER — POLYETHYLENE GLYCOL 3350 17 G/17G
17 POWDER, FOR SOLUTION ORAL
Status: DISCONTINUED | OUTPATIENT
Start: 2023-06-27 | End: 2023-07-01 | Stop reason: HOSPADM

## 2023-06-27 RX ORDER — BISACODYL 5 MG/1
10 TABLET, DELAYED RELEASE ORAL ONCE
Status: COMPLETED | OUTPATIENT
Start: 2023-06-27 | End: 2023-06-27

## 2023-06-27 RX ADMIN — BISACODYL 10 MG: 5 TABLET, COATED ORAL at 14:29

## 2023-06-27 RX ADMIN — FAMOTIDINE 20 MG: 20 TABLET, FILM COATED ORAL at 22:59

## 2023-06-27 RX ADMIN — FAMOTIDINE 20 MG: 20 TABLET, FILM COATED ORAL at 16:23

## 2023-06-27 RX ADMIN — FINASTERIDE 5 MG: 5 TABLET, FILM COATED ORAL at 08:05

## 2023-06-27 RX ADMIN — POLYETHYLENE GLYCOL 3350 17 G: 17 POWDER, FOR SOLUTION ORAL at 21:44

## 2023-06-27 RX ADMIN — SENNOSIDES AND DOCUSATE SODIUM 2 TABLET: 50; 8.6 TABLET ORAL at 09:35

## 2023-06-27 RX ADMIN — RANOLAZINE 500 MG: 500 TABLET, FILM COATED, EXTENDED RELEASE ORAL at 21:44

## 2023-06-27 RX ADMIN — METOPROLOL TARTRATE 25 MG: 25 TABLET, FILM COATED ORAL at 08:05

## 2023-06-27 RX ADMIN — ATORVASTATIN CALCIUM 80 MG: 80 TABLET, FILM COATED ORAL at 16:23

## 2023-06-27 RX ADMIN — NICOTINE 1 PATCH: 21 PATCH, EXTENDED RELEASE TRANSDERMAL at 08:05

## 2023-06-27 RX ADMIN — ACETAMINOPHEN 975 MG: 325 TABLET, FILM COATED ORAL at 12:55

## 2023-06-27 RX ADMIN — ENOXAPARIN SODIUM 40 MG: 40 INJECTION SUBCUTANEOUS at 08:05

## 2023-06-27 RX ADMIN — ACETAMINOPHEN 975 MG: 325 TABLET, FILM COATED ORAL at 05:24

## 2023-06-27 RX ADMIN — RANOLAZINE 500 MG: 500 TABLET, FILM COATED, EXTENDED RELEASE ORAL at 08:05

## 2023-06-27 RX ADMIN — SERTRALINE HYDROCHLORIDE 100 MG: 100 TABLET ORAL at 21:44

## 2023-06-27 RX ADMIN — ACETAMINOPHEN 975 MG: 325 TABLET, FILM COATED ORAL at 21:44

## 2023-06-27 NOTE — CASE MANAGEMENT
Pauly Dhillon 51 received request for authorization from Care Manager  Authorization request for: [address] Name: [address] [place] [address] Brendon Kemp  NPI: [id number]  Facility MD: Wanda Duong  NPI: [id number]  Authorization initiated by contacting insurance: [facility] Via: Phone  Pending Reference #:  [id number]  Clinicals submitted via:  Fax

## 2023-06-27 NOTE — PLAN OF CARE
Problem: MOBILITY - ADULT  Goal: Maintain or return to baseline ADL function  Description: INTERVENTIONS:  -  Assess patient's ability to carry out ADLs; assess patient's baseline for ADL function and identify physical deficits which impact ability to perform ADLs (bathing, care of mouth/teeth, toileting, grooming, dressing, etc )  - Assess/evaluate cause of self-care deficits   - Assess range of motion  - Assess patient's mobility; develop plan if impaired  - Assess patient's need for assistive devices and provide as appropriate  - Encourage maximum independence but intervene and supervise when necessary  - Involve family in performance of ADLs  - Assess for home care needs following discharge   - Consider OT consult to assist with ADL evaluation and planning for discharge  - Provide patient education as appropriate  Outcome: Progressing     Problem: NEUROSENSORY - ADULT  Goal: Achieves stable or improved neurological status  Description: INTERVENTIONS  - Monitor and report changes in neurological status  - Monitor vital signs such as temperature, blood pressure, glucose, and any other labs ordered   - Initiate measures to prevent increased intracranial pressure  - Monitor for seizure activity and implement precautions if appropriate      Outcome: Progressing     Problem: MUSCULOSKELETAL - ADULT  Goal: Maintain or return mobility to safest level of function  Description: INTERVENTIONS:  - Assess patient's ability to carry out ADLs; assess patient's baseline for ADL function and identify physical deficits which impact ability to perform ADLs (bathing, care of mouth/teeth, toileting, grooming, dressing, etc )  - Assess/evaluate cause of self-care deficits   - Assess range of motion  - Assess patient's mobility  - Assess patient's need for assistive devices and provide as appropriate  - Encourage maximum independence but intervene and supervise when necessary  - Involve family in performance of ADLs  - Assess for home care needs following discharge   - Consider OT consult to assist with ADL evaluation and planning for discharge  - Provide patient education as appropriate  Outcome: Progressing     Problem: PAIN - ADULT  Goal: Verbalizes/displays adequate comfort level or baseline comfort level  Description: Interventions:  - Encourage patient to monitor pain and request assistance  - Assess pain using appropriate pain scale  - Administer analgesics based on type and severity of pain and evaluate response  - Implement non-pharmacological measures as appropriate and evaluate response  - Consider cultural and social influences on pain and pain management  - Notify physician/advanced practitioner if interventions unsuccessful or patient reports new pain  Outcome: Progressing     Problem: INFECTION - ADULT  Goal: Absence or prevention of progression during hospitalization  Description: INTERVENTIONS:  - Assess and monitor for signs and symptoms of infection  - Monitor lab/diagnostic results  - Monitor all insertion sites, i e  indwelling lines, tubes, and drains  - Monitor endotracheal if appropriate and nasal secretions for changes in amount and color  - Stark appropriate cooling/warming therapies per order  - Administer medications as ordered  - Instruct and encourage patient and family to use good hand hygiene technique  - Identify and instruct in appropriate isolation precautions for identified infection/condition  Outcome: Progressing     Problem: SAFETY ADULT  Goal: Maintain or return to baseline ADL function  Description: INTERVENTIONS:  -  Assess patient's ability to carry out ADLs; assess patient's baseline for ADL function and identify physical deficits which impact ability to perform ADLs (bathing, care of mouth/teeth, toileting, grooming, dressing, etc )  - Assess/evaluate cause of self-care deficits   - Assess range of motion  - Assess patient's mobility; develop plan if impaired  - Assess patient's need for assistive devices and provide as appropriate  - Encourage maximum independence but intervene and supervise when necessary  - Involve family in performance of ADLs  - Assess for home care needs following discharge   - Consider OT consult to assist with ADL evaluation and planning for discharge  - Provide patient education as appropriate  Outcome: Progressing

## 2023-06-27 NOTE — ARC ADMISSION
Per CM patient is stable for d/c  Auth can be submitted  Our NPI is  and Dr Flory Herring is   Benefits checked: Pt has no OOP costs just needs prior authorization

## 2023-06-27 NOTE — OP NOTE
OPERATIVE REPORT  PATIENT NAME: Ricky Rowland    :  1950  MRN: [id number]  Pt Location:  OR ROOM 17    SURGERY DATE: 2023    Surgeon(s) and Role:     * Erica Webber MD - Primary    Preop Diagnosis:  Weakness of both lower extremities [R29 898]  Spinal stenosis of lumbar region with neurogenic claudication [M48 062]    Post-Op Diagnosis Codes:     * Weakness of both lower extremities [R29 898]     * Spinal stenosis of lumbar region with neurogenic claudication [M48 062]    Procedure(s):  1) Full L3 and L4 bilateral laminectomies, partial L5 bilateral laminectomies  2) L3-4, 4-5 bilateral foraminotomies  3) Use of fluoroscopy <1hr    Specimen(s):  * No specimens in log *    Estimated Blood Loss:   Minimal    Drains:  Closed/Suction Drain Left Back Accordion 7 Fr  (Active)   Site Description Healing 23 0701   Dressing Status Open to air 23 0701   Drainage Appearance Bloody 23 0701   Status Accordion suction 23 0701   Output (mL) 25 mL 23 0701   Number of days: 4       Anesthesia Type:   General    Operative Indications:  Weakness of both lower extremities [R29 898]  Spinal stenosis of lumbar region with neurogenic claudication [M48 062]    Operative Findings:  Severe L3-4 and L4-5 stenoses due to facet arthropathy and ligamentous hypertrophy    Complications:   None    Procedure and Technique:  After obtaining written informed consent, the patient was brought to the operating room  General endotracheal anesthesia was induced  Roth catheter was then placed  The patient was then flipped prone onto gel rolls in position  Baseline monitoring was obtained  The patient was then given Ancef 2g as perioperative antibiotic  Fluoroscopy was used to paulina the incision  The patient was prepped and draped in the usual sterile fashion  Lidocaine with epinephrine was injected in the surgical site  Surgical time-out was performed         A ten blade was then used to open a midline L3 to L5 incision  Monopolar cautery was then used to dissect down through the dorsal fascia to the spinous process of L3 through 5  Dissection was carried in a subperiosteal manner over the lamina to the medial facet joints  A fluoroscopic X-ray was obtained and we were at L3-4 level  Level confirmation was performed and a second time out was performed  The spinous process of L3 and L4 were removed with Leksell rongeurs  A high speed drill with a 5mm round bit was used to drill the lamina of L3 and L4 to their insertion points  The laminectomy was completed with Kerrison rongeurs  The rostral lamina of L5 were removed using Kerrison rongeurs  Bilateral medial facetectomies were performed and the lateral recesses were decompressed  There was severe central stenoses at L3-4 and L4-5 due to ligamentous hypertrophy and facet arthropathy  Once the laminectomy was completed, the thecal sac was noted to be decompressed without any areas of compression  We then proceeded with closing  A 10 Danish Hemovac drain was tunneled in a subfascial fashion and secured in place with a 4-0 monocryl suture  Next the muscle and fascia were closed with 0 Vicryl  The deep dermal layer was closed with an inverted 2 0 Vicryl  The skin was closed with a 4-0 stratafix suture in a subcuticular fashion  2-0 prolenes were then placed in a vertical mattress fashion  Sterile dressing was applied  Surgical sign-out was performed  No qualified resident was available  I performed the procedure by myself  I was present for the entire procedure      Patient Disposition:  PACU         SIGNATURE: Scarlet Ross MD  DATE: June 27, 2023  TIME: 9:49 AM

## 2023-06-27 NOTE — DISCHARGE SUMMARY
INTERNAL MEDICINE RESIDENCY DISCHARGE SUMMARY     Taya Tinajero   71 y o  male  MRN: [id number]  Room/Bed: [facility] 70/[facility] [address] [place] [address]   Encounter: [id number]    Principal Problem:    Spinal stenosis of lumbar region with neurogenic claudication  Active Problems:    CAD (coronary artery disease)    HLD (hyperlipidemia)    Pre-diabetes    HTN (hypertension)    Tobacco use    BPH (benign prostatic hyperplasia)    Schizophrenia (HCC)      * Spinal stenosis of lumbar region with neurogenic claudication  Assessment & Plan  Taya Tinajero is a 71 y o  male  with pertinent history lumbar spondylosis, neurogenic claudication, CAD s/p CABG x 4, HTN, HLD, hx of PE (Not on thinners) who presented as stroke alert on 6/19/23 given acute onset worsening bilateral lower extremity weakness  Denies any urinary symptoms or saddle anesthesia  Initial NIHSS of 5 and LKW 1200  Initial BP on arrival was Blood Pressure: 110/53  As a result of lower suspicion for stroke and potential need for urgent surgery patient was determined to not be a candidate for tPA  • F/u exam:  HF strength 4-/5 R, 4/5 L  No ataxia or dysmetria noted on exam  Proprioception normal  Difficulty standing from a seated position without assistance  Romberg positive  • BP over last 24 hours: Blood Pressure: 136/79  current BP: Blood Pressure: 136/79     Imaging:  • CTH: No acute intracranial abnormality  • CTA: No significant stenosis of the cervical carotid or vertebral arteries  No high-grade intracranial stenosis, large vessel occlusion or aneurysm  • MRI brain: No MR evidence of acute ischemia  Small right mastoid effusion  • MRI L spine: Spondylotic degenerative changes again noted similar to the prior study most significantly at L3-4 and L4-5 where severe central stenosis is identified  Moderate to severe lateral recess stenosis also identified as described   Moderate central stenosis and mild bilateral foraminal narrowing noted at L2-3 with mild foraminal narrowing at L5-S1 bilaterally  • Echocardiogram: EF 50% with mild LA dilation   • Telemetry: monitor  • LDL 71   • HBA1C 5 8     Impression: At this time history and physical along with available imaging concerning for worsening weakness in the setting of known spinal stenosis  One month ago patient strength 5/5 throughout vs now significantly week in bilateral HF        Plan:  • S/p L3-4 and L4-5 decompression with Dr Eusebia White 6/23  • Lumbar drain removed   • Stable for d/c to rehab per NSY   • Patient will have postop visits at 2-week for incision check  and 6 weeks with Dr Malinda Pineda  • Follow up with neurology as outpatient in 3 months  • On DVT ppx   • Family able to help with recovery  • PMR on board, plan for acute post surgical rehab   • Neuro checks  • PT/OT//PMR consults appreciated     CAD (coronary artery disease)  Assessment & Plan  Patient is a 1 PPD smoker with hx of CAD s/p CABG x 4  Complains of angina with exertion    Plan:  · Patient has small area of of ischemia on NM SPECT  · Cardiology consult  · Continue Metoprolol 25 mg BID  · Ranexa 500 mg BID for angina   · Atorvastatin to 80 mg daily  · Restart ASA 81 daily 2 weeks post op (July 8)  · Patient will re-establish with cardiology o/p after d/c     HLD (hyperlipidemia)  Assessment & Plan  Last lipid panel in 2021  · Continue atorvastatin 80 mg qd  · Repeat lipid panel showing HDL at 31, LDL at 71, triglycerides at 109    Pre-diabetes  Assessment & Plan  Last A1c recently 5 8  · Follow up A1c  · Goal BG <200, can add SSI if needed to maintain goal BG    HTN (hypertension)  Assessment & Plan  Patient appears to be on lisinopril 5 mg daily and Lopressor 25 mg daily at home  -Has been mostly normotensive with blood pressure systolics between 375-506 mmHg  -Hold lisinopril for now    Tobacco use  Assessment & Plan  1 PPD smoker    · Continue nicotine patch    BPH (benign prostatic "hyperplasia)  Assessment & Plan  Continue home finasteride 5 mg qd    Schizophrenia ([facility] [address] )  Assessment & Plan  Patient's home medication list includes Abilify, Atarax, Risperdal, and Zoloft  Reviewed medications with patient and he reports of these, he only takes Zoloft  · Continue Zoloft 100 mg qd  · Avoid gabapentin - which is on patient's home medication list; he says this causes him to have suicidal thoughts    HISTORY OF PRESENT ILLNESS (PER H&P)     \"Samm Armijo is a 71 y o  male  with pertinent history lumbar spondylosis, neurogenic claudication, CAD s/p CABG x 4, HTN, HLD, hx of PE (Not on thinners) who presents as a stroke alert for acute onset bilateral lower extremity weakness  Patient states that symptom onset was around noon  Patient was changing his car tire when he stood up he experienced a strange smell  He had a cigarette  When he entered his house he experienced an acute sensation of the room spinning and shakiness  Of note, he had not eaten anything to eat  Throughout the day had only had coffee and cigarettes  Around that time he experienced worsening of bilateral lower extremity weakness  He denies and urinary symptoms or saddle anesthesia       On arrival to the emergency department, /53, HR 95, RR 20 and SPO2 98% on room air  Stroke alert was called at 1418  NIHSS of 5 given for some effort against gravity in BLE and visual field deficits  HF strength 4-/5 R, 4/5 L  No ataxia or dysmetria noted on exam  Proprioception normal  Difficulty standing from a seated position without assistance  Romberg positive  CTH negative for acute intracranial abnormalities  CTA without significant vessel disease  Patient was not deemed a candidate for thrombolytics given clinical picture       In terms of his PMH, patient with a history of lumbar spondylosis with neurogenic claudication  He reports more recent increase in frequency of falls  He is followed by neurosurgery as outpatient    Patient " "was determined to be a candidate for L3-L5 bilateral laminectomies/foraminotomies  Surgery was cancelled giving his living situation  Patient reported he lives on a second floor and it would be hard for him to ambulate up and down stairs  Patient was last seen by neurosx on 5/19/2023  As per note, at that time patient exam 5/5 strength  Other than this, patient is a 1 PPD smoker with multiple vascular risk factors  \"    [address]     Cardiology was consulted due to recent reports of angina with exertion  He underwent NM SPECT exam and per cardiology this showed \"There is a small area of apical infarct on NM SPECT with mild fidel-infarct ischemia but no major ischemia seen  This area is hypokinetic on echo and has a perfusion defect on 2015 SPECT  Unlikely new  He may need further titration of cardiac medications post-op due to some presumed stable angina but findings do not confer prohibitive risk for surgery  Acceptable to proceed with surgery with modestly elevated but acceptable cardiac risk\" The patient was cleared by cardiology for neurosurgical intervention and started on Ranexa 500 mg BD for angina  ASA was held prior to proceadure and will be held up until 2 weeks post rugery (7/8)  Patient underwent L3-4 and L4-5 decompression with Dr Rios on 6/23  Post operatively, lumbar drain was removed on 6/26  No post op complications during hosptial course  Patient was evaluated by PT/OT and deemed appropriate to short term rehab       DISCHARGE PHYSICAL EXAM      Vitals:    06/29/23 0725   BP: 119/72   Pulse: 88   Resp:    Temp: 97 7 °F (36 5 °C)   SpO2: 98%           DISCHARGE INFORMATION     PCP at Discharge: No PCP patient will establish care with [facility] after rehab - information provided in AVS     Admitting Provider: Aure Hare MD  Admission Date: 6/19/2023    Discharge Provider: Teagan Valle MD  Discharge Date: 06/30/2023    Discharge Disposition: Home/Self " Care  Discharge Condition: good  Discharge with Lines: no    Discharge Diet: regular diet  Activity Restrictions: as tolerated  Test Results Pending at Discharge: none    Discharge Diagnoses:  Principal Problem:    Spinal stenosis of lumbar region with neurogenic claudication  Active Problems:    CAD (coronary artery disease)    Pre-diabetes    HLD (hyperlipidemia)    HTN (hypertension)    Tobacco use    BPH (benign prostatic hyperplasia)    Schizophrenia ([facility] [address] )  Resolved Problems:    NARDA (acute kidney injury) ([facility] )      Consulting Providers:  · Cardiology - Gissel Lion  · Neurosurgery - Joy Kearney  · Neurology - Richard Mcnally MD   · PMR - Uma Melendez,       Diagnostic & Therapeutic Procedures Performed:  XR spine lumbosacral 1 view    Result Date: 6/23/2023  Impression: Fluoroscopic guidance provided for procedure guidance  Please refer to the separate procedure notes for additional details  Workstation performed: [id number]     MRI lumbar spine wo contrast    Result Date: 6/20/2023  Impression: Spondylotic degenerative changes again noted similar to the prior study most significantly at L3-4 and L4-5 where severe central stenosis is identified  Moderate to severe lateral recess stenosis also identified as described  Moderate central stenosis and mild bilateral foraminal narrowing noted at L2-3 with mild foraminal narrowing at L5-S1 bilaterally  Workstation performed: [id number]     MRI brain wo contrast    Result Date: 6/20/2023  Impression: No MR evidence of acute ischemia  Small right mastoid effusion  Workstation performed: [id number]     CT stroke alert brain    Result Date: 6/19/2023  Impression: No acute intracranial abnormality  Findings were directly discussed with Richard Mcnally at 2:41 p m   Workstation performed: [id number]     CTA stroke alert (head/neck)    Result Date: 6/19/2023  Impression: No significant stenosis of the cervical carotid or vertebral arteries No high-grade intracranial stenosis, large vessel occlusion or aneurysm  Findings were directly discussed with Jemal Linda at 2:41 p m  Workstation performed: [id number]       Code Status: Level 1 - Full Code  Advance Directive & Living Will: <no information>  Power of :    POLST:      Medications:  Current Discharge Medication List        Current Discharge Medication List        Current Discharge Medication List      CONTINUE these medications which have NOT CHANGED    Details   Acetaminophen Extra Strength 500 MG tablet       ARIPiprazole (ABILIFY) 5 mg tablet Take 5 mg by mouth daily      Arthritis Pain Relief 650 MG CR tablet take 2 tablets by mouth twice a day if needed      aspirin 81 MG tablet Take 81 mg by mouth daily  Aspirin Low Dose 81 MG EC tablet       atorvastatin (LIPITOR) 40 mg tablet Take 40 mg by mouth daily      erythromycin (ILOTYCIN) ophthalmic ointment Place a 1/2 inch ribbon of ointment into the lower eyelid  Qty: 3 5 g, Refills: 0    Associated Diagnoses: Conjunctivitis      finasteride (PROSCAR) 5 mg tablet Take 5 mg by mouth daily      gabapentin (NEURONTIN) 100 mg capsule Take 3 capsules (300 mg total) by mouth daily at bedtime  Qty: 90 capsule, Refills: 1    Associated Diagnoses: Lumbar radiculopathy      hydrOXYzine HCL (ATARAX) 25 mg tablet Take 1 tablet twice a day by oral route as needed for 30 days        lisinopril (ZESTRIL) 5 mg tablet       metoprolol tartrate (LOPRESSOR) 25 mg tablet Take 25 mg by mouth daily        naproxen (NAPROSYN) 250 mg tablet take 1 tablet by mouth twice a day if needed for 10 days      nicotine (NICODERM CQ) 21 mg/24 hr TD 24 hr patch apply 1 patch to CLEAN, DRY, AND INTACT SKIN once daily      nitroglycerin (NITRODUR) 0 4 mg/hr Place 1 patch on the skin 2 (two) times a day as needed      RA Melatonin 3 MG take 1 tablet by mouth EVERY DAY AT BEDTIME FOR 90 DAYS      risperiDONE (RisperDAL) 0 5 mg tablet       sertraline (ZOLOFT) 100 mg tablet take 1 tablet by mouth EVERY DAY IN THE EVENING FOR 90 DAYS      triamcinolone (KENALOG) 0 1 % ointment APPLY A THIN LAYER TO THE AFFECTED AREA(S) BY TOPICAL ROUTE TWO TIMES PER DAY AS NEEDED             Allergies: Allergies   Allergen Reactions   • Gabapentin Anxiety     Suicidal thoughts        FOLLOW-UP     PCP Outpatient Follow-up:  No PCP patient will establish care with [facility] after rehab - information provided in Madyson Mathias MD  Internal Medicine [phone] [facility] [address] [place] [address] an appointment as soon as possible for a visit in 2 week(s)  Appointment:   Instructions: to establish care with a family medicine doctor and hospital follow up         Consulting Providers Follow-up:   Status Date Arrival Time Visit Type Length Department Provider    Bobby 7/7/2023 10:30 AM Arrive by 10:15 AM NURSE VISIT PG 30 min Joby John   Patient Instructions:       Location Instructions:    [facility], [address] [facility], [place], [place], [address] [phone]   Appointment Notes:    2 WK POV INCISION CHECK PER Gwenith Dress         Active Issues Requiring Follow-up:   S/p lumbar decompressive surgery - F/u w NSY as provided above     CAD w/ exertional angina - F/w w cardiology outpatient, per cards, their office will contact patient to establish care  Information provided in AVS    MD Sukh Moran MD  Cardiology Multidisciplinary [phone] [facility] [address] an appointment as soon as possible for a visit in 2 week(s)  Appointment:   Erica[address] Arminda Alvarez with cardiology       Discharge Statement:   I spent 30 minutes minutes discharging the patient  This time was spent on the day of discharge   I "had direct contact with the patient on the day of discharge  Additional documentation is required if more than 30 minutes were spent on discharge  Portions of the record may have been created with voice recognition software  Occasional wrong word or \"sound a like\" substitutions may have occurred due to the inherent limitations of voice recognition software  Read the chart carefully and recognize, using context, where substitutions have occurred        Joan Hurtado MD  Internal Medicine Residency PGY-2  [facility]        "

## 2023-06-27 NOTE — PROGRESS NOTES
INTERNAL MEDICINE RESIDENCY PROGRESS NOTE     Name: Shiraz Velásquez   Age & Sex: 71 y o  male   MRN: [id number]  Unit/Bed#: [facility] 708-01   Encounter: [id number]  Team: SOD Team B     PATIENT INFORMATION     Name: Shiraz Velásquez   Age & Sex: 71 y o  male   MRN: [id number]  Hospital Stay Days: 8    ASSESSMENT/PLAN     Principal Problem:    Spinal stenosis of lumbar region with neurogenic claudication  Active Problems:    CAD (coronary artery disease)    Pre-diabetes    HLD (hyperlipidemia)    HTN (hypertension)    Tobacco use    BPH (benign prostatic hyperplasia)    Schizophrenia ([facility] [address] )      * Spinal stenosis of lumbar region with neurogenic claudication  Assessment & Plan  Shiraz Velásquez is a 71 y o  male  with pertinent history lumbar spondylosis, neurogenic claudication, CAD s/p CABG x 4, HTN, HLD, hx of PE (Not on thinners) who presented as stroke alert on 6/19/23 given acute onset worsening bilateral lower extremity weakness  Denies any urinary symptoms or saddle anesthesia  Initial NIHSS of 5 and LKW 1200  Initial BP on arrival was Blood Pressure: 110/53  As a result of lower suspicion for stroke and potential need for urgent surgery patient was determined to not be a candidate for tPA  • F/u exam:  HF strength 4-/5 R, 4/5 L  No ataxia or dysmetria noted on exam  Proprioception normal  Difficulty standing from a seated position without assistance  Romberg positive  • BP over last 24 hours: Blood Pressure: 136/79  current BP: Blood Pressure: 136/79     Imaging:  • CTH: No acute intracranial abnormality  • CTA: No significant stenosis of the cervical carotid or vertebral arteries  No high-grade intracranial stenosis, large vessel occlusion or aneurysm  • MRI brain: No MR evidence of acute ischemia  Small right mastoid effusion  • MRI L spine: Spondylotic degenerative changes again noted similar to the prior study most significantly at L3-4 and L4-5 where severe central stenosis is identified   Moderate to severe lateral recess stenosis also identified as described  Moderate central stenosis and mild bilateral foraminal narrowing noted at L2-3 with mild foraminal narrowing at L5-S1 bilaterally  • Echocardiogram: EF 50% with mild LA dilation   • Telemetry: monitor  • LDL 71   • HBA1C 5 8     Impression: At this time history and physical along with available imaging concerning for worsening weakness in the setting of known spinal stenosis  One month ago patient strength 5/5 throughout vs now significantly week in bilateral HF        Plan:  • S/p L3-4 and L4-5 decompression with Dr Abby Spangler 6/23  • Lumbar drain removed   • Stable for d/c to rehab per [facility]   • Patient will have postop visits at 2-week for incision check  and 6 weeks with Dr Patricia Kraft  • Follow up with neurology as outpatient in 3 months  • On DVT ppx   • Family able to help with recovery  • PMR on board, plan for acute post surgical rehab   • Neuro checks  • PT/OT//PMR consults appreciated     Schizophrenia [facility])  Assessment & Plan  Patient's home medication list includes Abilify, Atarax, Risperdal, and Zoloft  Reviewed medications with patient and he reports of these, he only takes Zoloft  · Continue Zoloft 100 mg qd  · Avoid gabapentin - which is on patient's home medication list; he says this causes him to have suicidal thoughts    BPH (benign prostatic hyperplasia)  Assessment & Plan  Continue home finasteride 5 mg qd    Tobacco use  Assessment & Plan  1 PPD smoker  · Continue nicotine patch    HTN (hypertension)  Assessment & Plan  Patient appears to be on lisinopril 5 mg daily and Lopressor 25 mg daily at home  -Has been mostly normotensive with blood pressure systolics between 244-589 mmHg  -Hold lisinopril for now    HLD (hyperlipidemia)  Assessment & Plan  Last lipid panel in 2021    · Continue atorvastatin 80 mg qd  · Repeat lipid panel showing HDL at 31, LDL at 71, triglycerides at 109    Pre-diabetes  Assessment & Plan  Last A1c recently 5 8   · Follow up A1c  · Goal BG <200, can add SSI if needed to maintain goal BG    CAD (coronary artery disease)  Assessment & Plan  Patient is a 1 PPD smoker with hx of CAD s/p CABG x 4  Complains of angina with exertion    Plan:  · Patient has small area of of ischemia on NM SPECT  · Cardiology consult  · Continue Metoprolol 25 mg BID  · Ranexa 500 mg BID for angina   · Atorvastatin to 80 mg daily  · Restart ASA 81 daily 2 weeks post op ()  · Patient will re-establish with cardiology o/p after d/c       Disposition: pending placement to [address] [place] [address]     Patient seen and examined  No acute events overnight  Patients pain is controlled  No complaints at this time     OBJECTIVE     Vitals:    23 2156 23 0831 23 1529 23 2210   BP: 120/66 106/61 122/70 121/71   BP Location:   Right arm    Pulse: 88 82 83 70   Resp: 18 15 16 16   Temp: 97 8 °F (36 6 °C) 98 3 °F (36 8 °C) 98 4 °F (36 9 °C) 98 5 °F (36 9 °C)   TempSrc:   Oral    SpO2: 94% 97% 95% 96%   Weight:       Height:          Temperature:   Temp (24hrs), Av 5 °F (36 9 °C), Min:98 4 °F (36 9 °C), Max:98 5 °F (36 9 °C)    Temperature: 98 5 °F (36 9 °C)  Intake & Output:  I/O        0701   0700  0701   0700  0701   0700    P  O  420 300 400    I V  (mL/kg) 1445 8 (20 1)      Total Intake(mL/kg) 1865 8 (25 9) 300 (4 2) 400 (5 5)    Urine (mL/kg/hr) 700 (0 4) 1500 (0 9)     Drains 20 25     Total Output 720 1525     Net +1145 8 -1225 +400           Unmeasured Urine Occurrence 1 x          Weights:   IBW (Ideal Body Weight): 70 7 kg    Body mass index is 23 48 kg/m²  Weight (last 2 days)     None        Physical Exam  Vitals and nursing note reviewed  Constitutional:       General: He is not in acute distress  Appearance: Normal appearance  He is well-developed  HENT:      Head: Normocephalic and atraumatic     Eyes:      Conjunctiva/sclera: Conjunctivae normal    Cardiovascular:      Rate and Rhythm: Normal rate and regular rhythm  Heart sounds: No murmur heard  Pulmonary:      Effort: Pulmonary effort is normal  No respiratory distress  Breath sounds: Normal breath sounds  Abdominal:      Palpations: Abdomen is soft  Tenderness: There is no abdominal tenderness  Musculoskeletal:         General: No swelling  Cervical back: Neck supple  Right lower leg: No edema  Left lower leg: No edema  Skin:     General: Skin is warm and dry  Capillary Refill: Capillary refill takes less than 2 seconds  Neurological:      General: No focal deficit present  Mental Status: He is alert and oriented to person, place, and time  Psychiatric:         Mood and Affect: Mood normal        LABORATORY DATA     Labs: I have personally reviewed pertinent reports  Results from last 7 days   Lab Units 06/27/23 0445 06/26/23  0546 06/25/23  0454   WBC Thousand/uL 9 09 9 72 9 68   HEMOGLOBIN g/dL 13 2 12 3 11 8*   HEMATOCRIT % 40 0 37 8 35 1*   PLATELETS Thousands/uL 281 245 253   NEUTROS PCT % 57 59 66   MONOS PCT % 10 11 9   EOS PCT % 2 2 1      Results from last 7 days   Lab Units 06/27/23 0445 06/26/23 0546 06/25/23 0454 06/24/23  0600 06/23/23  0456   POTASSIUM mmol/L 4 1 3 9 4 3   < > 4 1   CHLORIDE mmol/L 108 111* 117*   < > 114*   CO2 mmol/L 27 27 26   < > 27   BUN mg/dL 14 13 15   < > 19   CREATININE mg/dL 0 95 0 89 0 91   < > 1 02   CALCIUM mg/dL 9 1 8 9 8 2*   < > 8 8   ALK PHOS U/L  --   --   --   --  82   ALT U/L  --   --   --   --  30   AST U/L  --   --   --   --  30    < > = values in this interval not displayed  Results from last 7 days   Lab Units 06/24/23  0600 06/23/23  0456   INR  1 00 0 96   PTT seconds 25 29               IMAGING & DIAGNOSTIC TESTING     Radiology Results: I have personally reviewed pertinent reports  XR spine lumbosacral 1 view    Result Date: 6/23/2023  Impression: Fluoroscopic guidance provided for procedure guidance    Please refer to the separate procedure notes for additional details  Workstation performed: [id number]     MRI lumbar spine wo contrast    Result Date: 6/20/2023  Impression: Spondylotic degenerative changes again noted similar to the prior study most significantly at L3-4 and L4-5 where severe central stenosis is identified  Moderate to severe lateral recess stenosis also identified as described  Moderate central stenosis and mild bilateral foraminal narrowing noted at L2-3 with mild foraminal narrowing at L5-S1 bilaterally  Workstation performed: [id number]     MRI brain wo contrast    Result Date: 6/20/2023  Impression: No MR evidence of acute ischemia  Small right mastoid effusion  Workstation performed: [id number]     CT stroke alert brain    Result Date: 6/19/2023  Impression: No acute intracranial abnormality  Findings were directly discussed with Richard Topeteo at 2:41 p m  Workstation performed: [id number]     CTA stroke alert (head/neck)    Result Date: 6/19/2023  Impression: No significant stenosis of the cervical carotid or vertebral arteries No high-grade intracranial stenosis, large vessel occlusion or aneurysm  Findings were directly discussed with Richard Uli at 2:41 p m  Workstation performed: [id number]     Other Diagnostic Testing: I have personally reviewed pertinent reports      ACTIVE MEDICATIONS     Current Facility-Administered Medications   Medication Dose Route Frequency   • acetaminophen (TYLENOL) tablet 975 mg  975 mg Oral Q8H [facility]   • acetaminophen (TYLENOL) tablet 975 mg  975 mg Oral Q6H PRN   • atorvastatin (LIPITOR) tablet 80 mg  80 mg Oral QPM   • enoxaparin (LOVENOX) subcutaneous injection 40 mg  40 mg Subcutaneous Daily   • famotidine (PEPCID) tablet 20 mg  20 mg Oral BID   • finasteride (PROSCAR) tablet 5 mg  5 mg Oral Daily   • HYDROmorphone HCl (DILAUDID) injection 0 2 mg  0 2 mg Intravenous Q4H PRN   • metoprolol tartrate (LOPRESSOR) tablet 25 mg  25 mg Oral Daily   • nicotine "(NICODERM CQ) 21 mg/24 hr TD 24 hr patch 1 patch  1 patch Transdermal Daily   • oxyCODONE (ROXICODONE) immediate release tablet 10 mg  10 mg Oral Q4H PRN   • oxyCODONE (ROXICODONE) IR tablet 5 mg  5 mg Oral Q4H PRN   • ranolazine (RANEXA) 12 hr tablet 500 mg  500 mg Oral Q12H FRANKI   • sertraline (ZOLOFT) tablet 100 mg  100 mg Oral HS       VTE Pharmacologic Prophylaxis: Enoxaparin (Lovenox)  VTE Mechanical Prophylaxis: sequential compression device    Portions of the record may have been created with voice recognition software  Occasional wrong word or \"sound a like\" substitutions may have occurred due to the inherent limitations of voice recognition software    Read the chart carefully and recognize, using context, where substitutions have occurred   ==  Elva Hamilton, [facility]  Internal Medicine Residency PGY-2       "

## 2023-06-28 LAB
ANION GAP SERPL CALCULATED.3IONS-SCNC: 2 MMOL/L
BASOPHILS # BLD AUTO: 0.03 THOUSANDS/ÂΜL (ref 0–0.1)
BASOPHILS NFR BLD AUTO: 0 % (ref 0–1)
BUN SERPL-MCNC: 14 MG/DL (ref 5–25)
CALCIUM SERPL-MCNC: 9 MG/DL (ref 8.3–10.1)
CHLORIDE SERPL-SCNC: 110 MMOL/L (ref 96–108)
CO2 SERPL-SCNC: 26 MMOL/L (ref 21–32)
CREAT SERPL-MCNC: 1.05 MG/DL (ref 0.6–1.3)
EOSINOPHIL # BLD AUTO: 0.21 THOUSAND/ÂΜL (ref 0–0.61)
EOSINOPHIL NFR BLD AUTO: 2 % (ref 0–6)
ERYTHROCYTE [DISTWIDTH] IN BLOOD BY AUTOMATED COUNT: 13.9 % (ref 11.6–15.1)
GFR SERPL CREATININE-BSD FRML MDRD: 71 ML/MIN/1.73SQ M
GLUCOSE SERPL-MCNC: 99 MG/DL (ref 65–140)
HCT VFR BLD AUTO: 40.1 % (ref 36.5–49.3)
HGB BLD-MCNC: 13.4 G/DL (ref 12–17)
IMM GRANULOCYTES # BLD AUTO: 0.02 THOUSAND/UL (ref 0–0.2)
IMM GRANULOCYTES NFR BLD AUTO: 0 % (ref 0–2)
LYMPHOCYTES # BLD AUTO: 3.26 THOUSANDS/ÂΜL (ref 0.6–4.47)
LYMPHOCYTES NFR BLD AUTO: 38 % (ref 14–44)
MCH RBC QN AUTO: 31 PG (ref 26.8–34.3)
MCHC RBC AUTO-ENTMCNC: 33.4 G/DL (ref 31.4–37.4)
MCV RBC AUTO: 93 FL (ref 82–98)
MONOCYTES # BLD AUTO: 0.89 THOUSAND/ÂΜL (ref 0.17–1.22)
MONOCYTES NFR BLD AUTO: 10 % (ref 4–12)
NEUTROPHILS # BLD AUTO: 4.2 THOUSANDS/ÂΜL (ref 1.85–7.62)
NEUTS SEG NFR BLD AUTO: 50 % (ref 43–75)
NRBC BLD AUTO-RTO: 0 /100 WBCS
PLATELET # BLD AUTO: 286 THOUSANDS/UL (ref 149–390)
PMV BLD AUTO: 9.7 FL (ref 8.9–12.7)
POTASSIUM SERPL-SCNC: 3.9 MMOL/L (ref 3.5–5.3)
RBC # BLD AUTO: 4.32 MILLION/UL (ref 3.88–5.62)
SODIUM SERPL-SCNC: 138 MMOL/L (ref 135–147)
WBC # BLD AUTO: 8.61 THOUSAND/UL (ref 4.31–10.16)

## 2023-06-28 PROCEDURE — 97116 GAIT TRAINING THERAPY: CPT

## 2023-06-28 PROCEDURE — 99232 SBSQ HOSP IP/OBS MODERATE 35: CPT | Performed by: INTERNAL MEDICINE

## 2023-06-28 PROCEDURE — 80048 BASIC METABOLIC PNL TOTAL CA: CPT | Performed by: STUDENT IN AN ORGANIZED HEALTH CARE EDUCATION/TRAINING PROGRAM

## 2023-06-28 PROCEDURE — 97530 THERAPEUTIC ACTIVITIES: CPT

## 2023-06-28 PROCEDURE — 85025 COMPLETE CBC W/AUTO DIFF WBC: CPT | Performed by: STUDENT IN AN ORGANIZED HEALTH CARE EDUCATION/TRAINING PROGRAM

## 2023-06-28 PROCEDURE — 97535 SELF CARE MNGMENT TRAINING: CPT

## 2023-06-28 RX ADMIN — ACETAMINOPHEN 975 MG: 325 TABLET, FILM COATED ORAL at 13:08

## 2023-06-28 RX ADMIN — METOPROLOL TARTRATE 25 MG: 25 TABLET, FILM COATED ORAL at 08:15

## 2023-06-28 RX ADMIN — ACETAMINOPHEN 975 MG: 325 TABLET, FILM COATED ORAL at 21:49

## 2023-06-28 RX ADMIN — RANOLAZINE 500 MG: 500 TABLET, FILM COATED, EXTENDED RELEASE ORAL at 22:00

## 2023-06-28 RX ADMIN — ATORVASTATIN CALCIUM 80 MG: 80 TABLET, FILM COATED ORAL at 16:39

## 2023-06-28 RX ADMIN — POLYETHYLENE GLYCOL 3350 17 G: 17 POWDER, FOR SOLUTION ORAL at 21:50

## 2023-06-28 RX ADMIN — ENOXAPARIN SODIUM 40 MG: 40 INJECTION SUBCUTANEOUS at 08:15

## 2023-06-28 RX ADMIN — RANOLAZINE 500 MG: 500 TABLET, FILM COATED, EXTENDED RELEASE ORAL at 08:15

## 2023-06-28 RX ADMIN — NICOTINE 1 PATCH: 21 PATCH, EXTENDED RELEASE TRANSDERMAL at 08:15

## 2023-06-28 RX ADMIN — FAMOTIDINE 20 MG: 20 TABLET, FILM COATED ORAL at 16:39

## 2023-06-28 RX ADMIN — FINASTERIDE 5 MG: 5 TABLET, FILM COATED ORAL at 08:15

## 2023-06-28 RX ADMIN — FAMOTIDINE 20 MG: 20 TABLET, FILM COATED ORAL at 23:36

## 2023-06-28 RX ADMIN — ACETAMINOPHEN 975 MG: 325 TABLET, FILM COATED ORAL at 05:28

## 2023-06-28 RX ADMIN — SERTRALINE HYDROCHLORIDE 100 MG: 100 TABLET ORAL at 21:49

## 2023-06-28 NOTE — OCCUPATIONAL THERAPY NOTE
Occupational Therapy Progress Note     Patient Name: Gisella Myers  [id number] Date: 6/28/2023  Problem List  Principal Problem:    Spinal stenosis of lumbar region with neurogenic claudication  Active Problems:    CAD (coronary artery disease)    Pre-diabetes    HLD (hyperlipidemia)    HTN (hypertension)    Tobacco use    BPH (benign prostatic hyperplasia)    [facility]ia ([facility] )     06/28/23 1118   OT Last Visit   OT Visit Date 06/28/23   Pain Assessment   Pain Assessment Tool FLACC   Pain Location/Orientation Orientation: Bilateral;Location: Back   Hospital Pain Intervention(s) Emotional support; Ambulation/increased activity;Repositioned; Rest   Pain Rating: FLACC (Rest) - Face 1   Pain Rating: FLACC (Rest) - Legs 1   Pain Rating: FLACC (Rest) - Activity 0   Pain Rating: FLACC (Rest) - Cry 0   Pain Rating: FLACC (Rest) - Consolability 0   Score: FLACC (Rest) 2   Pain Rating: FLACC (Activity) - Face 1   Pain Rating: FLACC (Activity) - Legs 0   Pain Rating: FLACC (Activity) - Activity 0   Pain Rating: FLACC (Activity) - Cry 0   Pain Rating: FLACC (Activity) - Consolability 0   Score: FLACC (Activity) 1   Restrictions/Precautions   Weight Bearing Precautions Per Order No   Other Precautions Telemetry; Fall Risk;Pain;Spinal precautions   Lifestyle   Autonomy I with ADL's/assistance with IaDL's, +SPC occassionally, SO can assist as needed  Reciprocal Relationships neighbor, dtr in [place]   Service to Others  -part time employement   Intrinsic Gratification swimming   ADL   Where Assessed Standing at sink   Grooming Assistance 5  Supervision/Setup   Grooming Deficit Wash/dry hands; Wash/dry face   UB Bathing Assistance 5  Supervision/Setup   UB Bathing Deficit Setup   LB Bathing Assistance 4  Minimal Assistance   LB Bathing Deficit Right lower leg including foot; Left lower leg including foot   UB Dressing Assistance 5  Supervision/Setup   UB Dressing Deficit Setup  (over head shirt)   LB Dressing Assistance 5  Supervision/Setup   LB Dressing Deficit (able to don slip on shoes)   Toileting Assistance  5  Supervision/Setup   Bed Mobility   Supine to Sit Unable to assess   Sit to Supine Unable to assess   Additional Comments pt remained seated at EOB with all needs met  Transfers   Sit to Stand 5  Supervision   Stand to Sit 5  Supervision   Functional Mobility   Functional Mobility 4  Minimal assistance   Additional Comments min a for CG household distance functional mobility without AD, mildy unsteady, good/improving acdtivity tolerance  Additional items (no AD)   Cognition   Overall Cognitive Status WFL   Arousal/Participation Alert; Responsive; Cooperative   Attention Within functional limits   Orientation Level Oriented X4   Memory Within functional limits   Following Commands Follows multistep commands with increased time or repetition   Comments pt pleasant and cooperative, good memory, direction following, occasional safety cues with mobility   Assessment   Assessment Patient participated in Skilled OT session this date with interventions consisting of self care tasks, functional transfers/mobility    Patient agreeable to OT treatment session, upon arrival patient was found standing at sink  In comparison to previous session, patient with improvements in dynamic standing balance   Patient requiring ocassional safety reminders  Patient continues to be functioning below baseline level, occupational performance remains limited secondary to factors listed above and increased risk for falls and injury  The patient's raw score on the AM-PAC Daily Activity Inpatient Short Form is 22  A raw score of greater than or equal to 19 suggests the patient may benefit from discharge to home  Please refer to the recommendation of the Occupational Therapist for safe discharge planning  From OT standpoint, recommendation at time of d/c would be Short Term Rehab     Patient to benefit from continued Occupational Therapy treatment while in the hospital to address deficits as defined above and maximize level of functional independence with ADLs and functional mobility  Plan   Treatment Interventions ADL retraining;Functional transfer training;UE strengthening/ROM; Endurance training;Cognitive reorientation;Patient/family training;Equipment evaluation/education; Compensatory technique education; Energy conservation; Activityengagement   Goal Expiration Date 7/16/23   OT Treatment Day 3   OT Frequency 2-3x/wk   AM-PAC Daily Activity Inpatient   Lower Body Dressing 3   Bathing 3   Toileting 4   Upper Body Dressing 4   Grooming 4   Eating 4   Daily Activity Raw Score 22   Daily Activity Standardized Score (Calc for Raw Score >=11) 47  [address] [facility]   Following a Speech/Presentation 3   Understanding Ordinary Conversation 4   Taking Medications 4   Remembering Where Things Are Placed or Put Away 4   Remembering List of 4-5 Errands 4   Taking Care of Complicated Tasks 3   Applied Cognition Raw Score 22   Applied Cognition Standardized Score 47 83   End of Consult   Patient Position at End of Consult Bedside chair   Nurse Communication Nurse aware of consult   Eloise KIRAN OTR/L

## 2023-06-28 NOTE — RESTORATIVE TECHNICIAN NOTE
Restorative Technician Note      Patient Name: Maira Mcgee     Note Type: Mobility  Patient Position Upon Consult: Supine  Activity Performed: Ambulated; Dangled; Stood  Assistive Device: Other (Comment) (none)  Education Provided: Yes  Patient Position at End of Consult: Supine;  All needs within reach; Bed/Chair alarm activated    Princess RICKETTS, Restorative Technician, [employer]

## 2023-06-28 NOTE — PLAN OF CARE
Problem: PHYSICAL THERAPY ADULT  Goal: Performs mobility at highest level of function for planned discharge setting  See evaluation for individualized goals  Description: Treatment/Interventions: Functional transfer training, LE strengthening/ROM, Elevations, Therapeutic exercise, Endurance training, Patient/family training, Equipment eval/education, Bed mobility, Gait training, Spoke to nursing, Spoke to case management, OT  Equipment Recommended: Jakub Caro       See flowsheet documentation for full assessment, interventions and recommendations  Outcome: Progressing  Note: Prognosis: Good  Problem List: Decreased strength, Decreased endurance, Decreased mobility, Impaired balance, Pain, Orthopedic restrictions  Assessment: The patient has improving gait in a straight line, but he continues ot have decreased balance with turns and any dynamic movements  Dual-task activities also are challenging for him  He does demonstrate improvement in his activity tolerance, but this is below his baseline  Continue to recommend rehab in order to maximize his independence and safety prior to returning home  Barriers to Discharge: Inaccessible home environment, Decreased caregiver support     PT Discharge Recommendation: Post acute rehabilitation services    See flowsheet documentation for full assessment

## 2023-06-28 NOTE — PROGRESS NOTES
INTERNAL MEDICINE RESIDENCY PROGRESS NOTE     Name: Amanda Bunn   Age & Sex: 71 y o  male   MRN: [id number]  Unit/Bed#: [facility] 708-01   Encounter: [id number]  Team: SOD Team B     PATIENT INFORMATION     Name: Amanda Bunn   Age & Sex: 71 y o  male   MRN: [id number]  Hospital Stay Days: 9    ASSESSMENT/PLAN     Principal Problem:    Spinal stenosis of lumbar region with neurogenic claudication  Active Problems:    CAD (coronary artery disease)    Pre-diabetes    HLD (hyperlipidemia)    HTN (hypertension)    Tobacco use    BPH (benign prostatic hyperplasia)    Schizophrenia ([facility] [address] )      * Spinal stenosis of lumbar region with neurogenic claudication  Assessment & Plan  Amanda Bunn is a 71 y o  male  with pertinent history lumbar spondylosis, neurogenic claudication, CAD s/p CABG x 4, HTN, HLD, hx of PE (Not on thinners) who presented as stroke alert on 6/19/23 given acute onset worsening bilateral lower extremity weakness  Denies any urinary symptoms or saddle anesthesia  Initial NIHSS of 5 and LKW 1200  Initial BP on arrival was Blood Pressure: 110/53  As a result of lower suspicion for stroke and potential need for urgent surgery patient was determined to not be a candidate for tPA  F/u exam:  HF strength 4-/5 R, 4/5 L  No ataxia or dysmetria noted on exam  Proprioception normal  Difficulty standing from a seated position without assistance  Romberg positive  BP over last 24 hours: Blood Pressure: 136/79  current BP: Blood Pressure: 136/79     Imaging:  CTH: No acute intracranial abnormality  CTA: No significant stenosis of the cervical carotid or vertebral arteries  No high-grade intracranial stenosis, large vessel occlusion or aneurysm  MRI brain: No MR evidence of acute ischemia  Small right mastoid effusion  MRI L spine: Spondylotic degenerative changes again noted similar to the prior study most significantly at L3-4 and L4-5 where severe central stenosis is identified   Moderate to severe lateral recess stenosis also identified as described  Moderate central stenosis and mild bilateral foraminal narrowing noted at L2-3 with mild foraminal narrowing at L5-S1 bilaterally  Echocardiogram: EF 50% with mild LA dilation   Telemetry: monitor  LDL 71   HBA1C 5 8     Impression: At this time history and physical along with available imaging concerning for worsening weakness in the setting of known spinal stenosis  One month ago patient strength 5/5 throughout vs now significantly week in bilateral HF        Plan:  S/p L3-4 and L4-5 decompression with Dr Bernardo Cummings 6/23  Lumbar drain removed   Stable for d/c to rehab per [address] [place] [address]   Patient will have postop visits at 2-week for incision check  and 6 weeks with Dr Twin Swartz  Follow up with neurology as outpatient in 3 months  On DVT ppx   Family able to help with recovery  PMR on board, plan for acute post surgical rehab   Neuro checks  PT/OT//PMR consults appreciated     CAD (coronary artery disease)  Assessment & Plan  Patient is a 1 PPD smoker with hx of CAD s/p CABG x 4  Complains of angina with exertion    Plan:  Patient has small area of of ischemia on NM SPECT  Cardiology consult  Continue Metoprolol 25 mg BID  Ranexa 500 mg BID for angina   Atorvastatin to 80 mg daily  Restart ASA 81 daily 2 weeks post op (July 8)  Patient will re-establish with cardiology o/p after d/c     Schizophrenia [facility])  Assessment & Plan  Patient's home medication list includes Abilify, Atarax, Risperdal, and Zoloft  Reviewed medications with patient and he reports of these, he only takes Zoloft  Continue Zoloft 100 mg qd  Avoid gabapentin - which is on patient's home medication list; he says this causes him to have suicidal thoughts    BPH (benign prostatic hyperplasia)  Assessment & Plan  Continue home finasteride 5 mg qd    Tobacco use  Assessment & Plan  1 PPD smoker    Continue nicotine patch    HTN (hypertension)  Assessment & Plan  Patient appears to be on lisinopril 5 mg daily and Lopressor 25 mg daily at home  -Has been mostly normotensive with blood pressure systolics between 803-402 mmHg  -Hold lisinopril for now    HLD (hyperlipidemia)  Assessment & Plan  Last lipid panel in   Continue atorvastatin 80 mg qd  Repeat lipid panel showing HDL at 31, LDL at 71, triglycerides at 109    Pre-diabetes  Assessment & Plan  Last A1c recently 5 8  Follow up A1c  Goal BG <200, can add SSI if needed to maintain goal BG        Disposition: pending palcement to rehab; stable for dc     SUBJECTIVE     Patient seen and examined  No acute events overnight  Pain controlled  Had BM last night  OBJECTIVE     Vitals:    23 2210 23 1554 23 2151 23 0703   BP: 121/71 118/71 126/71 103/63   BP Location:       Pulse: 70 83 94 84   Resp: 16 16  16   Temp: 98 5 °F (36 9 °C) 99 °F (37 2 °C) 98 6 °F (37 °C) 98 1 °F (36 7 °C)   TempSrc:       SpO2: 96% 94% 95% 94%   Weight:       Height:          Temperature:   Temp (24hrs), Av 6 °F (37 °C), Min:98 1 °F (36 7 °C), Max:99 °F (37 2 °C)    Temperature: 98 1 °F (36 7 °C)  Intake & Output:  I/O        0701   0700  0701   0700  07 0700    P  O  300 1100 300    I V  (mL/kg)       Total Intake(mL/kg) 300 (4 2) 1100 (15 3) 300 (4 2)    Urine (mL/kg/hr) 1500 (0 9) 700 (0 4)     Drains 25      Stool  0     Total Output 1525 700     Net -1225 +400 +300           Unmeasured Stool Occurrence  4 x         Weights:   IBW (Ideal Body Weight): 70 7 kg    Body mass index is 23 48 kg/m²  Weight (last 2 days)     None        Physical Exam  Vitals and nursing note reviewed  Constitutional:       General: He is not in acute distress  Appearance: Normal appearance  He is well-developed and normal weight  HENT:      Head: Normocephalic and atraumatic  Eyes:      Conjunctiva/sclera: Conjunctivae normal    Cardiovascular:      Rate and Rhythm: Normal rate and regular rhythm        Heart sounds: No murmur heard   Pulmonary:      Effort: Pulmonary effort is normal  No respiratory distress  Breath sounds: Normal breath sounds  Abdominal:      General: Bowel sounds are normal       Palpations: Abdomen is soft  Tenderness: There is no abdominal tenderness  Musculoskeletal:         General: No swelling  Cervical back: Neck supple  Right lower leg: No edema  Left lower leg: No edema  Skin:     General: Skin is warm and dry  Capillary Refill: Capillary refill takes less than 2 seconds  Neurological:      Mental Status: He is alert  Motor: Weakness present  Comments: 4/5 bl le    Psychiatric:         Mood and Affect: Mood normal        LABORATORY DATA     Labs: I have personally reviewed pertinent reports  Results from last 7 days   Lab Units 06/28/23  0538 06/27/23 0445 06/26/23  0546   WBC Thousand/uL 8 61 9 09 9 72   HEMOGLOBIN g/dL 13 4 13 2 12 3   HEMATOCRIT % 40 1 40 0 37 8   PLATELETS Thousands/uL 286 281 245   NEUTROS PCT % 50 57 59   MONOS PCT % 10 10 11   EOS PCT % 2 2 2      Results from last 7 days   Lab Units 06/28/23  0538 06/27/23 0445 06/26/23  0546 06/24/23  0600 06/23/23  0456   POTASSIUM mmol/L 3 9 4 1 3 9   < > 4 1   CHLORIDE mmol/L 110* 108 111*   < > 114*   CO2 mmol/L 26 27 27   < > 27   BUN mg/dL 14 14 13   < > 19   CREATININE mg/dL 1 05 0 95 0 89   < > 1 02   CALCIUM mg/dL 9 0 9 1 8 9   < > 8 8   ALK PHOS U/L  --   --   --   --  82   ALT U/L  --   --   --   --  30   AST U/L  --   --   --   --  30    < > = values in this interval not displayed  Results from last 7 days   Lab Units 06/24/23  0600 06/23/23  0456   INR  1 00 0 96   PTT seconds 25 29               IMAGING & DIAGNOSTIC TESTING     Radiology Results: I have personally reviewed pertinent reports  XR spine lumbosacral 1 view    Result Date: 6/23/2023  Impression: Fluoroscopic guidance provided for procedure guidance    Please refer to the separate procedure notes for additional details  Workstation performed: [id number]     MRI lumbar spine wo contrast    Result Date: 6/20/2023  Impression: Spondylotic degenerative changes again noted similar to the prior study most significantly at L3-4 and L4-5 where severe central stenosis is identified  Moderate to severe lateral recess stenosis also identified as described  Moderate central stenosis and mild bilateral foraminal narrowing noted at L2-3 with mild foraminal narrowing at L5-S1 bilaterally  Workstation performed: [id number]     MRI brain wo contrast    Result Date: 6/20/2023  Impression: No MR evidence of acute ischemia  Small right mastoid effusion  Workstation performed: [id number]     CT stroke alert brain    Result Date: 6/19/2023  Impression: No acute intracranial abnormality  Findings were directly discussed with Misti Mendiola at 2:41 p m  Workstation performed: [id number]     CTA stroke alert (head/neck)    Result Date: 6/19/2023  Impression: No significant stenosis of the cervical carotid or vertebral arteries No high-grade intracranial stenosis, large vessel occlusion or aneurysm  Findings were directly discussed with Misti Mendiola at 2:41 p m  Workstation performed: [id number]     Other Diagnostic Testing: I have personally reviewed pertinent reports      ACTIVE MEDICATIONS     Current Facility-Administered Medications   Medication Dose Route Frequency   • acetaminophen (TYLENOL) tablet 975 mg  975 mg Oral Q8H [address]   • acetaminophen (TYLENOL) tablet 975 mg  975 mg Oral Q6H PRN   • atorvastatin (LIPITOR) tablet 80 mg  80 mg Oral QPM   • enoxaparin (LOVENOX) subcutaneous injection 40 mg  40 mg Subcutaneous Daily   • famotidine (PEPCID) tablet 20 mg  20 mg Oral BID   • finasteride (PROSCAR) tablet 5 mg  5 mg Oral Daily   • HYDROmorphone HCl (DILAUDID) injection 0 2 mg  0 2 mg Intravenous Q4H PRN   • metoprolol tartrate (LOPRESSOR) tablet 25 mg  25 mg Oral Daily   • nicotine (NICODERM CQ) 21 mg/24 hr TD 24 hr patch 1 patch  1 patch "Transdermal Daily   • oxyCODONE (ROXICODONE) immediate release tablet 10 mg  10 mg Oral Q4H PRN   • oxyCODONE (ROXICODONE) IR tablet 5 mg  5 mg Oral Q4H PRN   • polyethylene glycol (MIRALAX) packet 17 g  17 g Oral HS   • ranolazine (RANEXA) 12 hr tablet 500 mg  500 mg Oral Q12H FRANKI   • sertraline (ZOLOFT) tablet 100 mg  100 mg Oral HS       VTE Pharmacologic Prophylaxis: Enoxaparin (Lovenox)  VTE Mechanical Prophylaxis: sequential compression device    Portions of the record may have been created with voice recognition software  Occasional wrong word or \"sound a like\" substitutions may have occurred due to the inherent limitations of voice recognition software    Read the chart carefully and recognize, using context, where substitutions have occurred   ==  Elva Finley, [address]  Internal Medicine Residency PGY-2       "

## 2023-06-28 NOTE — PLAN OF CARE
Problem: OCCUPATIONAL THERAPY ADULT  Goal: Performs self-care activities at highest level of function for planned discharge setting  See evaluation for individualized goals  Description: Treatment Interventions: ADL retraining, Functional transfer training, Endurance training, Patient/family training, Equipment evaluation/education, Compensatory technique education, Energy conservation, Activityengagement  Equipment Recommended:  (TBD)       See flowsheet documentation for full assessment, interventions and recommendations  Outcome: Progressing  Note: Limitation: Decreased ADL status, Decreased UE strength, Decreased endurance, Decreased self-care trans, Decreased high-level ADLs  Prognosis: Good  Assessment: Patient participated in Skilled OT session this date with interventions consisting of self care tasks, functional transfers/mobility    Patient agreeable to OT treatment session, upon arrival patient was found standing at sink  In comparison to previous session, patient with improvements in dynamic standing balance   Patient requiring ocassional safety reminders  Patient continues to be functioning below baseline level, occupational performance remains limited secondary to factors listed above and increased risk for falls and injury  The patient's raw score on the AM-PAC Daily Activity Inpatient Short Form is 22  A raw score of greater than or equal to 19 suggests the patient may benefit from discharge to home  Please refer to the recommendation of the Occupational Therapist for safe discharge planning  From OT standpoint, recommendation at time of d/c would be Short Term Rehab  Patient to benefit from continued Occupational Therapy treatment while in the hospital to address deficits as defined above and maximize level of functional independence with ADLs and functional mobility       OT Discharge Recommendation: Post acute rehabilitation services

## 2023-06-28 NOTE — CASE MANAGEMENT
CM received notification from Physician Advisor:          Denial Upheld   Level of Care: Acute Rehab  Rationale for denial upheld: patient more appropriate for SNF   Insurer: Scot bellamy Medical Director:   Date outcome received: Dr Salomón Rooney: Edmon Lombard will approve SNF if requested in next 24 hours  Care Manager Notified: Monisha Bergeron

## 2023-06-28 NOTE — PLAN OF CARE
Problem: MOBILITY - ADULT  Goal: Maintain or return to baseline ADL function  Description: INTERVENTIONS:  -  Assess patient's ability to carry out ADLs; assess patient's baseline for ADL function and identify physical deficits which impact ability to perform ADLs (bathing, care of mouth/teeth, toileting, grooming, dressing, etc )  - Assess/evaluate cause of self-care deficits   - Assess range of motion  - Assess patient's mobility; develop plan if impaired  - Assess patient's need for assistive devices and provide as appropriate  - Encourage maximum independence but intervene and supervise when necessary  - Involve family in performance of ADLs  - Assess for home care needs following discharge   - Consider OT consult to assist with ADL evaluation and planning for discharge  - Provide patient education as appropriate  Outcome: Progressing  Goal: Maintains/Returns to pre admission functional level  Description: INTERVENTIONS:  - Perform BMAT or MOVE assessment daily    - Set and communicate daily mobility goal to care team and patient/family/caregiver  - Collaborate with rehabilitation services on mobility goals if consulted  - Perform Range of Motion  times a day  - Reposition patient every  hours    - Dangle patient  times a day  - Stand patient  times a day  - Ambulate patient  times a day  - Out of bed to chair  times a day   - Out of bed for meals  times a day  - Out of bed for toileting  - Record patient progress and toleration of activity level   Outcome: Progressing     Problem: NEUROSENSORY - ADULT  Goal: Achieves stable or improved neurological status  Description: INTERVENTIONS  - Monitor and report changes in neurological status  - Monitor vital signs such as temperature, blood pressure, glucose, and any other labs ordered   - Initiate measures to prevent increased intracranial pressure  - Monitor for seizure activity and implement precautions if appropriate      Outcome: Progressing  Goal: Remains free of injury related to seizures activity  Description: INTERVENTIONS  - Maintain airway, patient safety  and administer oxygen as ordered  - Monitor patient for seizure activity, document and report duration and description of seizure to physician/advanced practitioner  - If seizure occurs,  ensure patient safety during seizure  - Reorient patient post seizure  - Seizure pads on all 4 side rails  - Instruct patient/family to notify RN of any seizure activity including if an aura is experienced  - Instruct patient/family to call for assistance with activity based on nursing assessment  - Administer anti-seizure medications if ordered    Outcome: Progressing  Goal: Achieves maximal functionality and self care  Description: INTERVENTIONS  - Monitor swallowing and airway patency with patient fatigue and changes in neurological status  - Encourage and assist patient to increase activity and self care     - Encourage visually impaired, hearing impaired and aphasic patients to use assistive/communication devices  Outcome: Progressing     Problem: MUSCULOSKELETAL - ADULT  Goal: Maintain or return mobility to safest level of function  Description: INTERVENTIONS:  - Assess patient's ability to carry out ADLs; assess patient's baseline for ADL function and identify physical deficits which impact ability to perform ADLs (bathing, care of mouth/teeth, toileting, grooming, dressing, etc )  - Assess/evaluate cause of self-care deficits   - Assess range of motion  - Assess patient's mobility  - Assess patient's need for assistive devices and provide as appropriate  - Encourage maximum independence but intervene and supervise when necessary  - Involve family in performance of ADLs  - Assess for home care needs following discharge   - Consider OT consult to assist with ADL evaluation and planning for discharge  - Provide patient education as appropriate  Outcome: Progressing  Goal: Maintain proper alignment of affected body part  Description: INTERVENTIONS:  - Support, maintain and protect limb and body alignment  - Provide patient/ family with appropriate education  Outcome: Progressing     Problem: PAIN - ADULT  Goal: Verbalizes/displays adequate comfort level or baseline comfort level  Description: Interventions:  - Encourage patient to monitor pain and request assistance  - Assess pain using appropriate pain scale  - Administer analgesics based on type and severity of pain and evaluate response  - Implement non-pharmacological measures as appropriate and evaluate response  - Consider cultural and social influences on pain and pain management  - Notify physician/advanced practitioner if interventions unsuccessful or patient reports new pain  Outcome: Progressing     Problem: INFECTION - ADULT  Goal: Absence or prevention of progression during hospitalization  Description: INTERVENTIONS:  - Assess and monitor for signs and symptoms of infection  - Monitor lab/diagnostic results  - Monitor all insertion sites, i e  indwelling lines, tubes, and drains  - Monitor endotracheal if appropriate and nasal secretions for changes in amount and color  - [place] appropriate cooling/warming therapies per order  - Administer medications as ordered  - Instruct and encourage patient and family to use good hand hygiene technique  - Identify and instruct in appropriate isolation precautions for identified infection/condition  Outcome: Progressing  Goal: Absence of fever/infection during neutropenic period  Description: INTERVENTIONS:  - Monitor WBC    Outcome: Progressing     Problem: SAFETY ADULT  Goal: Maintain or return to baseline ADL function  Description: INTERVENTIONS:  -  Assess patient's ability to carry out ADLs; assess patient's baseline for ADL function and identify physical deficits which impact ability to perform ADLs (bathing, care of mouth/teeth, toileting, grooming, dressing, etc )  - Assess/evaluate cause of self-care deficits   - Assess range of motion  - Assess patient's mobility; develop plan if impaired  - Assess patient's need for assistive devices and provide as appropriate  - Encourage maximum independence but intervene and supervise when necessary  - Involve family in performance of ADLs  - Assess for home care needs following discharge   - Consider OT consult to assist with ADL evaluation and planning for discharge  - Provide patient education as appropriate  Outcome: Progressing  Goal: Maintains/Returns to pre admission functional level  Description: INTERVENTIONS:  - Perform BMAT or MOVE assessment daily    - Set and communicate daily mobility goal to care team and patient/family/caregiver  - Collaborate with rehabilitation services on mobility goals if consulted  - Perform Range of Motion times a day  - Reposition patient every  hours    - Dangle patient  times a day  - Stand patient  times a day  - Ambulate patient  times a day  - Out of bed to chair  times a day   - Out of bed for meals  times a day  - Out of bed for toileting  - Record patient progress and toleration of activity level   Outcome: Progressing  Goal: Patient will remain free of falls  Description: INTERVENTIONS:  - Educate patient/family on patient safety including physical limitations  - Instruct patient to call for assistance with activity   - Consult OT/PT to assist with strengthening/mobility   - Keep Call bell within reach  - Keep bed low and locked with side rails adjusted as appropriate  - Keep care items and personal belongings within reach  - Initiate and maintain comfort rounds  - Make Fall Risk Sign visible to staff  - Offer Toileting every  Hours, in advance of need  - Initiate/Maintain alarm  - Obtain necessary fall risk management equipment:   - Apply yellow socks and bracelet for high fall risk patients  - Consider moving patient to room near nurses station  Outcome: Progressing     Problem: DISCHARGE PLANNING  Goal: Discharge to home or other facility with appropriate resources  Description: INTERVENTIONS:  - Identify barriers to discharge w/patient and caregiver  - Arrange for needed discharge resources and transportation as appropriate  - Identify discharge learning needs (meds, wound care, etc )  - Arrange for interpretive services to assist at discharge as needed  - Refer to Case Management Department for coordinating discharge planning if the patient needs post-hospital services based on physician/advanced practitioner order or complex needs related to functional status, cognitive ability, or social support system  Outcome: Progressing     Problem: Knowledge Deficit  Goal: Patient/family/caregiver demonstrates understanding of disease process, treatment plan, medications, and discharge instructions  Description: Complete learning assessment and assess knowledge base  Interventions:  - Provide teaching at level of understanding  - Provide teaching via preferred learning methods  Outcome: Progressing     Problem: Neurological Deficit  Goal: Neurological status is stable or improving  Description: Interventions:  - Monitor and assess patient's level of consciousness, motor function, sensory function, and level of assistance needed for ADLs  - Monitor and report changes from baseline  Collaborate with interdisciplinary team to initiate plan and implement interventions as ordered  - Provide and maintain a safe environment  - Consider seizure precautions  - Consider fall precautions  - Consider aspiration precautions  - Consider bleeding precautions  Outcome: Progressing     Problem: Activity Intolerance/Impaired Mobility  Goal: Mobility/activity is maintained at optimum level for patient  Description: Interventions:  - Assess and monitor patient  barriers to mobility and need for assistive/adaptive devices  - Assess patient's emotional response to limitations    - Collaborate with interdisciplinary team and initiate plans and interventions as ordered  - Encourage independent activity per ability   - Maintain proper body alignment  - Perform active/passive rom as tolerated/ordered  - Plan activities to conserve energy   - Turn patient as appropriate  Outcome: Progressing     Problem: Communication Impairment  Goal: Ability to express needs and understand communication  Description: Assess patient's communication skills and ability to understand information  Patient will demonstrate use of effective communication techniques, alternative methods of communication and understanding even if not able to speak  - Encourage communication and provide alternate methods of communication as needed  - Collaborate with case management/ for discharge needs  - Include patient/family/caregiver in decisions related to communication  Outcome: Progressing     Problem: Potential for Aspiration  Goal: Non-ventilated patient's risk of aspiration is minimized  Description: Assess and monitor vital signs, respiratory status, and labs (WBC)  Monitor for signs of aspiration (tachypnea, cough, rales, wheezing, cyanosis, fever)  - Assess and monitor patient's ability to swallow  - Place patient up in chair to eat if possible  - HOB up at 90 degrees to eat if unable to get patient up into chair   - Supervise patient during oral intake  - Instruct patient/ family to take small bites  - Instruct patient/ family to take small single sips when taking liquids  - Follow patient-specific strategies generated by speech pathologist   Outcome: Progressing  Goal: Ventilated patient's risk of aspiration is minimized  Description: Assess and monitor vital signs, respiratory status, airway cuff pressure, and labs (WBC)  Monitor for signs of aspiration (tachypnea, cough, rales, wheezing, cyanosis, fever)  - Elevate head of bed 30 degrees if patient has tube feeding   - Monitor tube feeding    Outcome: Progressing     Problem: Nutrition  Goal: Nutrition/Hydration status is improving  Description: Monitor and assess patient's nutrition/hydration status for malnutrition (ex- brittle hair, bruises, dry skin, pale skin and conjunctiva, muscle wasting, smooth red tongue, and disorientation)  Collaborate with interdisciplinary team and initiate plan and interventions as ordered  Monitor patient's weight and dietary intake as ordered or per policy  Utilize nutrition screening tool and intervene per policy  Determine patient's food preferences and provide high-protein, high-caloric foods as appropriate  - Assist patient with eating   - Allow adequate time for meals   - Encourage patient to take dietary supplement as ordered  - Collaborate with clinical nutritionist   - Include patient/family/caregiver in decisions related to nutrition    Outcome: Progressing

## 2023-06-28 NOTE — CASE MANAGEMENT
[facility] has received intent to deny     Denial received for: Acute Rehab  Facility: [address] [place] [address] [place]  Denial #: [id number]  Denial Reason: does not meet CMS guidelines  Peer to Peer phone#:   [phone] opt 5    Deadline: 6/28 3:30p  *Acute Rehab Denials team contacted via email*  Care Manager notified: Ivy Angeles

## 2023-06-28 NOTE — PHYSICAL THERAPY NOTE
Physical Therapy Progress Note     06/28/23 1140   PT Last Visit   PT Visit Date 06/28/23   Note Type   Note Type Treatment   Pain Assessment   Pain Assessment Tool 0-10   Pain Score No Pain   Restrictions/Precautions   Other Precautions Spinal precautions; Fall Risk;Pain   Subjective   Subjective The patient states that he feels well  Transfers   Sit to Stand 5  Supervision   Stand to Sit 5  Supervision   Additional items Verbal cues   Ambulation/Elevation   Gait pattern Excessively slow; Inconsistent jalen;Decreased foot clearance   Gait Assistance 4  Minimal assist   Additional items Assist x 1;Verbal cues   Assistive Device Rolling walker   Distance 150 feet x 2, 60 feet x 2, 100 feet x 2  Balance   Static Sitting Good   Dynamic Sitting Fair +   Static Standing Fair   Ambulatory Fair -   Activity Tolerance   Activity Tolerance Patient tolerated treatment well   Nurse Made Aware Yes  Assessment   Prognosis Good   Problem List Decreased strength;Decreased endurance;Decreased mobility; Impaired balance;Pain;Orthopedic restrictions   Assessment The patient has improving gait in a straight line, but he continues ot have decreased balance with turns and any dynamic movements  Dual-task activities also are challenging for him  He does demonstrate improvement in his activity tolerance, but this is below his baseline  Continue to recommend rehab in order to maximize his independence and safety prior to returning home  Barriers to Discharge Inaccessible home environment;Decreased caregiver support   Goals   Patient Goals To go home  STG Expiration Date 07/04/23   PT Treatment Day 1   Plan   Treatment/Interventions Functional transfer training;LE strengthening/ROM; Therapeutic exercise;Elevations; Endurance training;Cognitive reorientation;Patient/family training;Bed mobility;Gait training   Progress Progressing toward goals   PT Frequency 3-5x/wk   Recommendation   PT Discharge Recommendation Post acute rehabilitation services   Equipment Recommended [facility] Recommended Wheeled walker   AM-PAC Basic Mobility Inpatient   Turning in Flat Bed Without Bedrails 3   Lying on Back to Sitting on Edge of Flat Bed Without Bedrails 3   Moving Bed to Chair 3   Standing Up From Chair Using Arms 3   Walk in Room 3   Climb 3-5 Stairs With Railing 3   Basic Mobility Inpatient Raw Score 18   Basic Mobility Standardized Score 41 05   Highest Level Of Mobility   JH-HLM Goal 6: Walk 10 steps or more   JH-HLM Achieved 8: Walk 250 feet ot more         An AM-PAC Basic Mobility raw score less than 16 suggests the patient may benefit from discharge to post-acute rehab services      Charles Díaz PTA

## 2023-06-29 PROCEDURE — 97530 THERAPEUTIC ACTIVITIES: CPT

## 2023-06-29 PROCEDURE — 99232 SBSQ HOSP IP/OBS MODERATE 35: CPT | Performed by: INTERNAL MEDICINE

## 2023-06-29 PROCEDURE — 97116 GAIT TRAINING THERAPY: CPT

## 2023-06-29 RX ORDER — ACETAMINOPHEN 325 MG/1
975 TABLET ORAL EVERY 8 HOURS SCHEDULED
Qty: 30 TABLET
Start: 2023-06-29 | End: 2023-08-28

## 2023-06-29 RX ORDER — ATORVASTATIN CALCIUM 80 MG/1
80 TABLET, FILM COATED ORAL EVERY EVENING
Qty: 90 TABLET | Refills: 0
Start: 2023-06-29

## 2023-06-29 RX ORDER — RANOLAZINE 500 MG/1
500 TABLET, EXTENDED RELEASE ORAL EVERY 12 HOURS SCHEDULED
Qty: 90 TABLET | Refills: 1
Start: 2023-06-29

## 2023-06-29 RX ADMIN — ATORVASTATIN CALCIUM 80 MG: 80 TABLET, FILM COATED ORAL at 18:51

## 2023-06-29 RX ADMIN — FAMOTIDINE 20 MG: 20 TABLET, FILM COATED ORAL at 23:37

## 2023-06-29 RX ADMIN — SERTRALINE HYDROCHLORIDE 100 MG: 100 TABLET ORAL at 21:48

## 2023-06-29 RX ADMIN — POLYETHYLENE GLYCOL 3350 17 G: 17 POWDER, FOR SOLUTION ORAL at 21:48

## 2023-06-29 RX ADMIN — ACETAMINOPHEN 975 MG: 325 TABLET, FILM COATED ORAL at 15:35

## 2023-06-29 RX ADMIN — FAMOTIDINE 20 MG: 20 TABLET, FILM COATED ORAL at 18:51

## 2023-06-29 RX ADMIN — RANOLAZINE 500 MG: 500 TABLET, FILM COATED, EXTENDED RELEASE ORAL at 21:48

## 2023-06-29 RX ADMIN — ACETAMINOPHEN 975 MG: 325 TABLET, FILM COATED ORAL at 21:48

## 2023-06-29 RX ADMIN — METOPROLOL TARTRATE 25 MG: 25 TABLET, FILM COATED ORAL at 08:07

## 2023-06-29 RX ADMIN — ACETAMINOPHEN 975 MG: 325 TABLET, FILM COATED ORAL at 06:43

## 2023-06-29 RX ADMIN — FINASTERIDE 5 MG: 5 TABLET, FILM COATED ORAL at 08:07

## 2023-06-29 RX ADMIN — ENOXAPARIN SODIUM 40 MG: 40 INJECTION SUBCUTANEOUS at 08:06

## 2023-06-29 RX ADMIN — RANOLAZINE 500 MG: 500 TABLET, FILM COATED, EXTENDED RELEASE ORAL at 08:07

## 2023-06-29 RX ADMIN — NICOTINE 1 PATCH: 21 PATCH, EXTENDED RELEASE TRANSDERMAL at 08:07

## 2023-06-29 NOTE — PHYSICAL THERAPY NOTE
Physical Therapy Progress Note     06/29/23 1140   PT Last Visit   PT Visit Date 06/29/23   Note Type   Note Type Treatment   Pain Assessment   Pain Assessment Tool 0-10   Pain Score 3   Pain Location/Orientation Location: Back   Hospital Pain Intervention(s) Repositioned; Ambulation/increased activity   Restrictions/Precautions   Other Precautions Spinal precautions;Cognitive; Chair Alarm; Bed Alarm;Pain; Fall Risk   Subjective   Subjective The patient notes that he is eager to leave the hospital    Transfers   Sit to Stand 5  Supervision   Additional items Increased time required   Stand to Sit 5  Supervision   Additional items Increased time required   Ambulation/Elevation   Gait pattern Excessively slow; Inconsistent jalen;Decreased foot clearance   Gait Assistance 4  Minimal assist   Additional items Assist x 1;Verbal cues; Tactile cues   Assistive Device Rolling walker   Distance 60 feet, 140 feet, 130 feet, 180 feet  Stair Management Assistance 4  Minimal assist   Additional items Assist x 1;Verbal cues; Tactile cues   Stair Management Technique One rail L;Sideways;Nonreciprocal   Number of Stairs 7   Balance   Static Sitting Good   Dynamic Sitting Fair +   Static Standing Fair   Ambulatory Fair -   Activity Tolerance   Activity Tolerance Patient tolerated treatment well   Nurse Made Aware Yes  Assessment   Prognosis Good   Problem List Decreased strength;Decreased endurance;Decreased mobility; Impaired balance;Pain;Orthopedic restrictions   Assessment Today the patient was able to progress his ambulatory distance as well as progress on the stairs  He continues to require instruction for technique and safety  He was educated in his spinal precautions as he was not able to do so  He later recalled two out of three with verbal cues  Continue to recommend rehab in order to maximize his functional independence and safety     Barriers to Discharge Inaccessible home environment;Decreased caregiver support   Goals Patient Goals To get back home  STG Expiration Date 07/04/23   PT Treatment Day 2   Plan   Treatment/Interventions Functional transfer training;LE strengthening/ROM; Elevations; Therapeutic exercise; Endurance training;Cognitive reorientation;Patient/family training;Bed mobility;Gait training   Progress Progressing toward goals   PT Frequency 3-5x/wk   Recommendation   PT Discharge Recommendation Post acute rehabilitation services   Equipment Recommended [facility] Recommended Wheeled walker   AM-PAC Basic Mobility Inpatient   Turning in Flat Bed Without Bedrails 3   Lying on Back to Sitting on Edge of Flat Bed Without Bedrails 3   Moving Bed to Chair 3   Standing Up From Chair Using Arms 3   Walk in Room 3   Climb 3-5 Stairs With Railing 3   Basic Mobility Inpatient Raw Score 18   Basic Mobility Standardized Score 41 05   Highest Level Of Mobility   JH-HLM Goal 6: Walk 10 steps or more   JH-HLM Achieved 8: Walk 250 feet ot more         An AM-PAC Basic Mobility raw score less than 16 suggests the patient may benefit from discharge to post-acute rehab services      Hugo Delarosa PTA

## 2023-06-29 NOTE — PLAN OF CARE
Problem: NEUROSENSORY - ADULT  Goal: Achieves stable or proved neurological status  Description: INTERVENTIONS  - Monitor and report changes in neurological status  - Monitor vital signs such as temperature, blood pressure, glucose, and any other labs ordered   - Initiate measures to prevent increased intracranial pressure  - Monitor for seizure activity and implement precautions if appropriate      Outcome: Progressing  Goal: Remains free of injury related to seizures activity  Description: INTERVENTIONS  - Maintain airway, patient safety  and administer oxygen as ordered  - Monitor patient for seizure activity, document and report duration and description of seizure to physician/advanced practitioner  - If seizure occurs,  ensure patient safety during seizure  - Reorient patient post seizure  - Seizure pads on all 4 side rails  - Instruct patient/family to notify RN of any seizure activity including if an aura is experienced  - Instruct patient/family to call for assistance with activity based on nursing assessment  - Administer anti-seizure medications if ordered    Outcome: Progressing  Goal: Achieves maximal functionality and self care  Description: INTERVENTIONS  - Monitor swallowing and airway patency with patient fatigue and changes in neurological status  - Encourage and assist patient to increase activity and self care     - Encourage visually impaired, hearing impaired and aphasic patients to use assistive/communication devices  Outcome: Progressing     Problem: MUSCULOSKELETAL - ADULT  Goal: Maintain or return mobility to safest level of function  Description: INTERVENTIONS:  - Assess patient's ability to carry out ADLs; assess patient's baseline for ADL function and identify physical deficits which impact ability to perform ADLs (bathing, care of mouth/teeth, toileting, grooming, dressing, etc )  - Assess/evaluate cause of self-care deficits   - Assess range of motion  - Assess patient's mobility  - Assess patient's need for assistive devices and provide as appropriate  - Encourage maximum independence but intervene and supervise when necessary  - Involve family in performance of ADLs  - Assess for home care needs following discharge   - Consider OT consult to assist with ADL evaluation and planning for discharge  - Provide patient education as appropriate  Outcome: Progressing  Goal: Maintain proper alignment of affected body part  Description: INTERVENTIONS:  - Support, maintain and protect limb and body alignment  - Provide patient/ family with appropriate education  Outcome: Progressing     Problem: PAIN - ADULT  Goal: Verbalizes/displays adequate comfort level or baseline comfort level  Description: Interventions:  - Encourage patient to monitor pain and request assistance  - Assess pain using appropriate pain scale  - Administer analgesics based on type and severity of pain and evaluate response  - Implement non-pharmacological measures as appropriate and evaluate response  - Consider cultural and social influences on pain and pain management  - Notify physician/advanced practitioner if interventions unsuccessful or patient reports new pain  Outcome: Progressing     Problem: INFECTION - ADULT  Goal: Absence or prevention of progression during hospitalization  Description: INTERVENTIONS:  - Assess and monitor for signs and symptoms of infection  - Monitor lab/diagnostic results  - Monitor all insertion sites, i e  indwelling lines, tubes, and drains  - Monitor endotracheal if appropriate and nasal secretions for changes in amount and color  - [place] appropriate cooling/warming therapies per order  - Administer medications as ordered  - Instruct and encourage patient and family to use good hand hygiene technique  - Identify and instruct in appropriate isolation precautions for identified infection/condition  Outcome: Progressing     Problem: DISCHARGE PLANNING  Goal: Discharge to home or other facility with appropriate resources  Description: INTERVENTIONS:  - Identify barriers to discharge w/patient and caregiver  - Arrange for needed discharge resources and transportation as appropriate  - Identify discharge learning needs (meds, wound care, etc )  - Arrange for interpretive services to assist at discharge as needed  - Refer to Case Management Department for coordinating discharge planning if the patient needs post-hospital services based on physician/advanced practitioner order or complex needs related to functional status, cognitive ability, or social support system  Outcome: Progressing     Problem: Knowledge Deficit  Goal: Patient/family/caregiver demonstrates understanding of disease process, treatment plan, medications, and discharge instructions  Description: Complete learning assessment and assess knowledge base  Interventions:  - Provide teaching at level of understanding  - Provide teaching via preferred learning methods  Outcome: Progressing     Problem: Neurological Deficit  Goal: Neurological status is stable or improving  Description: Interventions:  - Monitor and assess patient's level of consciousness, motor function, sensory function, and level of assistance needed for ADLs  - Monitor and report changes from baseline  Collaborate with interdisciplinary team to initiate plan and implement interventions as ordered  - Provide and maintain a safe environment  - Consider seizure precautions  - Consider fall precautions  - Consider aspiration precautions  - Consider bleeding precautions  Outcome: Progressing     Problem: Activity Intolerance/Impaired Mobility  Goal: Mobility/activity is maintained at optimum level for patient  Description: Interventions:  - Assess and monitor patient  barriers to mobility and need for assistive/adaptive devices  - Assess patient's emotional response to limitations    - Collaborate with interdisciplinary team and initiate plans and interventions as ordered  - Encourage independent activity per ability   - Maintain proper body alignment  - Perform active/passive rom as tolerated/ordered  - Plan activities to conserve energy   - Turn patient as appropriate  Outcome: Progressing     Problem: Communication Impairment  Goal: Ability to express needs and understand communication  Description: Assess patient's communication skills and ability to understand information  Patient will demonstrate use of effective communication techniques, alternative methods of communication and understanding even if not able to speak  - Encourage communication and provide alternate methods of communication as needed  - Collaborate with case management/ for discharge needs  - Include patient/family/caregiver in decisions related to communication  Outcome: Progressing     Problem: Potential for Aspiration  Goal: Non-ventilated patient's risk of aspiration is minimized  Description: Assess and monitor vital signs, respiratory status, and labs (WBC)  Monitor for signs of aspiration (tachypnea, cough, rales, wheezing, cyanosis, fever)  - Assess and monitor patient's ability to swallow  - Place patient up in chair to eat if possible  - HOB up at 90 degrees to eat if unable to get patient up into chair   - Supervise patient during oral intake  - Instruct patient/ family to take small bites  - Instruct patient/ family to take small single sips when taking liquids  - Follow patient-specific strategies generated by speech pathologist   Outcome: Progressing  Goal: Ventilated patient's risk of aspiration is minimized  Description: Assess and monitor vital signs, respiratory status, airway cuff pressure, and labs (WBC)  Monitor for signs of aspiration (tachypnea, cough, rales, wheezing, cyanosis, fever)  - Elevate head of bed 30 degrees if patient has tube feeding   - Monitor tube feeding    Outcome: Progressing     Problem: Nutrition  Goal: Nutrition/Hydration status is improving  Description: Monitor and assess patient's nutrition/hydration status for malnutrition (ex- brittle hair, bruises, dry skin, pale skin and conjunctiva, muscle wasting, smooth red tongue, and disorientation)  Collaborate with interdisciplinary team and initiate plan and interventions as ordered  Monitor patient's weight and dietary intake as ordered or per policy  Utilize nutrition screening tool and intervene per policy  Determine patient's food preferences and provide high-protein, high-caloric foods as appropriate  - Assist patient with eating   - Allow adequate time for meals   - Encourage patient to take dietary supplement as ordered  - Collaborate with clinical nutritionist   - Include patient/family/caregiver in decisions related to nutrition    Outcome: Progressing

## 2023-06-29 NOTE — RESTORATIVE TECHNICIAN NOTE
Restorative Technician Note      Patient Name: Gala Khan     Note Type: Mobility  Patient Position Upon Consult: Supine  Activity Performed: Ambulated; Dangled; Stood  Assistive Device: Other (Comment) (none)  Education Provided: Yes  Patient Position at End of Consult: Supine;  All needs within reach; Bed/Chair alarm activated    [employer] BS, Restorative Technician, [employer]

## 2023-06-29 NOTE — PLAN OF CARE
Problem: PHYSICAL THERAPY ADULT  Goal: Performs mobility at highest level of function for planned discharge setting  See evaluation for individualized goals  Description: Treatment/Interventions: Functional transfer training, LE strengthening/ROM, Elevations, Therapeutic exercise, Endurance training, Patient/family training, Equipment eval/education, Bed mobility, Gait training, Spoke to nursing, Spoke to case management, OT  Equipment Recommended: Neri Abel       See flowsheet documentation for full assessment, interventions and recommendations  Outcome: Progressing  Note: Prognosis: Good  Problem List: Decreased strength, Decreased endurance, Decreased mobility, Impaired balance, Pain, Orthopedic restrictions  Assessment: Today the patient was able to progress his ambulatory distance as well as progress on the stairs  He continues to require instruction for technique and safety  He was educated in his spinal precautions as he was not able to do so  He later recalled two out of three with verbal cues  Continue to recommend rehab in order to maximize his functional independence and safety  Barriers to Discharge: Inaccessible home environment, Decreased caregiver support     PT Discharge Recommendation: Post acute rehabilitation services    See flowsheet documentation for full assessment

## 2023-06-29 NOTE — PLAN OF CARE
Problem: NEUROSENSORY - ADULT  Goal: Achieves stable or improved neurological status  Description: INTERVENTIONS  - Monitor and report changes in neurological status  - Monitor vital signs such as temperature, blood pressure, glucose, and any other labs ordered   - Initiate measures to prevent increased intracranial pressure  - Monitor for seizure activity and implement precautions if appropriate      Outcome: Progressing  Goal: Remains free of injury related to seizures activity  Description: INTERVENTIONS  - Maintain airway, patient safety  and administer oxygen as ordered  - Monitor patient for seizure activity, document and report duration and description of seizure to physician/advanced practitioner  - If seizure occurs,  ensure patient safety during seizure  - Reorient patient post seizure  - Seizure pads on all 4 side rails  - Instruct patient/family to notify RN of any seizure activity including if an aura is experienced  - Instruct patient/family to call for assistance with activity based on nursing assessment  - Administer anti-seizure medications if ordered    Outcome: Progressing  Goal: Achieves maximal functionality and self care  Description: INTERVENTIONS  - Monitor swallowing and airway patency with patient fatigue and changes in neurological status  - Encourage and assist patient to increase activity and self care     - Encourage visually impaired, hearing impaired and aphasic patients to use assistive/communication devices  Outcome: Progressing     Problem: MUSCULOSKELETAL - ADULT  Goal: Maintain or return mobility to safest level of function  Description: INTERVENTIONS:  - Assess patient's ability to carry out ADLs; assess patient's baseline for ADL function and identify physical deficits which impact ability to perform ADLs (bathing, care of mouth/teeth, toileting, grooming, dressing, etc )  - Assess/evaluate cause of self-care deficits   - Assess range of motion  - Assess patient's mobility  - Assess patient's need for assistive devices and provide as appropriate  - Encourage maximum independence but intervene and supervise when necessary  - Involve family in performance of ADLs  - Assess for home care needs following discharge   - Consider OT consult to assist with ADL evaluation and planning for discharge  - Provide patient education as appropriate  Outcome: Progressing  Goal: Maintain proper alignment of affected body part  Description: INTERVENTIONS:  - Support, maintain and protect limb and body alignment  - Provide patient/ family with appropriate education  Outcome: Progressing     Problem: PAIN - ADULT  Goal: Verbalizes/displays adequate comfort level or baseline comfort level  Description: Interventions:  - Encourage patient to monitor pain and request assistance  - Assess pain using appropriate pain scale  - Administer analgesics based on type and severity of pain and evaluate response  - Implement non-pharmacological measures as appropriate and evaluate response  - Consider cultural and social influences on pain and pain management  - Notify physician/advanced practitioner if interventions unsuccessful or patient reports new pain  Outcome: Progressing     Problem: INFECTION - ADULT  Goal: Absence or prevention of progression during hospitalization  Description: INTERVENTIONS:  - Assess and monitor for signs and symptoms of infection  - Monitor lab/diagnostic results  - Monitor all insertion sites, i e  indwelling lines, tubes, and drains  - Monitor endotracheal if appropriate and nasal secretions for changes in amount and color  - [place] appropriate cooling/warming therapies per order  - Administer medications as ordered  - Instruct and encourage patient and family to use good hand hygiene technique  - Identify and instruct in appropriate isolation precautions for identified infection/condition  Outcome: Progressing     Problem: DISCHARGE PLANNING  Goal: Discharge to home or other facility with appropriate resources  Description: INTERVENTIONS:  - Identify barriers to discharge w/patient and caregiver  - Arrange for needed discharge resources and transportation as appropriate  - Identify discharge learning needs (meds, wound care, etc )  - Arrange for interpretive services to assist at discharge as needed  - Refer to Case Management Department for coordinating discharge planning if the patient needs post-hospital services based on physician/advanced practitioner order or complex needs related to functional status, cognitive ability, or social support system  Outcome: Progressing     Problem: Knowledge Deficit  Goal: Patient/family/caregiver demonstrates understanding of disease process, treatment plan, medications, and discharge instructions  Description: Complete learning assessment and assess knowledge base  Interventions:  - Provide teaching at level of understanding  - Provide teaching via preferred learning methods  Outcome: Progressing     Problem: Neurological Deficit  Goal: Neurological status is stable or improving  Description: Interventions:  - Monitor and assess patient's level of consciousness, motor function, sensory function, and level of assistance needed for ADLs  - Monitor and report changes from baseline  Collaborate with interdisciplinary team to initiate plan and implement interventions as ordered  - Provide and maintain a safe environment  - Consider seizure precautions  - Consider fall precautions  - Consider aspiration precautions  - Consider bleeding precautions  Outcome: Progressing     Problem: Activity Intolerance/Impaired Mobility  Goal: Mobility/activity is maintained at optimum level for patient  Description: Interventions:  - Assess and monitor patient  barriers to mobility and need for assistive/adaptive devices  - Assess patient's emotional response to limitations    - Collaborate with interdisciplinary team and initiate plans and interventions as ordered  - Encourage independent activity per ability   - Maintain proper body alignment  - Perform active/passive rom as tolerated/ordered  - Plan activities to conserve energy   - Turn patient as appropriate  Outcome: Progressing     Problem: Communication Impairment  Goal: Ability to express needs and understand communication  Description: Assess patient's communication skills and ability to understand information  Patient will demonstrate use of effective communication techniques, alternative methods of communication and understanding even if not able to speak  - Encourage communication and provide alternate methods of communication as needed  - Collaborate with case management/ for discharge needs  - Include patient/family/caregiver in decisions related to communication  Outcome: Progressing     Problem: Potential for Aspiration  Goal: Non-ventilated patient's risk of aspiration is minimized  Description: Assess and monitor vital signs, respiratory status, and labs (WBC)  Monitor for signs of aspiration (tachypnea, cough, rales, wheezing, cyanosis, fever)  - Assess and monitor patient's ability to swallow  - Place patient up in chair to eat if possible  - HOB up at 90 degrees to eat if unable to get patient up into chair   - Supervise patient during oral intake  - Instruct patient/ family to take small bites  - Instruct patient/ family to take small single sips when taking liquids  - Follow patient-specific strategies generated by speech pathologist   Outcome: Progressing  Goal: Ventilated patient's risk of aspiration is minimized  Description: Assess and monitor vital signs, respiratory status, airway cuff pressure, and labs (WBC)  Monitor for signs of aspiration (tachypnea, cough, rales, wheezing, cyanosis, fever)  - Elevate head of bed 30 degrees if patient has tube feeding   - Monitor tube feeding    Outcome: Progressing     Problem: Nutrition  Goal: Nutrition/Hydration status is improving  Description: Monitor and assess patient's nutrition/hydration status for malnutrition (ex- brittle hair, bruises, dry skin, pale skin and conjunctiva, muscle wasting, smooth red tongue, and disorientation)  Collaborate with interdisciplinary team and initiate plan and interventions as ordered  Monitor patient's weight and dietary intake as ordered or per policy  Utilize nutrition screening tool and intervene per policy  Determine patient's food preferences and provide high-protein, high-caloric foods as appropriate  - Assist patient with eating   - Allow adequate time for meals   - Encourage patient to take dietary supplement as ordered  - Collaborate with clinical nutritionist   - Include patient/family/caregiver in decisions related to nutrition    Outcome: Progressing

## 2023-06-29 NOTE — CASE MANAGEMENT
Pauly Dhillon 51 received request for authorization from Care Manager    Authorization request for: SNF  [facility]: [facility] NPI: [id number]  Facility MD: Dr Lowe Foil: [phone]  Authorization initiated by contacting insurance: [facility] Via: [place] SIMON# [phone] opt 7 opt 1  Pending Reference #: [id number]  Clinicals submitted via: fax#  [phone]

## 2023-06-29 NOTE — PROGRESS NOTES
INTERNAL MEDICINE RESIDENCY PROGRESS NOTE     Name: Maty Zaragoza   Age & Sex: 71 y o  male   MRN: [id number]  Unit/Bed#: [facility] 708-01   Encounter: [id number]  Team: SOD Team B     PATIENT INFORMATION     Name: Maty Zaragoza   Age & Sex: 71 y o  male   MRN: [id number]  Hospital Stay Days: 10    ASSESSMENT/PLAN     Principal Problem:    Spinal stenosis of lumbar region with neurogenic claudication  Active Problems:    CAD (coronary artery disease)    Pre-diabetes    HLD (hyperlipidemia)    HTN (hypertension)    Tobacco use    BPH (benign prostatic hyperplasia)    Schizophrenia ([facility] [address] )      * Spinal stenosis of lumbar region with neurogenic claudication  Assessment & Plan  Maty Zaragoza is a 71 y o  male  with pertinent history lumbar spondylosis, neurogenic claudication, CAD s/p CABG x 4, HTN, HLD, hx of PE (Not on thinners) who presented as stroke alert on 6/19/23 given acute onset worsening bilateral lower extremity weakness  Denies any urinary symptoms or saddle anesthesia  Initial NIHSS of 5 and LKW 1200  Initial BP on arrival was Blood Pressure: 110/53  As a result of lower suspicion for stroke and potential need for urgent surgery patient was determined to not be a candidate for tPA  • F/u exam:  HF strength 4-/5 R, 4/5 L  No ataxia or dysmetria noted on exam  Proprioception normal  Difficulty standing from a seated position without assistance  Romberg positive  • BP over last 24 hours: Blood Pressure: 136/79  current BP: Blood Pressure: 136/79     Imaging:  • CTH: No acute intracranial abnormality  • CTA: No significant stenosis of the cervical carotid or vertebral arteries  No high-grade intracranial stenosis, large vessel occlusion or aneurysm  • MRI brain: No MR evidence of acute ischemia  Small right mastoid effusion  • MRI L spine: Spondylotic degenerative changes again noted similar to the prior study most significantly at L3-4 and L4-5 where severe central stenosis is identified  Moderate to severe lateral recess stenosis also identified as described  Moderate central stenosis and mild bilateral foraminal narrowing noted at L2-3 with mild foraminal narrowing at L5-S1 bilaterally  • Echocardiogram: EF 50% with mild LA dilation   • Telemetry: monitor  • LDL 71   • HBA1C 5 8     Impression: At this time history and physical along with available imaging concerning for worsening weakness in the setting of known spinal stenosis  One month ago patient strength 5/5 throughout vs now significantly week in bilateral HF        Plan:  • S/p L3-4 and L4-5 decompression with Dr Christy Breeding 6/23  • Lumbar drain removed   • Stable for d/c to rehab per [facility]   • Patient will have postop visits at 2-week for incision check  and 6 weeks with Dr Maninder Gee  • Follow up with neurology as outpatient in 3 months  • On DVT ppx   • Family able to help with recovery  • PMR on board, plan for acute post surgical rehab   • Neuro checks  • PT/OT//PMR consults appreciated     CAD (coronary artery disease)  Assessment & Plan  Patient is a 1 PPD smoker with hx of CAD s/p CABG x 4  Complains of angina with exertion    Plan:  · Patient has small area of of ischemia on NM SPECT  · Cardiology consult  · Continue Metoprolol 25 mg BID  · Ranexa 500 mg BID for angina   · Atorvastatin to 80 mg daily  · Restart ASA 81 daily 2 weeks post op (July 8)  · Patient will re-establish with cardiology o/p after d/c     Schizophrenia [facility])  Assessment & Plan  Patient's home medication list includes Abilify, Atarax, Risperdal, and Zoloft  Reviewed medications with patient and he reports of these, he only takes Zoloft  · Continue Zoloft 100 mg qd  · Avoid gabapentin - which is on patient's home medication list; he says this causes him to have suicidal thoughts    BPH (benign prostatic hyperplasia)  Assessment & Plan  Continue home finasteride 5 mg qd    Tobacco use  Assessment & Plan  1 PPD smoker    · Continue nicotine patch    HTN (hypertension)  Assessment & Plan  Patient appears to be on lisinopril 5 mg daily and Lopressor 25 mg daily at home  -Has been mostly normotensive with blood pressure systolics between 830-943 mmHg  -Hold lisinopril for now    HLD (hyperlipidemia)  Assessment & Plan  Last lipid panel in   · Continue atorvastatin 80 mg qd  · Repeat lipid panel showing HDL at 31, LDL at 71, triglycerides at 109    Pre-diabetes  Assessment & Plan  Last A1c recently 5 8  · Follow up A1c  · Goal BG <200, can add SSI if needed to maintain goal BG        Disposition: stable for discharge  Accepted  anticipate today or tomorrow      SUBJECTIVE     Patient seen and examined  No acute events overnight  Pain well controlled    OBJECTIVE     Vitals:    23 0703 23 1534 23 2130 23 0725   BP: 103/63 106/64 116/67 119/72   Pulse: 84 86 83 88   Resp: 16 15     Temp: 98 1 °F (36 7 °C) 98 7 °F (37 1 °C) 98 7 °F (37 1 °C) 97 7 °F (36 5 °C)   TempSrc:       SpO2: 94% 95% 94% 98%   Weight:       Height:          Temperature:   Temp (24hrs), Av 4 °F (36 9 °C), Min:97 7 °F (36 5 °C), Max:98 7 °F (37 1 °C)    Temperature: 97 7 °F (36 5 °C)  Intake & Output:  I/O        0701   0700  0701   0700  0701   0700    P  O  1100 300 500    Total Intake(mL/kg) 1100 (15 3) 300 (4 2) 500 (6 9)    Urine (mL/kg/hr) 700 (0 4)      Drains       Stool 0      Total Output 700      Net +400 +300 +500           Unmeasured Stool Occurrence 4 x          Weights:   IBW (Ideal Body Weight): 70 7 kg    Body mass index is 23 48 kg/m²  Weight (last 2 days)     None        Physical Exam  Vitals and nursing note reviewed  Constitutional:       General: He is not in acute distress  Appearance: Normal appearance  He is well-developed  HENT:      Head: Normocephalic and atraumatic  Eyes:      Conjunctiva/sclera: Conjunctivae normal    Cardiovascular:      Rate and Rhythm: Normal rate and regular rhythm        Heart sounds: No murmur heard  Pulmonary:      Effort: Pulmonary effort is normal  No respiratory distress  Breath sounds: Normal breath sounds  Abdominal:      General: Bowel sounds are normal       Palpations: Abdomen is soft  Tenderness: There is no abdominal tenderness  Musculoskeletal:         General: No swelling  Cervical back: Neck supple  Right lower leg: No edema  Left lower leg: No edema  Skin:     General: Skin is warm and dry  Capillary Refill: Capillary refill takes less than 2 seconds  Neurological:      Mental Status: He is alert  Motor: Weakness present  Comments: 4/5 bl le    Psychiatric:         Mood and Affect: Mood normal        LABORATORY DATA     Labs: I have personally reviewed pertinent reports  Results from last 7 days   Lab Units 06/28/23  0538 06/27/23 0445 06/26/23  0546   WBC Thousand/uL 8 61 9 09 9 72   HEMOGLOBIN g/dL 13 4 13 2 12 3   HEMATOCRIT % 40 1 40 0 37 8   PLATELETS Thousands/uL 286 281 245   NEUTROS PCT % 50 57 59   MONOS PCT % 10 10 11   EOS PCT % 2 2 2      Results from last 7 days   Lab Units 06/28/23  0538 06/27/23 0445 06/26/23  0546 06/24/23  0600 06/23/23  0456   POTASSIUM mmol/L 3 9 4 1 3 9   < > 4 1   CHLORIDE mmol/L 110* 108 111*   < > 114*   CO2 mmol/L 26 27 27   < > 27   BUN mg/dL 14 14 13   < > 19   CREATININE mg/dL 1 05 0 95 0 89   < > 1 02   CALCIUM mg/dL 9 0 9 1 8 9   < > 8 8   ALK PHOS U/L  --   --   --   --  82   ALT U/L  --   --   --   --  30   AST U/L  --   --   --   --  30    < > = values in this interval not displayed  Results from last 7 days   Lab Units 06/24/23  0600 06/23/23  0456   INR  1 00 0 96   PTT seconds 25 29               IMAGING & DIAGNOSTIC TESTING     Radiology Results: I have personally reviewed pertinent reports  XR spine lumbosacral 1 view    Result Date: 6/23/2023  Impression: Fluoroscopic guidance provided for procedure guidance    Please refer to the separate procedure notes for additional details  Workstation performed: [id number]     MRI lumbar spine wo contrast    Result Date: 6/20/2023  Impression: Spondylotic degenerative changes again noted similar to the prior study most significantly at L3-4 and L4-5 where severe central stenosis is identified  Moderate to severe lateral recess stenosis also identified as described  Moderate central stenosis and mild bilateral foraminal narrowing noted at L2-3 with mild foraminal narrowing at L5-S1 bilaterally  Workstation performed: [id number]     MRI brain wo contrast    Result Date: 6/20/2023  Impression: No MR evidence of acute ischemia  Small right mastoid effusion  Workstation performed: [id number]     CT stroke alert brain    Result Date: 6/19/2023  Impression: No acute intracranial abnormality  Findings were directly discussed with Crystal Giraldo at 2:41 p m  Workstation performed: [id number]     CTA stroke alert (head/neck)    Result Date: 6/19/2023  Impression: No significant stenosis of the cervical carotid or vertebral arteries No high-grade intracranial stenosis, large vessel occlusion or aneurysm  Findings were directly discussed with Crystal Giraldo at 2:41 p m  Workstation performed: [id number]     Other Diagnostic Testing: I have personally reviewed pertinent reports      ACTIVE MEDICATIONS     Current Facility-Administered Medications   Medication Dose Route Frequency   • acetaminophen (TYLENOL) tablet 975 mg  975 mg Oral Q8H [address]   • acetaminophen (TYLENOL) tablet 975 mg  975 mg Oral Q6H PRN   • atorvastatin (LIPITOR) tablet 80 mg  80 mg Oral QPM   • enoxaparin (LOVENOX) subcutaneous injection 40 mg  40 mg Subcutaneous Daily   • famotidine (PEPCID) tablet 20 mg  20 mg Oral BID   • finasteride (PROSCAR) tablet 5 mg  5 mg Oral Daily   • HYDROmorphone HCl (DILAUDID) injection 0 2 mg  0 2 mg Intravenous Q4H PRN   • metoprolol tartrate (LOPRESSOR) tablet 25 mg  25 mg Oral Daily   • nicotine (NICODERM CQ) 21 mg/24 hr TD 24 hr patch 1 "patch  1 patch Transdermal Daily   • oxyCODONE (ROXICODONE) immediate release tablet 10 mg  10 mg Oral Q4H PRN   • oxyCODONE (ROXICODONE) IR tablet 5 mg  5 mg Oral Q4H PRN   • polyethylene glycol (MIRALAX) packet 17 g  17 g Oral HS   • ranolazine (RANEXA) 12 hr tablet 500 mg  500 mg Oral Q12H FRANKI   • sertraline (ZOLOFT) tablet 100 mg  100 mg Oral HS       VTE Pharmacologic Prophylaxis: Enoxaparin (Lovenox)  VTE Mechanical Prophylaxis: sequential compression device    Portions of the record may have been created with voice recognition software  Occasional wrong word or \"sound a like\" substitutions may have occurred due to the inherent limitations of voice recognition software    Read the chart carefully and recognize, using context, where substitutions have occurred   ==  Manuel Candelaria  Internal Medicine Residency PGY-2       "

## 2023-06-30 VITALS
DIASTOLIC BLOOD PRESSURE: 70 MMHG | WEIGHT: 159 LBS | HEART RATE: 75 BPM | SYSTOLIC BLOOD PRESSURE: 116 MMHG | OXYGEN SATURATION: 96 % | TEMPERATURE: 98.7 F | HEIGHT: 69 IN | RESPIRATION RATE: 18 BRPM | BODY MASS INDEX: 23.55 KG/M2

## 2023-06-30 PROCEDURE — 97112 NEUROMUSCULAR REEDUCATION: CPT

## 2023-06-30 PROCEDURE — 97530 THERAPEUTIC ACTIVITIES: CPT

## 2023-06-30 PROCEDURE — 97116 GAIT TRAINING THERAPY: CPT

## 2023-06-30 RX ORDER — ASPIRIN 81 MG/1
81 TABLET ORAL DAILY
Qty: 30 TABLET | Refills: 0
Start: 2023-07-08 | End: 2023-08-07

## 2023-06-30 RX ORDER — FAMOTIDINE 20 MG/1
20 TABLET, FILM COATED ORAL 2 TIMES DAILY
Qty: 60 TABLET | Refills: 0
Start: 2023-06-30 | End: 2023-07-30

## 2023-06-30 RX ORDER — NICOTINE 21 MG/24HR
1 PATCH, TRANSDERMAL 24 HOURS TRANSDERMAL DAILY
Qty: 28 PATCH | Refills: 0
Start: 2023-07-01

## 2023-06-30 RX ORDER — RANOLAZINE 500 MG/1
500 TABLET, EXTENDED RELEASE ORAL EVERY 12 HOURS SCHEDULED
Qty: 60 TABLET | Refills: 0
Start: 2023-06-30 | End: 2023-07-30

## 2023-06-30 RX ORDER — ATORVASTATIN CALCIUM 80 MG/1
80 TABLET, FILM COATED ORAL EVERY EVENING
Qty: 30 TABLET | Refills: 0
Start: 2023-06-30 | End: 2023-07-30

## 2023-06-30 RX ADMIN — ACETAMINOPHEN 975 MG: 325 TABLET, FILM COATED ORAL at 06:24

## 2023-06-30 RX ADMIN — NICOTINE 1 PATCH: 21 PATCH, EXTENDED RELEASE TRANSDERMAL at 08:24

## 2023-06-30 RX ADMIN — RANOLAZINE 500 MG: 500 TABLET, FILM COATED, EXTENDED RELEASE ORAL at 08:25

## 2023-06-30 RX ADMIN — ENOXAPARIN SODIUM 40 MG: 40 INJECTION SUBCUTANEOUS at 08:24

## 2023-06-30 RX ADMIN — METOPROLOL TARTRATE 25 MG: 25 TABLET, FILM COATED ORAL at 08:24

## 2023-06-30 RX ADMIN — FAMOTIDINE 20 MG: 20 TABLET, FILM COATED ORAL at 17:43

## 2023-06-30 RX ADMIN — ACETAMINOPHEN 975 MG: 325 TABLET, FILM COATED ORAL at 13:20

## 2023-06-30 RX ADMIN — ATORVASTATIN CALCIUM 80 MG: 80 TABLET, FILM COATED ORAL at 17:43

## 2023-06-30 RX ADMIN — FINASTERIDE 5 MG: 5 TABLET, FILM COATED ORAL at 08:24

## 2023-06-30 NOTE — CASE MANAGEMENT
Case Management Discharge Planning Note    Patient name Annette Fuentes  Location [address] [place] [address] [facility] [id number]  : 1950 Date 2023       Current Admission Date: 2023  Current Admission Diagnosis:Spinal stenosis of lumbar region with neurogenic claudication   Patient Active Problem List    Diagnosis Date Noted   • Spinal stenosis of lumbar region with neurogenic claudication 2022   • Chronic pain syndrome 2022   • Lumbar radiculopathy 2022   • Lumbar spondylosis 2022   • DDD (degenerative disc disease), lumbar 2022   • Low back pain with sciatica 2022   • Closed compression fracture of first lumbar vertebra ([facility] [address] ) 2022   • Olecranon bursitis of right elbow 2022   • Sessile colonic polyp 04/10/2017   • Subclinical hypothyroidism 2017   • Chest pain 2016   • CAD (coronary artery disease) 2016   • S/P CABG x 4 2016   • Pre-diabetes 2016   • HLD (hyperlipidemia) 2016   • HTN (hypertension) 2016   • Tobacco use 2016   • Hx pulmonary embolism 2016   • BPH (benign prostatic hyperplasia) 2016   • Osteoarthritis 2013   • Schizophrenia ([address] ) 2013      LOS (days): 11  Geometric Mean LOS (GMLOS) (days): 2 80  Days to GMLOS:-8 3     OBJECTIVE:  Risk of Unplanned Readmission Score: 14 19         Current admission status: Inpatient   Preferred Pharmacy:   [id number], [address] [address]  Phone: [phone] Fax: [address], [phone] [id number]  Phone: [phone] Fax: [phone]    Primary Care Provider: SINGH Hdez    Primary Insurance: [facility]  Secondary Insurance:     DISCHARGE DETAILS:       Freedom of Choice: Yes  Comments - Freedom of Choice: Family is in agreement with [facility]  Patient Contacts: Pete Simpson  Relationship to Patient[de-identified] Other (Comment) (ex-wife)  Phone Number: [phone]  Reason/Outcome: Emergency Contact, Discharge [address]         Is the patient interested in [facility] [address] at discharge?: No    DME Referral Provided  Referral made for DME?: No    Other Referral/Resources/Interventions Provided:  Interventions: Short Term Rehab  Referral Comments: Refedrred to [facility]    Would you like to participate in our [facility] [address] service program?  : No - Declined    Treatment Team Recommendation: Short Term Rehab  Discharge Destination Plan[de-identified] Short Term Rehab  Transport at Discharge : [address] [place] [address] of Transport (Date): 06/30/23           Accompanied by: [address] : 1974 [address]  Receiving Facility/Agency Phone Number: [phone]  Facility/Agency Fax Number: [phone]

## 2023-06-30 NOTE — RESTORATIVE TECHNICIAN NOTE
Restorative Technician Note      Patient Name: Kathy Baum     Note Type: Mobility  Patient Position Upon Consult: Supine  Activity Performed: Ambulated; Dangled; Stood  Assistive Device: Other (Comment) (none)  Education Provided: Yes  Patient Position at End of Consult: Supine;  All needs within reach; Bed/Chair alarm activated    Daniel RICKETTS, Restorative Technician, [employer]

## 2023-06-30 NOTE — PLAN OF CARE
Problem: NEUROSENSORY - ADULT  Goal: Achieves stable or improved neurological status  Description: INTERVENTIONS  - Monitor and report changes in neurological status  - Monitor vital signs such as temperature, blood pressure, glucose, and any other labs ordered   - Initiate measures to prevent increased intracranial pressure  - Monitor for seizure activity and implement precautions if appropriate      Outcome: Progressing  Goal: Remains free of injury related to seizures activity  Description: INTERVENTIONS  - Maintain airway, patient safety  and administer oxygen as ordered  - Monitor patient for seizure activity, document and report duration and description of seizure to physician/advanced practitioner  - If seizure occurs,  ensure patient safety during seizure  - Reorient patient post seizure  - Seizure pads on all 4 side rails  - Instruct patient/family to notify RN of any seizure activity including if an aura is experienced  - Instruct patient/family to call for assistance with activity based on nursing assessment  - Administer anti-seizure medications if ordered    Outcome: Progressing  Goal: Achieves maximal functionality and self care  Description: INTERVENTIONS  - Monitor swallowing and airway patency with patient fatigue and changes in neurological status  - Encourage and assist patient to increase activity and self care     - Encourage visually impaired, hearing impaired and aphasic patients to use assistive/communication devices  Outcome: Progressing     Problem: MUSCULOSKELETAL - ADULT  Goal: Maintain or return mobility to safest level of function  Description: INTERVENTIONS:  - Assess patient's ability to carry out ADLs; assess patient's baseline for ADL function and identify physical deficits which impact ability to perform ADLs (bathing, care of mouth/teeth, toileting, grooming, dressing, etc )  - Assess/evaluate cause of self-care deficits   - Assess range of motion  - Assess patient's mobility  - Assess patient's need for assistive devices and provide as appropriate  - Encourage maximum independence but intervene and supervise when necessary  - Involve family in performance of ADLs  - Assess for home care needs following discharge   - Consider OT consult to assist with ADL evaluation and planning for discharge  - Provide patient education as appropriate  Outcome: Progressing  Goal: Maintain proper alignment of affected body part  Description: INTERVENTIONS:  - Support, maintain and protect limb and body alignment  - Provide patient/ family with appropriate education  Outcome: Progressing     Problem: PAIN - ADULT  Goal: Verbalizes/displays adequate comfort level or baseline comfort level  Description: Interventions:  - Encourage patient to monitor pain and request assistance  - Assess pain using appropriate pain scale  - Administer analgesics based on type and severity of pain and evaluate response  - Implement non-pharmacological measures as appropriate and evaluate response  - Consider cultural and social influences on pain and pain management  - Notify physician/advanced practitioner if interventions unsuccessful or patient reports new pain  Outcome: Progressing     Problem: INFECTION - ADULT  Goal: Absence or prevention of progression during hospitalization  Description: INTERVENTIONS:  - Assess and monitor for signs and symptoms of infection  - Monitor lab/diagnostic results  - Monitor all insertion sites, i e  indwelling lines, tubes, and drains  - Monitor endotracheal if appropriate and nasal secretions for changes in amount and color  - [place] appropriate cooling/warming therapies per order  - Administer medications as ordered  - Instruct and encourage patient and family to use good hand hygiene technique  - Identify and instruct in appropriate isolation precautions for identified infection/condition  Outcome: Progressing     Problem: DISCHARGE PLANNING  Goal: Discharge to home or other facility with appropriate resources  Description: INTERVENTIONS:  - Identify barriers to discharge w/patient and caregiver  - Arrange for needed discharge resources and transportation as appropriate  - Identify discharge learning needs (meds, wound care, etc )  - Arrange for interpretive services to assist at discharge as needed  - Refer to Case Management Department for coordinating discharge planning if the patient needs post-hospital services based on physician/advanced practitioner order or complex needs related to functional status, cognitive ability, or social support system  Outcome: Progressing     Problem: Knowledge Deficit  Goal: Patient/family/caregiver demonstrates understanding of disease process, treatment plan, medications, and discharge instructions  Description: Complete learning assessment and assess knowledge base  Interventions:  - Provide teaching at level of understanding  - Provide teaching via preferred learning methods  Outcome: Progressing     Problem: Neurological Deficit  Goal: Neurological status is stable or improving  Description: Interventions:  - Monitor and assess patient's level of consciousness, motor function, sensory function, and level of assistance needed for ADLs  - Monitor and report changes from baseline  Collaborate with interdisciplinary team to initiate plan and implement interventions as ordered  - Provide and maintain a safe environment  - Consider seizure precautions  - Consider fall precautions  - Consider aspiration precautions  - Consider bleeding precautions  Outcome: Progressing     Problem: Activity Intolerance/Impaired Mobility  Goal: Mobility/activity is maintained at optimum level for patient  Description: Interventions:  - Assess and monitor patient  barriers to mobility and need for assistive/adaptive devices  - Assess patient's emotional response to limitations    - Collaborate with interdisciplinary team and initiate plans and interventions as ordered  - Encourage independent activity per ability   - Maintain proper body alignment  - Perform active/passive rom as tolerated/ordered  - Plan activities to conserve energy   - Turn patient as appropriate  Outcome: Progressing     Problem: Communication Impairment  Goal: Ability to express needs and understand communication  Description: Assess patient's communication skills and ability to understand information  Patient will demonstrate use of effective communication techniques, alternative methods of communication and understanding even if not able to speak  - Encourage communication and provide alternate methods of communication as needed  - Collaborate with case management/ for discharge needs  - Include patient/family/caregiver in decisions related to communication  Outcome: Progressing     Problem: Potential for Aspiration  Goal: Non-ventilated patient's risk of aspiration is minimized  Description: Assess and monitor vital signs, respiratory status, and labs (WBC)  Monitor for signs of aspiration (tachypnea, cough, rales, wheezing, cyanosis, fever)  - Assess and monitor patient's ability to swallow  - Place patient up in chair to eat if possible  - HOB up at 90 degrees to eat if unable to get patient up into chair   - Supervise patient during oral intake  - Instruct patient/ family to take small bites  - Instruct patient/ family to take small single sips when taking liquids  - Follow patient-specific strategies generated by speech pathologist   Outcome: Progressing  Goal: Ventilated patient's risk of aspiration is minimized  Description: Assess and monitor vital signs, respiratory status, airway cuff pressure, and labs (WBC)  Monitor for signs of aspiration (tachypnea, cough, rales, wheezing, cyanosis, fever)  - Elevate head of bed 30 degrees if patient has tube feeding   - Monitor tube feeding    Outcome: Progressing     Problem: Nutrition  Goal: Nutrition/Hydration status is improving  Description: Monitor and assess patient's nutrition/hydration status for malnutrition (ex- brittle hair, bruises, dry skin, pale skin and conjunctiva, muscle wasting, smooth red tongue, and disorientation)  Collaborate with interdisciplinary team and initiate plan and interventions as ordered  Monitor patient's weight and dietary intake as ordered or per policy  Utilize nutrition screening tool and intervene per policy  Determine patient's food preferences and provide high-protein, high-caloric foods as appropriate  - Assist patient with eating   - Allow adequate time for meals   - Encourage patient to take dietary supplement as ordered  - Collaborate with clinical nutritionist   - Include patient/family/caregiver in decisions related to nutrition    Outcome: Progressing

## 2023-06-30 NOTE — PHYSICAL THERAPY NOTE
Physical Therapy Progress Note     06/30/23 1330   PT Last Visit   PT Visit Date 06/30/23   Note Type   Note Type Treatment   Pain Assessment   Pain Assessment Tool 0-10   Pain Score No Pain   Restrictions/Precautions   Other Precautions Spinal precautions; Fall Risk;Pain;Bed Alarm; Chair Alarm   Subjective   Subjective The patient notes that he didnt' sleep well last night  Transfers   Sit to Stand 5  Supervision   Stand to Sit 5  Supervision   Ambulation/Elevation   Gait pattern Excessively slow; Short stride; Inconsistent jalen   Gait Assistance 4  Minimal assist   Additional items Assist x 1;Verbal cues  (Contact guard )   Assistive Device Rolling walker   Distance 120 feet, 100 feet, 80 feet, 60 feet  Stair Management Assistance 4  Minimal assist   Additional items Assist x 1;Verbal cues   Stair Management Technique One rail L;Step to pattern; Foreward   Number of Stairs 7   Balance   Static Sitting Good   Dynamic Sitting Fair +   Static Standing Fair   Ambulatory Fair -   Activity Tolerance   Activity Tolerance Patient tolerated treatment well   Assessment   Prognosis Good   Problem List Decreased strength;Decreased endurance;Decreased mobility; Impaired balance;Pain;Orthopedic restrictions   Assessment The patient continues to demonstrate deficits with dynamic head and trunk movements as well as with dual-task gait activities  Picking up objects from the floor is very challenging for him, and extensive instruction was provided for acquiring a long-handled reacher vs  supported squatting  He had somewhat more difficulty with the steps with the forwards approach, but this was utilized also as a strengthening activity  Barriers to Discharge Inaccessible home environment;Decreased caregiver support   Goals   Patient Goals To regain his independence  STG Expiration Date 07/04/23   PT Treatment Day 3   Plan   Treatment/Interventions Functional transfer training;LE strengthening/ROM; Elevations; Therapeutic exercise; Endurance training;Patient/family training;Bed mobility;Gait training   Progress Progressing toward goals   PT Frequency 3-5x/wk   Recommendation   PT Discharge Recommendation Post acute rehabilitation services   Equipment Recommended [facility] Recommended Wheeled walker   AM-PAC Basic Mobility Inpatient   Turning in Flat Bed Without Bedrails 3   Lying on Back to Sitting on Edge of Flat Bed Without Bedrails 3   Moving Bed to Chair 3   Standing Up From Chair Using Arms 3   Walk in Room 3   Climb 3-5 Stairs With Railing 3   Basic Mobility Inpatient Raw Score 18   Basic Mobility Standardized Score 41 05   Highest Level Of Mobility   JH-HLM Goal 6: Walk 10 steps or more   JH-HLM Achieved 8: Walk 250 feet ot more         An AM-PAC Basic Mobility raw score less than 16 suggests the patient may benefit from discharge to post-acute rehab services      Kim Mandel PTA

## 2023-06-30 NOTE — PLAN OF CARE
Problem: PHYSICAL THERAPY ADULT  Goal: Performs mobility at highest level of function for planned discharge setting  See evaluation for individualized goals  Description: Treatment/Interventions: Functional transfer training, LE strengthening/ROM, Elevations, Therapeutic exercise, Endurance training, Patient/family training, Equipment eval/education, Bed mobility, Gait training, Spoke to nursing, Spoke to case management, OT  Equipment Recommended: Will Jacey       See flowsheet documentation for full assessment, interventions and recommendations  Outcome: Progressing  Note: Prognosis: Good  Problem List: Decreased strength, Decreased endurance, Decreased mobility, Impaired balance, Pain, Orthopedic restrictions  Assessment: The patient continues to demonstrate deficits with dynamic head and trunk movements as well as with dual-task gait activities  Picking up objects from the floor is very challenging for him, and extensive instruction was provided for acquiring a long-handled reacher vs  supported squatting  He had somewhat more difficulty with the steps with the forwards approach, but this was utilized also as a strengthening activity  Barriers to Discharge: Inaccessible home environment, Decreased caregiver support     PT Discharge Recommendation: Post acute rehabilitation services    See flowsheet documentation for full assessment

## 2023-06-30 NOTE — CASE MANAGEMENT
Pauly Alejo 51 has received approved authorization from insurance:   [address] in by Rep: Kiannonkatu 19 received for: SNF  Facility: [address] [place] [address] #: [id number]  Start of Care: 6/29  Next Review Date: 7/3  Continued Stay Care Coordinator: David Negro next review to: fax# [phone]  Care Manager notified: Catalina Moulton

## 2023-06-30 NOTE — PROGRESS NOTES
INTERNAL MEDICINE RESIDENCY PROGRESS NOTE     Name: Lukasz Zuniga   Age & Sex: 71 y o  male   MRN: [id number]  Unit/Bed#: [facility] 708-01   Encounter: [id number]  Team: SOD Team B     PATIENT INFORMATION     Name: Lukasz Zuniga   Age & Sex: 71 y o  male   MRN: [id number]  Hospital Stay Days: 11    ASSESSMENT/PLAN     Principal Problem:    Spinal stenosis of lumbar region with neurogenic claudication  Active Problems:    CAD (coronary artery disease)    HLD (hyperlipidemia)    Pre-diabetes    HTN (hypertension)    Tobacco use    BPH (benign prostatic hyperplasia)    Schizophrenia ([facility] [address] )      * Spinal stenosis of lumbar region with neurogenic claudication  Assessment & Plan  Lukasz Zuniga is a 71 y o  male  with pertinent history lumbar spondylosis, neurogenic claudication, CAD s/p CABG x 4, HTN, HLD, hx of PE (Not on thinners) who presented as stroke alert on 6/19/23 given acute onset worsening bilateral lower extremity weakness  Denies any urinary symptoms or saddle anesthesia  Initial NIHSS of 5 and LKW 1200  Initial BP on arrival was Blood Pressure: 110/53  As a result of lower suspicion for stroke and potential need for urgent surgery patient was determined to not be a candidate for tPA  • F/u exam:  HF strength 4-/5 R, 4/5 L  No ataxia or dysmetria noted on exam  Proprioception normal  Difficulty standing from a seated position without assistance  Romberg positive  • BP over last 24 hours: Blood Pressure: 136/79  current BP: Blood Pressure: 136/79     Imaging:  • CTH: No acute intracranial abnormality  • CTA: No significant stenosis of the cervical carotid or vertebral arteries  No high-grade intracranial stenosis, large vessel occlusion or aneurysm  • MRI brain: No MR evidence of acute ischemia  Small right mastoid effusion  • MRI L spine: Spondylotic degenerative changes again noted similar to the prior study most significantly at L3-4 and L4-5 where severe central stenosis is identified  Moderate to severe lateral recess stenosis also identified as described  Moderate central stenosis and mild bilateral foraminal narrowing noted at L2-3 with mild foraminal narrowing at L5-S1 bilaterally  • Echocardiogram: EF 50% with mild LA dilation   • Telemetry: monitor  • LDL 71   • HBA1C 5 8     Impression: At this time history and physical along with available imaging concerning for worsening weakness in the setting of known spinal stenosis  One month ago patient strength 5/5 throughout vs now significantly week in bilateral HF        Plan:  • S/p L3-4 and L4-5 decompression with Dr Alix Hopson 6/23  • Lumbar drain removed   • Stable for d/c to rehab per [facility]   • Patient will have postop visits at 2-week for incision check  and 6 weeks with Dr Ashley Spicer  • Follow up with neurology as outpatient in 3 months  • On DVT ppx   • Family able to help with recovery  • PMR on board, plan for acute post surgical rehab   • Neuro checks  • PT/OT//PMR consults appreciated     CAD (coronary artery disease)  Assessment & Plan  Patient is a 1 PPD smoker with hx of CAD s/p CABG x 4  Complains of angina with exertion    Plan:  · Patient has small area of of ischemia on NM SPECT  · Cardiology consult  · Continue Metoprolol 25 mg BID  · Ranexa 500 mg BID for angina   · Atorvastatin to 80 mg daily  · Restart ASA 81 daily 2 weeks post op (July 8)  · Patient will re-establish with cardiology o/p after d/c     HLD (hyperlipidemia)  Assessment & Plan  Last lipid panel in 2021  · Continue atorvastatin 80 mg qd  · Repeat lipid panel showing HDL at 31, LDL at 71, triglycerides at 109    Pre-diabetes  Assessment & Plan  Last A1c recently 5 8    · Follow up A1c  · Goal BG <200, can add SSI if needed to maintain goal BG    HTN (hypertension)  Assessment & Plan  Patient appears to be on lisinopril 5 mg daily and Lopressor 25 mg daily at home  -Has been mostly normotensive with blood pressure systolics between 400-998 mmHg  -Hold lisinopril for now    Tobacco use  Assessment & Plan  1 PPD smoker  · Continue nicotine patch    BPH (benign prostatic hyperplasia)  Assessment & Plan  Continue home finasteride 5 mg qd    Schizophrenia ([address] )  Assessment & Plan  Patient's home medication list includes Abilify, Atarax, Risperdal, and Zoloft  Reviewed medications with patient and he reports of these, he only takes Zoloft  · Continue Zoloft 100 mg qd  · Avoid gabapentin - which is on patient's home medication list; he says this causes him to have suicidal thoughts        Disposition: Discussed ongoing discharge planning at rounds as well as with case management, patient medically stable to be discharged and currently awaiting an authorization  We will continue to work with case management team towards this  SUBJECTIVE     Patient seen and examined  No acute events overnight  OBJECTIVE   Vitals:  Temp:  [98 1 °F (36 7 °C)-98 7 °F (37 1 °C)] 98 2 °F (36 8 °C)  HR:  [77-95] 89  Resp:  [16] 16  BP: (113-119)/(72) 113/72    Recent Labs:  Results from last 7 days   Lab Units 06/28/23  0538 06/27/23  0445 06/26/23  0546   SODIUM mmol/L 138 138 139   POTASSIUM mmol/L 3 9 4 1 3 9   CO2 mmol/L 26 27 27   CHLORIDE mmol/L 110* 108 111*   BUN mg/dL 14 14 13   CREATININE mg/dL 1 05 0 95 0 89   EGFR ml/min/1 73sq m 71 80 86         Results from last 7 days   Lab Units 06/24/23  0600   PROTIME seconds 13 4   INR  1 00     Results from last 7 days   Lab Units 06/28/23  0538 06/27/23  0445 06/26/23  0546   HEMOGLOBIN g/dL 13 4 13 2 12 3   HEMATOCRIT % 40 1 40 0 37 8   PLATELETS Thousands/uL 286 281 245                       Intake and Outputs:  I/O       06/28 0701 06/29 0700 06/29 0701 06/30 0700 06/30 0701 07/01 0700    P  O  300 1060     Total Intake(mL/kg) 300 (4 2) 1060 (14 7)     Urine (mL/kg/hr)  650 (0 4)     Stool       Total Output  650     Net +300 +410            Unmeasured Urine Occurrence  2 x     Unmeasured Stool Occurrence  0 x           Physical Exam  Vitals reviewed  Constitutional:       Comments: Patient is Occitan-speaking,  device was used to speak with the patient  Seen lying in bed  HENT:      Head: Normocephalic  Cardiovascular:      Rate and Rhythm: Normal rate and regular rhythm  Pulses: Normal pulses  Heart sounds: Normal heart sounds  Pulmonary:      Effort: Pulmonary effort is normal       Breath sounds: Normal breath sounds  Abdominal:      General: Bowel sounds are normal       Palpations: Abdomen is soft  Skin:     General: Skin is warm and dry  Capillary Refill: Capillary refill takes less than 2 seconds  Neurological:      Mental Status: He is alert and oriented to person, place, and time  Comments: Noted to have 4/5 bilateral lower extremity weakness  IMAGING & DIAGNOSTIC TESTING     Radiology Results: I have personally reviewed pertinent reports  XR spine lumbosacral 1 view    Result Date: 6/23/2023  Impression: Fluoroscopic guidance provided for procedure guidance  Please refer to the separate procedure notes for additional details  Workstation performed: [id number]     MRI lumbar spine wo contrast    Result Date: 6/20/2023  Impression: Spondylotic degenerative changes again noted similar to the prior study most significantly at L3-4 and L4-5 where severe central stenosis is identified  Moderate to severe lateral recess stenosis also identified as described  Moderate central stenosis and mild bilateral foraminal narrowing noted at L2-3 with mild foraminal narrowing at L5-S1 bilaterally  Workstation performed: [id number]     MRI brain wo contrast    Result Date: 6/20/2023  Impression: No MR evidence of acute ischemia  Small right mastoid effusion  Workstation performed: [id number]     CT stroke alert brain    Result Date: 6/19/2023  Impression: No acute intracranial abnormality  Findings were directly discussed with Crystal Cooper at 2:41 p m   Workstation performed: [id number]     CTA "stroke alert (head/neck)    Result Date: 6/19/2023  Impression: No significant stenosis of the cervical carotid or vertebral arteries No high-grade intracranial stenosis, large vessel occlusion or aneurysm  Findings were directly discussed with Vince Jones at 2:41 p m  Workstation performed: [id number]     Other Diagnostic Testing: I have personally reviewed pertinent reports  VTE Pharmacologic Prophylaxis: Enoxaparin (Lovenox)  VTE Mechanical Prophylaxis: sequential compression device    Portions of the record may have been created with voice recognition software  Occasional wrong word or \"sound a like\" substitutions may have occurred due to the inherent limitations of voice recognition software  Read the chart carefully and recognize, using context, where substitutions have occurred          Tamiko Son MD  Internal Medicine Residency PGY-2  [address] [place] [address]        "

## 2023-06-30 NOTE — PLAN OF CARE
Problem: Nutrition  Goal: Nutrition/Hydration status is improving  Description: Monitor and assess patient's nutrition/hydration status for malnutrition (ex- brittle hair, bruises, dry skin, pale skin and conjunctiva, muscle wasting, smooth red tongue, and disorientation)  Collaborate with interdisciplinary team and initiate plan and interventions as ordered  Monitor patient's weight and dietary intake as ordered or per policy  Utilize nutrition screening tool and intervene per policy  Determine patient's food preferences and provide high-protein, high-caloric foods as appropriate  - Assist patient with eating   - Allow adequate time for meals   - Encourage patient to take dietary supplement as ordered  - Collaborate with clinical nutritionist   - Include patient/family/caregiver in decisions related to nutrition    Outcome: Adequate for Discharge

## 2023-07-03 NOTE — TELEPHONE ENCOUNTER
Called [facility] and spoke with the nurse. She denies any incisional issues or fevers at this time. Confirmed patient's 2 week POV with the nurse for incision check and 6 week POV with Dr. Simin Harris. Encouraged them to call with any further questions or concerns or if any incisional issues were to arise. She verbalized an understanding and was appreciative for the coordination.

## 2023-07-05 NOTE — UTILIZATION REVIEW
NOTIFICATION OF ADMISSION DISCHARGE   This is a Notification of Discharge from [facility]. Please be advised that this patient has been discharge from our facility. Below you will find the admission and discharge date and time including the patient’s disposition. UTILIZATION REVIEW CONTACT:  Cat Sutton  Utilization   Network Utilization Review Department  Phone: [phone] x carefully listen to the prompts. All voicemails are confidential.  Email: [email or web address]     ADMISSION INFORMATION  PRESENTATION DATE: 6/19/2023  1:10 PM  OBERVATION ADMISSION DATE:   INPATIENT ADMISSION DATE: 6/19/23  3:12 PM   DISCHARGE DATE: 6/30/2023 10:00 PM   DISPOSITION:Non [facility] SNF/TCU/SNU    IMPORTANT INFORMATION:  Send all requests for admission clinical reviews, approved or denied determinations and any other requests to dedicated fax number below belonging to the campus where the patient is receiving treatment.  List of dedicated fax numbers:  [place] DENIALS (Administrative/Medical Necessity) [phone] [address] [facility] (Maternity/NICU/Pediatrics) [phone]   [facility] [phone]   [facility] [phone] [address] [phone] [address] [facility] [phone]   [facility] [phone]   [address] [place] [address] [address] [facility] [phone]   [address] [facility] [phone]   [address] [facility] [phone] [address] [facility] [address] [facility] [phone]

## 2023-07-07 ENCOUNTER — CLINICAL SUPPORT (OUTPATIENT)
Dept: NEUROSURGERY | Facility: CLINIC | Age: 73
End: 2023-07-07

## 2023-07-07 VITALS — DIASTOLIC BLOOD PRESSURE: 60 MMHG | SYSTOLIC BLOOD PRESSURE: 106 MMHG | TEMPERATURE: 97.8 F

## 2023-07-07 DIAGNOSIS — Z98.890 POST-OPERATIVE STATE: Primary | ICD-10-CM

## 2023-07-07 PROCEDURE — 99024 POSTOP FOLLOW-UP VISIT: CPT

## 2023-07-07 RX ORDER — MULTIVIT-MIN/IRON FUM/FOLIC AC 7.5 MG-4
1 TABLET ORAL DAILY
COMMUNITY

## 2023-07-07 RX ORDER — ZINC GLUCONATE 50 MG
50 TABLET ORAL DAILY
COMMUNITY

## 2023-07-07 RX ORDER — ENEMA 19; 7 G/133ML; G/133ML
1 ENEMA RECTAL AS NEEDED
COMMUNITY

## 2023-07-07 RX ORDER — BISACODYL 10 MG
10 SUPPOSITORY, RECTAL RECTAL AS NEEDED
COMMUNITY

## 2023-07-10 NOTE — PROGRESS NOTES
**Taiwanese interpretor services used to translate - interpretor [id number]. **                                                                          Post-Op Visit- Neurosurgery    [facility] [place] 71 y.o. male MRN: [id number]    Chief Complaint:  Patient presents post: L3-4 and L4-5 laminectomy, bilateral foraminotomy decompresion - Bilateral    History of Present Illness:  Patient presents for 2 week POV for incision check. Currently resides at [facility]. Arrived unaccompanied. Assisted him to exam room in wheelchair. He states he is able to stand and ambulate a little. He reports pain is 1/10 and primarily in his hip and arm. He states he was dropped when being transported to facility following discharge. He notes pain is much improved however. Assessment:   Vitals:    07/07/23 1014   BP: 106/60   Temp: 97.8 °F (36.6 °C)       Wound Exam: Incision is clean, dry, and in tact, well approximated, without edema, erythema, or drainage. See below:        Procedure:  Staple/suture removal.   Procedure Note: Cleansed incision with CHG swab before 1 running prolene suture removed. Patient Status: the patient tolerated the procedure well. Complications: None. Discussion/Summary:  Doing well postoperatively. Reviewed incision care with patient including daily observation for s/s infection including: increased erythema, edema, drainage, dehiscence of incision or fever >101. Should these be observed, he understands that he is to call and/or return immediately for reassessment. Advised patient to continue cleansing area with mild soap and water and pat dry. Not to apply any lotions, creams, or ointments, & not to submerge in any water for 4 more weeks. He is to maintain activity restrictionsuntil cleared by the surgeon. Activity levels were also reviewed with the patient in detail, he is to lift no greater than 10 pounds and ambulation is encouraged as tolerated.     Verified date/time/location of upcoming POV. He is to call the office with any further questions or concerns, or if any incisional issues or fevers would arise.

## 2023-07-18 ENCOUNTER — TELEPHONE (OUTPATIENT)
Dept: NEUROLOGY | Facility: CLINIC | Age: 73
End: 2023-07-18

## 2023-07-18 NOTE — TELEPHONE ENCOUNTER
[facility]/ [facility]/Spinal stenosis of lumbar region with neurogenic claudication     DC- 6/30/2023- [address] Name and Phone Number  [address] Name, [address] [facility]  Receiving Facility/Agency Phone  Number  [phone]    Bassem Alaniz will need follow up in in 3 months with general attending (Scheduled with Dr. Imer Vernon at [address]). He will not require outpatient neurological testing. Schedule outpatient follow up with me at [address]: This should be completed prior to removing patient from list or discharge     [facility]ew[facility] schedule template has not yet open , I can only place on hold for the [address] [place] [address] office at this time. Kaylie Head

## 2023-08-10 ENCOUNTER — OFFICE VISIT (OUTPATIENT)
Dept: NEUROSURGERY | Facility: CLINIC | Age: 73
End: 2023-08-10

## 2023-08-10 VITALS
OXYGEN SATURATION: 98 % | TEMPERATURE: 98 F | HEIGHT: 69 IN | WEIGHT: 159 LBS | SYSTOLIC BLOOD PRESSURE: 98 MMHG | HEART RATE: 78 BPM | BODY MASS INDEX: 23.55 KG/M2 | DIASTOLIC BLOOD PRESSURE: 72 MMHG

## 2023-08-10 DIAGNOSIS — Z98.890 POST-OPERATIVE STATE: Primary | ICD-10-CM

## 2023-08-10 DIAGNOSIS — Z98.890 S/P LAMINECTOMY: ICD-10-CM

## 2023-08-10 PROCEDURE — 99024 POSTOP FOLLOW-UP VISIT: CPT | Performed by: STUDENT IN AN ORGANIZED HEALTH CARE EDUCATION/TRAINING PROGRAM
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
## 2023-08-11 ENCOUNTER — HOME HEALTH ADMISSION (OUTPATIENT)
Dept: HOME HEALTH SERVICES | Facility: HOME HEALTHCARE | Age: 73
End: 2023-08-11

## 2023-08-12 ENCOUNTER — HOME CARE VISIT (OUTPATIENT)
Dept: HOME HEALTH SERVICES | Facility: HOME HEALTHCARE | Age: 73
End: 2023-08-12

## 2023-08-15 ENCOUNTER — HOME CARE VISIT (OUTPATIENT)
Dept: HOME HEALTH SERVICES | Facility: HOME HEALTHCARE | Age: 73
End: 2023-08-15

## 2023-08-15 NOTE — CASE COMMUNICATION
Notification of Assess not Admit    [facility] has assessed your patient for Home Health services and has determined the patient is not eligible for service due to the following    Upon arrival into pts home, pt is (I)ly ambulating with the use of SPC in the home. Pt is able to perform transfers, community and shousehold mobility and gait with use of SPC (I)ly, able to go up and down 2nd floor steps (full flight) with use of B HR (I )ly and able to exit and enter home with performance of MARIE with use of available HR and SPC (I)ly. Pt would benefit from skilled outpt PT services at this time for back pain control and strength. DC pt from skilled home PT services at this time 2* pt is (I) with mobility and is currently not homebound. PC to pts PCP informing MD of information above. Pt was given a [facility] outpt therapy reference sheet in order to call and schedule an  appointment. Pt has all current medications in the home and has no questions and/or concerns about current medication regimen. Pt currently has no further questions at this time.   Pt's primary language is Welsh and [employer]  services was used with  #[id number]        Thank you,  Didier Singer DPT

## 2023-08-29 ENCOUNTER — EVALUATION (OUTPATIENT)
Dept: PHYSICAL THERAPY | Facility: REHABILITATION | Age: 73
End: 2023-08-29
Payer: COMMERCIAL

## 2023-08-29 DIAGNOSIS — Z98.890 POST-OPERATIVE STATE: ICD-10-CM

## 2023-08-29 DIAGNOSIS — Z91.81 AT MAXIMUM RISK FOR FALL: ICD-10-CM

## 2023-08-29 DIAGNOSIS — Z98.890 S/P LAMINECTOMY: Primary | ICD-10-CM

## 2023-08-29 PROCEDURE — 97112 NEUROMUSCULAR REEDUCATION: CPT

## 2023-08-29 PROCEDURE — 97161 PT EVAL LOW COMPLEX 20 MIN: CPT

## 2023-08-29 NOTE — PROGRESS NOTES
PT Evaluation     Today's date: 2023  Patient name: Neida Gooden  : 1950  MRN: [id number]  Referring provider: Zachary Maya MD  Dx:   Encounter Diagnosis     ICD-10-CM    1. S/P laminectomy  Z98.890 Ambulatory Referral to Physical Therapy      2. Post-operative state  Z98.890 Ambulatory Referral to Physical Therapy      3. At maximum risk for fall  Z91.81           Start Time: 1400  Stop Time: 1450  Total time in clinic (min): 50 minutes    Assessment  Assessment details: Neida Gooden is a 71 y.o. male presenting with global lumbar pain s/p L3-L5 bilateral laminectomy on 2023. Primary impairments include global lumbar pain with functional activities, global BLE weakness, lumbar AROM dysfunction, lumbar paraspinal motor control dysfunction. Pt is a significant fall risk - required assistance to prevent fall during evaluation. Close supervision provided throughout visit. Educated pt on anatomy and physiology of diagnosis. Will benefit from skilled PT interventions for community reintegration, ADL management/independence, return to hobbies. Pt with also complaints of cervical and R Sh pain following ambulance ride after the surgery. Provided pt with written home exercise program to be completed daily. Impairments: abnormal coordination, abnormal gait, abnormal muscle firing, abnormal muscle tone, abnormal or restricted ROM, abnormal movement, activity intolerance, impaired balance, impaired physical strength, lacks appropriate home exercise program, pain with function, safety issue, weight-bearing intolerance, poor posture  and poor body mechanics  Functional limitations: prolonged activity, walking, standing, transfers, bed mobility, ADLs, fall risk  Symptom irritability: highUnderstanding of Dx/Px/POC: good   Prognosis: fair    Goals    Short Term Goals: In 6 weeks, the patient will:  1. Improve B global hip strength to at least 3+/5 MMT  2.  Improve lumbar flexion AROM to at least 75%  3. Supervision with HEP for self-care    Long Term Goals: In 12 weeks, the patient will:  1. Complete 5xSTS to under 20 seconds without UE assistance  2. FOTO to greater than predicted value  3.  Independent with HEP for self-care      Plan  Plan details: 2-3x per week  Patient would benefit from: skilled physical therapy  Planned modality interventions: manual electrical stimulation  Planned therapy interventions: abdominal trunk stabilization, activity modification, balance, balance/weight bearing training, behavior modification, body mechanics training, community reintegration, coordination, fine motor coordination training, flexibility, functional ROM exercises, gait training, graded activity, graded exercise, graded motor, home exercise program, work reintegration, therapeutic training, therapeutic exercise, therapeutic activities, stretching, strengthening, self care, postural training, patient education, neuromuscular re-education, motor coordination training, massage, manual therapy, joint mobilization and ADL training  Duration in weeks: 12  Plan of Care beginning date: 8/29/2023  Plan of Care expiration date: 11/21/2023  Treatment plan discussed with: patient        Subjective     Pain Location: B lumbar region, neck, R Sh,   Pain Intensity: sharp; 6-7/10  [place]TE: s/p L3-5 bilateral laminectomies and foraminotomies; pain following ambulance ride  DOI: 6/27/2023  Aggravating Factors: prolonged activity - walking, stair negotiation  Alleviating Factors: pain medication  Living Situation: lives on second floor  Constitutional S/S: legs feel heavy   Goals: "I want to be able to walk"  PLOF: FALL RISK - fell on Saturday, general deconditioning     #[id number] used throughout initial evaluation    Objective          Postural Findings:     Head Position x Protracted   Neutral   Retracted   Scapular Position x Protracted   Neutral   Retracted   Thoracic Spine  x Inc Kyphosis  Neutral       Lumbar Spine   Inc Lordosis   Neutral x Dec Lordosis   Pelvis   Anterior Tilt  Neutral x  Posterior Tilt   Iliac Crest   L elevated x Neutral   R elevated   Feet   Pronated x Neutral   Supinated   Lateral Shift   Right   Left x None      Strength and ROM evaluated B from a regional biomechanical perspective and values relevant to this episode recorded in tables below. ROM:   Joint / Motion  Right  Left    Lumbar Flexion  50%*     Lumbar Extension  15%*     Lumbar Sidebending  15%* 15%*   Lumbar rotation 10%* 10%*    * indicates increase in pain     Strength: MMT revealed the following findings.   Joint Motion Right Left   Hip Flexion 3-/5* 3-/5*   Hip Abduction 3-/5* 3-/5*   Hip Adduction 3-/5* 3-/5*   Hip Extension 3-/5* 3-/5*   Knee Extension 3-/5* 3-/5*   Knee Flexion 3-/5* 3-/5*   Ankle Plantarflexion 3-/5 3-/5   Ankle Dorsiflexion 3-/5 3-/5    * indicates increase in pain          Additional Assessments:  Pain with palpation noted: significant pain and tenderness with palpation throughout lumbar region - hypersensitivity with palpation  Passive intervertebral motion: maximal hypersensitivity - unable to fully assess at lumbar spine; hypomobile t/s    5xSTS:  50.48 seconds w/ UE assist    Treatment Based Classification: Primary stability                Precautions: CABGx4, hx pulmonary embolism, HTN, s/p L3-5 laminextomy (6/27/2023)     Pertinent Findings:                                    Test / Measure  8/29/2023     FOTO 31     Global B hip MMT 3-/5     L/s flexion AROM 50%     5xSTS 50.48s w/ UE assistance        Manuals 8/29                                                                Neuro Re-Ed             Hip add squeeze 10x5"            Hooklying clamshell 10x mtb            TB sidestepping                                                                 Ther Ex             HEP review 5'            NuStep             STS 5x            FSU             LSU             Stand hip 3 way Ther Activity                                       Gait Training                                       Modalities

## 2023-09-05 ENCOUNTER — OFFICE VISIT (OUTPATIENT)
Dept: PHYSICAL THERAPY | Facility: REHABILITATION | Age: 73
End: 2023-09-05
Payer: COMMERCIAL

## 2023-09-05 DIAGNOSIS — Z98.890 S/P LAMINECTOMY: ICD-10-CM

## 2023-09-05 DIAGNOSIS — Z91.81 AT MAXIMUM RISK FOR FALL: ICD-10-CM

## 2023-09-05 DIAGNOSIS — Z98.890 POST-OPERATIVE STATE: Primary | ICD-10-CM

## 2023-09-05 PROCEDURE — 97112 NEUROMUSCULAR REEDUCATION: CPT

## 2023-09-05 PROCEDURE — 97110 THERAPEUTIC EXERCISES: CPT

## 2023-09-05 NOTE — PROGRESS NOTES
Daily Note     Today's date: 2023  Patient name: Christiano Weinberg   : 1950  MRN: [id number]  Referring provider: Shmuel Caldera MD  Dx:   Encounter Diagnosis     ICD-10-CM    1. Post-operative state  Z98.890       2. S/P laminectomy  Z98.890       3. At maximum risk for fall  Z91.81           Start Time: 930  Stop Time: 1008  Total time in clinic (min): 38 minutes    Subjective: Pt reports B hip and lumbar pain prior to start of tx session. Objective: See treatment diary below      Assessment: Tolerated treatment well. Patient would benefit from continued PT. Pt able to tolerate progression of POC this tx session following initial evaluation. Close supervision provided throughout visit due to fall risk. ModAx1 provided on one instance when attempting to complete tandem stance static balance. Pt with frequent UE assistance on the parallel bar to maintain balance independently. Moderate fatigue noted post-session. 1:1 with Zina Taylor DPT entirety of tx. Plan: Progress treatment as tolerated.        Precautions: CABGx4, hx pulmonary embolism, HTN, s/p L3-5 laminectomy (2023)     Pertinent Findings:                                    Test / Measure  2023     FOTO 31     Global B hip MMT 3-/5     L/s flexion AROM 50%     5xSTS 50.48s w/ UE assistance        Manuals                                                                Neuro Re-Ed             Hip add squeeze 10x5" 15x5"           Hooklying clamshell 10x mtb            TB sidestepping  2 laps otb           Mini squats  2x10           Tandem stance  2x30" B           Tandem gait  2 laps UE assist                        Ther Ex             HEP review 5'            NuStep  6' L6           STS 5x UE assist 3x5 1 airex           FSU  15x B 4" step           LSU             Stand hip 3 way  15x ea           LAQ  3x10 B 2#           Leg press  2x10 40#           Ther Activity Gait Training                                       Modalities

## 2023-09-11 ENCOUNTER — OFFICE VISIT (OUTPATIENT)
Dept: PHYSICAL THERAPY | Facility: REHABILITATION | Age: 73
End: 2023-09-11
Payer: COMMERCIAL

## 2023-09-11 DIAGNOSIS — Z98.890 POST-OPERATIVE STATE: Primary | ICD-10-CM

## 2023-09-11 DIAGNOSIS — Z91.81 AT MAXIMUM RISK FOR FALL: ICD-10-CM

## 2023-09-11 DIAGNOSIS — Z98.890 S/P LAMINECTOMY: ICD-10-CM

## 2023-09-11 PROCEDURE — 97110 THERAPEUTIC EXERCISES: CPT

## 2023-09-11 PROCEDURE — 97112 NEUROMUSCULAR REEDUCATION: CPT

## 2023-09-11 NOTE — PROGRESS NOTES
Daily Note     Today's date: 2023  Patient name: Jossie Garrido  : 1950  MRN: [id number]  Referring provider: Benjamin Morrow MD  Dx:   Encounter Diagnosis     ICD-10-CM    1. Post-operative state  Z98.890       2. S/P laminectomy  Z98.890       3. At maximum risk for fall  Z91.81           Start Time: 930  Stop Time: 1008  Total time in clinic (min): 38 minutes    Subjective: Pt reports R lumbar and lateral hip pain as well as R knee pain prior to start of tx session. Objective: See treatment diary below      Assessment: Tolerated treatment well. Patient would benefit from continued PT. Close supervision throughout tx session due to high fall risk. Slight LOB when completing STS transfers on one occasion - pt fell back into chair. Utilization of parallel bar when completing various balance and functional tasks. Mild fatigue noted post-session. Lumbar/hip pain persists, slight improvement of knee pain post-session. 1:1 with Kimberlee Adrian PT for initial 15 minutes of tx session ([id number]) then with Delia Christianson DPT for remaining 23 minutes of tx session ([id number]). Plan: Progress treatment as tolerated.        Precautions: CABGx4, hx pulmonary embolism, HTN, s/p L3-5 laminectomy (2023)     Pertinent Findings:                                    Test / Measure  2023     FOTO 31     Global B hip MMT 3-/5     L/s flexion AROM 50%     5xSTS 50.48s w/ UE assistance        Manuals                                                               Neuro Re-Ed             Hip add squeeze 10x5" 15x5" 15x5"          Hooklying clamshell 10x mtb            TB sidestepping  2 laps otb 4 laps gtb          Mini squats  2x10           Tandem stance  2x30" B 3x30" B          Tandem gait  2 laps UE assist 4 laps UE assist          Lateral step overs   3 laps ea foam beam 3 hurdles          Forward step overs   3 laps foam beam 3 hurdles          Suitcase carry   5 laps ea mirror 8#          Ther Ex             HEP review 5'            NuStep  6' L6           Rec bike   8' L1          STS 5x UE assist 3x5 1 airex 3x8 3# airex          FSU  15x B 4" step 15x B 6" step          LSU   10x B 6" step          Stand hip 3 way  15x ea 15x ea          LAQ  3x10 B 2#           Leg press  2x10 40# 3x10 40#          Ther Activity                                       Gait Training                                       Modalities

## 2023-09-13 ENCOUNTER — OFFICE VISIT (OUTPATIENT)
Dept: PHYSICAL THERAPY | Facility: REHABILITATION | Age: 73
End: 2023-09-13
Payer: COMMERCIAL

## 2023-09-13 DIAGNOSIS — Z98.890 POST-OPERATIVE STATE: ICD-10-CM

## 2023-09-13 DIAGNOSIS — Z98.890 S/P LAMINECTOMY: Primary | ICD-10-CM

## 2023-09-13 DIAGNOSIS — Z91.81 AT MAXIMUM RISK FOR FALL: ICD-10-CM

## 2023-09-13 PROCEDURE — 97110 THERAPEUTIC EXERCISES: CPT

## 2023-09-13 PROCEDURE — 97112 NEUROMUSCULAR REEDUCATION: CPT

## 2023-09-13 NOTE — PROGRESS NOTES
Daily Note     Today's date: 2023  Patient name: Dennie Maiers  : 1950  MRN: [id number]  Referring provider: Dorotha Seip, MD  Dx:   Encounter Diagnosis     ICD-10-CM    1. S/P laminectomy  Z98.890       2. Post-operative state  Z98.890       3. At maximum risk for fall  Z91.81           Start Time: 1000  Stop Time: 1045  Total time in clinic (min): 45 minutes    Subjective: Pt reports feeling better today compared to last tx session, wants to continue strengthening to work on balance. Objective: See treatment diary below      Assessment: Tolerated treatment well. Pt able to tolerate more weight and volume today during exercise. Balance continues to be difficult during standing exercises. After warm up and as treatment went on balance was better. Patient required close supervision during monster walks and foam pad walking. Patient able to start deadlifts today with good form to strengthen the posterior chain. Patient would benefit from continued PT.  1:1 with FRANCIA SamuelT for initial 15 minutes of tx session ([id number]) then with LAURA Fraser under direct supervision of Nonie Skiff, DPT for remaining 30 minutes of tx session ([id number]). Plan: Progress treatment as tolerated.        Precautions: CABGx4, hx pulmonary embolism, HTN, s/p L3-5 laminectomy (2023)     Pertinent Findings:                                    Test / Measure  2023     FOTO 31     Global B hip MMT 3-/5     L/s flexion AROM 50%     5xSTS 50.48s w/ UE assistance        Manuals                                                              Neuro Re-Ed             Hip add squeeze 10x5" 15x5" 15x5"          Hooklying clamshell 10x mtb            TB sidestepping  2 laps otb 4 laps gtb          Mini squats  2x10           Tandem stance  2x30" B 3x30" B          Tandem gait  2 laps UE assist 4 laps UE assist          Lateral step overs   3 laps ea foam beam 3 hurdles 3 laps 2# AW foam beam 2 hurdles         Forward step overs    3 laps 2# AW foam beam 2 hurdles         Monster walk    [id number] w gtb         Suitcase carry   5 laps ea mirror 8#          Ther Ex             HEP review 5'            NuStep  6' L6  8' L6         Rec bike   8' L1          STS 5x UE assist 3x5 1 airex 3x8 3# airex 2x10 #4  10x #5         FSU  15x B 4" step 15x B 6" step 2x8  6" step         LSU   10x B 6" step 2x8  6" step         Stand hip 3 way  15x ea 15x ea 15x ea 2# AW         LAQ  3x10 B 2#  3x10 B 3#         Leg press  2x10 40# 3x10 40# 3x12 55#         BB DL    6x 85#  3x6 65#           Ther Activity                                       Gait Training                                       Modalities

## 2023-09-15 ENCOUNTER — APPOINTMENT (OUTPATIENT)
Dept: PHYSICAL THERAPY | Facility: REHABILITATION | Age: 73
End: 2023-09-15
Payer: COMMERCIAL

## 2023-09-20 ENCOUNTER — APPOINTMENT (OUTPATIENT)
Dept: PHYSICAL THERAPY | Facility: REHABILITATION | Age: 73
End: 2023-09-20
Payer: COMMERCIAL

## 2023-09-21 ENCOUNTER — TELEPHONE (OUTPATIENT)
Dept: NEUROLOGY | Facility: CLINIC | Age: 73
End: 2023-09-21

## 2023-09-21 NOTE — TELEPHONE ENCOUNTER
Placed call to confirm patients upcoming appointment, Informed patient of the date/time and location. Advised to call office if they need to reschedule their visit.

## 2023-09-25 ENCOUNTER — OFFICE VISIT (OUTPATIENT)
Dept: PHYSICAL THERAPY | Facility: REHABILITATION | Age: 73
End: 2023-09-25
Payer: COMMERCIAL

## 2023-09-25 DIAGNOSIS — Z98.890 S/P LAMINECTOMY: Primary | ICD-10-CM

## 2023-09-25 DIAGNOSIS — Z98.890 POST-OPERATIVE STATE: ICD-10-CM

## 2023-09-25 DIAGNOSIS — Z91.81 AT MAXIMUM RISK FOR FALL: ICD-10-CM

## 2023-09-25 PROCEDURE — 97110 THERAPEUTIC EXERCISES: CPT

## 2023-09-25 PROCEDURE — 97112 NEUROMUSCULAR REEDUCATION: CPT

## 2023-09-25 NOTE — PROGRESS NOTES
Daily Note     Today's date: 2023  Patient name: Christiano Weinberg  : 1950  MRN: [id number]  Referring provider: Shmuel Caldera MD  Dx:   Encounter Diagnosis     ICD-10-CM    1. S/P laminectomy  Z98.890       2. Post-operative state  Z98.890       3. At maximum risk for fall  Z91.81           Start Time: 0945  Stop Time: 1030  Total time in clinic (min): 45 minutes    Subjective: Pt reports continued moderate lumbar pain. Objective: See treatment diary below      Assessment: Tolerated treatment well. Patient would benefit from continued PT. Balance deficit and resultant fall risk status persists. Close supervision provided throughout tx session. Slight LOBs when completing STS and deadlifts but pt able to regain balance independently. Contact guard assistance when completing tandem stance static balance on the uneven surface. Posterior hip fatigue and discomfort is most limiting factor. Required UE assistance on the parallel bar when completing step ups and other balance tasks. 1:1 with Zina Taylor DPT entirety of tx. Plan: Progress treatment as tolerated.        Precautions: CABGx4, hx pulmonary embolism, HTN, s/p L3-5 laminectomy (2023)     Pertinent Findings:                                    Test / Measure  2023     FOTO 31     Global B hip MMT 3-/5     L/s flexion AROM 50%     5xSTS 50.48s w/ UE assistance        Manuals                                    Neuro Re-Ed        Hip add squeeze 10x5" 15x5" 15x5"     Hooklying clamshell 10x mtb       TB sidestepping  2 laps otb 4 laps gtb  3 laps btb   Mini squats  2x10      Tandem stance  2x30" B 3x30" B  3x30" B foam beam CGA   Tandem gait  2 laps UE assist 4 laps UE assist     Lateral step overs   3 laps ea foam beam 3 hurdles 3 laps 2# AW foam beam 2 hurdles 3 laps foam beam + hurdles   Forward step overs    3 laps 2# AW foam beam 2 hurdles Foam beam + 12" box 6 laps   Monster walk [id number] w gtb    Suitcase carry   5 laps ea mirror 8#     Bosu fwd lunges     15x B   Ther Ex        HEP review 5'       NuStep  6' L6  8' L6 8' L7   Rec bike   8' L1     STS 5x UE assist 3x5 1 airex 3x8 3# airex 2x10 #4  10x #5 2x10 8#   FSU  15x B 4" step 15x B 6" step 2x8  6" step 15x B 6" step to 12" box   LSU   10x B 6" step 2x8  6" step    Stand hip 3 way  15x ea 15x ea 15x ea 2# AW    LAQ  3x10 B 2#  3x10 B 3#    Leg press  2x10 40# 3x10 40# 3x12 55# SL 2x12 B 25#   BB DL    6x 85#  3x6 65#   3x6 55#   Ther Activity                        Gait Training                        Modalities

## 2023-10-10 ENCOUNTER — TELEPHONE (OUTPATIENT)
Dept: NEUROLOGY | Facility: CLINIC | Age: 73
End: 2023-10-10

## 2023-10-20 ENCOUNTER — OFFICE VISIT (OUTPATIENT)
Dept: NEUROSURGERY | Facility: CLINIC | Age: 73
End: 2023-10-20

## 2023-10-20 VITALS
RESPIRATION RATE: 16 BRPM | BODY MASS INDEX: 23.55 KG/M2 | OXYGEN SATURATION: 98 % | HEART RATE: 100 BPM | TEMPERATURE: 96 F | DIASTOLIC BLOOD PRESSURE: 74 MMHG | WEIGHT: 159 LBS | HEIGHT: 69 IN | SYSTOLIC BLOOD PRESSURE: 108 MMHG

## 2023-10-20 DIAGNOSIS — Z98.890 POST-OPERATIVE STATE: ICD-10-CM

## 2023-10-20 DIAGNOSIS — Z98.890 S/P LAMINECTOMY: Primary | ICD-10-CM
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
## 2023-10-24 ENCOUNTER — OFFICE VISIT (OUTPATIENT)
Dept: NEUROLOGY | Facility: CLINIC | Age: 73
End: 2023-10-24
Payer: COMMERCIAL

## 2023-10-24 VITALS
HEART RATE: 99 BPM | DIASTOLIC BLOOD PRESSURE: 80 MMHG | SYSTOLIC BLOOD PRESSURE: 142 MMHG | BODY MASS INDEX: 23.37 KG/M2 | TEMPERATURE: 97.8 F | HEIGHT: 69 IN | RESPIRATION RATE: 18 BRPM | OXYGEN SATURATION: 99 % | WEIGHT: 157.8 LBS

## 2023-10-24 DIAGNOSIS — M54.16 LUMBAR RADICULOPATHY: ICD-10-CM

## 2023-10-24 DIAGNOSIS — M48.062 SPINAL STENOSIS OF LUMBAR REGION WITH NEUROGENIC CLAUDICATION: Primary | ICD-10-CM

## 2023-10-24 DIAGNOSIS — M79.10 MUSCLE TENSION PAIN: ICD-10-CM

## 2023-10-24 PROCEDURE — 99215 OFFICE O/P EST HI 40 MIN: CPT | Performed by: PSYCHIATRY & NEUROLOGY

## 2023-10-24 RX ORDER — LANOLIN ALCOHOL/MO/W.PET/CERES
400 CREAM (GRAM) TOPICAL DAILY
Qty: 90 TABLET | Refills: 0 | Status: SHIPPED | OUTPATIENT
Start: 2023-10-24
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
## 2023-12-01 NOTE — NURSING NOTE
The nurse from [facility] rehab called to get report on this patient  Patient had all his belongings in his back pack  No complaints of pain and stable at discharge  Principal Discharge DX:	Concussion w/o coma   1 Principal Discharge DX:	Concussion w/o coma  Secondary Diagnosis:	Laceration of forehead

## 2024-01-12 ENCOUNTER — APPOINTMENT (OUTPATIENT)
Dept: LAB | Facility: CLINIC | Age: 74
End: 2024-01-12
Payer: COMMERCIAL

## 2024-01-12 ENCOUNTER — TRANSCRIBE ORDERS (OUTPATIENT)
Dept: LAB | Facility: CLINIC | Age: 74
End: 2024-01-12

## 2024-01-12 DIAGNOSIS — I10 ESSENTIAL HYPERTENSION: Primary | ICD-10-CM

## 2024-01-12 DIAGNOSIS — I10 ESSENTIAL HYPERTENSION: ICD-10-CM

## 2024-01-12 DIAGNOSIS — I25.10 ATHEROSCLEROSIS OF NATIVE CORONARY ARTERY WITHOUT ANGINA PECTORIS, UNSPECIFIED WHETHER NATIVE OR TRANSPLANTED HEART: ICD-10-CM

## 2024-01-12 DIAGNOSIS — Z12.5 ENCOUNTER FOR SCREENING FOR MALIGNANT NEOPLASM OF PROSTATE: ICD-10-CM

## 2024-01-12 LAB
ALBUMIN SERPL BCP-MCNC: 3.9 G/DL (ref 3.5–5)
ALP SERPL-CCNC: 74 U/L (ref 34–104)
ALT SERPL W P-5'-P-CCNC: 9 U/L (ref 7–52)
ANION GAP SERPL CALCULATED.3IONS-SCNC: 8 MMOL/L
AST SERPL W P-5'-P-CCNC: 13 U/L (ref 13–39)
BILIRUB SERPL-MCNC: 0.32 MG/DL (ref 0.2–1)
BUN SERPL-MCNC: 15 MG/DL (ref 5–25)
CALCIUM SERPL-MCNC: 8.7 MG/DL (ref 8.4–10.2)
CHLORIDE SERPL-SCNC: 108 MMOL/L (ref 96–108)
CHOLEST SERPL-MCNC: 150 MG/DL
CO2 SERPL-SCNC: 26 MMOL/L (ref 21–32)
CREAT SERPL-MCNC: 0.98 MG/DL (ref 0.6–1.3)
GFR SERPL CREATININE-BSD FRML MDRD: 76 ML/MIN/1.73SQ M
GLUCOSE P FAST SERPL-MCNC: 99 MG/DL (ref 65–99)
HDLC SERPL-MCNC: 36 MG/DL
LDLC SERPL CALC-MCNC: 101 MG/DL (ref 0–100)
NONHDLC SERPL-MCNC: 114 MG/DL
POTASSIUM SERPL-SCNC: 3.7 MMOL/L (ref 3.5–5.3)
PROT SERPL-MCNC: 6.5 G/DL (ref 6.4–8.4)
SODIUM SERPL-SCNC: 142 MMOL/L (ref 135–147)
TRIGL SERPL-MCNC: 66 MG/DL

## 2024-01-12 PROCEDURE — 80061 LIPID PANEL: CPT

## 2024-01-12 PROCEDURE — 80053 COMPREHEN METABOLIC PANEL: CPT

## 2024-01-12 PROCEDURE — 36415 COLL VENOUS BLD VENIPUNCTURE: CPT

## 2024-01-12 PROCEDURE — 84154 ASSAY OF PSA FREE: CPT

## 2024-01-12 PROCEDURE — 84153 ASSAY OF PSA TOTAL: CPT

## 2024-01-13 LAB
PSA FREE MFR SERPL: 15.3 %
PSA FREE SERPL-MCNC: 0.23 NG/ML
PSA SERPL-MCNC: 1.5 NG/ML (ref 0–4)

## 2024-01-18 ENCOUNTER — APPOINTMENT (OUTPATIENT)
Dept: LAB | Facility: CLINIC | Age: 74
End: 2024-01-18
Payer: COMMERCIAL

## 2024-01-18 ENCOUNTER — TRANSCRIBE ORDERS (OUTPATIENT)
Dept: LAB | Facility: CLINIC | Age: 74
End: 2024-01-18

## 2024-01-18 DIAGNOSIS — I10 HYPERTENSION, ESSENTIAL: ICD-10-CM

## 2024-01-18 DIAGNOSIS — I10 HYPERTENSION, ESSENTIAL: Primary | ICD-10-CM

## 2024-01-18 LAB
BACTERIA UR QL AUTO: ABNORMAL /HPF
BILIRUB UR QL STRIP: NEGATIVE
CLARITY UR: CLEAR
COLOR UR: ABNORMAL
GLUCOSE UR STRIP-MCNC: NEGATIVE MG/DL
HGB UR QL STRIP.AUTO: ABNORMAL
KETONES UR STRIP-MCNC: NEGATIVE MG/DL
LEUKOCYTE ESTERASE UR QL STRIP: NEGATIVE
NITRITE UR QL STRIP: NEGATIVE
NON-SQ EPI CELLS URNS QL MICRO: ABNORMAL /HPF
PH UR STRIP.AUTO: 6 [PH]
PROT UR STRIP-MCNC: NEGATIVE MG/DL
RBC #/AREA URNS AUTO: ABNORMAL /HPF
SP GR UR STRIP.AUTO: 1.02 (ref 1–1.03)
UROBILINOGEN UR STRIP-ACNC: <2 MG/DL
WBC #/AREA URNS AUTO: ABNORMAL /HPF

## 2024-01-18 PROCEDURE — 81001 URINALYSIS AUTO W/SCOPE: CPT

## 2024-01-23 ENCOUNTER — TELEPHONE (OUTPATIENT)
Dept: NEUROSURGERY | Facility: CLINIC | Age: 74
End: 2024-01-23

## 2024-01-23 NOTE — TELEPHONE ENCOUNTER
Per Aimee patient should be scheduled with an AP, called pt to inform him of recommendations. patient agreed to be seen by Tamiko on 1/25/2024 at 10:30a.

## 2024-01-23 NOTE — TELEPHONE ENCOUNTER
Patient walked into clinic requesting appointment with Dr. Rios due to him having a recent fall and pain. He accepted appointment on 1/25/2024 at 1:30p.

## 2024-02-18 ENCOUNTER — APPOINTMENT (EMERGENCY)
Dept: RADIOLOGY | Facility: HOSPITAL | Age: 74
End: 2024-02-18
Payer: COMMERCIAL

## 2024-02-18 ENCOUNTER — HOSPITAL ENCOUNTER (EMERGENCY)
Facility: HOSPITAL | Age: 74
Discharge: HOME | End: 2024-02-18
Attending: EMERGENCY MEDICINE
Payer: COMMERCIAL

## 2024-02-18 VITALS
HEART RATE: 79 BPM | SYSTOLIC BLOOD PRESSURE: 167 MMHG | OXYGEN SATURATION: 98 % | RESPIRATION RATE: 18 BRPM | DIASTOLIC BLOOD PRESSURE: 73 MMHG | TEMPERATURE: 97.6 F

## 2024-02-18 VITALS
RESPIRATION RATE: 18 BRPM | SYSTOLIC BLOOD PRESSURE: 137 MMHG | TEMPERATURE: 97.5 F | OXYGEN SATURATION: 97 % | HEART RATE: 74 BPM | DIASTOLIC BLOOD PRESSURE: 86 MMHG

## 2024-02-18 DIAGNOSIS — R31.9 HEMATURIA: ICD-10-CM
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
## 2024-02-18 DIAGNOSIS — M54.9 BACK PAIN: Primary | ICD-10-CM

## 2024-02-18 LAB
BACTERIA UR QL AUTO: ABNORMAL /HPF
BILIRUB UR QL STRIP: NEGATIVE
CLARITY UR: CLEAR
COLOR UR: YELLOW
GLUCOSE UR STRIP-MCNC: NEGATIVE MG/DL
HGB UR QL STRIP.AUTO: ABNORMAL
KETONES UR STRIP-MCNC: NEGATIVE MG/DL
LEUKOCYTE ESTERASE UR QL STRIP: NEGATIVE
MUCOUS THREADS UR QL AUTO: ABNORMAL
NITRITE UR QL STRIP: NEGATIVE
NON-SQ EPI CELLS URNS QL MICRO: ABNORMAL /HPF
PH UR STRIP.AUTO: 6.5 [PH] (ref 4.5–8)
PROT UR STRIP-MCNC: NEGATIVE MG/DL
RBC #/AREA URNS AUTO: ABNORMAL /HPF
SP GR UR STRIP.AUTO: 1.02 (ref 1–1.03)
UROBILINOGEN UR QL STRIP.AUTO: 0.2 E.U./DL
WBC #/AREA URNS AUTO: ABNORMAL /HPF

## 2024-02-18 PROCEDURE — 99283 EMERGENCY DEPT VISIT LOW MDM: CPT

## 2024-02-18 PROCEDURE — 71046 X-RAY EXAM CHEST 2 VIEWS: CPT

## 2024-02-18 PROCEDURE — 99284 EMERGENCY DEPT VISIT MOD MDM: CPT | Performed by: EMERGENCY MEDICINE

## 2024-02-18 PROCEDURE — 99285 EMERGENCY DEPT VISIT HI MDM: CPT | Performed by: EMERGENCY MEDICINE

## 2024-02-18 PROCEDURE — G1004 CDSM NDSC: HCPCS

## 2024-02-18 PROCEDURE — 99284 EMERGENCY DEPT VISIT MOD MDM: CPT

## 2024-02-18 PROCEDURE — 96372 THER/PROPH/DIAG INJ SC/IM: CPT

## 2024-02-18 PROCEDURE — 81001 URINALYSIS AUTO W/SCOPE: CPT

## 2024-02-18 PROCEDURE — 74177 CT ABD & PELVIS W/CONTRAST: CPT

## 2024-02-18 RX ORDER — IBUPROFEN 400 MG/1
400 TABLET ORAL ONCE
Status: COMPLETED | OUTPATIENT
Start: 2024-02-18 | End: 2024-02-18

## 2024-02-18 RX ORDER — LIDOCAINE 50 MG/G
1 PATCH TOPICAL ONCE
Status: DISCONTINUED | OUTPATIENT
Start: 2024-02-18 | End: 2024-02-18 | Stop reason: HOSPADM

## 2024-02-18 RX ORDER — METHOCARBAMOL 500 MG/1
500 TABLET, FILM COATED ORAL ONCE
Status: COMPLETED | OUTPATIENT
Start: 2024-02-18 | End: 2024-02-18

## 2024-02-18 RX ORDER — ACETAMINOPHEN 325 MG/1
975 TABLET ORAL ONCE
Status: COMPLETED | OUTPATIENT
Start: 2024-02-18 | End: 2024-02-18

## 2024-02-18 RX ORDER — KETOROLAC TROMETHAMINE 30 MG/ML
15 INJECTION, SOLUTION INTRAMUSCULAR; INTRAVENOUS ONCE
Status: COMPLETED | OUTPATIENT
Start: 2024-02-18 | End: 2024-02-18

## 2024-02-18 RX ADMIN — METHOCARBAMOL 500 MG: 500 TABLET ORAL at 01:44

## 2024-02-18 RX ADMIN — IOHEXOL 100 ML: 350 INJECTION, SOLUTION INTRAVENOUS at 03:26

## 2024-02-18 RX ADMIN — ACETAMINOPHEN 975 MG: 325 TABLET, FILM COATED ORAL at 01:44

## 2024-02-18 RX ADMIN — LIDOCAINE 5% 1 PATCH: 700 PATCH TOPICAL at 18:20

## 2024-02-18 RX ADMIN — KETOROLAC TROMETHAMINE 15 MG: 30 INJECTION, SOLUTION INTRAMUSCULAR; INTRAVENOUS at 18:20

## 2024-02-18 RX ADMIN — IBUPROFEN 400 MG: 400 TABLET, FILM COATED ORAL at 01:44

## 2024-02-18 NOTE — DISCHARGE INSTRUCTIONS
Please return to the emergency department if you develop new or worsening symptoms including fever, worsening pain, shortness of breath, nausea and vomiting that does not resolve on its own, new onset inability to walk.    Please follow-up with your primary care provider for additional care and management of your chronic back pain.     Please continue to take your home medications as prescribed, please take Tylenol and Motrin as needed for pain control.

## 2024-02-18 NOTE — DISCHARGE INSTRUCTIONS
You have been seen in the emergency department for evaluation of pain.  Your workup today was normal.  You should follow-up with urology for hematuria.  Please also follow-up with your PCP.
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
## 2024-02-19 NOTE — ED PROVIDER NOTES
History  Chief Complaint   Patient presents with    Medical Problem     Pt stated he has pain in his throat and right lower back. Pt stated it started 2 days ago.      Patient is a 73yoM with chronic neck/back/shoulder pain from an accident over the summer coming in today for evaluation of acute exacerbation of pain over the past week. Patient additional with throat pain, cough, and intermittent shortness of breath associated with the cough. Patient attempted tylenol and ibuprofen at home for the pain with no relief. Patient reports ongoing paresthesias to his lower extremities which is not new or changed with this episode. Denies bowel or bladder dysfunction. Denies history of cancers but does have 25lb weight loss and hematuria over the past month for which his PCP directed him to see urology but patient is unsure how to establish care with them.   Denies trismus, hoarse voice, difficulty swallowing, or fevers.          Prior to Admission Medications   Prescriptions Last Dose Informant Patient Reported? Taking?   Acetaminophen Extra Strength 500 MG tablet  Outside Facility (Specify), Self Yes No   Sig: Take 500 mg by mouth every 6 (six) hours as needed for fever or mild pain   Multiple Vitamins-Minerals (multivitamin with minerals) tablet  Outside Facility (Specify), Self Yes No   Sig: Take 1 tablet by mouth daily   aspirin (ECOTRIN LOW STRENGTH) 81 mg EC tablet  Outside Facility (Specify), Self No No   Sig: Take 1 tablet (81 mg total) by mouth daily Do not start before July 8, 2023.   atorvastatin (LIPITOR) 80 mg tablet  Outside Facility (Specify), Self No No   Sig: Take 1 tablet (80 mg total) by mouth every evening   bisacodyl (DULCOLAX) 10 mg suppository  Outside Facility (Specify), Self Yes No   Sig: Insert 10 mg into the rectum as needed for constipation   famotidine (PEPCID) 20 mg tablet  Outside Facility (Specify), Self No No   Sig: Take 1 tablet (20 mg total) by mouth 2 (two) times a day   finasteride  (PROSCAR) 5 mg tablet  Outside Facility (Specify), Self Yes No   Sig: Take 5 mg by mouth daily   hydrOXYzine HCL (ATARAX) 25 mg tablet  Outside Facility (Specify), Self Yes No   Sig: Take 25 mg by mouth 2 (two) times a day   lisinopril (ZESTRIL) 5 mg tablet  Outside Facility (Specify), Self Yes No   Sig: Take 5 mg by mouth daily   magnesium Oxide (MAG-OX) 400 mg TABS   No No   Sig: Take 1 tablet (400 mg total) by mouth daily   magnesium hydroxide (MILK OF MAGNESIA) 400 mg/5 mL oral suspension  Outside Facility (Specify), Self Yes No   Sig: Take 5 mL by mouth if needed for constipation   metoprolol tartrate (LOPRESSOR) 25 mg tablet  Outside Facility (Specify), Self Yes No   Sig: Take 25 mg by mouth daily Hold for SBP <100 or HR <60   nicotine (NICODERM CQ) 21 mg/24 hr TD 24 hr patch  Outside Facility (Specify), Self No No   Sig: Place 1 patch on the skin over 24 hours daily Do not start before July 1, 2023.   ranolazine (RANEXA) 500 mg 12 hr tablet  Outside Facility (Specify), Self No No   Sig: Take 1 tablet (500 mg total) by mouth every 12 (twelve) hours   sertraline (ZOLOFT) 100 mg tablet  Outside Facility (Specify), Self Yes No   Sig: Take 100 mg by mouth daily at bedtime   sodium phosphate-biphosphate (FLEET) 7-19 g 118 mL enema  Outside Facility (Specify), Self Yes No   Sig: Insert 1 enema into the rectum if needed   zinc gluconate 50 mg tablet  Outside Facility (Specify), Self Yes No   Sig: Take 50 mg by mouth daily      Facility-Administered Medications: None       Past Medical History:   Diagnosis Date    BPH (benign prostatic hyperplasia)     Coronary artery disease     HTN (hypertension)     Hx pulmonary embolism     Hyperlipidemia     Hypertension     Prediabetes     Tobacco use        Past Surgical History:   Procedure Laterality Date    CORONARY ARTERY BYPASS GRAFT      DECOMPRESSION SPINE LUMBAR POSTERIOR Bilateral 6/23/2023    Procedure: L3-4 and L4-5 laminectomy, bilateral foraminotomy decompresion;   Surgeon: Elijah Dover MD;  Location: [facility];  Service: Neurosurgery    TX COLONOSCOPY FLX DX W/COLLJ SPEC WHEN PFRMD N/A 4/6/2017    Procedure: COLONOSCOPY;  Surgeon: Calros Vega MD;  Location: [facility];  Service: Gastroenterology       Family History   Problem Relation Age of Onset    Diabetes Mother     Diabetes Father      I have reviewed and agree with the history as documented.    E-Cigarette/Vaping    E-Cigarette Use Never User      E-Cigarette/Vaping Substances    Nicotine No     THC No     CBD No     Flavoring No     Other No     Unknown No      Social History     Tobacco Use    Smoking status: Every Day     Current packs/day: 0.50     Average packs/day: 0.5 packs/day for 56.0 years (28.0 ttl pk-yrs)     Types: Cigarettes    Smokeless tobacco: Never    Tobacco comments:     started when Pt was 10 years old.  Pt smokes 1/2 to 1 pack a day when stressed   Vaping Use    Vaping status: Never Used   Substance Use Topics    Alcohol use: No    Drug use: No        Review of Systems   Constitutional:  Negative for chills, fatigue and fever.   HENT:  Positive for sore throat. Negative for congestion.    Respiratory:  Negative for cough, chest tightness and shortness of breath.    Cardiovascular:  Negative for chest pain.   Gastrointestinal:  Negative for abdominal pain, diarrhea, nausea and vomiting.   Genitourinary:  Negative for difficulty urinating.   Musculoskeletal:  Positive for back pain and neck pain.   Skin:  Negative for color change.   Neurological:  Negative for dizziness, syncope, weakness, numbness and headaches.       Physical Exam  ED Triage Vitals   Temperature Pulse Respirations Blood Pressure SpO2   02/18/24 0033 02/18/24 0029 02/18/24 0029 02/18/24 0029 02/18/24 0029   97.5 °F (36.4 °C) 88 18 114/64 95 %      Temp Source Heart Rate Source Patient Position - Orthostatic VS BP Location FiO2 (%)   02/18/24 0033 02/18/24 0029 02/18/24 0029 02/18/24 0029 --   Oral Monitor Sitting Right arm        Pain Score       02/18/24 0029       5             Orthostatic Vital Signs  Vitals:    02/18/24 0029 02/18/24 0245 02/18/24 0500   BP: 114/64 106/55 137/86   Pulse: 88 72 74   Patient Position - Orthostatic VS: Sitting         Physical Exam  Vitals and nursing note reviewed.   Constitutional:       General: He is not in acute distress.     Appearance: Normal appearance. He is not ill-appearing or toxic-appearing.   HENT:      Head: Normocephalic and atraumatic.      Nose: No congestion.      Mouth/Throat:      Mouth: Mucous membranes are moist.      Pharynx: Oropharynx is clear. No oropharyngeal exudate or posterior oropharyngeal erythema.   Eyes:      General: No scleral icterus.     Extraocular Movements: Extraocular movements intact.   Neck:      Comments: Mild tenderness to palpation of paraspinal cervical muscles.  Cardiovascular:      Rate and Rhythm: Normal rate and regular rhythm.      Pulses: Normal pulses.      Heart sounds: Normal heart sounds. No murmur heard.  Pulmonary:      Effort: Pulmonary effort is normal. No respiratory distress.      Breath sounds: Normal breath sounds. No wheezing or rhonchi.   Abdominal:      General: Abdomen is flat. There is no distension.      Palpations: Abdomen is soft.      Tenderness: There is no abdominal tenderness. There is no guarding.   Musculoskeletal:         General: Normal range of motion.      Cervical back: Full passive range of motion without pain and normal range of motion. No crepitus. No pain with movement.      Lumbar back: Bony tenderness present.   Skin:     Capillary Refill: Capillary refill takes less than 2 seconds.   Neurological:      General: No focal deficit present.      Mental Status: He is alert.      Cranial Nerves: No cranial nerve deficit.      Sensory: No sensory deficit.      Motor: No weakness.      Gait: Gait normal.   Psychiatric:         Mood and Affect: Mood normal.         Behavior: Behavior normal.         ED  Medications  Medications   acetaminophen (TYLENOL) tablet 975 mg (975 mg Oral Given 2/18/24 0144)   ibuprofen (MOTRIN) tablet 400 mg (400 mg Oral Given 2/18/24 0144)   methocarbamol (ROBAXIN) tablet 500 mg (500 mg Oral Given 2/18/24 0144)   iohexol (OMNIPAQUE) 350 MG/ML injection (MULTI-DOSE) 100 mL (100 mL Intravenous Given 2/18/24 0326)       Diagnostic Studies  Results Reviewed       Procedure Component Value Units Date/Time    Urine Microscopic [[id number]]  (Abnormal) Collected: 02/18/24 0203    Lab Status: Final result Specimen: Urine, Clean Catch Updated: 02/18/24 0328     RBC, UA 1-2 /hpf      WBC, UA None Seen /hpf      Epithelial Cells None Seen /hpf      Bacteria, UA None Seen /hpf      MUCUS THREADS Occasional    Urine Macroscopic, POC [[id number]]  (Abnormal) Collected: 02/18/24 0203    Lab Status: Final result Specimen: Urine Updated: 02/18/24 0205     Color, UA Yellow     Clarity, UA Clear     pH, UA 6.5     Leukocytes, UA Negative     Nitrite, UA Negative     Protein, UA Negative mg/dl      Glucose, UA Negative mg/dl      Ketones, UA Negative mg/dl      Urobilinogen, UA 0.2 E.U./dl      Bilirubin, UA Negative     Occult Blood, UA Trace     Specific Gravity, UA 1.020    Narrative:      CLINITEK RESULT                   CT recon only lumbar spine   Final Result by Romain Tillman MD (02/18 0438)      Multilevel degenerative changes as described above without acute lumbar spine fracture or subluxation. Likely chronic superior endplate L1 compression deformity with moderate loss of vertebral body height. Posterior decompression laminectomy at the L3    and L4 levels.         Workstation performed: [id number]         CT abdomen pelvis with contrast   Final Result by Romain Tillman MD (02/18 0429)      No acute intra-abdominal/pelvic abnormalities noted with findings detailed above.         Workstation performed: [id number]         XR chest 2 views   ED Interpretation by Antonina Goldman MD (02/18 0216)   No  acute cardiopulmonary disease or changes compared to prior      Final Result by Eliza Montero MD (02/18 2016)      No acute cardiopulmonary disease.            Workstation performed: [id number]               Procedures  Procedures      ED Course  ED Course as of 02/19/24 1244   Sun Feb 18, 2024   0130 Patient seen and evaluated by me  Ddx: Acute on chronic pain. Bony lumbar tenderness, weight loss, and hematuria concerning for malignancy. Oropharyngeal exam WNL-possibility of viral pharyngitis given other viral symptoms.  Workup and plan: pain control, CT abd/pelvis with lumbar recon. Urine dip.    0209 Blood, UA(!): Trace   0216 XR chest 2 views  No acute pathology on my interpretation.   0445 CT abdomen pelvis with contrast  IMPRESSION:     No acute intra-abdominal/pelvic abnormalities noted with findings detailed above.     0446 CT recon only lumbar spine  IMPRESSION:     Multilevel degenerative changes as described above without acute lumbar spine fracture or subluxation. Likely chronic superior endplate L1 compression deformity with moderate loss of vertebral body height. Posterior decompression laminectomy at the L3   and L4 levels.     0448 Pain improved. Given return precautions and follow up information. Referral to urology. Patient reports understanding, all questions answered.               Identification of Seniors at Risk      Flowsheet Row Most Recent Value   (ISAR) Identification of Seniors at Risk    Before the illness or injury that brought you to the Emergency, did you need someone to help you on a regular basis? 0 Filed at: 02/18/2024 0032   In the last 24 hours, have you needed more help than usual? 0 Filed at: 02/18/2024 0032   Have you been hospitalized for one or more nights during the past 6 months? 0 Filed at: 02/18/2024 0032   In general, do you see well? 1 Filed at: 02/18/2024 0032   In general, do you have serious problems with your memory? 0 Filed at: 02/18/2024 0032   Do you take  more than three different medications every day? 1 Filed at: 02/18/2024 0032   ISAR Score 2 Filed at: 02/18/2024 0032                                Medical Decision Making  Please see ED course above regarding details of the MDM    Amount and/or Complexity of Data Reviewed  Labs:  Decision-making details documented in ED Course.  Radiology: ordered and independent interpretation performed. Decision-making details documented in ED Course.    Risk  OTC drugs.  Prescription drug management.          Disposition  Final diagnoses:   Back pain   Hematuria     Time reflects when diagnosis was documented in both MDM as applicable and the Disposition within this note       Time User Action Codes Description Comment    2/18/2024  4:47 AM Antonina Goldman [M54.9] Back pain     2/18/2024  4:48 AM Antonina Goldman [R31.9] Hematuria           ED Disposition       ED Disposition   Discharge    Condition   Stable    Date/Time   Sun Feb 18, 2024 9502    Comment   Samm Armijo discharge to home/self care.                   Follow-up Information       Follow up With Specialties Details Why Contact Info    SINGH Glaser Nurse Practitioner   [address] [place] [address]  [phone]              Discharge Medication List as of 2/18/2024  4:48 AM        CONTINUE these medications which have NOT CHANGED    Details   Acetaminophen Extra Strength 500 MG tablet Take 500 mg by mouth every 6 (six) hours as needed for fever or mild pain, Starting Thu 5/12/2022, Historical Med      aspirin (ECOTRIN LOW STRENGTH) 81 mg EC tablet Take 1 tablet (81 mg total) by mouth daily Do not start before July 8, 2023., Starting Sat 7/8/2023, Until Tue 10/24/2023, No Print      atorvastatin (LIPITOR) 80 mg tablet Take 1 tablet (80 mg total) by mouth every evening, Starting Fri 6/30/2023, Until Tue 10/24/2023, No Print      bisacodyl (DULCOLAX) 10 mg suppository Insert 10 mg into the rectum as needed for constipation,  Historical Med      famotidine (PEPCID) 20 mg tablet Take 1 tablet (20 mg total) by mouth 2 (two) times a day, Starting Fri 6/30/2023, Until Tue 10/24/2023, No Print      finasteride (PROSCAR) 5 mg tablet Take 5 mg by mouth daily, Starting Sun 5/7/2023, Historical Med      hydrOXYzine HCL (ATARAX) 25 mg tablet Take 25 mg by mouth 2 (two) times a day, Historical Med      lisinopril (ZESTRIL) 5 mg tablet Take 5 mg by mouth daily, Starting u 5/12/2022, Historical Med      magnesium hydroxide (MILK OF MAGNESIA) 400 mg/5 mL oral suspension Take 5 mL by mouth if needed for constipation, Historical Med      magnesium Oxide (MAG-OX) 400 mg TABS Take 1 tablet (400 mg total) by mouth daily, Starting Tue 10/24/2023, Normal      metoprolol tartrate (LOPRESSOR) 25 mg tablet Take 25 mg by mouth daily Hold for SBP <100 or HR <60, Historical Med      Multiple Vitamins-Minerals (multivitamin with minerals) tablet Take 1 tablet by mouth daily, Historical Med      nicotine (NICODERM CQ) 21 mg/24 hr TD 24 hr patch Place 1 patch on the skin over 24 hours daily Do not start before July 1, 2023., Starting Sat 7/1/2023, No Print      ranolazine (RANEXA) 500 mg 12 hr tablet Take 1 tablet (500 mg total) by mouth every 12 (twelve) hours, Starting Fri 6/30/2023, Until Tue 10/24/2023, No Print      sertraline (ZOLOFT) 100 mg tablet Take 100 mg by mouth daily at bedtime, Starting Wed 3/8/2023, Historical Med      sodium phosphate-biphosphate (FLEET) 7-19 g 118 mL enema Insert 1 enema into the rectum if needed, Historical Med      zinc gluconate 50 mg tablet Take 50 mg by mouth daily, Historical Med               PDMP Review         Value Time User    PDMP Reviewed  Yes 2/23/2022 10:59 AM Gerald Santiago DO             ED Provider  Attending physically available and evaluated Samm Armijo. I managed the patient along with the ED Attending.    Electronically Signed by           Antonina Goldman MD  02/19/24 8511

## 2024-02-19 NOTE — ED ATTENDING ATTESTATION
2/18/2024  IRavindra MD, saw and evaluated the patient. I have discussed the patient with the resident/non-physician practitioner and agree with the resident's/non-physician practitioner's findings, Plan of Care, and MDM as documented in the resident's/non-physician practitioner's note, except where noted. All available labs and Radiology studies were reviewed.  I was present for key portions of any procedure(s) performed by the resident/non-physician practitioner and I was immediately available to provide assistance.       At this point I agree with the current assessment done in the Emergency Department.  I have conducted an independent evaluation of this patient a history and physical is as follows:    73-year-old male presents to the emergency department for evaluation of low back pain.  No trauma or falls.  No saddle anesthesia, bowel or bladder incontinence.  No fevers or chills.  Patient did state that he had some hematuria, no dysuria.  No abdominal pain, nausea or vomiting.    On exam, patient was comfortably in bed in no acute distress, head is normocephalic atraumatic, pupils equal round reactive to light, neck is supple without meningismus signs, heart is regular rate and rhythm with intact distal pulses, no increased work of breathing, respiratory distress, or stridor.  Abdomen is soft, nontender nondistended without rebound or guarding.  No midline neck or back tenderness, no step-offs or deformities.    Differential diagnosis includes but is not limited to musculoskeletal back pain, urinary tract infection, ureterolithiasis.  No red flag symptoms.  Will check urinalysis and CT scan of the abdomen and pelvis with recons.  Urinalysis negative for infection.  CT scan negative for any acute findings.  Symptoms improved and patient is stable for discharge.    ED Course  ED Course as of 02/18/24 2234   Sun Feb 18, 2024   0329 Bacteria, UA: None Seen         Critical Care Time  Procedures
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
## 2024-04-03 ENCOUNTER — TRANSCRIBE ORDERS (OUTPATIENT)
Dept: LAB | Facility: CLINIC | Age: 74
End: 2024-04-03

## 2024-04-03 ENCOUNTER — APPOINTMENT (OUTPATIENT)
Dept: LAB | Facility: CLINIC | Age: 74
End: 2024-04-03
Payer: COMMERCIAL

## 2024-04-03 ENCOUNTER — HOSPITAL ENCOUNTER (OUTPATIENT)
Dept: RADIOLOGY | Facility: HOSPITAL | Age: 74
Discharge: HOME | End: 2024-04-03
Payer: COMMERCIAL

## 2024-04-03 DIAGNOSIS — M54.50 LOW BACK PAIN, UNSPECIFIED BACK PAIN LATERALITY, UNSPECIFIED CHRONICITY, UNSPECIFIED WHETHER SCIATICA PRESENT: ICD-10-CM

## 2024-04-03 DIAGNOSIS — R58 HEMORRHAGE, NOT ELSEWHERE CLASSIFIED: ICD-10-CM

## 2024-04-03 DIAGNOSIS — R58 HEMORRHAGE, NOT ELSEWHERE CLASSIFIED: Primary | ICD-10-CM

## 2024-04-03 LAB
APTT PPP: 27 SECONDS (ref 23–37)
BASOPHILS # BLD AUTO: 0.03 THOUSANDS/ÂΜL (ref 0–0.1)
BASOPHILS NFR BLD AUTO: 0 % (ref 0–1)
EOSINOPHIL # BLD AUTO: 0.15 THOUSAND/ÂΜL (ref 0–0.61)
EOSINOPHIL NFR BLD AUTO: 2 % (ref 0–6)
ERYTHROCYTE [DISTWIDTH] IN BLOOD BY AUTOMATED COUNT: 13.9 % (ref 11.6–15.1)
HCT VFR BLD AUTO: 44.4 % (ref 36.5–49.3)
HGB BLD-MCNC: 14.7 G/DL (ref 12–17)
IMM GRANULOCYTES # BLD AUTO: 0.01 THOUSAND/UL (ref 0–0.2)
IMM GRANULOCYTES NFR BLD AUTO: 0 % (ref 0–2)
INR PPP: 0.97 (ref 0.84–1.19)
LYMPHOCYTES # BLD AUTO: 2.78 THOUSANDS/ÂΜL (ref 0.6–4.47)
LYMPHOCYTES NFR BLD AUTO: 38 % (ref 14–44)
MCH RBC QN AUTO: 31.2 PG (ref 26.8–34.3)
MCHC RBC AUTO-ENTMCNC: 33.1 G/DL (ref 31.4–37.4)
MCV RBC AUTO: 94 FL (ref 82–98)
MONOCYTES # BLD AUTO: 0.55 THOUSAND/ÂΜL (ref 0.17–1.22)
MONOCYTES NFR BLD AUTO: 8 % (ref 4–12)
NEUTROPHILS # BLD AUTO: 3.84 THOUSANDS/ÂΜL (ref 1.85–7.62)
NEUTS SEG NFR BLD AUTO: 52 % (ref 43–75)
NRBC BLD AUTO-RTO: 0 /100 WBCS
PLATELET # BLD AUTO: 281 THOUSANDS/UL (ref 149–390)
PMV BLD AUTO: 9.4 FL (ref 8.9–12.7)
PROTHROMBIN TIME: 12.8 SECONDS (ref 11.6–14.5)
RBC # BLD AUTO: 4.71 MILLION/UL (ref 3.88–5.62)
WBC # BLD AUTO: 7.36 THOUSAND/UL (ref 4.31–10.16)

## 2024-04-03 PROCEDURE — 85025 COMPLETE CBC W/AUTO DIFF WBC: CPT

## 2024-04-03 PROCEDURE — 36415 COLL VENOUS BLD VENIPUNCTURE: CPT

## 2024-04-03 PROCEDURE — 85730 THROMBOPLASTIN TIME PARTIAL: CPT

## 2024-04-03 PROCEDURE — 85610 PROTHROMBIN TIME: CPT

## 2024-04-03 PROCEDURE — 72100 X-RAY EXAM L-S SPINE 2/3 VWS: CPT

## 2024-04-05 NOTE — ASSESSMENT & PLAN NOTE
Orthopedic Progress Note      Patient Name: Luigi Parmar    YOB: 1962    MRN: [id number]    Subjective: The patient states he is doing fine this morning, does not complain of any pain but when he does have pain is controlled on current analgesics no new complaints at this time.    Objective:   Visit Vitals  /79 (BP Location: LUE - Left upper extremity, Patient Position: Supine)   Pulse 78   Temp 99.1 °F (37.3 °C) (Oral)   Resp 17   Ht 5' 9\" (1.753 m)   Wt 92.8 kg (204 lb 9.4 oz)   SpO2 100%   BMI 30.21 kg/m²     General:  Patient is alert and oriented x3, not in any acute distress, vital signs are stable, He is afebrile.   Extremity: Good range of motion at the hip, minimal swelling, compartments are soft.  Tenderness to palpation at the distal AKA stump.  Pulse: Femoral pulses are palpable.  Neurologic: Sensation grossly intact in all dermatomes.  Skin: Incision is clean dry and intact, no active bleeding, no signs of infection.    Labs:   Recent Results (from the past 24 hour(s))   GLUCOSE, BEDSIDE - POINT OF CARE    Collection Time: 04/04/24  8:37 AM   Result Value Ref Range    GLUCOSE, BEDSIDE - POINT OF CARE 123 (H) 70 - 99 mg/dL   GLUCOSE, BEDSIDE - POINT OF CARE    Collection Time: 04/04/24 12:06 PM   Result Value Ref Range    GLUCOSE, BEDSIDE - POINT OF CARE 158 (H) 70 - 99 mg/dL   GLUCOSE, BEDSIDE - POINT OF CARE    Collection Time: 04/04/24  6:22 PM   Result Value Ref Range    GLUCOSE, BEDSIDE - POINT OF CARE 119 (H) 70 - 99 mg/dL   GLUCOSE, BEDSIDE - POINT OF CARE    Collection Time: 04/04/24  8:38 PM   Result Value Ref Range    GLUCOSE, BEDSIDE - POINT OF CARE 124 (H) 70 - 99 mg/dL   GLUCOSE, BEDSIDE - POINT OF CARE    Collection Time: 04/04/24  9:47 PM   Result Value Ref Range    GLUCOSE, BEDSIDE - POINT OF CARE 137 (H) 70 - 99 mg/dL   Basic Metabolic Panel    Collection Time: 04/05/24  6:00 AM   Result Value Ref Range    Fasting Status      Sodium 137 135 - 145 mmol/L    Potassium 4.3 3.4  Remote CABG x4  Reporting some exertional chest pressure  S/p nuclear stress test today 6/22 - if evidence of ischemia, will need to delay surgical intervention  - 5.1 mmol/L    Chloride 106 97 - 110 mmol/L    Carbon Dioxide 24 21 - 32 mmol/L    Anion Gap 11 7 - 19 mmol/L    Glucose 61 (L) 70 - 99 mg/dL    BUN 25 (H) 6 - 20 mg/dL    Creatinine 1.47 (H) 0.67 - 1.17 mg/dL    Glomerular Filtration Rate 54 (L) >=60    BUN/Cr 17 7 - 25    Calcium 6.6 (L) 8.4 - 10.2 mg/dL   Magnesium    Collection Time: 04/05/24  6:00 AM   Result Value Ref Range    Magnesium 1.6 (L) 1.7 - 2.4 mg/dL   Phosphorus    Collection Time: 04/05/24  6:00 AM   Result Value Ref Range    Phosphorus 3.4 2.4 - 4.7 mg/dL       Impression: Status post left above the knee amputation post op day 2    Plan: Continue current care, mechanical and pharmacological DVT prophylaxis, pain control, physical therapy NWB Left LE, discharge planning most likely to skilled nursing facility change dressing to left AKA stump as needed.  IV antibiotics as directed by the infectious disease specialist.  Follow up 3 weeks from discharge for wound check, see discharge instructions.    Darrel Hernandez D.O.  [facility] [place]  [address] [facility] [address]  [place] [address]  [email or web address]  [phone] (office)  [phone] (fax)

## 2024-04-08 ENCOUNTER — TELEPHONE (OUTPATIENT)
Dept: NEUROLOGY | Facility: CLINIC | Age: 74
End: 2024-04-08

## 2024-04-16 ENCOUNTER — TELEPHONE (OUTPATIENT)
Dept: NEUROLOGY | Facility: CLINIC | Age: 74
End: 2024-04-16

## 2024-06-11 ENCOUNTER — HOSPITAL ENCOUNTER (OUTPATIENT)
Dept: RADIOLOGY | Facility: HOSPITAL | Age: 74
Discharge: HOME | End: 2024-06-11
Payer: COMMERCIAL

## 2024-06-11 DIAGNOSIS — M54.50 LOW BACK PAIN, UNSPECIFIED BACK PAIN LATERALITY, UNSPECIFIED CHRONICITY, UNSPECIFIED WHETHER SCIATICA PRESENT: ICD-10-CM

## 2024-06-11 PROCEDURE — 72100 X-RAY EXAM L-S SPINE 2/3 VWS: CPT

## 2024-08-01 ENCOUNTER — TRANSCRIBE ORDERS (OUTPATIENT)
Dept: LAB | Facility: CLINIC | Age: 74
End: 2024-08-01

## 2024-08-01 ENCOUNTER — APPOINTMENT (OUTPATIENT)
Dept: LAB | Facility: CLINIC | Age: 74
End: 2024-08-01
Payer: COMMERCIAL

## 2024-08-01 DIAGNOSIS — E78.5 HYPERLIPIDEMIA, UNSPECIFIED HYPERLIPIDEMIA TYPE: ICD-10-CM

## 2024-08-01 DIAGNOSIS — R31.29 OTHER MICROSCOPIC HEMATURIA: Primary | ICD-10-CM

## 2024-08-01 DIAGNOSIS — R31.29 OTHER MICROSCOPIC HEMATURIA: ICD-10-CM

## 2024-08-01 DIAGNOSIS — N12 PYELITIS GLANDULARIS: Primary | ICD-10-CM

## 2024-08-01 LAB
ALBUMIN SERPL BCG-MCNC: 3.9 G/DL (ref 3.5–5)
ALP SERPL-CCNC: 75 U/L (ref 34–104)
ALT SERPL W P-5'-P-CCNC: 10 U/L (ref 7–52)
ANION GAP SERPL CALCULATED.3IONS-SCNC: 7 MMOL/L (ref 4–13)
AST SERPL W P-5'-P-CCNC: 15 U/L (ref 13–39)
BACTERIA UR QL AUTO: ABNORMAL /HPF
BILIRUB SERPL-MCNC: 0.5 MG/DL (ref 0.2–1)
BILIRUB UR QL STRIP: NEGATIVE
BUN SERPL-MCNC: 18 MG/DL (ref 5–25)
CALCIUM SERPL-MCNC: 9.5 MG/DL (ref 8.4–10.2)
CHLORIDE SERPL-SCNC: 105 MMOL/L (ref 96–108)
CHOLEST SERPL-MCNC: 173 MG/DL
CLARITY UR: CLEAR
CO2 SERPL-SCNC: 26 MMOL/L (ref 21–32)
COLOR UR: YELLOW
CREAT SERPL-MCNC: 1.02 MG/DL (ref 0.6–1.3)
GFR SERPL CREATININE-BSD FRML MDRD: 73 ML/MIN/1.73SQ M
GLUCOSE P FAST SERPL-MCNC: 83 MG/DL (ref 65–99)
GLUCOSE UR STRIP-MCNC: NEGATIVE MG/DL
HDLC SERPL-MCNC: 35 MG/DL
HGB UR QL STRIP.AUTO: ABNORMAL
KETONES UR STRIP-MCNC: NEGATIVE MG/DL
LDLC SERPL CALC-MCNC: 109 MG/DL (ref 0–100)
LEUKOCYTE ESTERASE UR QL STRIP: ABNORMAL
MUCOUS THREADS UR QL AUTO: ABNORMAL
NITRITE UR QL STRIP: NEGATIVE
NON-SQ EPI CELLS URNS QL MICRO: ABNORMAL /HPF
NONHDLC SERPL-MCNC: 138 MG/DL
PH UR STRIP.AUTO: 6 [PH]
POTASSIUM SERPL-SCNC: 4.4 MMOL/L (ref 3.5–5.3)
PROT SERPL-MCNC: 7.2 G/DL (ref 6.4–8.4)
PROT UR STRIP-MCNC: ABNORMAL MG/DL
RBC #/AREA URNS AUTO: ABNORMAL /HPF
SODIUM SERPL-SCNC: 138 MMOL/L (ref 135–147)
SP GR UR STRIP.AUTO: 1.02 (ref 1–1.03)
TRIGL SERPL-MCNC: 145 MG/DL
UROBILINOGEN UR STRIP-ACNC: 2 MG/DL
WBC #/AREA URNS AUTO: ABNORMAL /HPF

## 2024-08-01 PROCEDURE — 80053 COMPREHEN METABOLIC PANEL: CPT

## 2024-08-01 PROCEDURE — 81001 URINALYSIS AUTO W/SCOPE: CPT

## 2024-08-01 PROCEDURE — 80061 LIPID PANEL: CPT

## 2024-08-01 PROCEDURE — 36415 COLL VENOUS BLD VENIPUNCTURE: CPT

## 2024-08-02 ENCOUNTER — TRANSCRIBE ORDERS (OUTPATIENT)
Dept: LAB | Facility: CLINIC | Age: 74
End: 2024-08-02

## 2024-08-02 DIAGNOSIS — R73.03 DIABETES MELLITUS, LATENT: Primary | ICD-10-CM

## 2024-08-08 ENCOUNTER — HOSPITAL ENCOUNTER (OUTPATIENT)
Dept: RADIOLOGY | Facility: HOSPITAL | Age: 74
Discharge: HOME | End: 2024-08-08
Payer: COMMERCIAL

## 2024-08-08 DIAGNOSIS — R31.29 OTHER MICROSCOPIC HEMATURIA: ICD-10-CM

## 2024-08-08 PROCEDURE — 76775 US EXAM ABDO BACK WALL LIM: CPT

## 2024-08-28 ENCOUNTER — APPOINTMENT (EMERGENCY)
Dept: RADIOLOGY | Facility: HOSPITAL | Age: 74
End: 2024-08-28
Payer: COMMERCIAL

## 2024-08-28 ENCOUNTER — APPOINTMENT (OUTPATIENT)
Dept: LAB | Facility: CLINIC | Age: 74
End: 2024-08-28
Payer: COMMERCIAL

## 2024-08-28 ENCOUNTER — HOSPITAL ENCOUNTER (OUTPATIENT)
Facility: HOSPITAL | Age: 74
Setting detail: OBSERVATION
Discharge: HOME | End: 2024-08-29
Attending: EMERGENCY MEDICINE | Admitting: FAMILY MEDICINE
Payer: COMMERCIAL

## 2024-08-28 DIAGNOSIS — M54.2 NECK PAIN: ICD-10-CM

## 2024-08-28 DIAGNOSIS — R55 SYNCOPE: Primary | ICD-10-CM

## 2024-08-28 DIAGNOSIS — R73.03 DIABETES MELLITUS, LATENT: ICD-10-CM

## 2024-08-28 DIAGNOSIS — R07.9 CHEST PAIN: ICD-10-CM

## 2024-08-28 LAB
2HR DELTA HS TROPONIN: 0 NG/L
4HR DELTA HS TROPONIN: 1 NG/L
ALBUMIN SERPL BCG-MCNC: 3.6 G/DL (ref 3.5–5)
ALP SERPL-CCNC: 74 U/L (ref 34–104)
ALT SERPL W P-5'-P-CCNC: 10 U/L (ref 7–52)
ANION GAP SERPL CALCULATED.3IONS-SCNC: 9 MMOL/L (ref 4–13)
AST SERPL W P-5'-P-CCNC: 14 U/L (ref 13–39)
BASOPHILS # BLD AUTO: 0.02 THOUSANDS/ÂΜL (ref 0–0.1)
BASOPHILS NFR BLD AUTO: 0 % (ref 0–1)
BILIRUB SERPL-MCNC: 0.3 MG/DL (ref 0.2–1)
BUN SERPL-MCNC: 17 MG/DL (ref 5–25)
CALCIUM SERPL-MCNC: 8.8 MG/DL (ref 8.4–10.2)
CARDIAC TROPONIN I PNL SERPL HS: 5 NG/L
CARDIAC TROPONIN I PNL SERPL HS: 5 NG/L
CARDIAC TROPONIN I PNL SERPL HS: 6 NG/L
CHLORIDE SERPL-SCNC: 107 MMOL/L (ref 96–108)
CO2 SERPL-SCNC: 21 MMOL/L (ref 21–32)
CREAT SERPL-MCNC: 0.95 MG/DL (ref 0.6–1.3)
EOSINOPHIL # BLD AUTO: 0.15 THOUSAND/ÂΜL (ref 0–0.61)
EOSINOPHIL NFR BLD AUTO: 2 % (ref 0–6)
ERYTHROCYTE [DISTWIDTH] IN BLOOD BY AUTOMATED COUNT: 14.1 % (ref 11.6–15.1)
EST. AVERAGE GLUCOSE BLD GHB EST-MCNC: 131 MG/DL
FLUAV RNA RESP QL NAA+PROBE: NEGATIVE
FLUBV RNA RESP QL NAA+PROBE: NEGATIVE
GFR SERPL CREATININE-BSD FRML MDRD: 79 ML/MIN/1.73SQ M
GLUCOSE SERPL-MCNC: 95 MG/DL (ref 65–140)
HBA1C MFR BLD: 6.2 %
HCT VFR BLD AUTO: 39.9 % (ref 36.5–49.3)
HGB BLD-MCNC: 13.2 G/DL (ref 12–17)
IMM GRANULOCYTES # BLD AUTO: 0.01 THOUSAND/UL (ref 0–0.2)
IMM GRANULOCYTES NFR BLD AUTO: 0 % (ref 0–2)
LYMPHOCYTES # BLD AUTO: 2.4 THOUSANDS/ÂΜL (ref 0.6–4.47)
LYMPHOCYTES NFR BLD AUTO: 36 % (ref 14–44)
MCH RBC QN AUTO: 31.6 PG (ref 26.8–34.3)
MCHC RBC AUTO-ENTMCNC: 33.1 G/DL (ref 31.4–37.4)
MCV RBC AUTO: 96 FL (ref 82–98)
MONOCYTES # BLD AUTO: 0.59 THOUSAND/ÂΜL (ref 0.17–1.22)
MONOCYTES NFR BLD AUTO: 9 % (ref 4–12)
NEUTROPHILS # BLD AUTO: 3.44 THOUSANDS/ÂΜL (ref 1.85–7.62)
NEUTS SEG NFR BLD AUTO: 53 % (ref 43–75)
NRBC BLD AUTO-RTO: 0 /100 WBCS
PLATELET # BLD AUTO: 286 THOUSANDS/UL (ref 149–390)
PMV BLD AUTO: 10.3 FL (ref 8.9–12.7)
POTASSIUM SERPL-SCNC: 3.8 MMOL/L (ref 3.5–5.3)
PROT SERPL-MCNC: 6.1 G/DL (ref 6.4–8.4)
RBC # BLD AUTO: 4.18 MILLION/UL (ref 3.88–5.62)
RSV RNA RESP QL NAA+PROBE: NEGATIVE
SARS-COV-2 RNA RESP QL NAA+PROBE: NEGATIVE
SODIUM SERPL-SCNC: 137 MMOL/L (ref 135–147)
TSH SERPL DL<=0.05 MIU/L-ACNC: 2.4 UIU/ML (ref 0.45–4.5)
WBC # BLD AUTO: 6.61 THOUSAND/UL (ref 4.31–10.16)

## 2024-08-28 PROCEDURE — 84443 ASSAY THYROID STIM HORMONE: CPT

## 2024-08-28 PROCEDURE — 85025 COMPLETE CBC W/AUTO DIFF WBC: CPT

## 2024-08-28 PROCEDURE — 99222 1ST HOSP IP/OBS MODERATE 55: CPT

## 2024-08-28 PROCEDURE — 84484 ASSAY OF TROPONIN QUANT: CPT

## 2024-08-28 PROCEDURE — 93005 ELECTROCARDIOGRAM TRACING: CPT

## 2024-08-28 PROCEDURE — 71046 X-RAY EXAM CHEST 2 VIEWS: CPT

## 2024-08-28 PROCEDURE — 99285 EMERGENCY DEPT VISIT HI MDM: CPT | Performed by: EMERGENCY MEDICINE

## 2024-08-28 PROCEDURE — 72125 CT NECK SPINE W/O DYE: CPT

## 2024-08-28 PROCEDURE — 36415 COLL VENOUS BLD VENIPUNCTURE: CPT

## 2024-08-28 PROCEDURE — 99284 EMERGENCY DEPT VISIT MOD MDM: CPT

## 2024-08-28 PROCEDURE — 80053 COMPREHEN METABOLIC PANEL: CPT

## 2024-08-28 PROCEDURE — 0241U HB NFCT DS VIR RESP RNA 4 TRGT: CPT

## 2024-08-28 PROCEDURE — 83036 HEMOGLOBIN GLYCOSYLATED A1C: CPT

## 2024-08-28 PROCEDURE — 70450 CT HEAD/BRAIN W/O DYE: CPT

## 2024-08-28 RX ORDER — NICOTINE 21 MG/24HR
1 PATCH, TRANSDERMAL 24 HOURS TRANSDERMAL DAILY
Status: DISCONTINUED | OUTPATIENT
Start: 2024-08-29 | End: 2024-08-29 | Stop reason: HOSPADM

## 2024-08-28 RX ORDER — SODIUM CHLORIDE 9 MG/ML
100 INJECTION, SOLUTION INTRAVENOUS CONTINUOUS
Status: DISCONTINUED | OUTPATIENT
Start: 2024-08-28 | End: 2024-08-29 | Stop reason: HOSPADM

## 2024-08-28 RX ORDER — SERTRALINE HYDROCHLORIDE 100 MG/1
100 TABLET, FILM COATED ORAL
Status: DISCONTINUED | OUTPATIENT
Start: 2024-08-28 | End: 2024-08-29 | Stop reason: HOSPADM

## 2024-08-28 RX ORDER — RISPERIDONE 0.25 MG/1
0.5 TABLET ORAL
Status: DISCONTINUED | OUTPATIENT
Start: 2024-08-28 | End: 2024-08-29 | Stop reason: HOSPADM

## 2024-08-28 RX ORDER — FINASTERIDE 5 MG/1
5 TABLET, FILM COATED ORAL DAILY
Status: DISCONTINUED | OUTPATIENT
Start: 2024-08-29 | End: 2024-08-29 | Stop reason: HOSPADM

## 2024-08-28 RX ORDER — PREGABALIN 75 MG/1
150 CAPSULE ORAL DAILY
Status: DISCONTINUED | OUTPATIENT
Start: 2024-08-28 | End: 2024-08-29 | Stop reason: HOSPADM

## 2024-08-28 RX ORDER — LISINOPRIL 5 MG/1
5 TABLET ORAL DAILY
Status: DISCONTINUED | OUTPATIENT
Start: 2024-08-29 | End: 2024-08-29 | Stop reason: HOSPADM

## 2024-08-28 RX ORDER — LANOLIN ALCOHOL/MO/W.PET/CERES
400 CREAM (GRAM) TOPICAL DAILY
Status: DISCONTINUED | OUTPATIENT
Start: 2024-08-29 | End: 2024-08-29 | Stop reason: HOSPADM

## 2024-08-28 RX ORDER — HYDROXYZINE HYDROCHLORIDE 25 MG/1
25 TABLET, FILM COATED ORAL 2 TIMES DAILY
Status: DISCONTINUED | OUTPATIENT
Start: 2024-08-28 | End: 2024-08-29 | Stop reason: HOSPADM

## 2024-08-28 RX ORDER — METOPROLOL TARTRATE 25 MG/1
25 TABLET, FILM COATED ORAL DAILY
Status: DISCONTINUED | OUTPATIENT
Start: 2024-08-29 | End: 2024-08-29 | Stop reason: HOSPADM

## 2024-08-28 RX ORDER — RANOLAZINE 500 MG/1
500 TABLET, EXTENDED RELEASE ORAL EVERY 12 HOURS SCHEDULED
Status: DISCONTINUED | OUTPATIENT
Start: 2024-08-28 | End: 2024-08-29 | Stop reason: HOSPADM

## 2024-08-28 RX ORDER — PRAVASTATIN SODIUM 20 MG
20 TABLET ORAL
Status: DISCONTINUED | OUTPATIENT
Start: 2024-08-28 | End: 2024-08-28 | Stop reason: CLARIF

## 2024-08-28 RX ORDER — PREGABALIN 150 MG/1
150 CAPSULE ORAL DAILY
COMMUNITY

## 2024-08-28 RX ORDER — ATORVASTATIN CALCIUM 80 MG/1
80 TABLET, FILM COATED ORAL
Status: DISCONTINUED | OUTPATIENT
Start: 2024-08-28 | End: 2024-08-29 | Stop reason: HOSPADM

## 2024-08-28 RX ADMIN — SERTRALINE 100 MG: 100 TABLET, FILM COATED ORAL at 21:49

## 2024-08-28 RX ADMIN — HYDROXYZINE HYDROCHLORIDE 25 MG: 25 TABLET, FILM COATED ORAL at 19:50

## 2024-08-28 RX ADMIN — ATORVASTATIN CALCIUM 80 MG: 80 TABLET, FILM COATED ORAL at 19:50

## 2024-08-28 RX ADMIN — PREGABALIN 150 MG: 75 CAPSULE ORAL at 19:49

## 2024-08-28 RX ADMIN — RANOLAZINE 500 MG: 500 TABLET, FILM COATED, EXTENDED RELEASE ORAL at 19:50

## 2024-08-28 RX ADMIN — RISPERIDONE 0.5 MG: 0.25 TABLET, FILM COATED ORAL at 21:48

## 2024-08-28 RX ADMIN — SODIUM CHLORIDE 100 ML/HR: 0.9 INJECTION, SOLUTION INTRAVENOUS at 19:44

## 2024-08-28 NOTE — ASSESSMENT & PLAN NOTE
Sees cards as outpatient. Previous CABG. Patient takes aspirin daily  No chest pain no palpitations

## 2024-08-28 NOTE — H&P
[facility]  H&P  Name: Samm Armijo 73 y.o. male I MRN: [id number]  Unit/Bed#: CW2 210-02 I Date of Admission: 8/28/2024   Date of Service: 8/28/2024 I Hospital Day: 0      Assessment & Plan   * Syncope  Assessment & Plan  Patient had syncopal event today after testing positive to home test of COVID-19.  Says that he gets occasionally lightheaded upon standing up quickly.  Denies any chest pain, palpitation, SOB.  Echo 6/2023 Left Ventricle: Left ventricular cavity size is normal. Wall thickness is normal. The left ventricular ejection fraction is 50%. Systolic function is low normal. Global longitudinal strain is reduced at -15%. Diastolic function is normal.   Negative covid test here  No signs and symptoms of infection  Good oral intake   Not precipitated by going to the bathroom    Likely secondary to orthostatic bp  Noted to have low BP by neighbor    Plan:  Continue IV fluids for now  Orthostatic blood pressure  Telemetry  Continue diet    Chronic pain syndrome  Assessment & Plan  Patient takes pregabalin, evidenced by patient.  Was rechecked on PDMP    BPH (benign prostatic hyperplasia)  Assessment & Plan  Continue Flomax.    HTN (hypertension)  Assessment & Plan  Continue lisinopril continue metoprolol    HLD (hyperlipidemia)  Assessment & Plan  Continue statin    S/P CABG x 4  Assessment & Plan  Sees cards as outpatient. Previous CABG. Patient takes aspirin daily  No chest pain no palpitations           VTE Pharmacologic Prophylaxis: VTE Score: 2 Low Risk (Score 0-2) - Encourage Ambulation.  Code Status: Level 1 - Full Code   Discussion with family: Attempted to update  (wife) via phone. Unable to contact.    Anticipated Length of Stay: Patient will be admitted on an observation basis with an anticipated length of stay of less than 2 midnights secondary to Syncope.    Total Time Spent on Date of Encounter in care of patient: 55 mins. This time was  spent on one or more of the following: performing physical exam; counseling and coordination of care; obtaining or reviewing history; documenting in the medical record; reviewing/ordering tests, medications or procedures; communicating with other healthcare professionals and discussing with patient's family/caregivers.    Chief Complaint: Syncope    History of Present Illness:  used  Samm Armijo is a 73 y.o. male with a Past medical history of CAD status post CABG x 4, hypertension, hyperlipidemia, degenerative disc disease, smoker, lumbar spondylosis status post laminectomy in 2023., previous compression fracture of the first vertebrae.  Patient comes in for syncope, COVID positive at home.    Patient claims that he had a lightheaded episode after figuring out that he had a positive COVID test at home.  Otherwise he does not have any chest pain, palpitations, shortness of breath, cough, nasal congestion, diarrhea, burning urination, he does complain of some neuropathy in the lower extremity.  As he walked he felt lightheaded and passed out.  After that he called 911 to be taken to the hospital.  According to him this lightheadedness is sometimes precipitated by standing up quickly.  Had blood pressure measured by the neighbor who is in healthcare.  He was told that the blood pressure was low however he does not know the specific number.  In the emergency department patient is stable.  Labs unremarkable.  COVID 19 was negative with PCR.  Will admit as a case of carbs  Review of Systems:  Review of Systems   Constitutional:  Negative for chills and fever.   HENT:  Negative for ear pain and sore throat.    Eyes:  Negative for pain and visual disturbance.   Respiratory:  Negative for cough and shortness of breath.    Cardiovascular:  Negative for chest pain and palpitations.   Gastrointestinal:  Negative for abdominal pain and vomiting.   Genitourinary:  Negative for dysuria and hematuria.    Musculoskeletal:  Negative for arthralgias and back pain.   Skin:  Negative for color change and rash.   Neurological:  Positive for syncope. Negative for seizures.   All other systems reviewed and are negative.      Past Medical and Surgical History:   Past Medical History:   Diagnosis Date    BPH (benign prostatic hyperplasia)     Coronary artery disease     HTN (hypertension)     Hx pulmonary embolism     Hyperlipidemia     Hypertension     Prediabetes     Tobacco use        Past Surgical History:   Procedure Laterality Date    CORONARY ARTERY BYPASS GRAFT      DECOMPRESSION SPINE LUMBAR POSTERIOR Bilateral 6/23/2023    Procedure: L3-4 and L4-5 laminectomy, bilateral foraminotomy decompresion;  Surgeon: Elijah Dover MD;  Location: [facility];  Service: Neurosurgery    NV COLONOSCOPY FLX DX W/COLLJ SPEC WHEN PFRMD N/A 4/6/2017    Procedure: COLONOSCOPY;  Surgeon: Carlos Vega MD;  Location: [facility];  Service: Gastroenterology       Meds/Allergies:  Prior to Admission medications    Medication Sig Start Date End Date Taking? Authorizing Provider   Acetaminophen Extra Strength 500 MG tablet Take 500 mg by mouth every 6 (six) hours as needed for fever or mild pain 5/12/22   Historical Provider, MD   albuterol (PROVENTIL HFA,VENTOLIN HFA) 90 mcg/act inhaler INHALE 2 PUFFS EVERY 4 HOURS BY INHALATION ROUTE AS NEEDED. 3/5/24   Historical Provider, MD   aspirin (ECOTRIN LOW STRENGTH) 81 mg EC tablet Take 1 tablet (81 mg total) by mouth daily Do not start before July 8, 2023. 7/8/23 10/24/23  Bo Bhatt MD   atorvastatin (LIPITOR) 80 mg tablet Take 1 tablet (80 mg total) by mouth every evening 6/30/23 10/24/23  Bo Bhatt MD   bisacodyl (DULCOLAX) 10 mg suppository Insert 10 mg into the rectum as needed for constipation    Historical Provider, MD   doxycycline hyclate (VIBRAMYCIN) 100 mg capsule TAKE 1 CAPSULE TWICE A DAY BY ORAL ROUTE FOR 7 DAYS. 3/5/24   Historical Provider, MD   famotidine (PEPCID)  20 mg tablet Take 1 tablet (20 mg total) by mouth 2 (two) times a day 6/30/23 10/24/23  Bo Bhatt MD   finasteride (PROSCAR) 5 mg tablet Take 5 mg by mouth daily 5/7/23   Historical Provider, MD   hydrOXYzine HCL (ATARAX) 25 mg tablet Take 25 mg by mouth 2 (two) times a day    Historical Provider, MD   lidocaine (LIDODERM) 5 % APPLY 1 PATCH BY TOPICAL ROUTE ONCE DAILY (MAY WEAR UP TO 12HOURS.) X 10 DAYS 5/29/24   Historical Provider, MD   lisinopril (ZESTRIL) 5 mg tablet Take 5 mg by mouth daily 5/12/22   Historical Provider, MD   magnesium hydroxide (MILK OF MAGNESIA) 400 mg/5 mL oral suspension Take 5 mL by mouth if needed for constipation    Historical Provider, MD   magnesium Oxide (MAG-OX) 400 mg TABS Take 1 tablet (400 mg total) by mouth daily 10/24/23   Arpita Raymundo MD   metoprolol tartrate (LOPRESSOR) 25 mg tablet Take 25 mg by mouth daily Hold for SBP <100 or HR <60    Historical Provider, MD   Multiple Vitamins-Minerals (multivitamin with minerals) tablet Take 1 tablet by mouth daily    Historical Provider, MD   naproxen (NAPROSYN) 500 mg tablet TAKE 1 TABLET TWICE A DAY BY ORAL ROUTE FOR 5 DAYS. 3/27/24   Historical Provider, MD   nicotine (NICODERM CQ) 21 mg/24 hr TD 24 hr patch Place 1 patch on the skin over 24 hours daily Do not start before July 1, 2023. 7/1/23   Bo Bhatt MD   ranolazine (RANEXA) 500 mg 12 hr tablet Take 1 tablet (500 mg total) by mouth every 12 (twelve) hours 6/30/23 10/24/23  Bo Bhatt MD   risperiDONE (RisperDAL) 0.5 mg tablet take 1 tablet by mouth at bedtime for 90 DAYS    Historical Provider, MD   sertraline (ZOLOFT) 100 mg tablet Take 100 mg by mouth daily at bedtime 3/8/23   Historical Provider, MD   simvastatin (ZOCOR) 10 mg tablet Take 10 mg by mouth every evening 12/22/23   Historical Provider, MD   sodium phosphate-biphosphate (FLEET) 7-19 g 118 mL enema Insert 1 enema into the rectum if needed    Historical Provider, MD   zinc gluconate 50 mg  tablet Take 50 mg by mouth daily    Historical Provider, MD GONZALEZ have reviewed home medications with patient personally.    Allergies:   Allergies   Allergen Reactions    Gabapentin Anxiety     Suicidal thoughts        Social History:  Marital Status: /Civil Union   Occupation: None  Patient Pre-hospital Living Situation: Home  Patient Pre-hospital Level of Mobility: walks  Patient Pre-hospital Diet Restrictions: None  Substance Use History:   Social History     Substance and Sexual Activity   Alcohol Use No     Social History     Tobacco Use   Smoking Status Every Day    Current packs/day: 0.50    Average packs/day: 0.5 packs/day for 56.0 years (28.0 ttl pk-yrs)    Types: Cigarettes   Smokeless Tobacco Never   Tobacco Comments    started when Pt was 10 years old.  Pt smokes 1/2 to 1 pack a day when stressed     Social History     Substance and Sexual Activity   Drug Use No       Family History:  Family History   Problem Relation Age of Onset    Diabetes Mother     Diabetes Father        Physical Exam:     Vitals:   Blood Pressure: 120/66 (08/28/24 1615)  Pulse: 76 (08/28/24 1615)  Temperature: 98 °F (36.7 °C) (08/28/24 1307)  Temp Source: Oral (08/28/24 1307)  Respirations: 21 (08/28/24 1615)  SpO2: 97 % (08/28/24 1615)    Physical Exam  Vitals and nursing note reviewed.   Constitutional:       Appearance: Normal appearance. He is well-developed and normal weight.   HENT:      Head: Normocephalic and atraumatic.      Nose: Nose normal.      Mouth/Throat:      Mouth: Mucous membranes are dry.   Eyes:      Conjunctiva/sclera: Conjunctivae normal.   Cardiovascular:      Rate and Rhythm: Normal rate and regular rhythm.      Heart sounds: Normal heart sounds. No murmur heard.  Pulmonary:      Effort: Pulmonary effort is normal. No respiratory distress.      Breath sounds: Normal breath sounds.   Abdominal:      General: Abdomen is flat.      Palpations: Abdomen is soft.      Tenderness: There is no abdominal  tenderness.   Musculoskeletal:         General: No swelling.      Cervical back: Neck supple.      Right lower leg: No edema.      Left lower leg: No edema.   Skin:     General: Skin is warm and dry.      Capillary Refill: Capillary refill takes less than 2 seconds.   Neurological:      General: No focal deficit present.      Mental Status: He is alert and oriented to person, place, and time. Mental status is at baseline.   Psychiatric:         Mood and Affect: Mood normal.          Additional Data:     Lab Results:  Results from last 7 days   Lab Units 08/28/24  1455   WBC Thousand/uL 6.61   HEMOGLOBIN g/dL 13.2   HEMATOCRIT % 39.9   PLATELETS Thousands/uL 286   SEGS PCT % 53   LYMPHO PCT % 36   MONO PCT % 9   EOS PCT % 2     Results from last 7 days   Lab Units 08/28/24  1455   SODIUM mmol/L 137   POTASSIUM mmol/L 3.8   CHLORIDE mmol/L 107   CO2 mmol/L 21   BUN mg/dL 17   CREATININE mg/dL 0.95   ANION GAP mmol/L 9   CALCIUM mg/dL 8.8   ALBUMIN g/dL 3.6   TOTAL BILIRUBIN mg/dL 0.30   ALK PHOS U/L 74   ALT U/L 10   AST U/L 14   GLUCOSE RANDOM mg/dL 95             Lab Results   Component Value Date    HGBA1C 6.2 (H) 08/28/2024    HGBA1C 5.9 (H) 06/20/2023    HGBA1C 5.8 (H) 05/11/2023           Lines/Drains:  Invasive Devices       Peripheral Intravenous Line  Duration             Peripheral IV 08/28/24 Distal;Dorsal (posterior);Left Forearm <1 day    Peripheral IV 08/28/24 Left Antecubital <1 day                        Imaging: Reviewed radiology reports from this admission including: chest xray  CT head without contrast   Final Result by Jacob Nguyen MD (08/28 4629)      No acute intracranial abnormality.                  Workstation performed: [id number]         CT cervical spine without contrast   Final Result by Jacob Nguyen MD (08/28 1936)         1. No acute cervical spine fracture or traumatic malalignment.   2. Moderate size central disc protrusion C6-7 as described above.                   Workstation performed: [id number]         XR chest 2 views   Final Result by Jignesh Norris MD (08/28 1645)      No acute cardiopulmonary disease.            Workstation performed: [id number]             EKG and Other Studies Reviewed on Admission:   EKG: Personally Reviewed.    ** Please Note: This note has been constructed using a voice recognition system. **

## 2024-08-28 NOTE — ED PROVIDER NOTES
History  Chief Complaint   Patient presents with    Fall     Patient arrived VIA EMS from home. EMS was originally called for abdominal pain. When EMS arrived he had a witnessed fall. -headstrike +ASA. Patient complains of left collar bone pain and neck pain      HPI  Patient is 73 y.o. male PMH htn, hld, CABG x 4,, tobacco use presenting to ED for + home covid and syncope. Patient reports having headache for several days and today tested positive at home for covid and had low blood pressure at home. When walking to meet ambulance felt lightheaded, dizzy and syncopized.  Patient reports right-sided neck pain after fall and midsternal chest pain.  Patient denies head strike.  On aspirin daily.  Patient denies back, abdominal pain, extremity pain. Patient denies shortness of breath, N/V/D.     used for entirety of encounter.  Prior to Admission Medications   Prescriptions Last Dose Informant Patient Reported? Taking?   Acetaminophen Extra Strength 500 MG tablet  Outside Facility (Specify), Self Yes No   Sig: Take 500 mg by mouth every 6 (six) hours as needed for fever or mild pain   Multiple Vitamins-Minerals (multivitamin with minerals) tablet  Outside Facility (Specify), Self Yes No   Sig: Take 1 tablet by mouth daily   albuterol (PROVENTIL HFA,VENTOLIN HFA) 90 mcg/act inhaler   Yes No   Sig: INHALE 2 PUFFS EVERY 4 HOURS BY INHALATION ROUTE AS NEEDED.   aspirin (ECOTRIN LOW STRENGTH) 81 mg EC tablet  Outside Facility (Specify), Self No No   Sig: Take 1 tablet (81 mg total) by mouth daily Do not start before July 8, 2023.   atorvastatin (LIPITOR) 80 mg tablet  Outside Facility (Specify), Self No No   Sig: Take 1 tablet (80 mg total) by mouth every evening   famotidine (PEPCID) 20 mg tablet  Outside Facility (Specify), Self No No   Sig: Take 1 tablet (20 mg total) by mouth 2 (two) times a day   finasteride (PROSCAR) 5 mg tablet  Outside Facility (Specify), Self Yes No   Sig: Take 5 mg by mouth daily    hydrOXYzine HCL (ATARAX) 25 mg tablet  Outside Facility (Specify), Self Yes No   Sig: Take 25 mg by mouth 2 (two) times a day   lidocaine (LIDODERM) 5 %   Yes No   Sig: APPLY 1 PATCH BY TOPICAL ROUTE ONCE DAILY (MAY WEAR UP TO 12HOURS.) X 10 DAYS   lisinopril (ZESTRIL) 5 mg tablet  Outside Facility (Specify), Self Yes No   Sig: Take 5 mg by mouth daily   magnesium Oxide (MAG-OX) 400 mg TABS   No No   Sig: Take 1 tablet (400 mg total) by mouth daily   magnesium hydroxide (MILK OF MAGNESIA) 400 mg/5 mL oral suspension  Outside Facility (Specify), Self Yes No   Sig: Take 5 mL by mouth if needed for constipation   metoprolol tartrate (LOPRESSOR) 25 mg tablet  Outside Facility (Specify), Self Yes No   Sig: Take 25 mg by mouth daily Hold for SBP <100 or HR <60   nicotine (NICODERM CQ) 21 mg/24 hr TD 24 hr patch  Outside Facility (Specify), Self No No   Sig: Place 1 patch on the skin over 24 hours daily Do not start before July 1, 2023.   pregabalin (LYRICA) 150 mg capsule   Yes Yes   Sig: Take 150 mg by mouth daily   ranolazine (RANEXA) 500 mg 12 hr tablet  Outside Facility (Specify), Self No No   Sig: Take 1 tablet (500 mg total) by mouth every 12 (twelve) hours   risperiDONE (RisperDAL) 0.5 mg tablet   Yes No   Sig: take 1 tablet by mouth at bedtime for 90 DAYS   sertraline (ZOLOFT) 100 mg tablet  Outside Facility (Specify), Self Yes No   Sig: Take 100 mg by mouth daily at bedtime   simvastatin (ZOCOR) 10 mg tablet   Yes No   Sig: Take 10 mg by mouth every evening   zinc gluconate 50 mg tablet  Outside Facility (Specify), Self Yes No   Sig: Take 50 mg by mouth daily      Facility-Administered Medications: None       Past Medical History:   Diagnosis Date    BPH (benign prostatic hyperplasia)     Coronary artery disease     HTN (hypertension)     Hx pulmonary embolism     Hyperlipidemia     Hypertension     Prediabetes     Tobacco use        Past Surgical History:   Procedure Laterality Date    CORONARY ARTERY BYPASS  GRAFT      DECOMPRESSION SPINE LUMBAR POSTERIOR Bilateral 6/23/2023    Procedure: L3-4 and L4-5 laminectomy, bilateral foraminotomy decompresion;  Surgeon: Elijah Dover MD;  Location: [facility];  Service: Neurosurgery    KY COLONOSCOPY FLX DX W/COLLJ SPEC WHEN PFRMD N/A 4/6/2017    Procedure: COLONOSCOPY;  Surgeon: Carlos Vega MD;  Location: [facility];  Service: Gastroenterology       Family History   Problem Relation Age of Onset    Diabetes Mother     Diabetes Father      I have reviewed and agree with the history as documented.    E-Cigarette/Vaping    E-Cigarette Use Never User      E-Cigarette/Vaping Substances    Nicotine No     THC No     CBD No     Flavoring No     Other No     Unknown No      Social History     Tobacco Use    Smoking status: Every Day     Current packs/day: 0.50     Average packs/day: 0.5 packs/day for 56.0 years (28.0 ttl pk-yrs)     Types: Cigarettes    Smokeless tobacco: Never    Tobacco comments:     started when Pt was 10 years old.  Pt smokes 1/2 to 1 pack a day when stressed   Vaping Use    Vaping status: Never Used   Substance Use Topics    Alcohol use: No    Drug use: No        Review of Systems   Constitutional:  Negative for chills and fever.   HENT:  Negative for ear pain and sore throat.    Respiratory:  Negative for cough and shortness of breath.    Cardiovascular:  Positive for chest pain. Negative for palpitations and leg swelling.   Gastrointestinal:  Negative for abdominal pain, diarrhea, nausea and vomiting.   Genitourinary:  Negative for dysuria, frequency and hematuria.   Musculoskeletal:  Positive for neck pain. Negative for back pain.   Skin:  Negative for rash.   Neurological:  Positive for syncope and headaches. Negative for dizziness and light-headedness.       Physical Exam  ED Triage Vitals   Temperature Pulse Respirations Blood Pressure SpO2   08/28/24 1307 08/28/24 1307 08/28/24 1307 08/28/24 1307 08/28/24 1307   98 °F (36.7 °C) 78 16 103/55 94 %       Temp Source Heart Rate Source Patient Position - Orthostatic VS BP Location FiO2 (%)   08/28/24 1307 08/28/24 1307 08/28/24 1615 08/28/24 1615 --   Oral Monitor Lying Left arm       Pain Score       08/28/24 1820       7             Orthostatic Vital Signs  Vitals:    08/28/24 1927 08/28/24 2159 08/29/24 0657 08/29/24 0657   BP: 115/68 114/62 143/79 143/79   Pulse: (!) 108 91 86 82   Patient Position - Orthostatic VS: Standing - Orthostatic VS          Physical Exam  Vitals reviewed.   Constitutional:       General: He is awake.   HENT:      Head: Normocephalic and atraumatic.      Mouth/Throat:      Mouth: Mucous membranes are moist.   Eyes:      Extraocular Movements: Extraocular movements intact.      Right eye: No nystagmus.      Left eye: No nystagmus.      Conjunctiva/sclera: Conjunctivae normal.      Pupils: Pupils are equal, round, and reactive to light.   Neck:      Comments: No midline C-spine TTP, right paraspinal TTP  Cardiovascular:      Rate and Rhythm: Normal rate and regular rhythm.      Pulses: Normal pulses.      Heart sounds: Normal heart sounds, S1 normal and S2 normal. Heart sounds not distant. No murmur heard.     No friction rub. No gallop.   Pulmonary:      Breath sounds: No stridor. No wheezing, rhonchi or rales.      Comments: CTA b/l   Chest:      Comments: Midline anterior chest wall tenderness just over manubrium  Abdominal:      General: Bowel sounds are normal.      Palpations: Abdomen is soft.      Tenderness: There is no abdominal tenderness.   Musculoskeletal:      Right lower leg: No edema.      Left lower leg: No edema.      Comments: No midline cervical, thoracic or lumbar TTP, no step off. Full ROM in all extremities without pain. Pelvis stable.    Skin:     General: Skin is warm and dry.      Capillary Refill: Capillary refill takes less than 2 seconds.   Neurological:      Mental Status: He is alert and oriented to person, place, and time.      GCS: GCS eye subscore is 4.  GCS verbal subscore is 5. GCS motor subscore is 6.      Cranial Nerves: Cranial nerves 2-12 are intact.      Sensory: Sensation is intact.      Motor: No weakness or pronator drift.      Coordination: Coordination normal. Finger-Nose-Finger Test normal.         ED Medications  Medications - No data to display    Diagnostic Studies  Results Reviewed       Procedure Component Value Units Date/Time    HS Troponin I 4hr [[id number]]  (Normal) Collected: 08/28/24 2019    Lab Status: Final result Specimen: Blood from Arm, Right Updated: 08/28/24 2049     hs TnI 4hr 6 ng/L      Delta 4hr hsTnI 1 ng/L     TSH, 3rd generation [[id number]]  (Normal) Collected: 08/28/24 1455    Lab Status: Final result Specimen: Blood from Arm, Left Updated: 08/28/24 1955     TSH 3RD GENERATON 2.403 uIU/mL     HS Troponin I 2hr [[id number]]  (Normal) Collected: 08/28/24 1726    Lab Status: Final result Specimen: Blood from Arm, Left Updated: 08/28/24 1758     hs TnI 2hr 5 ng/L      Delta 2hr hsTnI 0 ng/L     FLU/RSV/COVID - if FLU/RSV clinically relevant [[id number]]  (Normal) Collected: 08/28/24 1624    Lab Status: Final result Specimen: Nares from Nose Updated: 08/28/24 1717     SARS-CoV-2 Negative     INFLUENZA A PCR Negative     INFLUENZA B PCR Negative     RSV PCR Negative    Narrative:      This test has been performed using the CoV-2/Flu/RSV plus assay on the [employer] GeneXpert platform. This test has been validated by the  and verified by the performing laboratory.     This test is designed to amplify and detect the following: nucleocapsid (N), envelope (E), and RNA-dependent RNA polymerase (RdRP) genes of the SARS-CoV-2 genome; matrix (M), basic polymerase (PB2), and acidic protein (PA) segments of the influenza A genome; matrix (M) and non-structural protein (NS) segments of the influenza B genome, and the nucleocapsid genes of RSV A and RSV B.     Positive results are indicative of the presence of Flu A, Flu B, RSV, and/or  SARS-CoV-2 RNA. Positive results for SARS-CoV-2 or suspected novel influenza should be reported to state, local, or federal health departments according to local reporting requirements.      All results should be assessed in conjunction with clinical presentation and other laboratory markers for clinical management.     FOR PEDIATRIC PATIENTS - copy/paste COVID Guidelines URL to browser: [email or web address]       Comprehensive metabolic panel [[id number]]  (Abnormal) Collected: 08/28/24 1455    Lab Status: Final result Specimen: Blood from Arm, Left Updated: 08/28/24 1638     Sodium 137 mmol/L      Potassium 3.8 mmol/L      Chloride 107 mmol/L      CO2 21 mmol/L      ANION GAP 9 mmol/L      BUN 17 mg/dL      Creatinine 0.95 mg/dL      Glucose 95 mg/dL      Calcium 8.8 mg/dL      AST 14 U/L      ALT 10 U/L      Alkaline Phosphatase 74 U/L      Total Protein 6.1 g/dL      Albumin 3.6 g/dL      Total Bilirubin 0.30 mg/dL      eGFR 79 ml/min/1.73sq m     Narrative:      National Kid[employer] Disease Foundation guidelines for Chronic Kidney Disease (CKD):     Stage 1 with normal or high GFR (GFR > 90 mL/min/1.73 square meters)    Stage 2 Mild CKD (GFR = 60-89 mL/min/1.73 square meters)    Stage 3A Moderate CKD (GFR = 45-59 mL/min/1.73 square meters)    Stage 3B Moderate CKD (GFR = 30-44 mL/min/1.73 square meters)    Stage 4 Severe CKD (GFR = 15-29 mL/min/1.73 square meters)    Stage 5 End Stage CKD (GFR <15 mL/min/1.73 square meters)  Note: GFR calculation is accurate only with a steady state creatinine    HS Troponin 0hr (reflex protocol) [[id number]]  (Normal) Collected: 08/28/24 1455    Lab Status: Final result Specimen: Blood from Arm, Left Updated: 08/28/24 1528     hs TnI 0hr 5 ng/L     CBC and differential [[id number]] Collected: 08/28/24 1455    Lab Status: Final result Specimen: Blood from Arm, Left Updated: 08/28/24 1502     WBC 6.61 Thousand/uL      RBC 4.18  Million/uL      Hemoglobin 13.2 g/dL      Hematocrit 39.9 %      MCV 96 fL      MCH 31.6 pg      MCHC 33.1 g/dL      RDW 14.1 %      MPV 10.3 fL      Platelets 286 Thousands/uL      nRBC 0 /100 WBCs      Segmented % 53 %      Immature Grans % 0 %      Lymphocytes % 36 %      Monocytes % 9 %      Eosinophils Relative 2 %      Basophils Relative 0 %      Absolute Neutrophils 3.44 Thousands/µL      Absolute Immature Grans 0.01 Thousand/uL      Absolute Lymphocytes 2.40 Thousands/µL      Absolute Monocytes 0.59 Thousand/µL      Eosinophils Absolute 0.15 Thousand/µL      Basophils Absolute 0.02 Thousands/µL                    CT head without contrast   Final Result by Jacob Nguyen MD (08/28 1554)      No acute intracranial abnormality.                  Workstation performed: [id number]         CT cervical spine without contrast   Final Result by Jacob Nguyen MD (08/28 1551)         1. No acute cervical spine fracture or traumatic malalignment.   2. Moderate size central disc protrusion C6-7 as described above.                  Workstation performed: [id number]         XR chest 2 views   Final Result by Jignesh Norris MD (08/28 1645)      No acute cardiopulmonary disease.            Workstation performed: [id number]               Procedures  Procedures      ED Course  ED Course as of 08/31/24 0034   Wed Aug 28, 2024   1512 WBC: 6.61   1512 Hemoglobin: 13.2   1512 Hematocrit: 39.9   1521 WBC: 6.61   1521 Hemoglobin: 13.2   1521 Platelet Count: 286   1533 hs TnI 0hr: 5   1600 CT head without contrast  IMPRESSION:     No acute intracranial abnormality.     1601 CT cervical spine without contrast  IMPRESSION:        1. No acute cervical spine fracture or traumatic malalignment.  2. Moderate size central disc protrusion C6-7 as described above.     1647 Sodium: 137   1647 Potassium: 3.8   1647 Creatinine: 0.95             HEART Risk Score      Flowsheet Row Most Recent Value   Heart Score Risk  Calculator    History 1 Filed at: 08/28/2024 1715   ECG 1 Filed at: 08/28/2024 1715   Age 2 Filed at: 08/28/2024 1715   Risk Factors 2 Filed at: 08/28/2024 1715   Troponin 0 Filed at: 08/28/2024 1715   HEART Score 6 Filed at: 08/28/2024 1715                        SBIRT 20yo+      Flowsheet Row Most Recent Value   Initial Alcohol Screen:  AUDIT-C     1. How often do you have a drink containing alcohol? 0 Filed at: 08/28/2024 1323   2. How many drinks containing alcohol do you have on a typical day you are drinking?  0 Filed at: 08/28/2024 1323   3b. FEMALE Any Age, or MALE 65+: How often do you have 4 or more drinks on one occassion? 0 Filed at: 08/28/2024 1323   Audit-C Score 0 Filed at: 08/28/2024 1323   NOEMY: How many times in the past year have you...    Used an illegal drug or used a prescription medication for non-medical reasons? Never Filed at: 08/28/2024 1323                  Medical Decision Making  Amount and/or Complexity of Data Reviewed  Labs: ordered. Decision-making details documented in ED Course.  Radiology: ordered. Decision-making details documented in ED Course.    Risk  Decision regarding hospitalization.      Patient is 73 y.o. male with PMH ofhtn, hld, CABG x 4, tobacco use presenting to ED for + home covid and syncope.. See history and physical documented above.       Differential diagnosis included but not limited to ACS, arrhythmia, electrolyte disturbance, anemia, URI, intracranial hemorrhage, fracture . Plan CBC, CMP, troponin, EKG, CXR, CT head and neck.     View ED course above for further discussion on patient workup.     On my interpretation of EKG NSR with PACs, normal intervals, normal axis, no ST segment elevations or depressions.     All labs reviewed and utilized in the medical decision making process  All radiology studies independently viewed by me and interpreted by the radiologist.  I reviewed all testing with the patient.     Upon re-evaluation Patient remains stable.  Given multiple risk factors and syncopal episode as well as positive home covid tests will admit for observation given elevated heart score and chest pain. Discussed case with [facility] and accepted for admission.    Disposition  Final diagnoses:   Syncope   Chest pain   Neck pain     Time reflects when diagnosis was documented in both MDM as applicable and the Disposition within this note       Time User Action Codes Description Comment    8/28/2024  4:27 PM Johana Adams Add [R55] Syncope     8/28/2024  5:15 PM Johana Adams Add [R07.9] Chest pain     8/28/2024  5:15 PM Johana Adams Add [M54.2] Neck pain           ED Disposition       ED Disposition   Admit    Condition   Stable    Date/Time   Wed Aug 28, 2024 1627    Comment   Case was discussed with  and the patient's admission status was agreed to be Admission Status: inpatient status to the service of    .               Follow-up Information       Follow up With Specialties Details Why Contact Info    SINGH Glaser Nurse Practitioner Follow up in 1 week(s)  [facility] [address]  [phone]      Jeremiah Cassidy MD    [address] [place] [address]  [phone]              Discharge Medication List as of 8/29/2024 11:25 AM        CONTINUE these medications which have NOT CHANGED    Details   pregabalin (LYRICA) 150 mg capsule Take 150 mg by mouth daily, Historical Med      Acetaminophen Extra Strength 500 MG tablet Take 500 mg by mouth every 6 (six) hours as needed for fever or mild pain, Starting u 5/12/2022, Historical Med      albuterol (PROVENTIL HFA,VENTOLIN HFA) 90 mcg/act inhaler INHALE 2 PUFFS EVERY 4 HOURS BY INHALATION ROUTE AS NEEDED., Historical Med      aspirin (ECOTRIN LOW STRENGTH) 81 mg EC tablet Take 1 tablet (81 mg total) by mouth daily Do not start before July 8, 2023., Starting Sat 7/8/2023, Until Tue 10/24/2023, No Print      atorvastatin (LIPITOR) 80 mg tablet Take 1 tablet (80  mg total) by mouth every evening, Starting Fri 6/30/2023, Until Tue 10/24/2023, No Print      famotidine (PEPCID) 20 mg tablet Take 1 tablet (20 mg total) by mouth 2 (two) times a day, Starting Fri 6/30/2023, Until Tue 10/24/2023, No Print      finasteride (PROSCAR) 5 mg tablet Take 5 mg by mouth daily, Starting Sun 5/7/2023, Historical Med      hydrOXYzine HCL (ATARAX) 25 mg tablet Take 25 mg by mouth 2 (two) times a day, Historical Med      lidocaine (LIDODERM) 5 % APPLY 1 PATCH BY TOPICAL ROUTE ONCE DAILY (MAY WEAR UP TO 12HOURS.) X 10 DAYS, Historical Med      lisinopril (ZESTRIL) 5 mg tablet Take 5 mg by mouth daily, Starting Thu 5/12/2022, Historical Med      magnesium hydroxide (MILK OF MAGNESIA) 400 mg/5 mL oral suspension Take 5 mL by mouth if needed for constipation, Historical Med      magnesium Oxide (MAG-OX) 400 mg TABS Take 1 tablet (400 mg total) by mouth daily, Starting Tue 10/24/2023, Normal      metoprolol tartrate (LOPRESSOR) 25 mg tablet Take 25 mg by mouth daily Hold for SBP <100 or HR <60, Historical Med      Multiple Vitamins-Minerals (multivitamin with minerals) tablet Take 1 tablet by mouth daily, Historical Med      nicotine (NICODERM CQ) 21 mg/24 hr TD 24 hr patch Place 1 patch on the skin over 24 hours daily Do not start before July 1, 2023., Starting Sat 7/1/2023, No Print      ranolazine (RANEXA) 500 mg 12 hr tablet Take 1 tablet (500 mg total) by mouth every 12 (twelve) hours, Starting Fri 6/30/2023, Until Tue 10/24/2023, No Print      risperiDONE (RisperDAL) 0.5 mg tablet take 1 tablet by mouth at bedtime for 90 DAYS, Historical Med      sertraline (ZOLOFT) 100 mg tablet Take 100 mg by mouth daily at bedtime, Starting Wed 3/8/2023, Historical Med      simvastatin (ZOCOR) 10 mg tablet Take 10 mg by mouth every evening, Starting Fri 12/22/2023, Historical Med      zinc gluconate 50 mg tablet Take 50 mg by mouth daily, Historical Med           Outpatient Discharge Orders   Discharge Diet      Activity as tolerated     Call provider for:  persistent nausea or vomiting     Call provider for:  severe uncontrolled pain     Call provider for: active or persistent bleeding     Call provider for:  difficulty breathing, headache or visual disturbances     Call provider for:  persistent dizziness or light-headedness     Call provider for:  extreme fatigue       PDMP Review         Value Time User    PDMP Reviewed  Yes 2/23/2022 10:59 AM Gerald Santiago DO             ED Provider  Attending physically available and evaluated Samm Armijo. I managed the patient along with the ED Attending.    Electronically Signed by           Johana Adams DO  08/31/24 0038

## 2024-08-28 NOTE — ASSESSMENT & PLAN NOTE
Patient had syncopal event today after testing positive to home test of COVID-19.  Says that he gets occasionally lightheaded upon standing up quickly.  Denies any chest pain, palpitation, SOB.  Echo 6/2023 Left Ventricle: Left ventricular cavity size is normal. Wall thickness is normal. The left ventricular ejection fraction is 50%. Systolic function is low normal. Global longitudinal strain is reduced at -15%. Diastolic function is normal.   Negative covid test here  No signs and symptoms of infection  Good oral intake   Not precipitated by going to the bathroom    Likely secondary to orthostatic bp  Noted to have low BP by neighbor    Plan:  Continue IV fluids for now  Orthostatic blood pressure  Telemetry  Continue diet

## 2024-08-28 NOTE — PLAN OF CARE
Problem: PAIN - ADULT  Goal: Verbalizes/displays adequate comfort level or baseline comfort level  Description: Interventions:  - Encourage patient to monitor pain and request assistance  - Assess pain using appropriate pain scale  - Administer analgesics based on type and severity of pain and evaluate response  - Implement non-pharmacological measures as appropriate and evaluate response  - Consider cultural and social influences on pain and pain management  - Notify physician/advanced practitioner if interventions unsuccessful or patient reports new pain  Outcome: Progressing     Problem: SAFETY ADULT  Goal: Patient will remain free of falls  Description: INTERVENTIONS:  - Educate patient/family on patient safety including physical limitations  - Instruct patient to call for assistance with activity   - Consult OT/PT to assist with strengthening/mobility   - Keep Call bell within reach  - Keep bed low and locked with side rails adjusted as appropriate  - Keep care items and personal belongings within reach  - Initiate and maintain comfort rounds  - Make Fall Risk Sign visible to staff  - Apply yellow socks and bracelet for high fall risk patients  - Consider moving patient to room near nurses station  Outcome: Progressing  Goal: Maintain or return to baseline ADL function  Description: INTERVENTIONS:  -  Assess patient's ability to carry out ADLs; assess patient's baseline for ADL function and identify physical deficits which impact ability to perform ADLs (bathing, care of mouth/teeth, toileting, grooming, dressing, etc.)  - Assess/evaluate cause of self-care deficits   - Assess range of motion  - Assess patient's mobility; develop plan if impaired  - Assess patient's need for assistive devices and provide as appropriate  - Encourage maximum independence but intervene and supervise when necessary  - Involve family in performance of ADLs  - Assess for home care needs following discharge   - Consider OT consult  to assist with ADL evaluation and planning for discharge  - Provide patient education as appropriate  Outcome: Progressing  Goal: Maintains/Returns to pre admission functional level  Description: INTERVENTIONS:  - Perform AM-PAC 6 Click Basic Mobility/ Daily Activity assessment daily.  - Set and communicate daily mobility goal to care team and patient/family/caregiver.   - Collaborate with rehabilitation services on mobility goals if consulted  - Perform Range of Motion 4 times a day.  - Reposition patient every 2 hours.  - Dangle patient 4 times a day  - Stand patient 3 times a day  - Ambulate patient 3 times a day  - Out of bed to chair 3 times a day   - Out of bed for meals 3 times a day  - Out of bed for toileting  - Record patient progress and toleration of activity level   Outcome: Progressing     Problem: DISCHARGE PLANNING  Goal: Discharge to home or other facility with appropriate resources  Description: INTERVENTIONS:  - Identify barriers to discharge w/patient and caregiver  - Arrange for needed discharge resources and transportation as appropriate  - Identify discharge learning needs (meds, wound care, etc.)  - Arrange for interpretive services to assist at discharge as needed  - Refer to Case Management Department for coordinating discharge planning if the patient needs post-hospital services based on physician/advanced practitioner order or complex needs related to functional status, cognitive ability, or social support system  Outcome: Progressing     Problem: Knowledge Deficit  Goal: Patient/family/caregiver demonstrates understanding of disease process, treatment plan, medications, and discharge instructions  Description: Complete learning assessment and assess knowledge base.  Interventions:  - Provide teaching at level of understanding  - Provide teaching via preferred learning methods  Outcome: Progressing

## 2024-08-29 VITALS
RESPIRATION RATE: 16 BRPM | DIASTOLIC BLOOD PRESSURE: 79 MMHG | OXYGEN SATURATION: 97 % | SYSTOLIC BLOOD PRESSURE: 143 MMHG | HEART RATE: 82 BPM | TEMPERATURE: 97.5 F

## 2024-08-29 LAB
ALBUMIN SERPL BCG-MCNC: 3.5 G/DL (ref 3.5–5)
ALP SERPL-CCNC: 73 U/L (ref 34–104)
ALT SERPL W P-5'-P-CCNC: 9 U/L (ref 7–52)
ANION GAP SERPL CALCULATED.3IONS-SCNC: 6 MMOL/L (ref 4–13)
AST SERPL W P-5'-P-CCNC: 15 U/L (ref 13–39)
ATRIAL RATE: 170 BPM
BASOPHILS # BLD AUTO: 0.04 THOUSANDS/ÂΜL (ref 0–0.1)
BASOPHILS NFR BLD AUTO: 1 % (ref 0–1)
BILIRUB SERPL-MCNC: 0.35 MG/DL (ref 0.2–1)
BUN SERPL-MCNC: 17 MG/DL (ref 5–25)
CALCIUM SERPL-MCNC: 8.4 MG/DL (ref 8.4–10.2)
CHLORIDE SERPL-SCNC: 108 MMOL/L (ref 96–108)
CO2 SERPL-SCNC: 22 MMOL/L (ref 21–32)
CREAT SERPL-MCNC: 1.01 MG/DL (ref 0.6–1.3)
EOSINOPHIL # BLD AUTO: 0.21 THOUSAND/ÂΜL (ref 0–0.61)
EOSINOPHIL NFR BLD AUTO: 3 % (ref 0–6)
ERYTHROCYTE [DISTWIDTH] IN BLOOD BY AUTOMATED COUNT: 14 % (ref 11.6–15.1)
GFR SERPL CREATININE-BSD FRML MDRD: 73 ML/MIN/1.73SQ M
GLUCOSE SERPL-MCNC: 88 MG/DL (ref 65–140)
HCT VFR BLD AUTO: 41.3 % (ref 36.5–49.3)
HGB BLD-MCNC: 13.5 G/DL (ref 12–17)
IMM GRANULOCYTES # BLD AUTO: 0.02 THOUSAND/UL (ref 0–0.2)
IMM GRANULOCYTES NFR BLD AUTO: 0 % (ref 0–2)
LYMPHOCYTES # BLD AUTO: 3.44 THOUSANDS/ÂΜL (ref 0.6–4.47)
LYMPHOCYTES NFR BLD AUTO: 41 % (ref 14–44)
MAGNESIUM SERPL-MCNC: 1.8 MG/DL (ref 1.9–2.7)
MCH RBC QN AUTO: 31.8 PG (ref 26.8–34.3)
MCHC RBC AUTO-ENTMCNC: 32.7 G/DL (ref 31.4–37.4)
MCV RBC AUTO: 97 FL (ref 82–98)
MONOCYTES # BLD AUTO: 0.74 THOUSAND/ÂΜL (ref 0.17–1.22)
MONOCYTES NFR BLD AUTO: 9 % (ref 4–12)
NEUTROPHILS # BLD AUTO: 3.87 THOUSANDS/ÂΜL (ref 1.85–7.62)
NEUTS SEG NFR BLD AUTO: 46 % (ref 43–75)
NRBC BLD AUTO-RTO: 0 /100 WBCS
PHOSPHATE SERPL-MCNC: 3 MG/DL (ref 2.3–4.1)
PLATELET # BLD AUTO: 271 THOUSANDS/UL (ref 149–390)
PMV BLD AUTO: 9.9 FL (ref 8.9–12.7)
POTASSIUM SERPL-SCNC: 4.4 MMOL/L (ref 3.5–5.3)
PROT SERPL-MCNC: 6.1 G/DL (ref 6.4–8.4)
QRS AXIS: 41 DEGREES
QRSD INTERVAL: 80 MS
QT INTERVAL: 348 MS
QTC INTERVAL: 430 MS
RBC # BLD AUTO: 4.25 MILLION/UL (ref 3.88–5.62)
SODIUM SERPL-SCNC: 136 MMOL/L (ref 135–147)
T WAVE AXIS: 81 DEGREES
VENTRICULAR RATE: 92 BPM
WBC # BLD AUTO: 8.32 THOUSAND/UL (ref 4.31–10.16)

## 2024-08-29 PROCEDURE — 85025 COMPLETE CBC W/AUTO DIFF WBC: CPT

## 2024-08-29 PROCEDURE — 83735 ASSAY OF MAGNESIUM: CPT

## 2024-08-29 PROCEDURE — 93010 ELECTROCARDIOGRAM REPORT: CPT | Performed by: INTERNAL MEDICINE

## 2024-08-29 PROCEDURE — 99239 HOSP IP/OBS DSCHRG MGMT >30: CPT | Performed by: INTERNAL MEDICINE

## 2024-08-29 PROCEDURE — 80053 COMPREHEN METABOLIC PANEL: CPT

## 2024-08-29 PROCEDURE — 84100 ASSAY OF PHOSPHORUS: CPT

## 2024-08-29 RX ORDER — MAGNESIUM SULFATE HEPTAHYDRATE 40 MG/ML
2 INJECTION, SOLUTION INTRAVENOUS ONCE
Status: DISCONTINUED | OUTPATIENT
Start: 2024-08-29 | End: 2024-08-29 | Stop reason: HOSPADM

## 2024-08-29 RX ADMIN — LISINOPRIL 5 MG: 5 TABLET ORAL at 08:42

## 2024-08-29 RX ADMIN — FINASTERIDE 5 MG: 5 TABLET, FILM COATED ORAL at 08:42

## 2024-08-29 RX ADMIN — PREGABALIN 150 MG: 75 CAPSULE ORAL at 08:42

## 2024-08-29 RX ADMIN — SODIUM CHLORIDE 100 ML/HR: 0.9 INJECTION, SOLUTION INTRAVENOUS at 05:16

## 2024-08-29 RX ADMIN — NICOTINE 1 PATCH: 21 PATCH, EXTENDED RELEASE TRANSDERMAL at 08:42

## 2024-08-29 RX ADMIN — ASPIRIN 81 MG: 81 TABLET, COATED ORAL at 08:42

## 2024-08-29 RX ADMIN — METOPROLOL TARTRATE 25 MG: 25 TABLET, FILM COATED ORAL at 08:42

## 2024-08-29 RX ADMIN — HYDROXYZINE HYDROCHLORIDE 25 MG: 25 TABLET, FILM COATED ORAL at 08:42

## 2024-08-29 RX ADMIN — RANOLAZINE 500 MG: 500 TABLET, FILM COATED, EXTENDED RELEASE ORAL at 08:42

## 2024-08-29 RX ADMIN — MAGNESIUM OXIDE TAB 400 MG (241.3 MG ELEMENTAL MG) 400 MG: 400 (241.3 MG) TAB at 08:42

## 2024-08-29 NOTE — UTILIZATION REVIEW
Initial Clinical Review    Admission: Date/Time/Statement:   Admission Orders (From admission, onward)       Ordered        08/28/24 1725  Place in Observation  Once                          Orders Placed This Encounter   Procedures    Place in Observation     Standing Status:   Standing     Number of Occurrences:   1     Order Specific Question:   Level of Care     Answer:   Med Surg [16]     ED Arrival Information       Expected   -    Arrival   8/28/2024 13:03    Acuity   Urgent              Means of arrival   Ambulance    Escorted by   [facility]    Service   Hospitalist    Admission type   Emergency              Arrival complaint   covid             Chief Complaint   Patient presents with    Fall     Patient arrived VIA EMS from home. EMS was originally called for abdominal pain. When EMS arrived he had a witnessed fall. -headstrike +ASA. Patient complains of left collar bone pain and neck pain        Initial Presentation: 73 y.o. male Past medical history of CAD status post CABG x 4, hypertension, hyperlipidemia, degenerative disc disease, smoker, lumbar spondylosis status post laminectomy in 2023., previous compression fracture of the first vertebrae.  Patient comes in for syncope, COVID positive at home.     Patient claims that he had a lightheaded episode after figuring out that he had a positive COVID test at home.  Otherwise he does not have any chest pain, palpitations, shortness of breath, cough, nasal congestion, diarrhea, burning urination, he does complain of some neuropathy in the lower extremity.  As he walked he felt lightheaded and passed out.  After that he called 911 to be taken to the hospital.  According to him this lightheadedness is sometimes precipitated by standing up quickly.  Had blood pressure measured by the neighbor who is in healthcare.  He was told that the blood pressure was low however he does not know the specific number.  In the emergency department patient is  stable.  Labs unremarkable.  COVID 19 was negative with PCR.  Will admit as a case of carbs    ADMIT OBSERVATION STATUS    Anticipated Length of Stay/Certification Statement:      Date:     Day 2: Date:    Day 3: Has surpassed a 2nd midnight with active treatments and services.        ED Triage Vitals   Temperature Pulse Respirations Blood Pressure SpO2 Pain Score   08/28/24 1307 08/28/24 1307 08/28/24 1307 08/28/24 1307 08/28/24 1307 08/28/24 1820   98 °F (36.7 °C) 78 16 103/55 94 % 7     Weight (last 2 days)       None            Vital Signs (last 3 days)       Date/Time Temp Pulse Resp BP MAP (mmHg) SpO2 O2 Device Patient Position - Orthostatic VS [place] Coma Scale Score Pain    08/29/24 06:57:27 97.5 °F (36.4 °C) 82 -- 143/79 100 97 % -- -- -- --    08/29/24 06:57:11 97.5 °F (36.4 °C) 86 -- 143/79 100 97 % -- -- -- --    08/28/24 21:59:12 98.3 °F (36.8 °C) 91 16 114/62 79 97 % None (Room air) -- 15 --    08/28/24 2100 -- -- -- -- -- -- None (Room air) -- -- 6    08/28/24 19:27:41 -- 108 17 115/68 84 96 % -- Standing - Orthostatic VS -- --    08/28/24 19:26:27 -- 97 15 111/68 82 94 % -- Sitting - Orthostatic VS -- --    08/28/24 19:25:27 -- 92 19 106/65 79 95 % -- Lying - Orthostatic VS -- --    08/28/24 18:20:05 97.8 °F (36.6 °C) 75 -- 133/72 92 97 % -- -- 15 7    08/28/24 1615 -- 76 21 120/66 87 97 % None (Room air) Lying -- --    08/28/24 1340 -- -- -- -- -- -- -- -- 15 --    08/28/24 1307 98 °F (36.7 °C) 78 16 103/55 71 94 % None (Room air) -- -- --              Pertinent Labs/Diagnostic Test Results:   Radiology:  CT head without contrast   Final Interpretation by Jacob Nguyen MD (08/28 9123)      No acute intracranial abnormality.                  Workstation performed: [id number]         CT cervical spine without contrast   Final Interpretation by Jacob Nguyen MD (08/28 8958)         1. No acute cervical spine fracture or traumatic malalignment.   2. Moderate size central disc  "protrusion C6-7 as described above.                  Workstation performed: [id number]         XR chest 2 views   Final Interpretation by Jignesh Norris MD (08/28 1645)      No acute cardiopulmonary disease.            Workstation performed: [id number]           Cardiology:  No orders to display     GI:  No orders to display       Results from last 7 days   Lab Units 08/28/24  1624   SARS-COV-2  Negative     Results from last 7 days   Lab Units 08/29/24  0454 08/28/24  1455   WBC Thousand/uL 8.32 6.61   HEMOGLOBIN g/dL 13.5 13.2   HEMATOCRIT % 41.3 39.9   PLATELETS Thousands/uL 271 286   TOTAL NEUT ABS Thousands/µL 3.87 3.44         Results from last 7 days   Lab Units 08/29/24  0454 08/28/24  1455   SODIUM mmol/L 136 137   POTASSIUM mmol/L 4.4 3.8   CHLORIDE mmol/L 108 107   CO2 mmol/L 22 21   ANION GAP mmol/L 6 9   BUN mg/dL 17 17   CREATININE mg/dL 1.01 0.95   EGFR ml/min/1.73sq m 73 79   CALCIUM mg/dL 8.4 8.8   MAGNESIUM mg/dL 1.8*  --    PHOSPHORUS mg/dL 3.0  --      Results from last 7 days   Lab Units 08/29/24  0454 08/28/24  1455   AST U/L 15 14   ALT U/L 9 10   ALK PHOS U/L 73 74   TOTAL PROTEIN g/dL 6.1* 6.1*   ALBUMIN g/dL 3.5 3.6   TOTAL BILIRUBIN mg/dL 0.35 0.30         Results from last 7 days   Lab Units 08/29/24  0454 08/28/24  1455   GLUCOSE RANDOM mg/dL 88 95         Results from last 7 days   Lab Units 08/28/24  0845   HEMOGLOBIN A1C % 6.2*   EAG mg/dl 131     No results found for: \"BETA-HYDROXYBUTYRATE\"                   Results from last 7 days   Lab Units 08/28/24  2019 08/28/24  1726 08/28/24  1455   HS TNI 0HR ng/L  --   --  5   HS TNI 2HR ng/L  --  5  --    HSTNI D2 ng/L  --  0  --    HS TNI 4HR ng/L 6  --   --    HSTNI D4 ng/L 1  --   --              Results from last 7 days   Lab Units 08/28/24  1455   TSH 3RD GENERATON uIU/mL 2.403                                                             Results from last 7 days   Lab Units 08/28/24  1624   INFLUENZA A PCR  Negative "   INFLUENZA B PCR  Negative   RSV PCR  Negative                                               ED Treatment-Medication Administration from 08/28/2024 1303 to 08/28/2024 1808       None            Past Medical History:   Diagnosis Date    BPH (benign prostatic hyperplasia)     Coronary artery disease     HTN (hypertension)     Hx pulmonary embolism     Hyperlipidemia     Hypertension     Prediabetes     Tobacco use      Present on Admission:   HTN (hypertension)   Chronic pain syndrome   BPH (benign prostatic hyperplasia)   HLD (hyperlipidemia)      Admitting Diagnosis: Neck pain [M54.2]  Syncope [R55]  Chest pain [R07.9]  Age/Sex: 73 y.o. male  Admission Orders:  Scheduled Medications:  aspirin, 81 mg, Oral, Daily  atorvastatin, 80 mg, Oral, Daily With Dinner  finasteride, 5 mg, Oral, Daily  hydrOXYzine HCL, 25 mg, Oral, BID  lisinopril, 5 mg, Oral, Daily  magnesium Oxide, 400 mg, Oral, Daily  metoprolol tartrate, 25 mg, Oral, Daily  nicotine, 1 patch, Transdermal, Daily  pregabalin, 150 mg, Oral, Daily  ranolazine, 500 mg, Oral, Q12H FRANKI  risperiDONE, 0.5 mg, Oral, HS  sertraline, 100 mg, Oral, HS      Continuous IV Infusions:  sodium chloride, 100 mL/hr, Intravenous, Continuous      PRN Meds:       None    Network Utilization Review Department  ATTENTION: Please call with any questions or concerns to [phone] and carefully listen to the prompts so that you are directed to the right person. All voicemails are confidential.   For Discharge needs, contact Care Management DC Support Team at [phone] opt. [phone]  Send all requests for admission clinical reviews, approved or denied determinations and any other requests to dedicated fax number below belonging to the campus where the patient is receiving treatment. List of dedicated fax numbers for the Facilities:  FACILITY NAME UR FAX NUMBER   ADMISSION DENIALS (Administrative/Medical Necessity) [phone]   DISCHARGE SUPPORT TEAM (NETWORK) [phone]   PARENT  CHILD HEALTH (Maternity/NICU/Pediatrics) [phone]   [facility] [phone]   [facility] [phone]   [facility] [phone]   [facility] [phone]   [facility] [phone]   [facility] [phone]   [facility] [phone]   [facility] [phone]   [facility] [phone]   [facility] [phone]   [facility] [phone]   [facility] [phone]

## 2024-08-29 NOTE — ASSESSMENT & PLAN NOTE
"Reported after testing + for COVID at home and then reporting dizziness, lightheadedness and a ?syncopal episode. Reportedly had headache for several days prior. Denies headstrike. Per notes, neighbor checked BP and it was \"low\" though actual reading not reported.   Orthostatics negative  CT Head negative  Echo 6/2023 Left Ventricle: Left ventricular cavity size is normal. Wall thickness is normal. The left ventricular ejection fraction is 50%. Systolic function is low normal. Global longitudinal strain is reduced at -15%. Diastolic function is normal.   COVID negative here   Troponins negative x3, EKG NSR   No events on telemetry  Was started on IVF upon admission, symptoms improved. Encourage oral hydration, slow positional changes and f/u PCP  "

## 2024-08-29 NOTE — ASSESSMENT & PLAN NOTE
Sees cards as outpatient. Previous CABG. Patient takes aspirin daily  No chest pain no palpitations  EKG NSR  Troponins negative x 3

## 2024-08-29 NOTE — DISCHARGE SUMMARY
"[facility]  Discharge- Samm Armijo 1950, 73 y.o. male MRN: [id number]  Unit/Bed#: CW2 210-02 Encounter: [id number]  Primary Care Provider: SINGH Glaser   Date and time admitted to hospital: 8/28/2024  1:04 PM    * Syncope  Assessment & Plan  Reported after testing + for COVID at home and then reporting dizziness, lightheadedness and a ?syncopal episode. Reportedly had headache for several days prior. Denies headstrike. Per notes, neighbor checked BP and it was \"low\" though actual reading not reported.   Orthostatics negative  CT Head negative  Echo 6/2023 Left Ventricle: Left ventricular cavity size is normal. Wall thickness is normal. The left ventricular ejection fraction is 50%. Systolic function is low normal. Global longitudinal strain is reduced at -15%. Diastolic function is normal.   COVID negative here   Troponins negative x3, EKG NSR   No events on telemetry  Was started on IVF upon admission, symptoms improved. Encourage oral hydration, slow positional changes and f/u PCP    Chronic pain syndrome  Assessment & Plan  On lyrica prior to admission, continue    BPH (benign prostatic hyperplasia)  Assessment & Plan  Continue Flomax.    HTN (hypertension)  Assessment & Plan  BP low normal upon arrival, s/p IVF  Home medications lisinopril and lopressor continued    HLD (hyperlipidemia)  Assessment & Plan  Continue statin    S/P CABG x 4  Assessment & Plan  Sees cards as outpatient. Previous CABG. Patient takes aspirin daily  No chest pain no palpitations  EKG NSR  Troponins negative x 3          Medical Problems       Resolved Problems  Date Reviewed: 8/29/2024   None       Discharging Physician / Practitioner: Jazmin Swenson PA-C  PCP: SINGH Glaser  Admission Date:   Admission Orders (From admission, onward)       Ordered        08/28/24 1725  Place in Observation  Once                          Discharge Date: " 08/29/24    Consultations During Hospital Stay:  None    Procedures Performed:   EKG: Normal sinus rhythm  Troponins: Negative x 3  COVID/RSV/flu: Negative x 3  CT C-spine without contrast:   No acute cervical spine fracture or traumatic malalignment  Moderate size central disc protrusion C6-7 as described above  CT head without contrast: No acute intracranial abnormality  CXR: No acute disease    Significant Findings / Test Results:   See above    Incidental Findings:   See above        Test Results Pending at Discharge (will require follow up):   none     Outpatient Tests Requested:  F/u PCP    Complications:  none    Reason for Admission: Near syncope, dizziness    Hospital Course:   Samm Armijo is a 73 y.o. male patient who originally presented to the hospital on 8/28/2024 due to feeling near syncope/dizzy after finding he had positive COVID on a home test.  He denied any respiratory symptoms however had been complaining of headache at home for the last several days.  Upon admission here, COVID test was negative along with flu and RSV. Basic cardiac w/u negative as well. Given IVF. Symptoms improved and was able to ambulate without feeling dizzy. Stable for dc home.     Please see above list of diagnoses and related plan for additional information.     Condition at Discharge: stable    Discharge Day Visit / Exam:   Subjective:  Used  #[id number]. Feels well today. No complaints.     Vitals: Blood Pressure: 143/79 (08/29/24 0657)  Pulse: 82 (08/29/24 0657)  Temperature: 97.5 °F (36.4 °C) (08/29/24 0657)  Temp Source: Oral (08/28/24 1307)  Respirations: 16 (08/28/24 2159)  SpO2: 97 % (08/29/24 0657)  Exam:   Physical Exam  Vitals and nursing note reviewed.   Constitutional:       General: He is not in acute distress.     Comments: On RA    Cardiovascular:      Rate and Rhythm: Normal rate.   Pulmonary:      Effort: No respiratory distress.   Abdominal:      General: There is no distension.    Musculoskeletal:      Right lower leg: No edema.      Left lower leg: No edema.   Neurological:      Mental Status: He is alert and oriented to person, place, and time.   Psychiatric:         Mood and Affect: Mood normal.          Discussion with Family: Patient declined call to .     Discharge instructions/Information to patient and family:   See after visit summary for information provided to patient and family.      Provisions for Follow-Up Care:  See after visit summary for information related to follow-up care and any pertinent home health orders.      Mobility at time of Discharge:   Basic Mobility Inpatient Raw Score: 22  JH-HLM Goal: 7: Walk 25 feet or more  JH-HLM Achieved: 7: Walk 25 feet or more  HLM Goal achieved. Continue to encourage appropriate mobility.     Disposition:   Home    Planned Readmission: no     Discharge Statement:  I spent 34 minutes discharging the patient. This time was spent on the day of discharge. I had direct contact with the patient on the day of discharge. Greater than 50% of the total time was spent examining patient, answering all patient questions, arranging and discussing plan of care with patient as well as directly providing post-discharge instructions.  Additional time then spent on discharge activities.    Discharge Medications:  See after visit summary for reconciled discharge medications provided to patient and/or family.      **Please Note: This note may have been constructed using a voice recognition system**

## 2024-09-01 NOTE — ED ATTENDING ATTESTATION
8/28/2024  I, Augusto Hi MD, saw and evaluated the patient. I have discussed the patient with the resident/non-physician practitioner and agree with the resident's/non-physician practitioner's findings, Plan of Care, and MDM as documented in the resident's/non-physician practitioner's note, except where noted. All available labs and Radiology studies were reviewed.  I was present for key portions of any procedure(s) performed by the resident/non-physician practitioner and I was immediately available to provide assistance.       At this point I agree with the current assessment done in the Emergency Department.  I have conducted an independent evaluation of this patient a history and physical is as follows:    ED Course     Impression: Syncope history of recent COVID-19 infection    Differential diagnosis: ACS MI arrhythmia, head C-spine injury, viremia, dehydration    Plan check CBC CMP troponin EKG chest x-ray CT head neck.    Anticipate admission    Critical Care Time  Procedures

## 2024-11-22 ENCOUNTER — TRANSCRIBE ORDERS (OUTPATIENT)
Dept: LAB | Facility: CLINIC | Age: 74
End: 2024-11-22

## 2024-11-22 DIAGNOSIS — R31.29 OTHER MICROSCOPIC HEMATURIA: Primary | ICD-10-CM

## 2024-11-22 DIAGNOSIS — E78.5 HYPERLIPIDEMIA, UNSPECIFIED HYPERLIPIDEMIA TYPE: ICD-10-CM

## 2024-12-25 ENCOUNTER — APPOINTMENT (EMERGENCY)
Dept: RADIOLOGY | Facility: HOSPITAL | Age: 74
End: 2024-12-25
Payer: COMMERCIAL

## 2024-12-25 ENCOUNTER — HOSPITAL ENCOUNTER (EMERGENCY)
Facility: HOSPITAL | Age: 74
Discharge: HOME | End: 2024-12-25
Attending: EMERGENCY MEDICINE
Payer: COMMERCIAL

## 2024-12-25 VITALS
OXYGEN SATURATION: 96 % | RESPIRATION RATE: 20 BRPM | SYSTOLIC BLOOD PRESSURE: 114 MMHG | WEIGHT: 160 LBS | HEART RATE: 108 BPM | DIASTOLIC BLOOD PRESSURE: 67 MMHG | HEIGHT: 69 IN | TEMPERATURE: 98.1 F | BODY MASS INDEX: 23.7 KG/M2

## 2024-12-25 DIAGNOSIS — R10.13 EPIGASTRIC PAIN: ICD-10-CM

## 2024-12-25 DIAGNOSIS — R11.2 NAUSEA AND VOMITING: Primary | ICD-10-CM

## 2024-12-25 DIAGNOSIS — R07.9 CHEST PAIN: ICD-10-CM

## 2024-12-25 LAB
2HR DELTA HS TROPONIN: -1 NG/L
ALBUMIN SERPL BCG-MCNC: 3.8 G/DL (ref 3.5–5)
ALP SERPL-CCNC: 72 U/L (ref 34–104)
ALT SERPL W P-5'-P-CCNC: 15 U/L (ref 7–52)
ANION GAP SERPL CALCULATED.3IONS-SCNC: 7 MMOL/L (ref 4–13)
AST SERPL W P-5'-P-CCNC: 16 U/L (ref 13–39)
ATRIAL RATE: 132 BPM
BASOPHILS # BLD AUTO: 0.02 THOUSANDS/ÂΜL (ref 0–0.1)
BASOPHILS NFR BLD AUTO: 0 % (ref 0–1)
BILIRUB SERPL-MCNC: 0.49 MG/DL (ref 0.2–1)
BNP SERPL-MCNC: 34 PG/ML (ref 0–100)
BUN SERPL-MCNC: 26 MG/DL (ref 5–25)
CALCIUM SERPL-MCNC: 9 MG/DL (ref 8.4–10.2)
CARDIAC TROPONIN I PNL SERPL HS: 6 NG/L (ref ?–50)
CARDIAC TROPONIN I PNL SERPL HS: 7 NG/L (ref ?–50)
CHLORIDE SERPL-SCNC: 104 MMOL/L (ref 96–108)
CO2 SERPL-SCNC: 27 MMOL/L (ref 21–32)
CREAT SERPL-MCNC: 0.95 MG/DL (ref 0.6–1.3)
EOSINOPHIL # BLD AUTO: 0.03 THOUSAND/ÂΜL (ref 0–0.61)
EOSINOPHIL NFR BLD AUTO: 0 % (ref 0–6)
ERYTHROCYTE [DISTWIDTH] IN BLOOD BY AUTOMATED COUNT: 14.2 % (ref 11.6–15.1)
FLUAV AG UPPER RESP QL IA.RAPID: NEGATIVE
FLUBV AG UPPER RESP QL IA.RAPID: NEGATIVE
GFR SERPL CREATININE-BSD FRML MDRD: 79 ML/MIN/1.73SQ M
GLUCOSE SERPL-MCNC: 104 MG/DL (ref 65–140)
HCT VFR BLD AUTO: 43.1 % (ref 36.5–49.3)
HGB BLD-MCNC: 14.6 G/DL (ref 12–17)
IMM GRANULOCYTES # BLD AUTO: 0.03 THOUSAND/UL (ref 0–0.2)
IMM GRANULOCYTES NFR BLD AUTO: 0 % (ref 0–2)
LIPASE SERPL-CCNC: 7 U/L (ref 11–82)
LYMPHOCYTES # BLD AUTO: 0.83 THOUSANDS/ÂΜL (ref 0.6–4.47)
LYMPHOCYTES NFR BLD AUTO: 10 % (ref 14–44)
MCH RBC QN AUTO: 31.5 PG (ref 26.8–34.3)
MCHC RBC AUTO-ENTMCNC: 33.9 G/DL (ref 31.4–37.4)
MCV RBC AUTO: 93 FL (ref 82–98)
MONOCYTES # BLD AUTO: 0.26 THOUSAND/ÂΜL (ref 0.17–1.22)
MONOCYTES NFR BLD AUTO: 3 % (ref 4–12)
NEUTROPHILS # BLD AUTO: 7.17 THOUSANDS/ÂΜL (ref 1.85–7.62)
NEUTS SEG NFR BLD AUTO: 87 % (ref 43–75)
NRBC BLD AUTO-RTO: 0 /100 WBCS
P AXIS: 82 DEGREES
PLATELET # BLD AUTO: 277 THOUSANDS/UL (ref 149–390)
PMV BLD AUTO: 9.3 FL (ref 8.9–12.7)
POTASSIUM SERPL-SCNC: 3.9 MMOL/L (ref 3.5–5.3)
PR INTERVAL: 152 MS
PROT SERPL-MCNC: 6.9 G/DL (ref 6.4–8.4)
QRS AXIS: 20 DEGREES
QRSD INTERVAL: 74 MS
QT INTERVAL: 274 MS
QTC INTERVAL: 406 MS
RBC # BLD AUTO: 4.64 MILLION/UL (ref 3.88–5.62)
SARS-COV+SARS-COV-2 AG RESP QL IA.RAPID: NEGATIVE
SODIUM SERPL-SCNC: 138 MMOL/L (ref 135–147)
T WAVE AXIS: 131 DEGREES
VENTRICULAR RATE: 132 BPM
WBC # BLD AUTO: 8.34 THOUSAND/UL (ref 4.31–10.16)

## 2024-12-25 PROCEDURE — 96374 THER/PROPH/DIAG INJ IV PUSH: CPT

## 2024-12-25 PROCEDURE — 99285 EMERGENCY DEPT VISIT HI MDM: CPT | Performed by: EMERGENCY MEDICINE

## 2024-12-25 PROCEDURE — 96361 HYDRATE IV INFUSION ADD-ON: CPT

## 2024-12-25 PROCEDURE — 87804 INFLUENZA ASSAY W/OPTIC: CPT

## 2024-12-25 PROCEDURE — 83880 ASSAY OF NATRIURETIC PEPTIDE: CPT

## 2024-12-25 PROCEDURE — 99285 EMERGENCY DEPT VISIT HI MDM: CPT

## 2024-12-25 PROCEDURE — 83690 ASSAY OF LIPASE: CPT

## 2024-12-25 PROCEDURE — 93005 ELECTROCARDIOGRAM TRACING: CPT

## 2024-12-25 PROCEDURE — 93010 ELECTROCARDIOGRAM REPORT: CPT | Performed by: INTERNAL MEDICINE

## 2024-12-25 PROCEDURE — 36415 COLL VENOUS BLD VENIPUNCTURE: CPT

## 2024-12-25 PROCEDURE — 71046 X-RAY EXAM CHEST 2 VIEWS: CPT

## 2024-12-25 PROCEDURE — 80053 COMPREHEN METABOLIC PANEL: CPT

## 2024-12-25 PROCEDURE — 85025 COMPLETE CBC W/AUTO DIFF WBC: CPT

## 2024-12-25 PROCEDURE — 84484 ASSAY OF TROPONIN QUANT: CPT

## 2024-12-25 PROCEDURE — 87811 SARS-COV-2 COVID19 W/OPTIC: CPT

## 2024-12-25 RX ORDER — ONDANSETRON 2 MG/ML
4 INJECTION INTRAMUSCULAR; INTRAVENOUS ONCE
Status: COMPLETED | OUTPATIENT
Start: 2024-12-25 | End: 2024-12-25

## 2024-12-25 RX ORDER — DIPHENHYDRAMINE HYDROCHLORIDE AND LIDOCAINE HYDROCHLORIDE AND ALUMINUM HYDROXIDE AND MAGNESIUM HYDRO
10 KIT ONCE
Status: COMPLETED | OUTPATIENT
Start: 2024-12-25 | End: 2024-12-25

## 2024-12-25 RX ORDER — ONDANSETRON 4 MG/1
4 TABLET, FILM COATED ORAL EVERY 6 HOURS
Qty: 12 TABLET | Refills: 0 | Status: SHIPPED | OUTPATIENT
Start: 2024-12-25

## 2024-12-25 RX ADMIN — SODIUM CHLORIDE 1000 ML: 0.9 INJECTION, SOLUTION INTRAVENOUS at 11:53

## 2024-12-25 RX ADMIN — DIPHENHYDRAMINE HYDROCHLORIDE AND LIDOCAINE HYDROCHLORIDE AND ALUMINUM HYDROXIDE AND MAGNESIUM HYDRO 10 ML: KIT at 13:45

## 2024-12-25 RX ADMIN — ONDANSETRON 4 MG: 2 INJECTION INTRAMUSCULAR; INTRAVENOUS at 11:59

## 2024-12-25 NOTE — ED ATTENDING ATTESTATION
12/25/2024  I, Barrera Patterson MD, saw and evaluated the patient. I have discussed the patient with the resident/non-physician practitioner and agree with the resident's/non-physician practitioner's findings, Plan of Care, and MDM as documented in the resident's/non-physician practitioner's note, except where noted. All available labs and Radiology studies were reviewed.  I was present for key portions of any procedure(s) performed by the resident/non-physician practitioner and I was immediately available to provide assistance.       At this point I agree with the current assessment done in the Emergency Department.  I have conducted an independent evaluation of this patient a history and physical is as follows:    This is a 74 y.o. old male who presents to the ED for evaluation of vomiting. Burning CP, NBNB. Occurred after eating something he things had gone bad.    VS and nursing notes reviewed  General: Appears in NAD, awake, alert, speaking normally in full sentences.   Well-nourished, well-developed. Appears stated age.   Head: Normocephalic, atraumatic.  Eyes: EOMI. Vision grossly normal. No subconjunctival hemorrhages or occular discharge noted. Symmetrical lids.   ENT: Atraumatic external nose and ears. No stridor. Normal phonation. No drooling. Normal swallowing.   Neck: No JVD. FROM. No goiter  CV: No pallor. Normal rate.  Lungs: No tachypnea. No respiratory distress.  MSK: Moving all extremities equally, no peripheral edema  Skin: Dry, intact. No cyanosis  Neuro: Awake, alert, GCS15. CN II-XII grossly intact. Grossly normal gait.  Psychiatric/Behavioral: Appropriate mood and affect.     A/P: This is a 74 y.o. male who presents to the ED for evaluation of vomiting, chest burning. ACS rule out. Treat symptmoatically. Reeval and dispo accoridngly.    HEART Risk Score      Flowsheet Row Most Recent Value   Heart Score Risk Calculator    History 0 Filed at: 12/25/2024 1439   ECG 0 Filed at: 12/25/2024 1432    Age 2 Filed at: 12/25/2024 1439   Risk Factors 2 Filed at: 12/25/2024 1439   Troponin 0 Filed at: 12/25/2024 1439   HEART Score 4 Filed at: 12/25/2024 1439              ED Course         Critical Care Time  Procedures

## 2024-12-25 NOTE — ED PROVIDER NOTES
Time reflects when diagnosis was documented in both MDM as applicable and the Disposition within this note       Time User Action Codes Description Comment    12/25/2024  2:35 PM Bert Caal [R11.2] Nausea and vomiting     12/25/2024  2:36 PM Bert Caal [R10.13] Epigastric pain     12/25/2024  2:38 PM Bert Caal [R07.9] Chest pain           ED Disposition       ED Disposition   Discharge    Condition   Stable    Date/Time   Wed Dec 25, 2024  2:35 PM    Comment   Samm Armijo discharge to home/self care.                   Assessment & Plan       Medical Decision Making  Patient 74-year-old male presenting for nausea vomiting, burning chest pain.  DDx: Nausea vomiting, food poisoning, gastroenteritis, ACS, arrhythmia, electrolyte abnormality, anemia, dissection, pneumonia, pneumothorax, pericarditis, myocarditis  Based on patient mentation and physical exam finds compartment concerns for nausea vomiting secondary to food poisoning gastroenteritis.  However given cardiac history and risk factors will obtain full cardiac lab workup to rule out any cardiac cause of patient's chest pain.  Symptomatic treatment for nausea vomiting and IV fluids.  Dispo pending labs and imaging.  -On reassessment, patient symptoms much improved, labs negative for any acute abnormalities.  Primary concern is for food poisoning/gastroenteritis.  Plan for Zofran prescription, given patient risk factors will give amatory referral to cardiology.  Strict return cautions.  Patient understands agrees.  Ready for discharge    Problems Addressed:  Chest pain: acute illness or injury  Epigastric pain: acute illness or injury  Nausea and vomiting: acute illness or injury    Amount and/or Complexity of Data Reviewed  Labs: ordered. Decision-making details documented in ED Course.  Radiology: ordered.    Risk  Prescription drug management.        ED Course as of 12/25/24 1439   Wed Dec 25, 2024   1235 hs TnI 0hr: 7       Medications    sodium chloride 0.9 % bolus 1,000 mL (0 mL Intravenous Stopped 12/25/24 1345)   ondansetron (ZOFRAN) injection 4 mg (4 mg Intravenous Given 12/25/24 1159)   diphenhydramine, lidocaine, Al/Mg hydroxide, simethicone (Magic Mouthwash) oral solution 10 mL (10 mL Swish & Swallow Given 12/25/24 1345)       ED Risk Strat Scores   HEART Risk Score      Flowsheet Row Most Recent Value   Heart Score Risk Calculator    History 0 Filed at: 12/25/2024 1439   ECG 0 Filed at: 12/25/2024 1439   Age 2 Filed at: 12/25/2024 1439   Risk Factors 2 Filed at: 12/25/2024 1439   Troponin 0 Filed at: 12/25/2024 1439   HEART Score 4 Filed at: 12/25/2024 1439          HEART Risk Score      Flowsheet Row Most Recent Value   Heart Score Risk Calculator    History 0 Filed at: 12/25/2024 1439   ECG 0 Filed at: 12/25/2024 1439   Age 2 Filed at: 12/25/2024 1439   Risk Factors 2 Filed at: 12/25/2024 1439   Troponin 0 Filed at: 12/25/2024 1439   HEART Score 4 Filed at: 12/25/2024 1439                            SBIRT 20yo+      Flowsheet Row Most Recent Value   Initial Alcohol Screen: US AUDIT-C     1. How often do you have a drink containing alcohol? 0 Filed at: 12/25/2024 1129   2. How many drinks containing alcohol do you have on a typical day you are drinking?  0 Filed at: 12/25/2024 1129   3b. FEMALE Any Age, or MALE 65+: How often do you have 4 or more drinks on one occassion? 0 Filed at: 12/25/2024 1129   Audit-C Score 0 Filed at: 12/25/2024 1129   NOEMY: How many times in the past year have you...    Used an illegal drug or used a prescription medication for non-medical reasons? Never Filed at: 12/25/2024 1129                            History of Present Illness       Chief Complaint   Patient presents with    Abdominal Pain     Per pt has been having abdominal pain and vomiting since last night. Also co of burning in his throat.        Past Medical History:   Diagnosis Date    BPH (benign prostatic hyperplasia)     Coronary artery disease      HTN (hypertension)     Hx pulmonary embolism     Hyperlipidemia     Hypertension     Prediabetes     Tobacco use       Past Surgical History:   Procedure Laterality Date    CORONARY ARTERY BYPASS GRAFT      DECOMPRESSION SPINE LUMBAR POSTERIOR Bilateral 6/23/2023    Procedure: L3-4 and L4-5 laminectomy, bilateral foraminotomy decompresion;  Surgeon: Elijah Dover MD;  Location: [facility];  Service: Neurosurgery    MO COLONOSCOPY FLX DX W/COLLJ SPEC WHEN PFRMD N/A 4/6/2017    Procedure: COLONOSCOPY;  Surgeon: Carlos Vega MD;  Location: [facility];  Service: Gastroenterology      Family History   Problem Relation Age of Onset    Diabetes Mother     Diabetes Father       Social History     Tobacco Use    Smoking status: Every Day     Current packs/day: 0.50     Average packs/day: 0.5 packs/day for 56.0 years (28.0 ttl pk-yrs)     Types: Cigarettes    Smokeless tobacco: Never    Tobacco comments:     started when Pt was 10 years old.  Pt smokes 1/2 to 1 pack a day when stressed   Vaping Use    Vaping status: Never Used   Substance Use Topics    Alcohol use: No    Drug use: No      E-Cigarette/Vaping    E-Cigarette Use Never User       E-Cigarette/Vaping Substances    Nicotine No     THC No     CBD No     Flavoring No     Other No     Unknown No       I have reviewed and agree with the history as documented.     HPI  Patient 74-year-old male presenting for concerns of several episodes of nonbloody nonbilious nausea vomiting accompanied by burning chest pain.  Past medical history significant for prior CABG, hypertension, CAD, hyperlipidemia, schizophrenia.  Patient states nausea vomiting occurred after he ate a yellow-colored piece of tuna and pina.  States the burning chest pain began after his first few episodes of nausea vomiting.  Denies any shortness of breath no lightheadedness no dizziness no abdominal pain no fever no chills.  Review of Systems   Constitutional: Negative.    HENT: Negative.     Eyes:  Negative.    Respiratory: Negative.     Cardiovascular:  Positive for chest pain.   Gastrointestinal:  Positive for nausea and vomiting.   Endocrine: Negative.    Genitourinary: Negative.    Musculoskeletal: Negative.    Allergic/Immunologic: Negative.    Neurological: Negative.    Hematological: Negative.    Psychiatric/Behavioral: Negative.             Objective       ED Triage Vitals [12/25/24 1126]   Temperature Pulse Blood Pressure Respirations SpO2 Patient Position - Orthostatic VS   98.1 °F (36.7 °C) (!) 130 161/73 20 92 % Sitting      Temp Source Heart Rate Source BP Location FiO2 (%) Pain Score    Temporal Monitor Left arm -- 7      Vitals      Date and Time Temp Pulse SpO2 Resp BP Pain Score FACES Pain Rating User   12/25/24 1330 -- 108 96 % 20 114/67 -- -- CS   12/25/24 1200 -- 116 97 % 20 123/61 10 - Worst Possible Pain -- MD   12/25/24 1156 -- 114 97 % 20 114/73 10 - Worst Possible Pain -- MD   12/25/24 1128 -- -- 97 % -- -- -- -- AM   12/25/24 1127 -- -- -- -- -- 10 - Worst Possible Pain -- AM   12/25/24 1126 98.1 °F (36.7 °C) 130 92 % 20 161/73 7 -- AM            Physical Exam  Vitals and nursing note reviewed.   Constitutional:       Appearance: Normal appearance. He is normal weight.   HENT:      Head: Normocephalic and atraumatic.      Right Ear: Tympanic membrane, ear canal and external ear normal.      Left Ear: Tympanic membrane, ear canal and external ear normal.      Nose: Nose normal.      Mouth/Throat:      Mouth: Mucous membranes are moist.      Pharynx: Oropharynx is clear.   Eyes:      Extraocular Movements: Extraocular movements intact.      Conjunctiva/sclera: Conjunctivae normal.      Pupils: Pupils are equal, round, and reactive to light.   Cardiovascular:      Rate and Rhythm: Regular rhythm. Tachycardia present.      Pulses: Normal pulses.      Heart sounds: Normal heart sounds.   Pulmonary:      Effort: Pulmonary effort is normal.      Breath sounds: Normal breath sounds.    Abdominal:      General: Abdomen is flat. Bowel sounds are normal.      Palpations: Abdomen is soft.      Tenderness: There is no abdominal tenderness.   Musculoskeletal:         General: Normal range of motion.      Cervical back: Normal range of motion and neck supple.   Skin:     General: Skin is warm and dry.      Capillary Refill: Capillary refill takes less than 2 seconds.   Neurological:      General: No focal deficit present.      Mental Status: He is alert and oriented to person, place, and time.   Psychiatric:         Mood and Affect: Mood normal.         Behavior: Behavior normal.         Thought Content: Thought content normal.         Judgment: Judgment normal.         Results Reviewed       Procedure Component Value Units Date/Time    HS Troponin I 2hr [[id number]]  (Normal) Collected: 12/25/24 1346    Lab Status: Final result Specimen: Blood from Arm, Right Updated: 12/25/24 1428     hs TnI 2hr 6 ng/L      Delta 2hr hsTnI -1 ng/L     B-Type Natriuretic Peptide(BNP) [[id number]]  (Normal) Collected: 12/25/24 1157    Lab Status: Final result Specimen: Blood from Arm, Right Updated: 12/25/24 1240     BNP 34 pg/mL     Comprehensive metabolic panel [[id number]]  (Abnormal) Collected: 12/25/24 1157    Lab Status: Final result Specimen: Blood from Arm, Right Updated: 12/25/24 1235     Sodium 138 mmol/L      Potassium 3.9 mmol/L      Chloride 104 mmol/L      CO2 27 mmol/L      ANION GAP 7 mmol/L      BUN 26 mg/dL      Creatinine 0.95 mg/dL      Glucose 104 mg/dL      Calcium 9.0 mg/dL      AST 16 U/L      ALT 15 U/L      Alkaline Phosphatase 72 U/L      Total Protein 6.9 g/dL      Albumin 3.8 g/dL      Total Bilirubin 0.49 mg/dL      eGFR 79 ml/min/1.73sq m     Narrative:      National Kid[employer] Foundation guidelines for Chronic Kidney Disease (CKD):     Stage 1 with normal or high GFR (GFR > 90 mL/min/1.73 square meters)    Stage 2 Mild CKD (GFR = 60-89 mL/min/1.73 square meters)    Stage 3A Moderate CKD  (GFR = 45-59 mL/min/1.73 square meters)    Stage 3B Moderate CKD (GFR = 30-44 mL/min/1.73 square meters)    Stage 4 Severe CKD (GFR = 15-29 mL/min/1.73 square meters)    Stage 5 End Stage CKD (GFR <15 mL/min/1.73 square meters)  Note: GFR calculation is accurate only with a steady state creatinine    Lipase [[id number]]  (Abnormal) Collected: 12/25/24 1157    Lab Status: Final result Specimen: Blood from Arm, Right Updated: 12/25/24 1235     Lipase 7 u/L     HS Troponin 0hr (reflex protocol) [[id number]]  (Normal) Collected: 12/25/24 1157    Lab Status: Final result Specimen: Blood from Arm, Right Updated: 12/25/24 1235     hs TnI 0hr 7 ng/L     HS Troponin I 4hr [[id number]]     Lab Status: No result Specimen: Blood     FLU/COVID Rapid Antigen (30 min. TAT) - Preferred screening test in ED [[id number]]  (Normal) Collected: 12/25/24 1157    Lab Status: Final result Specimen: Nares from Nose Updated: 12/25/24 1228     SARS COV Rapid Antigen Negative     Influenza A Rapid Antigen Negative     Influenza B Rapid Antigen Negative    Narrative:      This test has been performed using the Quidel Kizzy 2 FLU+SARS Antigen test under the Emergency Use Authorization (EUA). This test has been validated by the  and verified by the performing laboratory. The Kizzy uses lateral flow immunofluorescent sandwich assay to detect SARS-COV, Influenza A and Influenza B Antigen.     The Quidel Kizzy 2 SARS Antigen test does not differentiate between SARS-CoV and SARS-CoV-2.     Negative results are presumptive and may be confirmed with a molecular assay, if necessary, for patient management. Negative results do not rule out SARS-CoV-2 or influenza infection and should not be used as the sole basis for treatment or patient management decisions. A negative test result may occur if the level of antigen in a sample is below the limit of detection of this test.     Positive results are indicative of the presence of viral antigens, but  do not rule out bacterial infection or co-infection with other viruses.     All test results should be used as an adjunct to clinical observations and other information available to the provider.    FOR PEDIATRIC PATIENTS - copy/paste COVID Guidelines URL to browser: [email or web address]    CBC and differential [[id number]]  (Abnormal) Collected: 12/25/24 1157    Lab Status: Final result Specimen: Blood from Arm, Right Updated: 12/25/24 1209     WBC 8.34 Thousand/uL      RBC 4.64 Million/uL      Hemoglobin 14.6 g/dL      Hematocrit 43.1 %      MCV 93 fL      MCH 31.5 pg      MCHC 33.9 g/dL      RDW 14.2 %      MPV 9.3 fL      Platelets 277 Thousands/uL      nRBC 0 /100 WBCs      Segmented % 87 %      Immature Grans % 0 %      Lymphocytes % 10 %      Monocytes % 3 %      Eosinophils Relative 0 %      Basophils Relative 0 %      Absolute Neutrophils 7.17 Thousands/µL      Absolute Immature Grans 0.03 Thousand/uL      Absolute Lymphocytes 0.83 Thousands/µL      Absolute Monocytes 0.26 Thousand/µL      Eosinophils Absolute 0.03 Thousand/µL      Basophils Absolute 0.02 Thousands/µL             XR chest 2 views   Final Interpretation by Coy Rider MD (12/25 1321)      No acute cardiopulmonary disease.            Workstation performed: [id number]             ECG 12 Lead Documentation Only    Date/Time: 12/25/2024 12:00 PM    Performed by: Bert Caal MD  Authorized by: Bert Caal MD    Indications / Diagnosis:  Chest pain  ECG reviewed by me, the ED Provider: yes    Patient location:  ED  Interpretation:     Interpretation: abnormal    Rate:     ECG rate assessment: tachycardic    Rhythm:     Rhythm: sinus rhythm    Ectopy:     Ectopy: PAC    QRS:     QRS axis:  Normal    QRS intervals:  Normal  Conduction:     Conduction: normal    ST segments:     ST segments:  Normal  T waves:     T waves: normal    Comments:      Patient has several PACs on initial rhythm strip  however there are P waves before each QRS in lead V5.  Based on previous EKGs, believe patient is in sinus rhythm with PACs.  Will obtain EKG to reassess.      ED Medication and Procedure Management   Prior to Admission Medications   Prescriptions Last Dose Informant Patient Reported? Taking?   Acetaminophen Extra Strength 500 MG tablet  Outside Facility (Specify), Self Yes No   Sig: Take 500 mg by mouth every 6 (six) hours as needed for fever or mild pain   FT Sleep Aid, Doxylamine, 25 MG tablet   Yes No   Sig: TAKE 1 TABLET EVERY DAY BY ORAL ROUTE AT BEDTIME FOR FOR INSOMNIA.   Multiple Vitamins-Minerals (multivitamin with minerals) tablet  Outside Facility (Specify), Self Yes No   Sig: Take 1 tablet by mouth daily   Symbicort 160-4.5 MCG/ACT inhaler   Yes No   Sig: INHALE 2 PUFFS TWICE A DAY BY INHALATION ROUTE FOR 30 DAYS, FOR EMPHYSEMATOUS.   Symbicort 160-4.5 MCG/ACT inhaler   Yes No   Sig: INHALE 2 PUFFS TWICE A DAY BY INHALATION ROUTE FOR 30 DAYS, FOR EMPHYSEMATOUS.   albuterol (PROVENTIL HFA,VENTOLIN HFA) 90 mcg/act inhaler   Yes No   Sig: INHALE 2 PUFFS EVERY 4 HOURS BY INHALATION ROUTE AS NEEDED.   aspirin (ECOTRIN LOW STRENGTH) 81 mg EC tablet  Outside Facility (Specify), Self No No   Sig: Take 1 tablet (81 mg total) by mouth daily Do not start before July 8, 2023.   atorvastatin (LIPITOR) 80 mg tablet  Outside Facility (Specify), Self No No   Sig: Take 1 tablet (80 mg total) by mouth every evening   azelastine (OPTIVAR) 0.05 % ophthalmic solution   Yes No   Sig: INSTILL 1 DROP INTO AFFECTED EYE(S) BY OPHTHALMIC ROUTE 2 TIMES PER DAY   azelastine (OPTIVAR) 0.05 % ophthalmic solution   Yes No   Sig: INSTILL 1 DROP INTO AFFECTED EYE(S) BY OPHTHALMIC ROUTE 2 TIMES PER DAY   ciprofloxacin (CIPRO) 500 mg tablet   Yes No   Sig: TAKE 1 TABLET EVERY 12 HOURS BY ORAL ROUTE FOR 7 DAYS, FOR PYELONEPHRITIS.   famotidine (PEPCID) 20 mg tablet  Outside Facility (Specify), Self No No   Sig: Take 1 tablet (20 mg total)  by mouth 2 (two) times a day   finasteride (PROSCAR) 5 mg tablet  Outside Facility (Specify), Self Yes No   Sig: Take 5 mg by mouth daily   hydrOXYzine HCL (ATARAX) 25 mg tablet  Outside Facility (Specify), Self Yes No   Sig: Take 25 mg by mouth 2 (two) times a day   lidocaine (LIDODERM) 5 %   Yes No   Sig: APPLY 1 PATCH BY TOPICAL ROUTE ONCE DAILY (MAY WEAR UP TO 12HOURS.) X 10 DAYS   lisinopril (ZESTRIL) 5 mg tablet  Outside Facility (Specify), Self Yes No   Sig: Take 5 mg by mouth daily   magnesium Oxide (MAG-OX) 400 mg TABS   No No   Sig: Take 1 tablet (400 mg total) by mouth daily   magnesium hydroxide (MILK OF MAGNESIA) 400 mg/5 mL oral suspension  Outside Facility (Specify), Self Yes No   Sig: Take 5 mL by mouth if needed for constipation   metoprolol tartrate (LOPRESSOR) 25 mg tablet  Outside Facility (Specify), Self Yes No   Sig: Take 25 mg by mouth daily Hold for SBP <100 or HR <60   nicotine (NICODERM CQ) 21 mg/24 hr TD 24 hr patch  Outside Facility (Specify), Self No No   Sig: Place 1 patch on the skin over 24 hours daily Do not start before July 1, 2023.   nitrofurantoin (MACROBID) 100 mg capsule   Yes No   Sig: TAKE 1 CAPSULE EVERY 12 HOURS BY ORAL ROUTE FOR 5 DAYS, FOR URINARY TRACT INFECTION.   pregabalin (LYRICA) 150 mg capsule   Yes No   Sig: Take 150 mg by mouth daily   ranolazine (RANEXA) 500 mg 12 hr tablet  Outside Facility (Specify), Self No No   Sig: Take 1 tablet (500 mg total) by mouth every 12 (twelve) hours   risperiDONE (RisperDAL) 0.5 mg tablet   Yes No   Sig: take 1 tablet by mouth at bedtime for 90 DAYS   sertraline (ZOLOFT) 100 mg tablet  Outside Facility (Specify), Self Yes No   Sig: Take 100 mg by mouth daily at bedtime   simvastatin (ZOCOR) 10 mg tablet   Yes No   Sig: Take 10 mg by mouth every evening   zinc gluconate 50 mg tablet  Outside Facility (Specify), Self Yes No   Sig: Take 50 mg by mouth daily      Facility-Administered Medications: None     Patient's Medications    Discharge Prescriptions    ONDANSETRON (ZOFRAN) 4 MG TABLET    Take 1 tablet (4 mg total) by mouth every 6 (six) hours       Start Date: 12/25/2024End Date: --       Order Dose: 4 mg       Quantity: 12 tablet    Refills: 0       ED SEPSIS DOCUMENTATION   Time reflects when diagnosis was documented in both MDM as applicable and the Disposition within this note       Time User Action Codes Description Comment    12/25/2024  2:35 PM Bert Caal [R11.2] Nausea and vomiting     12/25/2024  2:36 PM Bert Caal [R10.13] Epigastric pain     12/25/2024  2:38 PM Bert Caal [R07.9] Chest pain                  Bert Caal MD  12/25/24 1430       Bert Caal MD  12/25/24 143

## 2024-12-27 LAB
ATRIAL RATE: 108 BPM
P AXIS: 61 DEGREES
PR INTERVAL: 176 MS
QRS AXIS: 42 DEGREES
QRSD INTERVAL: 78 MS
QT INTERVAL: 308 MS
QTC INTERVAL: 412 MS
T WAVE AXIS: 48 DEGREES
VENTRICULAR RATE: 108 BPM

## 2024-12-27 PROCEDURE — 93010 ELECTROCARDIOGRAM REPORT: CPT | Performed by: INTERNAL MEDICINE

## 2025-01-23 ENCOUNTER — HOSPITAL ENCOUNTER (OUTPATIENT)
Dept: RADIOLOGY | Facility: HOSPITAL | Age: 75
Discharge: HOME | End: 2025-01-23
Payer: COMMERCIAL

## 2025-01-23 DIAGNOSIS — M25.512 ARTHRALGIA OF LEFT ACROMIOCLAVICULAR JOINT: ICD-10-CM

## 2025-01-23 PROCEDURE — 73000 X-RAY EXAM OF COLLAR BONE: CPT

## 2025-01-29 ENCOUNTER — TELEPHONE (OUTPATIENT)
Age: 75
End: 2025-01-29

## 2025-01-29 ENCOUNTER — TELEPHONE (OUTPATIENT)
Dept: OBGYN CLINIC | Facility: HOSPITAL | Age: 75
End: 2025-01-29

## 2025-01-29 NOTE — TELEPHONE ENCOUNTER
Pt's caretaker called requesting to schedule a study. Advised this was the orthopedic doctors office. Went through active referrals none listed for ortho. Mentioned having a referral for cardio that is scheduled and needing a study done for urology. Noted that he just had an xr due to pt having frequent falls. Advised we can certainly schedule an appt with ortho if she liked. Declined stating she will go to the pcp office tomorrow to make sure she is not missing something for another specialty   
stated

## 2025-01-29 NOTE — TELEPHONE ENCOUNTER
Pts spouse calling regarding message received to schedule with Physiatry team.     Please assist, Thank you.

## 2025-01-30 ENCOUNTER — TELEPHONE (OUTPATIENT)
Age: 75
End: 2025-01-30

## 2025-01-30 ENCOUNTER — OFFICE VISIT (OUTPATIENT)
Dept: NEUROLOGY | Facility: CLINIC | Age: 75
End: 2025-01-30
Payer: COMMERCIAL

## 2025-01-30 VITALS
DIASTOLIC BLOOD PRESSURE: 70 MMHG | TEMPERATURE: 98.3 F | WEIGHT: 161.8 LBS | BODY MASS INDEX: 23.96 KG/M2 | OXYGEN SATURATION: 98 % | HEART RATE: 66 BPM | HEIGHT: 69 IN | SYSTOLIC BLOOD PRESSURE: 130 MMHG

## 2025-01-30 DIAGNOSIS — R29.6 FREQUENT FALLS: ICD-10-CM

## 2025-01-30 DIAGNOSIS — R41.89 COGNITIVE DECLINE: Primary | ICD-10-CM

## 2025-01-30 PROCEDURE — 99214 OFFICE O/P EST MOD 30 MIN: CPT | Performed by: PSYCHIATRY & NEUROLOGY

## 2025-01-30 PROCEDURE — G2211 COMPLEX E/M VISIT ADD ON: HCPCS | Performed by: PSYCHIATRY & NEUROLOGY

## 2025-01-30 RX ORDER — MULTIVITAMIN WITH FOLIC ACID 400 MCG
TABLET ORAL
COMMUNITY
Start: 2024-12-06

## 2025-01-30 RX ORDER — MULTIVITAMIN WITH FOLIC ACID 400 MCG
TABLET ORAL
COMMUNITY
Start: 2025-01-04

## 2025-01-30 RX ORDER — CAFFEINE 200 MG
TABLET ORAL
COMMUNITY

## 2025-01-30 NOTE — PROGRESS NOTES
Name: Samm Armijo      : 1950      MRN: [id number]  Encounter Provider: Arpita Marshall MD  Encounter Date: 2025   Encounter department: [facility]  :  Assessment & Plan  Cognitive decline  Patient is a very pleasant 73-year-old male with history of lumbar spondylosis, neurogenic claudication, CAD status post CABG x4, hypertension, hyperlipidemia, history of PE ( not on AC) who presents for follow-up. I follow him for lumbar radiculopathy but today would like to address memory.  Patient is very forgetful, argues and loses things frequently. Loses key. No dangerous situations. Can get angry and sometimes aggressive. Not driving. Ongoing for some time however two months ago became mor pronounced. Patient has frequent falls. He reports falling 33 times.     Will do MRI NeuroQuant       Orders:    MRI brain NeuroQuant wo contrast; Future    Frequent falls  Patient with history of lumbar spondylosis and neurogenic claudication reports having an increase in fall frequency. Last seen by neurosurgery in . Recommend following up with them. For repeat imaging if necessary. Will refer to PT.   Orders:    Ambulatory Referral to Physical Therapy; Future          History of Present Illness   HPI  Patient is a very pleasant 73-year-old male with history of lumbar spondylosis, neurogenic claudication, CAD status post CABG x4, hypertension, hyperlipidemia, history of PE ( not on AC) who presents for follow-up. I follow him for lumbar radiculopathy but today would like to address memory.   Patient here with home attendant. Patient is very forgetful, argues and loses things frequently. Loses key. No dangerous situations. Can get angry and sometimes aggressive hoeveer cargiver can handle him. Not driving. Ongoing for some time however two months ago became mor pronounced. Patient has frequent falls. He reports falling 33 times.     When he slips his face turns to the left.  "Not sleeping well   Home attending 5 am - 11 am, and a second from 11 am- 3:30 pm     HBA1C 6.2       Medication review:  Aspirin 81 mg  ?Atorvastatin 80 mg  Hydroxyzine 25 mg  Magnesium oxide 400 mg  Lopressor 25 mg  Lyrica 150 mg  Risperidone 0.5 mg  Sertraline 100 mg  ?Simvastatin 10 mg    Review of Systems   Constitutional:  Negative for appetite change, fatigue and fever.   HENT: Negative.  Negative for hearing loss, tinnitus, trouble swallowing and voice change.    Eyes: Negative.  Negative for photophobia, pain and visual disturbance.   Respiratory: Negative.  Negative for shortness of breath.    Cardiovascular: Negative.  Negative for palpitations.   Gastrointestinal: Negative.  Negative for nausea and vomiting.   Endocrine: Negative.  Negative for cold intolerance.   Genitourinary: Negative.  Negative for dysuria, frequency and urgency.   Musculoskeletal:  Negative for back pain, gait problem, myalgias, neck pain and neck stiffness.   Skin: Negative.  Negative for rash.   Allergic/Immunologic: Negative.    Neurological: Negative.  Negative for dizziness, tremors, seizures, syncope, facial asymmetry, speech difficulty, weakness, light-headedness, numbness and headaches.        Pt presents with severe memory loss, caregiver says he's forgetting everything. States he cannot be alone for a while. Has been progressively worse over the past 2 months.   Hematological: Negative.  Does not bruise/bleed easily.   Psychiatric/Behavioral: Negative.  Negative for confusion, hallucinations and sleep disturbance.    All other systems reviewed and are negative.   I have personally reviewed the MA's review of systems and made changes as necessary.         Objective   Ht 5' 9\" (1.753 m)   BMI 23.63 kg/m²     Physical Exam  Neurological Exam          "

## 2025-01-30 NOTE — TELEPHONE ENCOUNTER
Good Day,      Patient 's pcp called to schedule his Neurology appointment .     She states patient has cognitively declined within the las few months.      Patient scheduled with Dr. Marshall his Today , insurance E-verified.     Thanks     Ava

## 2025-02-05 ENCOUNTER — OFFICE VISIT (OUTPATIENT)
Dept: OBGYN CLINIC | Facility: OTHER | Age: 75
End: 2025-02-05
Payer: COMMERCIAL

## 2025-02-05 VITALS — BODY MASS INDEX: 23.85 KG/M2 | WEIGHT: 161 LBS | HEIGHT: 69 IN

## 2025-02-05 DIAGNOSIS — M19.011 ARTHRITIS OF BOTH ACROMIOCLAVICULAR JOINTS: ICD-10-CM

## 2025-02-05 DIAGNOSIS — M25.511 CHRONIC PAIN OF BOTH SHOULDERS: Primary | ICD-10-CM

## 2025-02-05 DIAGNOSIS — M25.512 CHRONIC PAIN OF BOTH SHOULDERS: Primary | ICD-10-CM

## 2025-02-05 DIAGNOSIS — M19.012 ARTHRITIS OF BOTH ACROMIOCLAVICULAR JOINTS: ICD-10-CM

## 2025-02-05 DIAGNOSIS — M47.812 CERVICAL SPONDYLOSIS: ICD-10-CM

## 2025-02-05 DIAGNOSIS — M25.519 STERNOCLAVICULAR JOINT PAIN, UNSPECIFIED LATERALITY: ICD-10-CM

## 2025-02-05 DIAGNOSIS — M75.102 ROTATOR CUFF SYNDROME OF BOTH SHOULDERS: ICD-10-CM

## 2025-02-05 DIAGNOSIS — M75.101 ROTATOR CUFF SYNDROME OF BOTH SHOULDERS: ICD-10-CM

## 2025-02-05 DIAGNOSIS — G89.29 CHRONIC PAIN OF BOTH SHOULDERS: Primary | ICD-10-CM

## 2025-02-05 PROCEDURE — 99213 OFFICE O/P EST LOW 20 MIN: CPT | Performed by: ORTHOPAEDIC SURGERY
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
## 2025-02-08 NOTE — PROGRESS NOTES
"Cardiology and Heart Failure Clinic Note    Samm Armijo 74 y.o. male   MRN: [id number]  Encounter: [id number]        Assessment / Plan:    # CAD  Hx CABG (reports 30 years ago in [facility])  ASA 81  Statin  Antianginals:  Lopressor, ranexa, adding norvasc    # Chest pain  Eval in 2023 by general cardiology - \"underwent NM SPECT exam and this showed a small area of apical infarct with mild fidel-infarct ischemia. This area is hypokinetic on echo and has a perfusion defect on 2015 SPECT. Unlikely new. \"  Reporting chest pain that is constant and due to \"an accident\".  Superficial in the sternal area.  It is not exertional.  Not consistent with a cardiac process.     # HTN  Lisinopril 5   Lopressor 25 daily (dosing not correct - will eventually change-not done today to avoid confusion)  BP today at goal.  Reports a dry cough so we will switch lisinopril to Norvasc which has antianginal properties.    # HLD  Lipitor 80  Last   Ultimately, consider adding Zetia    # hx PE  2015  No longer on anticoagulation    # Smoking   Active smoker  Cessation recommended    # lumbar spondylosis  # Neurogenic claudication  Had surgery in 2023  Has frequent falls    # Schizophrenia  noted    Today's Plan Summary:  See above assessment/plan for full details of today's plan.  Briefly,     Has a dry cough so we will stop lisinopril and start amlodipine.              Reason For Visit / Chief Complaint:  Referred by Dr. Patterson (ER) for a new patient visit for chest pain.    HPI:   Samm Armijo is a 74 y.o.  male with history as noted in the problem list and further detailed in the above assessment and plan.    Referred by Dr. Patterson (ER) for a new patient visit for chest pain.    This is a 74-year-old male with history of CAD with distant CABG in [place].  Also has hypertension, hyperlipidemia, distant PE, smoking.  The patient was in the ER on [place] Day with nausea vomiting and some atypical chest pain.  Troponins " were negative x 2.  It was felt to be gastroenteritis and he was discharged with a prescription for Zofran.  He was referred to cardiology because he is lost to follow-up.  Looking back in the chart the patient has not really been following with cardiology, last seen as an outpatient in 2017.    Today, pt reports -   reports chest pain.  Constant, not exertional, not new, reports due to an accident.  No exertional chest pressure, pain, or tightness.  Some SOB.  Some dry cough.   No orthopnea or PND.   No leg edema but gets numbness and falls.  No palpitations, presyncope, or syncope.       From [facility] [employer].  Retired.   .    Smoking cigarettes - 1 pack per day. No ETOH.   No drugs.          Cardiac Imaging personally reviewed:  EKG 12-25-24  Sinus w/ PACs  Possible old Ant MI  Non specific T wave changes       Holter or event monitor    Echo 2023  EF 50%.  Apical WMA.  Aortic sclerosis  Mild MR  Mild TR  RVSP 36       JAROD    Cardiac MRI    Stress testing Nuclear 6-2023  prior small apical infarction and small territory of fidel-infarct ischemia.    Coronary CTA or [facility]    [facility]    CPET              Patient Active Problem List    Diagnosis Date Noted   • Diabetes due to undrl condition w oth diabetic neuro comp (HCC) 02/10/2025   • Smoking 02/10/2025   • Syncope 08/28/2024   • Spinal stenosis of lumbar region with neurogenic claudication 09/12/2022   • Chronic pain syndrome 09/11/2022   • Lumbar radiculopathy 02/23/2022   • Lumbar spondylosis 02/23/2022   • DDD (degenerative disc disease), lumbar 02/23/2022   • Low back pain with sciatica 02/23/2022   • Closed compression fracture of first lumbar vertebra (HCC) 02/23/2022   • Olecranon bursitis of right elbow 02/23/2022   • Sessile colonic polyp 04/10/2017   • Subclinical hypothyroidism 02/27/2017   • Chest pain 09/16/2016   • CAD (coronary artery disease) 09/16/2016   • S/P CABG x 4 09/16/2016   • Pre-diabetes 09/16/2016   • HLD (hyperlipidemia) 09/16/2016   •  HTN (hypertension) 09/16/2016   • Tobacco use 09/16/2016   • Hx pulmonary embolism 09/16/2016   • BPH (benign prostatic hyperplasia) 09/16/2016   • Osteoarthritis 11/25/2013   • Schizophrenia (HCC) 02/27/2013       Past Medical History:   Diagnosis Date   • BPH (benign prostatic hyperplasia)    • Coronary artery disease    • HTN (hypertension)    • Hx pulmonary embolism    • Hyperlipidemia    • Hypertension    • Prediabetes    • Tobacco use        Review of Systems   Constitutional:  Negative for chills and fever.   HENT:  Negative for nosebleeds.    Gastrointestinal:  Negative for abdominal distention and blood in stool.   Neurological:  Negative for dizziness and syncope.   Psychiatric/Behavioral:  Negative for confusion.    14-point ROS completed and negative except as stated above and/or in the HPI.    Allergies   Allergen Reactions   • Gabapentin Anxiety     Suicidal thoughts        Current Outpatient Medications   Medication Instructions   • acetaminophen (TYLENOL) 650 mg, Every 8 hours PRN   • albuterol (PROVENTIL HFA,VENTOLIN HFA) 90 mcg/act inhaler INHALE 2 PUFFS EVERY 4 HOURS BY INHALATION ROUTE AS NEEDED.   • amLODIPine (NORVASC) 2.5 mg, Oral, Daily   • aspirin (ECOTRIN LOW STRENGTH) 81 mg, Oral, Daily   • atorvastatin (LIPITOR) 80 mg, Oral, Every evening   • azelastine (OPTIVAR) 0.05 % ophthalmic solution INSTILL 1 DROP INTO AFFECTED EYE(S) BY OPHTHALMIC ROUTE 2 TIMES PER DAY   • azelastine (OPTIVAR) 0.05 % ophthalmic solution INSTILL 1 DROP INTO AFFECTED EYE(S) BY OPHTHALMIC ROUTE 2 TIMES PER DAY   • caffeine 200 mg tablet TAKE 1 TABLET EVERY 8 HOURS BY ORAL ROUTE AS NEEDED   • ciprofloxacin (CIPRO) 500 mg tablet TAKE 1 TABLET EVERY 12 HOURS BY ORAL ROUTE FOR 7 DAYS, FOR PYELONEPHRITIS.   • Diclofenac Sodium (VOLTAREN) 1 % APPLY 2 G EVERY 6 HOURS BY TOPICAL ROUTE FOR 30 DAYS, FOR PAIN.   • famotidine (PEPCID) 20 mg, Oral, 2 times daily   • finasteride (PROSCAR) 5 mg, Daily   • FT Sleep Aid, Doxylamine,  25 MG tablet TAKE 1 TABLET EVERY DAY BY ORAL ROUTE AT BEDTIME FOR FOR INSOMNIA.   • hydrOXYzine HCL (ATARAX) 25 mg, 2 times daily   • lidocaine (LIDODERM) 5 % APPLY 1 PATCH BY TOPICAL ROUTE ONCE DAILY (MAY WEAR UP TO 12HOURS.) X 10 DAYS   • magnesium hydroxide (MILK OF MAGNESIA) 400 mg/5 mL oral suspension 5 mL, As needed   • magnesium Oxide (MAG-OX) 400 mg, Oral, Daily   • metoprolol tartrate (LOPRESSOR) 25 mg, Daily   • Multiple Vitamin (Daily-Uday) TABS TAKE 1 TABLET EVERY DAY BY ORAL ROUTE FOR 90 DAYS, FOR IMMUNE SUPPORT..   • Multiple Vitamin (Tab-A-Uday) TABS TAKE 1 TABLET EVERY DAY BY ORAL ROUTE FOR 90 DAYS, FOR IMMUNE SUPPORT..   • Multiple Vitamins-Minerals (multivitamin with minerals) tablet 1 tablet, Daily   • nicotine (NICODERM CQ) 21 mg/24 hr TD 24 hr patch 1 patch, Transdermal, Daily   • nitrofurantoin (MACROBID) 100 mg capsule TAKE 1 CAPSULE EVERY 12 HOURS BY ORAL ROUTE FOR 5 DAYS, FOR URINARY TRACT INFECTION.   • ondansetron (ZOFRAN) 4 mg, Oral, Every 6 hours   • pregabalin (LYRICA) 150 mg, Daily   • ranolazine (RANEXA) 500 mg, Oral, Every 12 hours scheduled   • risperiDONE (RisperDAL) 0.5 mg tablet take 1 tablet by mouth at bedtime for 90 DAYS   • sertraline (ZOLOFT) 100 mg, Daily at bedtime   • Symbicort 160-4.5 MCG/ACT inhaler INHALE 2 PUFFS TWICE A DAY BY INHALATION ROUTE FOR 30 DAYS, FOR EMPHYSEMATOUS.   • Symbicort 160-4.5 MCG/ACT inhaler INHALE 2 PUFFS TWICE A DAY BY INHALATION ROUTE FOR 30 DAYS, FOR EMPHYSEMATOUS.   • zinc gluconate 50 mg, Daily       Social History     Socioeconomic History   • Marital status: /Civil Union     Spouse name: Not on file   • Number of children: Not on file   • Years of education: Not on file   • Highest education level: Not on file   Occupational History   • Not on file   Tobacco Use   • Smoking status: Every Day     Current packs/day: 0.50     Average packs/day: 0.5 packs/day for 56.0 years (28.0 ttl pk-yrs)     Types: Cigarettes   • Smokeless tobacco:  "Never   • Tobacco comments:     started when Pt was 10 years old.  Pt smokes 1/2 to 1 pack a day when stressed   Vaping Use   • Vaping status: Never Used   Substance and Sexual Activity   • Alcohol use: No   • Drug use: No   • Sexual activity: Not Currently   Other Topics Concern   • Not on file   Social History Narrative   • Not on file     Social Drivers of Health     Financial Resource Strain: Not on file   Food Insecurity: No Food Insecurity (6/20/2023)    Hunger Vital Sign    • Worried About Running Out of Food in the Last Year: Never true    • Ran Out of Food in the Last Year: Never true   Transportation Needs: No Transportation Needs (6/20/2023)    PRAPARE - Transportation    • Lack of Transportation (Medical): No    • Lack of Transportation (Non-Medical): No   Physical Activity: Not on file   Stress: Not on file   Social Connections: Not on file   Intimate Partner Violence: Not on file   Housing Stability: Low Risk  (6/20/2023)    Housing Stability Vital Sign    • Unable to Pay for Housing in the Last Year: No    • Number of Places Lived in the Last Year: 2    • Unstable Housing in the Last Year: No       Family History   Problem Relation Age of Onset   • Diabetes Mother    • Diabetes Father        Physical Exam:  Blood pressure 104/54, pulse 100, height 5' 9\" (1.753 m), weight 73.7 kg (162 lb 6.4 oz), SpO2 98%.  Body mass index is 23.98 kg/m².  Wt Readings from Last 3 Encounters:   02/10/25 73.7 kg (162 lb 6.4 oz)   02/05/25 73 kg (161 lb)   01/30/25 73.4 kg (161 lb 12.8 oz)     Physical Exam  Vitals reviewed.   Constitutional:       General: He is not in acute distress.     Appearance: He is not toxic-appearing.   HENT:      Head: Normocephalic and atraumatic.   Eyes:      General: No scleral icterus.     Conjunctiva/sclera: Conjunctivae normal.   Neck:      Vascular: No carotid bruit.      Comments: No JVP elevation  Cardiovascular:      Rate and Rhythm: Normal rate and regular rhythm.      Heart sounds: " No murmur heard.     No friction rub. No gallop.   Pulmonary:      Breath sounds: Normal breath sounds. No wheezing, rhonchi or rales.   Abdominal:      General: There is no distension.      Palpations: Abdomen is soft.      Tenderness: There is no abdominal tenderness. There is no guarding.   Musculoskeletal:      Right lower leg: No edema.      Left lower leg: No edema.   Skin:     Coloration: Skin is not jaundiced or pale.      Findings: No erythema.   Neurological:      Mental Status: He is alert. Mental status is at baseline.   Psychiatric:         Mood and Affect: Mood normal.         Behavior: Behavior normal.         Labs & Results:  Lab Results   Component Value Date    SODIUM 138 12/25/2024    K 3.9 12/25/2024     12/25/2024    CO2 27 12/25/2024    BUN 26 (H) 12/25/2024    CREATININE 0.95 12/25/2024    GLUC 104 12/25/2024    CALCIUM 9.0 12/25/2024         Thank you for the opportunity to participate in the care of this patient.    Adam Almonte MD, [facility]  Advanced Heart Failure and Transplant Cardiologist  Director of Cardio-Oncology  [facility]

## 2025-02-10 ENCOUNTER — CONSULT (OUTPATIENT)
Dept: CARDIOLOGY CLINIC | Facility: CLINIC | Age: 75
End: 2025-02-10
Payer: COMMERCIAL

## 2025-02-10 VITALS
SYSTOLIC BLOOD PRESSURE: 104 MMHG | DIASTOLIC BLOOD PRESSURE: 54 MMHG | HEIGHT: 69 IN | OXYGEN SATURATION: 98 % | WEIGHT: 162.4 LBS | BODY MASS INDEX: 24.05 KG/M2 | HEART RATE: 100 BPM

## 2025-02-10 DIAGNOSIS — F17.200 SMOKING: ICD-10-CM

## 2025-02-10 DIAGNOSIS — R07.9 CHEST PAIN, UNSPECIFIED TYPE: Primary | ICD-10-CM

## 2025-02-10 DIAGNOSIS — I25.10 CAD IN NATIVE ARTERY: ICD-10-CM

## 2025-02-10 DIAGNOSIS — I10 PRIMARY HYPERTENSION: ICD-10-CM

## 2025-02-10 DIAGNOSIS — E08.49 DIABETES DUE TO UNDRL CONDITION W OTH DIABETIC NEURO COMP (HCC): ICD-10-CM

## 2025-02-10 DIAGNOSIS — E78.2 MIXED HYPERLIPIDEMIA: ICD-10-CM

## 2025-02-10 PROBLEM — IMO0001 SMOKING: Status: ACTIVE | Noted: 2025-02-10

## 2025-02-10 PROCEDURE — 99204 OFFICE O/P NEW MOD 45 MIN: CPT | Performed by: INTERNAL MEDICINE

## 2025-02-10 RX ORDER — SENNOSIDES 8.6 MG
650 CAPSULE ORAL EVERY 8 HOURS PRN
COMMUNITY
Start: 2025-02-04

## 2025-02-10 RX ORDER — AMLODIPINE BESYLATE 2.5 MG/1
2.5 TABLET ORAL DAILY
Qty: 30 TABLET | Refills: 11 | Status: SHIPPED | OUTPATIENT
Start: 2025-02-10 | End: 2025-02-13 | Stop reason: SDUPTHER

## 2025-02-13 DIAGNOSIS — I10 PRIMARY HYPERTENSION: ICD-10-CM

## 2025-02-13 RX ORDER — AMLODIPINE BESYLATE 2.5 MG/1
2.5 TABLET ORAL DAILY
Qty: 30 TABLET | Refills: 11 | Status: SHIPPED | OUTPATIENT
Start: 2025-02-13

## 2025-02-13 NOTE — TELEPHONE ENCOUNTER
Medication: Amlodipine 2.5mg     Dose/Frequency: 1 tablet daily    Quantity: 30    Pharmacy: [facility]     Office:   [] PCP/Provider -   [x] Speciality/Provider -     Does the patient have enough for 3 days?   [] Yes   [x] No - Send as HP to POD     Please send order to [facility] - pt reports that the order had been sent to [facility]. PT does not use [facility]

## 2025-02-20 ENCOUNTER — TELEPHONE (OUTPATIENT)
Age: 75
End: 2025-02-20

## 2025-02-20 NOTE — TELEPHONE ENCOUNTER
Caller: Quiana-spouse    Doctor: Dr. Villatoro    Reason for call: wife called to reschedule [facility] for 2/21 pt is sick    Call back#: [phone]

## 2025-02-28 ENCOUNTER — HOSPITAL ENCOUNTER (OUTPATIENT)
Dept: MRI IMAGING | Facility: HOSPITAL | Age: 75
End: 2025-02-28
Attending: PSYCHIATRY & NEUROLOGY
Payer: COMMERCIAL

## 2025-02-28 DIAGNOSIS — R41.89 COGNITIVE DECLINE: ICD-10-CM

## 2025-02-28 PROCEDURE — 70551 MRI BRAIN STEM W/O DYE: CPT

## 2025-03-07 ENCOUNTER — PROCEDURE VISIT (OUTPATIENT)
Dept: OBGYN CLINIC | Facility: OTHER | Age: 75
End: 2025-03-07
Payer: COMMERCIAL

## 2025-03-07 VITALS — HEIGHT: 69 IN | WEIGHT: 162 LBS | BODY MASS INDEX: 23.99 KG/M2

## 2025-03-07 DIAGNOSIS — L03.313 CELLULITIS OF CHEST WALL: Primary | ICD-10-CM

## 2025-03-07 PROCEDURE — 99213 OFFICE O/P EST LOW 20 MIN: CPT | Performed by: ORTHOPAEDIC SURGERY

## 2025-03-07 RX ORDER — SULFAMETHOXAZOLE AND TRIMETHOPRIM 800; 160 MG/1; MG/1
1 TABLET ORAL EVERY 12 HOURS SCHEDULED
Qty: 14 TABLET | Refills: 0 | Status: SHIPPED | OUTPATIENT
Start: 2025-03-07 | End: 2025-03-14

## 2025-03-07 NOTE — PROGRESS NOTES
"Name: Samm Armijo      : 1950      MRN: [id number]  Encounter Provider: Geovanna Villatoro MD  Encounter Date: 3/7/2025   Encounter department: [facility]  :  Assessment & Plan  Cellulitis of chest wall  Patient advised he cannot receive injection today but patient will follow-up in 3 to 4 weeks after completing his antibiotic therapy and following up with his primary care physician.  Orders:    sulfamethoxazole-trimethoprim (BACTRIM DS) 800-160 mg per tablet; Take 1 tablet by mouth every 12 (twelve) hours for 7 days        History of Present Illness   Mr. Storey is a 74-year-old male who presents for follow-up for his bilateral shoulder pains.  Patient was supposed to receive a subacromial and AC joint injection today of his right shoulder.  Unfortunately a patient states that he has been draining from his port from where he had bypass surgery.  Patient's skin was diagnosed with cellulitis.  Patient advised that today he cannot receive his injection will need to follow-up with his primary care physician and return in 3 weeks after he has completed his antibiotic course and this has resolved.      History obtained from: patient    Review of Systems       Objective   Ht 5' 9\" (1.753 m)   Wt 73.5 kg (162 lb)   BMI 23.92 kg/m²      Physical Exam  Skin:     General: Skin is warm.      Findings: Lesion and wound present.      Comments: No discharge seen on exam today but patient states that this has been draining recently.           "

## 2025-05-07 NOTE — ED ATTENDING ATTESTATION
6/19/2023  I, Jesus Mejia MD, saw and evaluated the patient  I have discussed the patient with the resident/non-physician practitioner and agree with the resident's/non-physician practitioner's findings, Plan of Care, and MDM as documented in the resident's/non-physician practitioner's note, except where noted  All available labs and Radiology studies were reviewed  I was present for key portions of any procedure(s) performed by the resident/non-physician practitioner and I was immediately available to provide assistance  At this point I agree with the current assessment done in the Emergency Department  I have conducted an independent evaluation of this patient a history and physical is as follows:    ED Course         Critical Care Time  Procedures    72 yo male with hx of cad, cabg, presents with generalized weakness since noon  Pt is   Pt states he didn't eat this morning  No fall, no trauma, no cp, no sob, no abdominal pain, no numbness, tingling  No fever  Vss, afebrile, lungs cta, rrr, abdomen soft nontender, le weakness bilaterally, poor effort  Cardiac workup, ivf, reassess, ambulate  Upon reevaluation, pt weakness in legs remains and pt mentioning dizziness, room spinning  Pt made stroke alert  Neuro at bedside  Ct head, cta head and neck, labs  nih ss 2 leg weakness 
Patient will speak with anesthesia and surgeon prior to surgery

## 2025-06-18 ENCOUNTER — OFFICE VISIT (OUTPATIENT)
Dept: NEUROLOGY | Facility: CLINIC | Age: 75
End: 2025-06-18
Payer: COMMERCIAL

## 2025-06-18 VITALS
OXYGEN SATURATION: 97 % | HEART RATE: 85 BPM | DIASTOLIC BLOOD PRESSURE: 58 MMHG | HEIGHT: 69 IN | SYSTOLIC BLOOD PRESSURE: 100 MMHG | BODY MASS INDEX: 23.4 KG/M2 | TEMPERATURE: 98.4 F | WEIGHT: 158 LBS

## 2025-06-18 DIAGNOSIS — G30.9 ALZHEIMER'S DISEASE (HCC): Primary | ICD-10-CM

## 2025-06-18 DIAGNOSIS — F02.818 ALZHEIMER'S DEMENTIA WITH BEHAVIORAL DISTURBANCE (HCC): ICD-10-CM

## 2025-06-18 DIAGNOSIS — F02.80 ALZHEIMER'S DISEASE (HCC): Primary | ICD-10-CM

## 2025-06-18 DIAGNOSIS — G30.9 ALZHEIMER'S DEMENTIA WITH BEHAVIORAL DISTURBANCE (HCC): ICD-10-CM

## 2025-06-18 PROCEDURE — 99214 OFFICE O/P EST MOD 30 MIN: CPT | Performed by: PSYCHIATRY & NEUROLOGY

## 2025-06-18 RX ORDER — LISINOPRIL 5 MG/1
TABLET ORAL
COMMUNITY
Start: 2025-05-16

## 2025-06-18 RX ORDER — LISINOPRIL 5 MG/1
TABLET ORAL
COMMUNITY
Start: 2025-04-20

## 2025-06-18 RX ORDER — FLUTICASONE PROPIONATE 50 MCG
SPRAY, SUSPENSION (ML) NASAL
COMMUNITY
Start: 2025-06-02

## 2025-06-18 RX ORDER — DONEPEZIL HYDROCHLORIDE 5 MG/1
5 TABLET, FILM COATED ORAL
Qty: 90 TABLET | Refills: 3 | Status: SHIPPED | OUTPATIENT
Start: 2025-06-18
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
## 2025-06-18 NOTE — LETTER
June 18, 2025     Patient: Samm Armijo  YOB: 1950  Date of Visit: 6/18/2025      To Whom it May Concern:    Samm Armijo is under my professional care. Samm was seen in my office on 6/18/2025. Imaging and clinical picture concerning for Alzheimers with behavioral concerns. Patient has not been compliant taking his medications. I believe he would benefit from increasing home health aid hours. Especially at night time so they can ensure he takes his medications as prescribed.     If you have any questions or concerns, please don't hesitate to call.         Sincerely,          Arpita Marshall MD        CC: No Recipients

## 2025-06-18 NOTE — ASSESSMENT & PLAN NOTE
Orders:    donepezil (ARICEPT) 5 mg tablet; Take 1 tablet (5 mg total) by mouth daily at bedtime

## 2025-06-18 NOTE — ASSESSMENT & PLAN NOTE
Patient with worsening cognitive problems.  As per home attendant he has been repeating himself more frequently.  He has become more aggressive and disrespectful.  Patient has not been taking his meds and is not sleeping well.  She reports that patient has had falls in the apartment, especially coming out of the shower.  MRI neuro quant supportive of Alzheimer's diagnosis.  Given his behavioral outbursts I believe patient has Alzheimer's dementia with behavioral disturbance.  I reviewed imaging with Samm and his home attendant. They are interested in starting him on medications.  I prescribed donepezil 5 mg nightly.  I also provided a letter to increase home attendant work hours given that he does not have anyone at night to supervise him while he takes his medications.  I will follow-up with him in 4 months.

## 2025-06-24 ENCOUNTER — HOSPITAL ENCOUNTER (EMERGENCY)
Facility: HOSPITAL | Age: 75
Discharge: HOME | End: 2025-06-24
Attending: EMERGENCY MEDICINE
Payer: COMMERCIAL

## 2025-06-24 ENCOUNTER — APPOINTMENT (EMERGENCY)
Dept: RADIOLOGY | Facility: HOSPITAL | Age: 75
End: 2025-06-24
Payer: COMMERCIAL

## 2025-06-24 VITALS
RESPIRATION RATE: 18 BRPM | SYSTOLIC BLOOD PRESSURE: 101 MMHG | OXYGEN SATURATION: 96 % | HEART RATE: 95 BPM | TEMPERATURE: 98.1 F | DIASTOLIC BLOOD PRESSURE: 63 MMHG

## 2025-06-24 DIAGNOSIS — F03.90 CHRONIC DEMENTIA (HCC): ICD-10-CM

## 2025-06-24 DIAGNOSIS — S09.90XA CHI (CLOSED HEAD INJURY), INITIAL ENCOUNTER: ICD-10-CM
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
## 2025-06-24 PROCEDURE — 99285 EMERGENCY DEPT VISIT HI MDM: CPT

## 2025-06-24 PROCEDURE — 70450 CT HEAD/BRAIN W/O DYE: CPT

## 2025-06-24 PROCEDURE — 72125 CT NECK SPINE W/O DYE: CPT

## 2025-06-24 PROCEDURE — 99284 EMERGENCY DEPT VISIT MOD MDM: CPT | Performed by: EMERGENCY MEDICINE

## 2025-06-24 NOTE — ED ATTENDING ATTESTATION
6/24/2025  I, Alvaro Valencia DO, saw and evaluated the patient. I have discussed the patient with the resident/non-physician practitioner and agree with the resident's/non-physician practitioner's findings, Plan of Care, and MDM as documented in the resident's/non-physician practitioner's note, except where noted. All available labs and Radiology studies were reviewed.  I was present for key portions of any procedure(s) performed by the resident/non-physician practitioner and I was immediately available to provide assistance.       At this point I agree with the current assessment done in the Emergency Department.  I have conducted an independent evaluation of this patient a history and physical is as follows:    Patient is a 74-year-old male with a history of dementia, coronary artery disease status post CABG, hypertension, hyperlipidemia, speaking interviewed with a  service.  Per EMS the patient has 24/7 home health care, at the staff says the patient will frequently roam around if he is not adequately supervised.  Patient says this morning he went to go talk to a  because people were not letting him sleep, EMS says staff said that the patient did not tell that he was leaving and they found him wandering and they called an ambulance.  The patient says he thinks he fell a couple times, thinks he fell while standing in the ambulance, right now has some mild head pain.  He denies chest pain, shortness of breath, no fevers, no chills, denies dysuria or hematuria.        General:  Patient is well-appearing  Head:  Atraumatic  Eyes:  Conjunctiva pink, Extraocular muscle intact, no periorbital ecchymosis, PERRL  ENT:  Mucous membranes are moist, no dental malocclusion, no craniofacial instability, no Dumont signs  Neck:  No midline tenderness or step-offs or deformities  Cardiac:  S1-S2, without murmurs  Lungs:  Clear to auscultation bilaterally, no clavicular tenderness on either side, no  chest wall tenderness, no crepitus, no flail segment, no paradoxical motion  Abdomen:  Soft, nontender, normal bowel sounds, no CVA tenderness, no tympany, no rigidity, no guarding  Extremities:  No bony tenderness to the bilateral bilateral humeral heads, humerus, elbows, radius, ulna, hands, hips, femurs, knees, tibia, fibula, feet. No pain with passive range of motion at the bilateral shoulders, elbows, wrists, hips, knees, or ankles.  Back: No midline thoracic, lumbar, sacral tenderness, deformities, or step-offs.  Neurologic:  Awake, fluent speech, oriented to person and place confused as far as time and recent events.  Strength is 5-5 in the bilateral arms and legs, no slurred speech, no facial droop, noticed on finger-nose testing or pronator drift.  Skin:  Pink warm and dry  Psychiatric:  Alert, pleasant, cooperative      ED Course     CT head without contrast   Final Result      No acute intracranial hemorrhage, significant mass effect or midline shift.                  Workstation performed: [id number]         CT spine cervical without contrast   Final Result      1.  No acute cervical spine fracture or traumatic malalignment.   2.  Redemonstrated C6-C7 posterior disc abnormality resulting in moderate spinal canal stenosis.                  Workstation performed: [id number]             On reassessment there is no change in the above findings.  Patient has mild dementia, at his baseline per discussion with caregivers, no sign of life-threatening intracranial hemorrhage or skull fracture or cervical spine fracture.  Physical lamination reveals no bony tenderness, do not believe additional imaging is indicated.    MEDICAL DECISION MAKING CODING    COLLECTION AND INTERPRETATION OF DATA  I reviewed prior external notes, including 6/18/2025 neurology office visit    I ordered each unique test  Tests reviewed personally by me:  Imaging: I independently interpreted the head CT and cervical spine CT as noted  above.            Critical Care Time  Procedures

## 2025-06-24 NOTE — ED PROVIDER NOTES
Time reflects when diagnosis was documented in both MDM as applicable and the Disposition within this note       Time User Action Codes Description Comment    6/24/2025  2:43 PM Antonina Goldman Add [W19.XXXA] Fall, initial encounter     6/24/2025  3:45 PM ErikAlvaro Add [F03.90] Chronic dementia (HCC)     6/24/2025  3:45 PM ErikAlvaro Add [S09.90XA] CHI (closed head injury), initial encounter           ED Disposition       ED Disposition   Discharge    Condition   Stable    Date/Time   Tue Jun 24, 2025  2:42 PM    Comment   Samm Zofia discharge to home/self care.                   Assessment & Plan       Medical Decision Making  Amount and/or Complexity of Data Reviewed  Radiology: ordered. Decision-making details documented in ED Course.        ED Course as of 06/26/25 1558   Tue Jun 24, 2025   1300 Patient seen and evaluated by me  DDx: Patient offers little and likely unreliable history, he did have a confirmed fall after discussion with the facility.  Little concern but will evaluate for intracranial traumatic injury, C-spine injury.  Patient does have a history of frequent falls and does not appear to have had any prodromal symptoms.  No indication for lab testing at this time.  Workup and plan: CT head, CT C-spine.  Plan to discharge back to home if trauma workup negative.     1415 CT head without contrast  IMPRESSION:     No acute intracranial hemorrhage, significant mass effect or midline shift.     1440 CT spine cervical without contrast  IMPRESSION:     1.  No acute cervical spine fracture or traumatic malalignment.  2.  Redemonstrated C6-C7 posterior disc abnormality resulting in moderate spinal canal stenosis.     1443 Patient discharged, however, unable to get in touch with facility or with patient's caretaker after several calls by myself and nursing staff.  Eventually, patient's neighbor showed up to take him home and states that the caretaker quit.  They are comfortable taking patient and patient  is comfortable along with neighbor.  Given return precautions and follow-up information.  They report understanding, all questions answered.   1710 Attempted to call facility twice with no answer.       Medications - No data to display    ED Risk Strat Scores                    No data recorded                            History of Present Illness       Chief Complaint   Patient presents with    Personal Problem     Per EMS, pt has 24/7 home health care. Per staff, pt can't be left alone without roaming. Didn't tell staff he was going outside and they called 911  Pt c/o head pressure and b/l leg weakness. Walks with a cane baseline        Past Medical History[1]   Past Surgical History[2]   Family History[3]   Social History[4]   E-Cigarette/Vaping    E-Cigarette Use Never User       E-Cigarette/Vaping Substances    Nicotine No     THC No     CBD No     Flavoring No     Other No     Unknown No       I have reviewed and agree with the history as documented.     Patient is a 74-year-old male, past medical history significant for CAD, hypertension, schizophrenia, hyperlipidemia, and Alzheimer's dementia presenting today for evaluation of a fall.  Patient lives at home where he has 24/7 home health care.  They state that he is unable to be left alone at home without roaming.  Caretaker was out of the room and patient went outside and did not tell anyone he was leaving.  Staff called 911, police were able to locate patient and bring him back to his house.  However, when home health aide returns to the home, she called back her agency and reported that patient had fell and needed to go to the hospital.  Patient reports he has fallen recently approximately 37 times.  He is unsure if he hit his head, but does endorse a headache.  He denies any nausea or vomiting.  He denies any otherwise pain or difficulty breathing.  He denies any neck pain or problems ranging his neck.  Patient does recall going outside today, he states  that he was on his way to find a  as his neighbors have not been letting him sleep.    Contacted facility who provided most of the history.  They are comfortable with patient returning back to his home after a trauma evaluation by emergency department staff.       used: Yes        Review of Systems   Unable to perform ROS: Dementia           Objective       ED Triage Vitals [06/24/25 1226]   Temperature Pulse Blood Pressure Respirations SpO2 Patient Position - Orthostatic VS   98.1 °F (36.7 °C) 87 122/70 16 95 % --      Temp Source Heart Rate Source BP Location FiO2 (%) Pain Score    Oral Monitor -- -- --      Vitals      Date and Time Temp Pulse SpO2 Resp BP Pain Score FACES Pain Rating User   06/24/25 1300 -- -- -- -- 101/63 -- --    06/24/25 1245 -- 95 96 % -- 106/63 -- --    06/24/25 1230 -- 101 96 % 18 115/62 -- --    06/24/25 1226 98.1 °F (36.7 °C) 87 95 % 16 122/70 -- --             Physical Exam  Vitals and nursing note reviewed.   Constitutional:       General: He is not in acute distress.     Appearance: Normal appearance. He is not ill-appearing or toxic-appearing.   HENT:      Head: Normocephalic and atraumatic.      Mouth/Throat:      Mouth: Mucous membranes are moist.     Eyes:      General: No scleral icterus.     Extraocular Movements: Extraocular movements intact.       Cardiovascular:      Rate and Rhythm: Normal rate and regular rhythm.      Pulses: Normal pulses.      Heart sounds: Normal heart sounds. No murmur heard.  Pulmonary:      Effort: Pulmonary effort is normal. No respiratory distress.      Breath sounds: Normal breath sounds. No wheezing or rhonchi.   Abdominal:      General: Abdomen is flat. There is no distension.      Palpations: Abdomen is soft.      Tenderness: There is no abdominal tenderness.     Musculoskeletal:         General: No swelling, tenderness, deformity or signs of injury. Normal range of motion.      Cervical back: Normal range of  motion and neck supple. No tenderness.     Skin:     General: Skin is warm.      Capillary Refill: Capillary refill takes less than 2 seconds.      Comments: No external signs of trauma     Neurological:      General: No focal deficit present.      Mental Status: He is alert.      Cranial Nerves: No cranial nerve deficit.      Sensory: No sensory deficit.      Motor: No weakness.      Gait: Gait normal.     Psychiatric:         Mood and Affect: Mood normal.         Behavior: Behavior normal.         Results Reviewed       None            CT head without contrast   Final Interpretation by Imtiaz Rincon MD (06/24 9636)      No acute intracranial hemorrhage, significant mass effect or midline shift.                  Workstation performed: [id number]         CT spine cervical without contrast   Final Interpretation by Imtiaz Rincon MD (06/24 8775)      1.  No acute cervical spine fracture or traumatic malalignment.   2.  Redemonstrated C6-C7 posterior disc abnormality resulting in moderate spinal canal stenosis.                  Workstation performed: [id number]             Procedures    ED Medication and Procedure Management   Prior to Admission Medications   Prescriptions Last Dose Informant Patient Reported? Taking?   Diclofenac Sodium (VOLTAREN) 1 %  Self, Outside Facility (Specify) Yes No   FT Sleep Aid, Doxylamine, 25 MG tablet  Self, Outside Facility (Specify) Yes No   Multiple Vitamin (Daily-Uday) TABS  Self, Outside Facility (Specify) Yes No   Multiple Vitamin (Tab-A-Uday) TABS  Self, Outside Facility (Specify) Yes No   Multiple Vitamins-Minerals (multivitamin with minerals) tablet  Outside Facility (Specify), Self Yes No   Sig: Take 1 tablet by mouth in the morning.   Symbicort 160-4.5 MCG/ACT inhaler  Self, Outside Facility (Specify) Yes No   Symbicort 160-4.5 MCG/ACT inhaler  Self, Outside Facility (Specify) Yes No   acetaminophen (TYLENOL) 650 mg CR tablet  Self, Outside Facility (Specify) Yes No    Sig: Take 650 mg by mouth every 8 (eight) hours as needed   albuterol (PROVENTIL HFA,VENTOLIN HFA) 90 mcg/act inhaler  Self, Outside Facility (Specify) Yes No   amLODIPine (NORVASC) 2.5 mg tablet  Self, Outside Facility (Specify) No No   Sig: Take 1 tablet (2.5 mg total) by mouth daily   aspirin (ECOTRIN LOW STRENGTH) 81 mg EC tablet  Outside Facility (Specify), Self No No   Sig: Take 1 tablet (81 mg total) by mouth daily Do not start before July 8, 2023.   atorvastatin (LIPITOR) 80 mg tablet  Outside Facility (Specify), Self No No   Sig: Take 1 tablet (80 mg total) by mouth every evening   azelastine (OPTIVAR) 0.05 % ophthalmic solution  Self, Outside Facility (Specify) Yes No   azelastine (OPTIVAR) 0.05 % ophthalmic solution  Self, Outside Facility (Specify) Yes No   caffeine 200 mg tablet  Self, Outside Facility (Specify) Yes No   ciprofloxacin (CIPRO) 500 mg tablet  Self, Outside Facility (Specify) Yes No   donepezil (ARICEPT) 5 mg tablet   No No   Sig: Take 1 tablet (5 mg total) by mouth daily at bedtime   famotidine (PEPCID) 20 mg tablet  Outside Facility (Specify), Self No No   Sig: Take 1 tablet (20 mg total) by mouth 2 (two) times a day   finasteride (PROSCAR) 5 mg tablet  Outside Facility (Specify), Self Yes No   Sig: Take 5 mg by mouth in the morning.   fluticasone (FLONASE) 50 mcg/act nasal spray  Self, Outside Facility (Specify) Yes No   Sig: SPRAY 1 SPRAY EVERY DAY BY INTRANASAL ROUTE AT BEDTIME FOR 30 DAYS.   hydrOXYzine HCL (ATARAX) 25 mg tablet  Outside Facility (Specify), Self Yes No   Sig: Take 25 mg by mouth in the morning and 25 mg in the evening.   lidocaine (LIDODERM) 5 %  Self, Outside Facility (Specify) Yes No   lisinopril (ZESTRIL) 5 mg tablet  Self, Outside Facility (Specify) Yes No   Sig: TAKE 1 TABLET BY MOUTH DAILY TO PROTECT KIDNEYS AND LOWER BLOOD PRESSURE.   lisinopril (ZESTRIL) 5 mg tablet  Self, Outside Facility (Specify) Yes No   Sig: TAKE 1 TABLET BY MOUTH DAILY TO PROTECT  KIDNEYS AND LOWER BLOOD PRESSURE.   magnesium Oxide (MAG-OX) 400 mg TABS  Self, Outside Facility (Specify) No No   Sig: Take 1 tablet (400 mg total) by mouth daily   magnesium hydroxide (MILK OF MAGNESIA) 400 mg/5 mL oral suspension  Outside Facility (Specify), Self Yes No   Sig: Take 5 mL by mouth if needed for constipation   metoprolol tartrate (LOPRESSOR) 25 mg tablet  Outside Facility (Specify), Self Yes No   Sig: Take 25 mg by mouth in the morning. Hold for SBP <100 or HR <60.   nicotine (NICODERM CQ) 21 mg/24 hr TD 24 hr patch  Outside Facility (Specify), Self No No   Sig: Place 1 patch on the skin over 24 hours daily Do not start before July 1, 2023.   nitrofurantoin (MACROBID) 100 mg capsule  Self, Outside Facility (Specify) Yes No   ondansetron (ZOFRAN) 4 mg tablet  Self, Outside Facility (Specify) No No   Sig: Take 1 tablet (4 mg total) by mouth every 6 (six) hours   pregabalin (LYRICA) 150 mg capsule  Self, Outside Facility (Specify) Yes No   Sig: Take 150 mg by mouth in the morning.   ranolazine (RANEXA) 500 mg 12 hr tablet  Outside Facility (Specify), Self No No   Sig: Take 1 tablet (500 mg total) by mouth every 12 (twelve) hours   risperiDONE (RisperDAL) 0.5 mg tablet  Self, Outside Facility (Specify) Yes No   sertraline (ZOLOFT) 100 mg tablet  Outside Facility (Specify), Self Yes No   Sig: Take 100 mg by mouth daily at bedtime   zinc gluconate 50 mg tablet  Outside Facility (Specify), Self Yes No   Sig: Take 50 mg by mouth in the morning.      Facility-Administered Medications: None     Discharge Medication List as of 6/24/2025  2:43 PM        CONTINUE these medications which have NOT CHANGED    Details   acetaminophen (TYLENOL) 650 mg CR tablet Take 650 mg by mouth every 8 (eight) hours as needed, Starting Tue 2/4/2025, Historical Med      albuterol (PROVENTIL HFA,VENTOLIN HFA) 90 mcg/act inhaler Historical Med      amLODIPine (NORVASC) 2.5 mg tablet Take 1 tablet (2.5 mg total) by mouth daily,  Starting Thu 2/13/2025, Normal      aspirin (ECOTRIN LOW STRENGTH) 81 mg EC tablet Take 1 tablet (81 mg total) by mouth daily Do not start before July 8, 2023., Starting Sat 7/8/2023, Until Wed 6/18/2025, No Print      atorvastatin (LIPITOR) 80 mg tablet Take 1 tablet (80 mg total) by mouth every evening, Starting Fri 6/30/2023, Until Wed 6/18/2025, No Print      !! azelastine (OPTIVAR) 0.05 % ophthalmic solution Historical Med      !! azelastine (OPTIVAR) 0.05 % ophthalmic solution Historical Med      caffeine 200 mg tablet Historical Med      ciprofloxacin (CIPRO) 500 mg tablet Historical Med      Diclofenac Sodium (VOLTAREN) 1 % Historical Med      donepezil (ARICEPT) 5 mg tablet Take 1 tablet (5 mg total) by mouth daily at bedtime, Starting Wed 6/18/2025, Normal      famotidine (PEPCID) 20 mg tablet Take 1 tablet (20 mg total) by mouth 2 (two) times a day, Starting Fri 6/30/2023, Until Wed 6/18/2025, No Print      finasteride (PROSCAR) 5 mg tablet Take 5 mg by mouth in the morning., Starting Sun 5/7/2023, Historical Med      fluticasone (FLONASE) 50 mcg/act nasal spray SPRAY 1 SPRAY EVERY DAY BY INTRANASAL ROUTE AT BEDTIME FOR 30 DAYS., Historical Med      FT Sleep Aid, Doxylamine, 25 MG tablet Historical Med      hydrOXYzine HCL (ATARAX) 25 mg tablet Take 25 mg by mouth in the morning and 25 mg in the evening., Historical Med      lidocaine (LIDODERM) 5 % Historical Med      !! lisinopril (ZESTRIL) 5 mg tablet TAKE 1 TABLET BY MOUTH DAILY TO PROTECT KIDNEYS AND LOWER BLOOD PRESSURE., Historical Med      !! lisinopril (ZESTRIL) 5 mg tablet TAKE 1 TABLET BY MOUTH DAILY TO PROTECT KIDNEYS AND LOWER BLOOD PRESSURE., Historical Med      magnesium hydroxide (MILK OF MAGNESIA) 400 mg/5 mL oral suspension Take 5 mL by mouth if needed for constipation, Historical Med      magnesium Oxide (MAG-OX) 400 mg TABS Take 1 tablet (400 mg total) by mouth daily, Starting Tue 10/24/2023, Normal      metoprolol tartrate (LOPRESSOR)  25 mg tablet Take 25 mg by mouth in the morning. Hold for SBP <100 or HR <60., Historical Med      !! Multiple Vitamin (Daily-Uday) TABS Historical Med      !! Multiple Vitamin (Tab-A-Uday) TABS Historical Med      Multiple Vitamins-Minerals (multivitamin with minerals) tablet Take 1 tablet by mouth in the morning., Historical Med      nicotine (NICODERM CQ) 21 mg/24 hr TD 24 hr patch Place 1 patch on the skin over 24 hours daily Do not start before July 1, 2023., Starting Sat 7/1/2023, No Print      nitrofurantoin (MACROBID) 100 mg capsule Historical Med      ondansetron (ZOFRAN) 4 mg tablet Take 1 tablet (4 mg total) by mouth every 6 (six) hours, Starting Wed 12/25/2024, Normal      pregabalin (LYRICA) 150 mg capsule Take 150 mg by mouth in the morning., Historical Med      ranolazine (RANEXA) 500 mg 12 hr tablet Take 1 tablet (500 mg total) by mouth every 12 (twelve) hours, Starting Fri 6/30/2023, Until Mon 2/10/2025, No Print      risperiDONE (RisperDAL) 0.5 mg tablet Historical Med      sertraline (ZOLOFT) 100 mg tablet Take 100 mg by mouth daily at bedtime, Starting Wed 3/8/2023, Historical Med      !! Symbicort 160-4.5 MCG/ACT inhaler Historical Med      !! Symbicort 160-4.5 MCG/ACT inhaler Historical Med      zinc gluconate 50 mg tablet Take 50 mg by mouth in the morning., Historical Med       !! - Potential duplicate medications found. Please discuss with provider.        No discharge procedures on file.  ED SEPSIS DOCUMENTATION   Time reflects when diagnosis was documented in both MDM as applicable and the Disposition within this note       Time User Action Codes Description Comment    6/24/2025  2:43 PM Antonina Goldman [W19.XXXA] Fall, initial encounter     6/24/2025  3:45 PM Alvaro Valencia [F03.90] Chronic dementia (HCC)     6/24/2025  3:45 PM Alvaro Valencia [S09.90XA] CHI (closed head injury), initial encounter                    Antonina Goldman MD  06/26/25 3489         [1]   Past Medical  History:  Diagnosis Date    BPH (benign prostatic hyperplasia)     Coronary artery disease     HTN (hypertension)     Hx pulmonary embolism     Hyperlipidemia     Hypertension     Prediabetes     Tobacco use    [2]   Past Surgical History:  Procedure Laterality Date    CORONARY ARTERY BYPASS GRAFT      DECOMPRESSION SPINE LUMBAR POSTERIOR Bilateral 6/23/2023    Procedure: L3-4 and L4-5 laminectomy, bilateral foraminotomy decompresion;  Surgeon: Elijah Dover MD;  Location: [facility];  Service: Neurosurgery    UT COLONOSCOPY FLX DX W/COLLJ SPEC WHEN PFRMD N/A 4/6/2017    Procedure: COLONOSCOPY;  Surgeon: Carlos Vega MD;  Location: [facility];  Service: Gastroenterology   [3]   Family History  Problem Relation Name Age of Onset    Diabetes Mother      Diabetes Father     [4]   Social History  Tobacco Use    Smoking status: Every Day     Current packs/day: 0.50     Average packs/day: 0.5 packs/day for 56.0 years (28.0 ttl pk-yrs)     Types: Cigarettes    Smokeless tobacco: Never    Tobacco comments:     started when Pt was 10 years old.  Pt smokes 1/2 to 1 pack a day when stressed   Vaping Use    Vaping status: Never Used   Substance Use Topics    Alcohol use: No    Drug use: No        Antonina Goldman MD  06/26/25 4619

## 2025-06-24 NOTE — DISCHARGE INSTRUCTIONS
You have been seen in the emergency department for evaluation of a fall. Your workup was normal. Please follow up with your PCP regarding today's visit and ongoing evaluation.

## 2025-06-26 ENCOUNTER — NURSE TRIAGE (OUTPATIENT)
Age: 75
End: 2025-06-26

## 2025-06-26 ENCOUNTER — TELEPHONE (OUTPATIENT)
Age: 75
End: 2025-06-26

## 2025-06-26 NOTE — TELEPHONE ENCOUNTER
Regarding: new aggresive behavior/ Alzheimers disease  ----- Message from Cathy ROJAS sent at 6/26/2025 12:24 PM EDT -----  Danielle called in and wanted to report pt new aggressive behavior. Pt is in nursing and verbally abuses staff and screams at care giver Candis Alvarado .   Candis is requesting a call back to report pt new concerning symptoms.  Pt does suffer from Alzheimers.   Please call [phone] to speak to Candis as she is his care giver.

## 2025-06-26 NOTE — TELEPHONE ENCOUNTER
Called Candis re: below. She asked for . Advised Candis that I will call her back w/ . She verbalized understanding.     Called Candis using a  [employer] #[id number] and left a message on Candis's answering machine for a call back    Dr Marshall, pls see below. Can't get additional info as I can't get a hold of the caregiver

## 2025-06-26 NOTE — TELEPHONE ENCOUNTER
Danielle called in and wanted to report pt new aggressive behavior. Pt is in nursing Home and verbally abuses staff and screams at care giver Candis Alvarado .   Candis is requesting a call back to report pt new concerning symptoms.  Pt does suffer from Alzheimers.   Please call [phone] to speak to Candis as she is his care giver.   Danielle said the police had to be called due to fear of pt becoming combative.     Call hub message sent for triage.     Pt is now R/s sooner 7/30/25 at 8 am with Dr. Rolando Dong.  Danielle wanted pt to be seen sooner due to his worsening aggression and feels the medications needs to be adjusted.

## 2025-06-26 NOTE — TELEPHONE ENCOUNTER
Called [phone] and left a message on [employer] 's answering machine for a call back  Reason for Disposition  • Message left on identified voicemail    Protocols used: No Contact or Duplicate Contact Call-Adult-[place]

## 2025-06-27 DIAGNOSIS — F02.818 ALZHEIMER'S DEMENTIA WITH BEHAVIORAL DISTURBANCE (HCC): Primary | ICD-10-CM

## 2025-06-27 DIAGNOSIS — G30.9 ALZHEIMER'S DEMENTIA WITH BEHAVIORAL DISTURBANCE (HCC): Primary | ICD-10-CM

## 2025-06-27 DIAGNOSIS — F20.9 SCHIZOPHRENIA (HCC): ICD-10-CM

## 2025-06-27 RX ORDER — RISPERIDONE 0.5 MG/1
0.5 TABLET ORAL 2 TIMES DAILY
Qty: 60 TABLET | Refills: 6 | Status: SHIPPED | OUTPATIENT
Start: 2025-06-27

## 2025-06-27 NOTE — TELEPHONE ENCOUNTER
Called Candis ([phone]) using  [employer] #[id number] and left a message on Candis's answering machine for a call back    Dr Marshall, can't get a hold of the caregiver Candis

## 2025-06-27 NOTE — TELEPHONE ENCOUNTER
Arpita Raymundo MD to Me        6/27/25 10:02 AM  Please call them back and tell them to increase the risperidone to two a day. I sent a refill to his pharmacy with 60 tabs.

## 2025-07-02 NOTE — TELEPHONE ENCOUNTER
Arpita Raymundo MD       6/27/25 10:02 AM  Please call them back and tell them to increase the risperidone to two a day. I sent a refill to his pharmacy with 60 tabs.    __________________     Jaquan # [id number]    I read provider's note to Candis . She understood and had no other questions at this time.

## 2025-07-30 ENCOUNTER — OFFICE VISIT (OUTPATIENT)
Dept: NEUROLOGY | Facility: CLINIC | Age: 75
End: 2025-07-30
Payer: COMMERCIAL

## 2025-07-30 VITALS
WEIGHT: 158 LBS | DIASTOLIC BLOOD PRESSURE: 60 MMHG | TEMPERATURE: 98.2 F | OXYGEN SATURATION: 97 % | SYSTOLIC BLOOD PRESSURE: 112 MMHG | HEIGHT: 69 IN | HEART RATE: 88 BPM | BODY MASS INDEX: 23.4 KG/M2

## 2025-07-30 DIAGNOSIS — G30.9 ALZHEIMER'S DEMENTIA WITH BEHAVIORAL DISTURBANCE (HCC): Primary | ICD-10-CM

## 2025-07-30 DIAGNOSIS — F02.818 ALZHEIMER'S DEMENTIA WITH BEHAVIORAL DISTURBANCE (HCC): Primary | ICD-10-CM

## 2025-07-30 PROCEDURE — 99213 OFFICE O/P EST LOW 20 MIN: CPT | Performed by: PSYCHIATRY & NEUROLOGY

## 2025-07-31 ENCOUNTER — TELEPHONE (OUTPATIENT)
Age: 75
End: 2025-07-31

## 2025-08-05 DIAGNOSIS — F02.80 ALZHEIMER'S DEMENTIA (HCC): Primary | ICD-10-CM

## 2025-08-05 DIAGNOSIS — G30.9 ALZHEIMER'S DEMENTIA (HCC): Primary | ICD-10-CM

## 2025-08-05 RX ORDER — RIVASTIGMINE 4.6 MG/24H
1 PATCH, EXTENDED RELEASE TRANSDERMAL DAILY
Qty: 30 PATCH | Refills: 4 | Status: SHIPPED | OUTPATIENT
Start: 2025-08-05

## 2025-08-06 ENCOUNTER — TELEPHONE (OUTPATIENT)
Age: 75
End: 2025-08-06

## (undated) DEVICE — GLOVE INDICATOR PI UNDERGLOVE SZ 7.5 BLUE

## (undated) DEVICE — ADHESIVE SKIN HIGH VISCOSITY EXOFIN 1ML

## (undated) DEVICE — SKIN MARKER DUAL TIP WITH RULER CAP, FLEXIBLE RULER AND LABELS: Brand: DEVON

## (undated) DEVICE — SINGLE-USE BIOPSY FORCEPS: Brand: RADIAL JAW 4

## (undated) DEVICE — NEEDLE 25G X 1 1/2

## (undated) DEVICE — GAUZE SPONGES,16 PLY: Brand: CURITY

## (undated) DEVICE — ELECTRODE BLADE MOD E-Z CLEAN 4IN -0014AM

## (undated) DEVICE — GLOVE SRG BIOGEL 7.5

## (undated) DEVICE — SYRINGE 3ML LL

## (undated) DEVICE — SUT STRATAFIX SPIRAL MONOCRYL PLUS 4-0 PS-2 45CM SXMP1B118

## (undated) DEVICE — NEEDLE SPINAL18G X 3.5 IN QUINCKE

## (undated) DEVICE — BETHLEHEM UNIVERSAL SPINE, KIT: Brand: CARDINAL HEALTH

## (undated) DEVICE — INTENDED FOR TISSUE SEPARATION, AND OTHER PROCEDURES THAT REQUIRE A SHARP SURGICAL BLADE TO PUNCTURE OR CUT.: Brand: BARD-PARKER ® CARBON RIB-BACK BLADES

## (undated) DEVICE — HEMOSTATIC MATRIX SURGIFLO 8ML W/THROMBIN

## (undated) DEVICE — TOOL MR8-15BA50 MR8 15CM BALL 5MM: Brand: MIDAS REX MR8

## (undated) DEVICE — LIGHT HANDLE COVER SLEEVE DISP BLUE STELLAR

## (undated) DEVICE — SNAP KOVER: Brand: UNBRANDED

## (undated) DEVICE — SYRINGE 10ML LL

## (undated) DEVICE — POV-IOD SOLUTION 4OZ BT

## (undated) DEVICE — ANTIBACTERIAL VIOLET BRAIDED (POLYGLACTIN 910), SYNTHETIC ABSORBABLE SUTURE: Brand: COATED VICRYL

## (undated) DEVICE — DRESSING MEPILEX AG BORDER POST-OP 4 X 6 IN

## (undated) DEVICE — PENCIL ELECTROSURG E-Z CLEAN -0035H

## (undated) DEVICE — DRESSING MEPILEX AG BORDER 4 X 4 IN

## (undated) DEVICE — Device

## (undated) DEVICE — DISPOSABLE EQUIPMENT COVER: Brand: SMALL TOWEL DRAPE

## (undated) DEVICE — MINOR PROCEDURE DRAPE: Brand: CONVERTORS

## (undated) DEVICE — PREMIUM DRY TRAY LF: Brand: MEDLINE INDUSTRIES, INC.

## (undated) DEVICE — CHLORAPREP HI-LITE 26ML ORANGE

## (undated) DEVICE — NEEDLE 18 G X 1 1/2 SAFETY

## (undated) DEVICE — INTENDED FOR TISSUE SEPARATION, AND OTHER PROCEDURES THAT REQUIRE A SHARP SURGICAL BLADE TO PUNCTURE OR CUT.: Brand: BARD-PARKER SAFETY BLADES SIZE 10, STERILE

## (undated) DEVICE — SPONGE SCRUB 4 PCT CHLORHEXIDINE